# Patient Record
Sex: MALE | Race: WHITE | NOT HISPANIC OR LATINO | URBAN - METROPOLITAN AREA
[De-identification: names, ages, dates, MRNs, and addresses within clinical notes are randomized per-mention and may not be internally consistent; named-entity substitution may affect disease eponyms.]

---

## 2018-01-08 ENCOUNTER — INPATIENT (INPATIENT)
Facility: HOSPITAL | Age: 83
LOS: 11 days | Discharge: HOME | DRG: 244 | End: 2018-01-20
Attending: INTERNAL MEDICINE | Admitting: FAMILY MEDICINE
Payer: MEDICARE

## 2018-01-08 VITALS
HEART RATE: 110 BPM | SYSTOLIC BLOOD PRESSURE: 135 MMHG | TEMPERATURE: 98 F | HEIGHT: 67 IN | DIASTOLIC BLOOD PRESSURE: 67 MMHG | OXYGEN SATURATION: 95 % | RESPIRATION RATE: 24 BRPM | WEIGHT: 149.91 LBS

## 2018-01-08 DIAGNOSIS — R55 SYNCOPE AND COLLAPSE: ICD-10-CM

## 2018-01-08 LAB
ALBUMIN SERPL ELPH-MCNC: 3.6 G/DL — SIGNIFICANT CHANGE UP (ref 3.3–5.2)
ALP SERPL-CCNC: 82 U/L — SIGNIFICANT CHANGE UP (ref 40–120)
ALT FLD-CCNC: 32 U/L — SIGNIFICANT CHANGE UP
ANION GAP SERPL CALC-SCNC: 18 MMOL/L — HIGH (ref 5–17)
ANISOCYTOSIS BLD QL: SLIGHT — SIGNIFICANT CHANGE UP
APPEARANCE UR: CLEAR — SIGNIFICANT CHANGE UP
APTT BLD: 27 SEC — LOW (ref 27.5–37.4)
AST SERPL-CCNC: 32 U/L — SIGNIFICANT CHANGE UP
BILIRUB SERPL-MCNC: 0.5 MG/DL — SIGNIFICANT CHANGE UP (ref 0.4–2)
BILIRUB UR-MCNC: NEGATIVE — SIGNIFICANT CHANGE UP
BUN SERPL-MCNC: 28 MG/DL — HIGH (ref 8–20)
CALCIUM SERPL-MCNC: 9 MG/DL — SIGNIFICANT CHANGE UP (ref 8.6–10.2)
CHLORIDE SERPL-SCNC: 100 MMOL/L — SIGNIFICANT CHANGE UP (ref 98–107)
CO2 SERPL-SCNC: 19 MMOL/L — LOW (ref 22–29)
COLOR SPEC: YELLOW — SIGNIFICANT CHANGE UP
CREAT SERPL-MCNC: 1.02 MG/DL — SIGNIFICANT CHANGE UP (ref 0.5–1.3)
DIFF PNL FLD: ABNORMAL
EPI CELLS # UR: SIGNIFICANT CHANGE UP
GLUCOSE SERPL-MCNC: 112 MG/DL — SIGNIFICANT CHANGE UP (ref 70–115)
GLUCOSE UR QL: NEGATIVE MG/DL — SIGNIFICANT CHANGE UP
HCT VFR BLD CALC: 36.9 % — LOW (ref 42–52)
HGB BLD-MCNC: 12.5 G/DL — LOW (ref 14–18)
INR BLD: 1.2 RATIO — HIGH (ref 0.88–1.16)
KETONES UR-MCNC: NEGATIVE — SIGNIFICANT CHANGE UP
LEUKOCYTE ESTERASE UR-ACNC: NEGATIVE — SIGNIFICANT CHANGE UP
LYMPHOCYTES # BLD AUTO: 5 % — LOW (ref 20–55)
MACROCYTES BLD QL: SLIGHT — SIGNIFICANT CHANGE UP
MCHC RBC-ENTMCNC: 31.4 PG — HIGH (ref 27–31)
MCHC RBC-ENTMCNC: 33.9 G/DL — SIGNIFICANT CHANGE UP (ref 32–36)
MCV RBC AUTO: 92.7 FL — SIGNIFICANT CHANGE UP (ref 80–94)
MICROCYTES BLD QL: SLIGHT — SIGNIFICANT CHANGE UP
MONOCYTES NFR BLD AUTO: 12 % — HIGH (ref 3–10)
NEUTROPHILS NFR BLD AUTO: 83 % — HIGH (ref 37–73)
NITRITE UR-MCNC: NEGATIVE — SIGNIFICANT CHANGE UP
NT-PROBNP SERPL-SCNC: 781 PG/ML — HIGH (ref 0–300)
OVALOCYTES BLD QL SMEAR: SLIGHT — SIGNIFICANT CHANGE UP
PH UR: 6 — SIGNIFICANT CHANGE UP (ref 5–8)
PLAT MORPH BLD: NORMAL — SIGNIFICANT CHANGE UP
PLATELET # BLD AUTO: 239 K/UL — SIGNIFICANT CHANGE UP (ref 150–400)
POIKILOCYTOSIS BLD QL AUTO: SLIGHT — SIGNIFICANT CHANGE UP
POTASSIUM SERPL-MCNC: 4 MMOL/L — SIGNIFICANT CHANGE UP (ref 3.5–5.3)
POTASSIUM SERPL-SCNC: 4 MMOL/L — SIGNIFICANT CHANGE UP (ref 3.5–5.3)
PROT SERPL-MCNC: 7.1 G/DL — SIGNIFICANT CHANGE UP (ref 6.6–8.7)
PROT UR-MCNC: NEGATIVE MG/DL — SIGNIFICANT CHANGE UP
PROTHROM AB SERPL-ACNC: 13.3 SEC — HIGH (ref 9.8–12.7)
RBC # BLD: 3.98 M/UL — LOW (ref 4.6–6.2)
RBC # FLD: 13 % — SIGNIFICANT CHANGE UP (ref 11–15.6)
RBC BLD AUTO: ABNORMAL
RBC CASTS # UR COMP ASSIST: SIGNIFICANT CHANGE UP /HPF (ref 0–4)
SODIUM SERPL-SCNC: 137 MMOL/L — SIGNIFICANT CHANGE UP (ref 135–145)
SP GR SPEC: 1.01 — SIGNIFICANT CHANGE UP (ref 1.01–1.02)
UROBILINOGEN FLD QL: NEGATIVE MG/DL — SIGNIFICANT CHANGE UP
WBC # BLD: 10.8 K/UL — SIGNIFICANT CHANGE UP (ref 4.8–10.8)
WBC # FLD AUTO: 10.8 K/UL — SIGNIFICANT CHANGE UP (ref 4.8–10.8)
WBC UR QL: NEGATIVE — SIGNIFICANT CHANGE UP

## 2018-01-08 PROCEDURE — 93010 ELECTROCARDIOGRAM REPORT: CPT

## 2018-01-08 PROCEDURE — 71045 X-RAY EXAM CHEST 1 VIEW: CPT | Mod: 26

## 2018-01-08 PROCEDURE — 99223 1ST HOSP IP/OBS HIGH 75: CPT

## 2018-01-08 PROCEDURE — 99285 EMERGENCY DEPT VISIT HI MDM: CPT

## 2018-01-08 RX ORDER — SODIUM CHLORIDE 9 MG/ML
1000 INJECTION INTRAMUSCULAR; INTRAVENOUS; SUBCUTANEOUS ONCE
Qty: 0 | Refills: 0 | Status: COMPLETED | OUTPATIENT
Start: 2018-01-08 | End: 2018-01-08

## 2018-01-08 RX ORDER — IPRATROPIUM/ALBUTEROL SULFATE 18-103MCG
3 AEROSOL WITH ADAPTER (GRAM) INHALATION ONCE
Qty: 0 | Refills: 0 | Status: COMPLETED | OUTPATIENT
Start: 2018-01-08 | End: 2018-01-08

## 2018-01-08 RX ORDER — METOPROLOL TARTRATE 50 MG
5 TABLET ORAL ONCE
Qty: 0 | Refills: 0 | Status: COMPLETED | OUTPATIENT
Start: 2018-01-08 | End: 2018-01-08

## 2018-01-08 RX ORDER — SODIUM CHLORIDE 9 MG/ML
1000 INJECTION INTRAMUSCULAR; INTRAVENOUS; SUBCUTANEOUS
Qty: 0 | Refills: 0 | Status: DISCONTINUED | OUTPATIENT
Start: 2018-01-08 | End: 2018-01-11

## 2018-01-08 RX ORDER — ENOXAPARIN SODIUM 100 MG/ML
68 INJECTION SUBCUTANEOUS ONCE
Qty: 0 | Refills: 0 | Status: COMPLETED | OUTPATIENT
Start: 2018-01-08 | End: 2018-01-09

## 2018-01-08 RX ORDER — METOPROLOL TARTRATE 50 MG
25 TABLET ORAL EVERY 8 HOURS
Qty: 0 | Refills: 0 | Status: DISCONTINUED | OUTPATIENT
Start: 2018-01-08 | End: 2018-01-10

## 2018-01-08 RX ADMIN — Medication 3 MILLILITER(S): at 20:33

## 2018-01-08 RX ADMIN — SODIUM CHLORIDE 1000 MILLILITER(S): 9 INJECTION INTRAMUSCULAR; INTRAVENOUS; SUBCUTANEOUS at 20:33

## 2018-01-08 RX ADMIN — SODIUM CHLORIDE 1000 MILLILITER(S): 9 INJECTION INTRAMUSCULAR; INTRAVENOUS; SUBCUTANEOUS at 22:30

## 2018-01-08 RX ADMIN — Medication 5 MILLIGRAM(S): at 20:33

## 2018-01-08 NOTE — ED ADULT NURSE NOTE - PMH
CAD (coronary artery disease)    HTN (hypertension)    Hyperlipidemia    MI (myocardial infarction)  1988  Stented coronary artery

## 2018-01-08 NOTE — ED PROVIDER NOTE - MEDICAL DECISION MAKING DETAILS
Multiple episodes of syncope, 1 provoked 1 at rest. Suspect possibly from Afib w/RVR as has not taken meds today. Will place on tele, get labs, CXR. Pt requesting xfer to [facility], will reassess after assessment ot see if patient still wants transfer or complete workup here. Given age, multiple syncopal episodes in a day, think will at least need to be monitored overnight, but will defer to [facility]'s asessment. Atraumatic exam. Not anticoagulated, though unclear why. Multiple episodes of syncope, 1 provoked 1 at rest. Suspect possibly from Afib w/RVR as has not taken meds today. Will place on tele, get labs, CXR. Pt requesting xfer to [facility], will reassess after assessment ot see if patient still wants transfer or complete workup here. Given age, multiple syncopal episodes in a day, think will at least need to be monitored overnight, but will defer to [facility] cardiology's asessment. Atraumatic exam. Not anticoagulated, though unclear why.

## 2018-01-08 NOTE — ED PROVIDER NOTE - PHYSICAL EXAMINATION
GEN: well appearing, nontoxic  HEAD: NCAT  EYES: clear  ENT: mucus membranes are dry, oropharynx WNL, neck supple  CV: tachycardic, irregular, 2+ distal pulses  RESP: scant expiratory wheezing bialterally, no focal aventitious breath sounds, no tachypnea, no increased WOB  ABD: soft, nontender  MSK: pelvist stable, extremities nontender, symmetric, fully ranged painlessly. no neck or back tenderness  NEURO: CNII-XII intact, strength intact, sensation intact, coordination intact  SKIN: warm, dry, no bruising

## 2018-01-08 NOTE — ED PROVIDER NOTE - OBJECTIVE STATEMENT
This is an 83M w/Hx of CAD, paroxysmal Afib (not on AC and no "episodes" for years) who presents for 2 episodes of syncope. He has been feeling mildly ill as he is recovering from "a flu" he had for the last week and his wife had the week before, which he describes as runny nose, sore throat, dry cough, mild body aches, no fevers, no sputum, no SOB. Today he syncopized twice. Once when he stood up quickly to go to the bathroom and fell backwards onto a padded chair. The second time he was seated rest and not changing position. Both episodes lasted somewhere between 1-3 minutes per patient's wife. Both episodes were preceded by palpitations and feeling flushed. he denies any associated SOB, pain. He denies any pain or injuries This is an 83M w/Hx of CAD, paroxysmal Afib (not on AC and no "episodes" for years) who presents for 2 episodes of syncope. He has been feeling mildly ill as he is recovering from "a flu" he had for the last week and his wife had the week before, which he describes as runny nose, sore throat, dry cough, mild body aches, no fevers, no sputum, no SOB. Today he syncopized twice. Once when he stood up quickly to go to the bathroom and fell backwards onto the a chair. The second time he was seated rest and not changing position. Both episodes lasted somewhere between 1-3 minutes per patient's wife. Both episodes were preceded by palpitations and feeling flushed. he denies any associated SOB, pain. He denies any pain or injuries

## 2018-01-08 NOTE — CONSULT NOTE ADULT - SUBJECTIVE AND OBJECTIVE BOX
[facility], [facility]                                          [address], [address], [place] [address]                                               PHONE: [phone]    FAX: [phone] [address], [address], [place] [address]                                       PHONE: [phone]    FAX: [phone]  *******************************************************************************    Reason for Consult: Syncope    HPI:  TATIANA RIBERA is a 83y Male with HTN, HLD, CAD/MI/stents presents with syncope x 2 today.  Pt was at rest, sat up with palpitations and dyspnea, then "passed out."  Second time patient was walking to the bathroom, felt palpitations, dyspnea, and then "passed out."  No significant trauma.  In ER found to be rapid AF, given IV BB with improvement in HR.  Denies CP, orthopnea, PND, or edema.  History of AF ~3 years ago, not maintained on AC.  Recent prolonged viral illness.    PAST MEDICAL & SURGICAL HISTORY:  MI (myocardial infarction): 1988  Stented coronary artery  CAD (coronary artery disease)  Hyperlipidemia  HTN (hypertension)  History of inguinal hernia repair  History of hemorrhoidectomy      Lipitor (Muscle Pain)  Plavix (Other)      MEDICATIONS  (STANDING):  sodium chloride 0.9% Bolus 1000 milliLiter(s) IV Bolus once    MEDICATIONS  (PRN):      Social History: no active tobacco / EtOH / IVDA    Family History: noncontributory    ROS: As noted above, otherwise unremarkable.    Vital Signs Last 24 Hrs  T(C): 36.3 (08 Jan 2018 20:10), Max: 36.6 (08 Jan 2018 18:14)  T(F): 97.4 (08 Jan 2018 20:10), Max: 97.9 (08 Jan 2018 18:14)  HR: 115 (08 Jan 2018 20:10) (98 - 115)  BP: 152/74 (08 Jan 2018 20:10) (131/75 - 152/74)  BP(mean): --  RR: 21 (08 Jan 2018 20:10) (20 - 24)  SpO2: 97% (08 Jan 2018 20:10) (95% - 97%)    I&O's Detail    I&O's Summary          PHYSICAL EXAM:  General: Appears well developed, well nourished, no acute distress  HEENT: Head: normocephalic, atraumatic  Eyes: Pupils equal and reactive  Neck: Supple, no carotid bruit, no JVD, no HJR  CARDIOVASCULAR: Irreg irreg, Normal S1 and S2, no murmur, rub, or gallop  LUNGS: Clear to auscultation bilaterally, no rales, rhonchi or wheeze  ABDOMEN: Soft, nontender, non-distended, positive bowel sounds, no mass or bruit  EXTREMITIES: No edema, distal pulses WNL  SKIN: Warm and dry with normal turgor  NEURO: Alert & oriented x 3, grossly intact  PSYCH: normal mood and affect    LABS:                        12.5   10.8  )-----------( 239      ( 08 Jan 2018 18:55 )             36.9     01-08    137  |  100  |  28.0<H>  ----------------------------<  112  4.0   |  19.0<L>  |  1.02    Ca    9.0      08 Jan 2018 18:55    TPro  7.1  /  Alb  3.6  /  TBili  0.5  /  DBili  x   /  AST  32  /  ALT  32  /  AlkPhos  82  01-08        PT/INR - ( 08 Jan 2018 18:55 )   PT: 13.3 sec;   INR: 1.20 ratio         PTT - ( 08 Jan 2018 18:55 )  PTT:27.0 sec    RADIOLOGY & ADDITIONAL STUDIES:    ECG: AF 117bpm, LVH, septal infarct, age indeterminate, nonspecific T wave changes    CATH:     Assessment and Plan:  In summary, TATIANA RIBERA is a 83y Male with past medical history significant for     - Monitor on telemetry  - Orthostatics  - Check troponins x3  - Repeat EKG  - Echocardiogram  - Carotid Duplex Scan  - Further work-up & testing (possible MRI, EEG, etc.) per neurology   - No evidence of ischemia or CHF clinically, eventual ischemic evaluation (likely as outpatient)  - Rhythm/hemodynamics stable = continue current doses for now and titrate PRN    We will follow with you.  Thank you for allowing me to participate in the care of your patient.      Sincerely,    Kaushal Thomas MD [facility], [facility]                                          [address], [address], [place] [address]                                               PHONE: [phone]    FAX: [phone] [address], [address], [place] [address]                                       PHONE: [phone]    FAX: [phone]  *******************************************************************************    Reason for Consult: Syncope    HPI:  TATIANA RIBERA is a 83y Male with HTN, HLD, CAD/MI/stents presents with syncope x 2 today.  Pt was at rest, sat up with palpitations and dyspnea, then "passed out."  Second time patient was walking to the bathroom, felt palpitations, dyspnea, and then "passed out."  No significant trauma.  In ER found to be rapid AF, given IV BB with improvement in HR.  Denies CP, orthopnea, PND, or edema.  History of AF ~3 years ago, not maintained on AC.  Recent prolonged viral illness.    PAST MEDICAL & SURGICAL HISTORY:  MI (myocardial infarction): 1988  Stented coronary artery  CAD (coronary artery disease)  Hyperlipidemia  HTN (hypertension)  History of inguinal hernia repair  History of hemorrhoidectomy      Lipitor (Muscle Pain)  Plavix (Other)      MEDICATIONS  (STANDING):  sodium chloride 0.9% Bolus 1000 milliLiter(s) IV Bolus once    MEDICATIONS  (PRN):      Social History: no active tobacco / EtOH / IVDA    Family History: noncontributory    ROS: As noted above, otherwise unremarkable.    Vital Signs Last 24 Hrs  T(C): 36.3 (08 Jan 2018 20:10), Max: 36.6 (08 Jan 2018 18:14)  T(F): 97.4 (08 Jan 2018 20:10), Max: 97.9 (08 Jan 2018 18:14)  HR: 115 (08 Jan 2018 20:10) (98 - 115)  BP: 152/74 (08 Jan 2018 20:10) (131/75 - 152/74)  BP(mean): --  RR: 21 (08 Jan 2018 20:10) (20 - 24)  SpO2: 97% (08 Jan 2018 20:10) (95% - 97%)    I&O's Detail    I&O's Summary          PHYSICAL EXAM:  General: Appears well developed, well nourished, no acute distress  HEENT: Head: normocephalic, atraumatic  Eyes: Pupils equal and reactive  Neck: Supple, no carotid bruit, no JVD, no HJR  CARDIOVASCULAR: Irreg irreg, Normal S1 and S2, no murmur, rub, or gallop  LUNGS: Clear to auscultation bilaterally, no rales, rhonchi or wheeze  ABDOMEN: Soft, nontender, non-distended, positive bowel sounds, no mass or bruit  EXTREMITIES: No edema, distal pulses WNL  SKIN: Warm and dry with normal turgor  NEURO: Alert & oriented x 3, grossly intact  PSYCH: normal mood and affect    LABS:                        12.5   10.8  )-----------( 239      ( 08 Jan 2018 18:55 )             36.9     01-08    137  |  100  |  28.0<H>  ----------------------------<  112  4.0   |  19.0<L>  |  1.02    Ca    9.0      08 Jan 2018 18:55    TPro  7.1  /  Alb  3.6  /  TBili  0.5  /  DBili  x   /  AST  32  /  ALT  32  /  AlkPhos  82  01-08        PT/INR - ( 08 Jan 2018 18:55 )   PT: 13.3 sec;   INR: 1.20 ratio         PTT - ( 08 Jan 2018 18:55 )  PTT:27.0 sec    RADIOLOGY & ADDITIONAL STUDIES:    ECG: AF 117bpm, LVH, septal infarct, age indeterminate, nonspecific T wave changes    CATH: < from: Cardiac Cath Lab (04.23.13 @ 17:39) >  VENTRICLES: No LV gram was performed; however, a recent echocardiogram  demonstrated normal global and regional LV function.  CORONARY VESSELS: The coronary circulation is right dominant.  LM:   --  LM: This vessel was not injected.  LAD:   --  LAD: This vessel was not injected.  CX:   --  Circumflex: This vessel was not injected.  RCA:   --  Proximal RCA: There was a diffuse 90 % stenosis at the ostium of  the vessel segment. There was CASSANDRA grade 3 flow through the vessel (brisk  flow). This is a likely culprit for the patient's clinical presentation.  An intervention was performed.  COMPLICATIONS: There were no complications.  DIAGNOSTIC RECOMMENDATIONS:  1. Successful POBA and brachytherapy to the ostial RCA.  2. Aspirin 81mg and clopidogrel 75mg daily indefinitely.  INTERVENTIONAL RECOMMENDATIONS:  1. Successful POBA and brachytherapy to the ostial RCA.  2. Aspirin 81mg and clopidogrel 75mg daily indefinitely.    < end of copied text >      Assessment and Plan:  In summary, TATIANA RIBERA is a 83y Male with past medical history significant for HTN, HLD, CAD/MI/stents presents with syncope x 2 today.   History of AF ~ 3 years ago, not on AC.  Currently with AF/RVR.  Consider vasovagal vs. arrhythmia.    - Monitor on telemetry  - Orthostatics  - Check troponins x3, initial negative  - Repeat EKG  - Echocardiogram  - Carotid Duplex Scan  - Further work-up & testing (possible MRI, EEG, etc.) per neurology   - No evidence of ischemia or CHF clinically, eventual ischemic evaluation (likely as outpatient)  - Check TSH  - Lopressor 25mg q8h for now, titrate prn  - Continue home CV medications including ASA  - IVF hydration  - Will get office records in AM, but last seen years ago  - Increased CHADSVASc score.  Start lovenox 1mg/kg for now.    We will follow with you.  Thank you for allowing me to participate in the care of your patient.      Sincerely,    Kaushal Thomas MD [facility], [facility]                                          [address], [place] [address]                                               PHONE: [phone]    FAX: [phone] [address], [address], [place] [address]                                       PHONE: [phone]    FAX: [phone]  *******************************************************************************    Reason for Consult: Syncope    HPI:  TATIANA RIBERA is a 83y Male with HTN, HLD, CAD/MI/stents presents with syncope x 2 today.  Pt was at rest, sat up with palpitations and dyspnea, then "passed out."  Second time patient was walking to the bathroom, felt palpitations, dyspnea, and then "passed out."  No significant trauma.  In ER found to be rapid AF, given IV BB with improvement in HR.  Denies CP, orthopnea, PND, or edema.  History of AF ~3 years ago, not maintained on AC.  Recent prolonged viral illness.    PAST MEDICAL & SURGICAL HISTORY:  MI (myocardial infarction): 1988  Stented coronary artery  CAD (coronary artery disease)  Hyperlipidemia  HTN (hypertension)  History of inguinal hernia repair  History of hemorrhoidectomy      Lipitor (Muscle Pain)  Plavix (Other)      MEDICATIONS  (STANDING):  sodium chloride 0.9% Bolus 1000 milliLiter(s) IV Bolus once    MEDICATIONS  (PRN):      Social History: no active tobacco / EtOH / IVDA    Family History: noncontributory    ROS: As noted above, otherwise unremarkable.    Vital Signs Last 24 Hrs  T(C): 36.3 (08 Jan 2018 20:10), Max: 36.6 (08 Jan 2018 18:14)  T(F): 97.4 (08 Jan 2018 20:10), Max: 97.9 (08 Jan 2018 18:14)  HR: 115 (08 Jan 2018 20:10) (98 - 115)  BP: 152/74 (08 Jan 2018 20:10) (131/75 - 152/74)  BP(mean): --  RR: 21 (08 Jan 2018 20:10) (20 - 24)  SpO2: 97% (08 Jan 2018 20:10) (95% - 97%)    I&O's Detail    I&O's Summary          PHYSICAL EXAM:  General: Appears well developed, well nourished, no acute distress  HEENT: Head: normocephalic, atraumatic  Eyes: Pupils equal and reactive  Neck: Supple, no carotid bruit, no JVD, no HJR  CARDIOVASCULAR: Irreg irreg, Normal S1 and S2, no murmur, rub, or gallop  LUNGS: Clear to auscultation bilaterally, no rales, rhonchi or wheeze  ABDOMEN: Soft, nontender, non-distended, positive bowel sounds, no mass or bruit  EXTREMITIES: No edema, distal pulses WNL  SKIN: Warm and dry with normal turgor  NEURO: Alert & oriented x 3, grossly intact  PSYCH: normal mood and affect    LABS:                        12.5   10.8  )-----------( 239      ( 08 Jan 2018 18:55 )             36.9     01-08    137  |  100  |  28.0<H>  ----------------------------<  112  4.0   |  19.0<L>  |  1.02    Ca    9.0      08 Jan 2018 18:55    TPro  7.1  /  Alb  3.6  /  TBili  0.5  /  DBili  x   /  AST  32  /  ALT  32  /  AlkPhos  82  01-08        PT/INR - ( 08 Jan 2018 18:55 )   PT: 13.3 sec;   INR: 1.20 ratio         PTT - ( 08 Jan 2018 18:55 )  PTT:27.0 sec    RADIOLOGY & ADDITIONAL STUDIES:    ECG: AF 117bpm, LVH, septal infarct, age indeterminate, nonspecific T wave changes    CATH: < from: Cardiac Cath Lab (04.23.13 @ 17:39) >  VENTRICLES: No LV gram was performed; however, a recent echocardiogram  demonstrated normal global and regional LV function.  CORONARY VESSELS: The coronary circulation is right dominant.  LM:   --  LM: This vessel was not injected.  LAD:   --  LAD: This vessel was not injected.  CX:   --  Circumflex: This vessel was not injected.  RCA:   --  Proximal RCA: There was a diffuse 90 % stenosis at the ostium of  the vessel segment. There was CASSANDRA grade 3 flow through the vessel (brisk  flow). This is a likely culprit for the patient's clinical presentation.  An intervention was performed.  COMPLICATIONS: There were no complications.  DIAGNOSTIC RECOMMENDATIONS:  1. Successful POBA and brachytherapy to the ostial RCA.  2. Aspirin 81mg and clopidogrel 75mg daily indefinitely.  INTERVENTIONAL RECOMMENDATIONS:  1. Successful POBA and brachytherapy to the ostial RCA.  2. Aspirin 81mg and clopidogrel 75mg daily indefinitely.    < end of copied text >      Assessment and Plan:  In summary, TATIANA RIBERA is a 83y Male with past medical history significant for HTN, HLD, CAD/MI/stents presents with syncope x 2 today.   History of AF ~ 3 years ago, not on AC.  Currently with AF/RVR.  Consider orthostatic vs. vasovagal vs. arrhythmia.    - Monitor on telemetry  - + Orthostatics in ER  - Check troponins x3, initial negative  - Repeat EKG  - Echocardiogram  - Carotid Duplex Scan  - Further work-up & testing (possible MRI, EEG, etc.) per neurology   - No evidence of ischemia or CHF clinically, eventual ischemic evaluation (likely as outpatient)  - Check TSH  - Lopressor 25mg q8h for now, titrate prn  - Continue home CV medications including ASA  - IVF hydration  - Will get office records in AM, but last seen years ago  - Increased CHADSVASc score.  Start lovenox 1mg/kg for now.    We will follow with you.  Thank you for allowing me to participate in the care of your patient.      Sincerely,    Kaushal Thomas MD

## 2018-01-08 NOTE — ED ADULT NURSE NOTE - CHIEF COMPLAINT QUOTE
Patient brought in by ambulance A/Ox3, patient reports he was sitting on couch and went to get up and had a syncopal episode landing on the couch, witnessed by his wife.  Patient has been incontinent twice today.  Patient [facility].  As per wife-pt. started new medication "Promethazime", and reports patient has been weak/fainting since he started new med.

## 2018-01-08 NOTE — ED PROVIDER NOTE - PROGRESS NOTE DETAILS
Pt amenable to stay in this hospital. To be seen by Dr. Ortiz of [facility]. Pt amenable to stay in this hospital. Appears dehydrated by labs. To be seen by [facility].

## 2018-01-08 NOTE — ED ADULT TRIAGE NOTE - CHIEF COMPLAINT QUOTE
Patient brought in by ambulance A/Ox3, patient reports he was sitting on couch and went to get up and had a syncopal episode landing on the couch, witnessed by his wife.  Patient has been incontinent twice today.  Patient [facility]. Patient brought in by ambulance A/Ox3, patient reports he was sitting on couch and went to get up and had a syncopal episode landing on the couch, witnessed by his wife.  Patient has been incontinent twice today.  Patient [facility].  As per wife-pt. started new medication "Promethazime", and reports patient has been weak/fainting since he started new med.

## 2018-01-08 NOTE — H&P ADULT - PROBLEM SELECTOR PLAN 1
admit to telemetry  cardiology consult appreciated  troponin neg x 1, f/u rpt and trend  EKG reviewed-rpt in AM  Carotid doppler  TTE  cont lopressor-dosage changed to 25 q 8 from 50 ER  IVF for gentle hydration  lovenox full dose x 1   f/u cbc, cmp, coags, trop

## 2018-01-08 NOTE — H&P ADULT - NSHPPHYSICALEXAM_GEN_ALL_CORE
General: Appears well developed, well nourished, no acute distress  HEENT: Head: normocephalic, atraumatic  Eyes: Pupils equal and reactive  Neck: Supple, no carotid bruit  CARDIOVASCULAR: Irreg irreg, Normal S1 and S2, no murmur, rub, or gallop  LUNGS: Clear to auscultation bilaterally, no rales, rhonchi or wheeze  ABDOMEN: Soft, nontender, non-distended, positive bowel sounds, no mass or bruit  EXTREMITIES: No edema, distal pulses WNL  SKIN: Warm and dry with normal turgor  NEURO: Alert & oriented x 3, grossly intact  PSYCH: normal mood and affect

## 2018-01-08 NOTE — H&P ADULT - HISTORY OF PRESENT ILLNESS
Pt is an 82yo Male with HTN, HLD, CAD/MI/stents who presents with syncope x 2 today, which was preceded by shortness of breath and associated with urinary incontinence. Pt states in past he has had these episodes of SOB and that it has been going on for many years but today was the first time he lost consciousness. Pt denies tongue biting, numbness, speech, visual, auditory disturbances, cp, palpitations, HA, n/v/d. Pt does endorse URI sx for past two weeks.

## 2018-01-08 NOTE — ED ADULT NURSE NOTE - OBJECTIVE STATEMENT
received pt sitting in stretcher with 18g RAC. pt c/o cough, difficulty breathing, states he has been sick with flu, today feeling dizzy and passed out earlier. received pt sitting in stretcher with 18g RAC, A&Ox3. pt c/o dry cough, difficulty breathing after bending over to tie shoes or laying down, states he has been sick with flu past couple days, today feeling dizzy and passed out 2x today with bladder incontinence. denies injury both times.

## 2018-01-08 NOTE — ED PROVIDER NOTE - CARE PLAN
Principal Discharge DX:	Syncope Principal Discharge DX:	Syncope  Secondary Diagnosis:	Viral syndrome

## 2018-01-08 NOTE — ED PROVIDER NOTE - NS ED ROS FT
CONST: see HPI  EYES: no pain, no visual disturbances  ENT: see HPI  CV: see HPI  RESP: see HPI  ABD: no abdominal pain, no nausea, no vomiting, no diarrhea, no black or bloody stool  : no dysuria, no hematuria, no frequency, no urgency  MSK: no back pain, no neck pain, no extremity pain  NEURO: no headache, no sensory disturbances, no focal weakness, no dizziness  HEME: no easy bleeding or bruising, see HPI  SKIN: no diaphoresis, no rash  ENDO: no DM, no thyroid issues

## 2018-01-09 DIAGNOSIS — E78.5 HYPERLIPIDEMIA, UNSPECIFIED: ICD-10-CM

## 2018-01-09 DIAGNOSIS — D64.9 ANEMIA, UNSPECIFIED: ICD-10-CM

## 2018-01-09 DIAGNOSIS — R94.6 ABNORMAL RESULTS OF THYROID FUNCTION STUDIES: ICD-10-CM

## 2018-01-09 DIAGNOSIS — Z90.89 ACQUIRED ABSENCE OF OTHER ORGANS: Chronic | ICD-10-CM

## 2018-01-09 DIAGNOSIS — N17.9 ACUTE KIDNEY FAILURE, UNSPECIFIED: ICD-10-CM

## 2018-01-09 DIAGNOSIS — B34.9 VIRAL INFECTION, UNSPECIFIED: ICD-10-CM

## 2018-01-09 DIAGNOSIS — I25.10 ATHEROSCLEROTIC HEART DISEASE OF NATIVE CORONARY ARTERY WITHOUT ANGINA PECTORIS: ICD-10-CM

## 2018-01-09 DIAGNOSIS — R55 SYNCOPE AND COLLAPSE: ICD-10-CM

## 2018-01-09 LAB
APTT BLD: 27.3 SEC — LOW (ref 27.5–37.4)
CHOLEST SERPL-MCNC: 132 MG/DL — SIGNIFICANT CHANGE UP (ref 110–199)
FERRITIN SERPL-MCNC: 114.7 NG/ML — SIGNIFICANT CHANGE UP (ref 30–400)
HBA1C BLD-MCNC: 5 % — SIGNIFICANT CHANGE UP (ref 4–5.6)
HCT VFR BLD CALC: 34 % — LOW (ref 42–52)
HDLC SERPL-MCNC: 32 MG/DL — LOW
HGB BLD-MCNC: 11.1 G/DL — LOW (ref 14–18)
INR BLD: 1.25 RATIO — HIGH (ref 0.88–1.16)
IRON SATN MFR SERPL: 14 % — LOW (ref 16–55)
IRON SATN MFR SERPL: 42 UG/DL — LOW (ref 59–158)
LIPID PNL WITH DIRECT LDL SERPL: 80 MG/DL — SIGNIFICANT CHANGE UP
MAGNESIUM SERPL-MCNC: 1.9 MG/DL — SIGNIFICANT CHANGE UP (ref 1.6–2.6)
MCHC RBC-ENTMCNC: 30.6 PG — SIGNIFICANT CHANGE UP (ref 27–31)
MCHC RBC-ENTMCNC: 32.6 G/DL — SIGNIFICANT CHANGE UP (ref 32–36)
MCV RBC AUTO: 93.7 FL — SIGNIFICANT CHANGE UP (ref 80–94)
PHOSPHATE SERPL-MCNC: 2.6 MG/DL — SIGNIFICANT CHANGE UP (ref 2.4–4.7)
PLATELET # BLD AUTO: 236 K/UL — SIGNIFICANT CHANGE UP (ref 150–400)
PROTHROM AB SERPL-ACNC: 13.8 SEC — HIGH (ref 9.8–12.7)
RAPID RVP RESULT: SIGNIFICANT CHANGE UP
RBC # BLD: 3.63 M/UL — LOW (ref 4.6–6.2)
RBC # FLD: 13 % — SIGNIFICANT CHANGE UP (ref 11–15.6)
T3 SERPL-MCNC: 83 NG/DL — SIGNIFICANT CHANGE UP (ref 80–200)
T4 AB SER-ACNC: 5.5 UG/DL — SIGNIFICANT CHANGE UP (ref 4.5–12)
TIBC SERPL-MCNC: 296 UG/DL — SIGNIFICANT CHANGE UP (ref 220–430)
TOTAL CHOLESTEROL/HDL RATIO MEASUREMENT: 4 RATIO — SIGNIFICANT CHANGE UP (ref 3.4–9.6)
TRANSFERRIN SERPL-MCNC: 207 MG/DL — SIGNIFICANT CHANGE UP (ref 180–329)
TRIGL SERPL-MCNC: 102 MG/DL — SIGNIFICANT CHANGE UP (ref 10–200)
TROPONIN T SERPL-MCNC: 0.05 NG/ML — SIGNIFICANT CHANGE UP (ref 0–0.06)
TSH SERPL-MCNC: 0.23 UIU/ML — LOW (ref 0.27–4.2)
TSH SERPL-MCNC: 0.28 UIU/ML — SIGNIFICANT CHANGE UP (ref 0.27–4.2)
WBC # BLD: 9.3 K/UL — SIGNIFICANT CHANGE UP (ref 4.8–10.8)
WBC # FLD AUTO: 9.3 K/UL — SIGNIFICANT CHANGE UP (ref 4.8–10.8)

## 2018-01-09 PROCEDURE — 93306 TTE W/DOPPLER COMPLETE: CPT | Mod: 26

## 2018-01-09 PROCEDURE — 99233 SBSQ HOSP IP/OBS HIGH 50: CPT

## 2018-01-09 RX ORDER — PRASUGREL 5 MG/1
10 TABLET, FILM COATED ORAL DAILY
Qty: 0 | Refills: 0 | Status: DISCONTINUED | OUTPATIENT
Start: 2018-01-09 | End: 2018-01-20

## 2018-01-09 RX ORDER — ENOXAPARIN SODIUM 100 MG/ML
68 INJECTION SUBCUTANEOUS EVERY 12 HOURS
Qty: 0 | Refills: 0 | Status: DISCONTINUED | OUTPATIENT
Start: 2018-01-09 | End: 2018-01-16

## 2018-01-09 RX ORDER — ASPIRIN/CALCIUM CARB/MAGNESIUM 324 MG
325 TABLET ORAL DAILY
Qty: 0 | Refills: 0 | Status: DISCONTINUED | OUTPATIENT
Start: 2018-01-09 | End: 2018-01-20

## 2018-01-09 RX ADMIN — ENOXAPARIN SODIUM 68 MILLIGRAM(S): 100 INJECTION SUBCUTANEOUS at 17:35

## 2018-01-09 RX ADMIN — Medication 25 MILLIGRAM(S): at 05:55

## 2018-01-09 RX ADMIN — PRASUGREL 10 MILLIGRAM(S): 5 TABLET, FILM COATED ORAL at 14:16

## 2018-01-09 RX ADMIN — ENOXAPARIN SODIUM 68 MILLIGRAM(S): 100 INJECTION SUBCUTANEOUS at 05:55

## 2018-01-09 RX ADMIN — Medication 25 MILLIGRAM(S): at 14:16

## 2018-01-09 RX ADMIN — Medication 25 MILLIGRAM(S): at 21:05

## 2018-01-09 RX ADMIN — Medication 5 MILLIGRAM(S): at 05:56

## 2018-01-09 RX ADMIN — Medication 325 MILLIGRAM(S): at 14:16

## 2018-01-09 RX ADMIN — SODIUM CHLORIDE 80 MILLILITER(S): 9 INJECTION INTRAMUSCULAR; INTRAVENOUS; SUBCUTANEOUS at 05:54

## 2018-01-09 RX ADMIN — Medication 10 MILLIGRAM(S): at 05:56

## 2018-01-09 NOTE — ED ADULT NURSE REASSESSMENT NOTE - NS ED NURSE REASSESS COMMENT FT1
Pt had a 3.29 pause and carlene down. Dr. Guerra made aware and strips put in chart. Pt is asymptotic and denies any chest pain. Will continue to monitor.
Spoke with patient's wife, wants  to be transferred to [facility], Dr. Kaplan made aware.
Assumed pt care @ 4016 from [facility]. Pt is A&Ox3 in NAD. Pt denies complaint.  IV clean dry and intact, running without difficulty. Pt OOB independently. Safety maintained, Bed locked in lowest position. Call bell in reach. Pt awaiting bed placement. Will continue to monitor.
Pt care assumed at this time, no distress noted, NSR on CM , VSS , pt awaiting eccho and US , will continue to monitor
Pt is sleeping comfortably in NAD resp even and unlabored. Afib on cardiac monitor. Pt OOB independently. Safety maintained, Bed locked in lowest position.  Pt awaiting bed placement. Will continue to monitor.

## 2018-01-09 NOTE — PROGRESS NOTE ADULT - SUBJECTIVE AND OBJECTIVE BOX
[facility], [facility]                                                    [address] , [address], [place] [address]                                                         PHONE: [phone]    FAX: [phone] [address], [address], [place] [address]                                                 PHONE: [phone]    FAX: [phone]  *******************************************************************************    Overnight events/Subjective Assessment:    INTERPRETATION OF TELEMETRY (personally reviewed):    Lipitor (Muscle Pain)  Plavix (Other)    MEDICATIONS  (STANDING):  aspirin enteric coated 325 milliGRAM(s) Oral daily  enalapril 10 milliGRAM(s) Oral daily  metoprolol     tartrate 25 milliGRAM(s) Oral every 8 hours  prasugrel 10 milliGRAM(s) Oral daily  predniSONE   Tablet 5 milliGRAM(s) Oral daily  sodium chloride 0.9%. 1000 milliLiter(s) (80 mL/Hr) IV Continuous <Continuous>    MEDICATIONS  (PRN):      Vital Signs Last 24 Hrs  T(C): 37.1 (09 Jan 2018 04:09), Max: 37.1 (09 Jan 2018 04:09)  T(F): 98.7 (09 Jan 2018 04:09), Max: 98.7 (09 Jan 2018 04:09)  HR: 78 (09 Jan 2018 05:43) (78 - 115)  BP: 142/65 (09 Jan 2018 05:43) (131/75 - 152/74)  BP(mean): --  RR: 20 (09 Jan 2018 05:43) (20 - 24)  SpO2: 92% (09 Jan 2018 05:43) (92% - 97%)    I&O's Detail    I&O's Summary          PHYSICAL EXAM:  General: Appears well developed, well nourished, no acute distress  HEENT: Head: normocephalic, atraumatic  Eyes: Pupils equal and reactive  Neck: Supple, no carotid bruit, no JVD, no HJR  CARDIOVASCULAR: irreg irreg S1 and S2, no murmur, rub, or gallop  LUNGS: Clear to auscultation bilaterally, no rales, rhonchi or wheeze  ABDOMEN: Soft, nontender, non-distended, positive bowel sounds, no mass or bruit  EXTREMITIES: No edema, distal pulses WNL  SKIN: Warm and dry with normal turgor  NEURO: Alert & oriented x 3, grossly intact  PSYCH: normal mood and affect        LABS:                        11.1   9.3   )-----------( 236      ( 09 Jan 2018 05:49 )             34.0     01-08    137  |  100  |  28.0<H>  ----------------------------<  112  4.0   |  19.0<L>  |  1.02    Ca    9.0      08 Jan 2018 18:55  Phos  2.6     01-09  Mg     1.9     01-09    TPro  7.1  /  Alb  3.6  /  TBili  0.5  /  DBili  x   /  AST  32  /  ALT  32  /  AlkPhos  82  01-08    CARDIAC MARKERS ( 09 Jan 2018 05:49 )  x     / 0.05 ng/mL / x     / x     / x      CARDIAC MARKERS ( 08 Jan 2018 22:55 )  x     / 0.05 ng/mL / x     / x     / x          PT/INR - ( 09 Jan 2018 05:49 )   PT: 13.8 sec;   INR: 1.25 ratio         PTT - ( 09 Jan 2018 05:49 )  PTT:27.3 sec  Serum Pro-Brain Natriuretic Peptide: 781 pg/mL (01-08 @ 22:28)  serum  Lipids:   Hemoglobin A1C, Whole Blood: 5.0 % (01-09 @ 05:50)    Thyroid Stimulating Hormone, Serum: 0.28 uIU/mL (01-09 @ 05:50)  Thyroid Stimulating Hormone, Serum: 0.23 uIU/mL (01-08 @ 22:55)      RADIOLOGY & ADDITIONAL STUDIES:    ECG: AF RBBB      ASSESSMENT AND PLAN:  In summary, TATIANA RIBERA is a 83y Male with past medical history significant for HTN, HLD, CAD/MI/stents presents with syncope x 2 in light of coughing in light of URI symptoms.   History of PAF,, not on AC (pt refused eliquis as it made him feel poorly and will not take AC.  On presentation AF/RVR. Last evening 3.29sec pause in CAF.  Consider orthostatic vs. vasovagal vs. arrhythmia.  Multiple coronary stents, ast June 2015 of mid LAD and large diagonal. Pt has smooth 50% in stent stenosis of RCA. 70%small circumflex marginal branch.  Hx of self terminating WCT (declined EPS). EF 36%  Myoview stress 3/30/16 large fixed distal AW defect. EF=43%  Carotid 9/22/16 bilat 16-49% stenosis  Echo 8/8/16 EF=50-55%. Apical akinesis. mild MR/AR. dilation of the aortic root 3.8cm  MUGA 7/14/16 EF=36%  Holter 2/8/17 SR. AVG HR=64, 1847 isolated VE, 50 paired VE, 14 trigeminy,  33 isolated SVE, one 3bt SVT  - Continue to Monitor on telemetry  - + Orthostatics in ER  - Troponins are negative  - Echocardiogram  - Carotid Duplex Scan  - Further work-up & testing (possible MRI, EEG, etc.) per neurology   - No evidence of ischemia or CHF clinically, eventual ischemic evaluation (likely as outpatient)  - asa, Effient (pt allergic to plavix).   Pt with hx of coronary stents  - metoprolol, enalapril  - Lopressor 25mg q8h for now. ? pauses vagally induced with coughing?. Will consider outpt ILR  - Continue home CV medications including ASA  - IVF hydration  - Increased CHADSVASc score.  Start lovenox 1mg/kg for now while in hospital. Pt got one dose yesterday    We will follow with you.  Thank you for allowing me to participate in the care of your patient.          Key Fischer MD

## 2018-01-09 NOTE — PROGRESS NOTE ADULT - PROBLEM SELECTOR PLAN 1
Appreciate cardiology comments/plan Continue telemetry. troponin negative s, Carotid doppler, TTE, cont lopressor-dosage changed to 25 q 8 from 50 ER, enalapril, aspirin, IVF for gentle hydration. Also AC with lovenox full dose inpatient, continue Effient as outpt (pt with stents).    May  need neurology consult, and work up orthostatics, MRI, tilt table

## 2018-01-09 NOTE — PROGRESS NOTE ADULT - SUBJECTIVE AND OBJECTIVE BOX
HPI:  Pt is an 84yo Male with HTN, HLD, CAD/MI/stents who presents with syncope x 2 today, which was preceded by shortness of breath and associated with urinary incontinence. Pt states in past he has had these episodes of SOB and that it has been going on for many years but today was the first time he lost consciousness. Pt denies tongue biting, numbness, speech, visual, auditory disturbances, cp, palpitations, HA, n/v/d. Pt does endorse URI sx for past two weeks. (08 Jan 2018 23:19)    Hospital Course:  Patient today was in good spirits, eating breakfast without difficulty. Audible cough interrupting speech. Patient was thought he was well enough to go home.. did'nt seem to think there was not  much more happening. We discussed the need for full work up. That he was scheduled for some testing and cardiology would be coming by.   Patients wife has the Flu and was prescribe 30day of small pills  Chart Review: Cardiology  Pt is a 83y Male with past medical history significant for HTN, HLD, CAD/MI/stents presents with syncope x 2 in light of coughing in light of URI symptoms.   History of PAF,,but non complient  refused eliquis and will not take AC.  On presentation AF/RVR. noting 3.29sec pause in CAF.  Consider orthostatic vs. vasovagal vs. arrhythmia. Patient with multiple coronary stents, ast June 2015 of mid LAD and large diagonal. Pt has smooth 50% in stent stenosis of RCA. 70%small circumflex marginal branch. Hx of self terminating WCT (declined EPS). EF 36%    Myoview stress 3/30/16 large fixed distal AW defect. EF=43%  Carotid 9/22/16 bilat 16-49% stenosis  Echo 8/8/16 EF=50-55%. Apical akinesis. mild MR/AR. dilation of the aortic root 3.8cm  MUGA 7/14/16 EF=36%  Holter 2/8/17 SR. AVG HR=64, 1847 isolated VE, 50 paired VE, 14 trigeminy,  33 isolated SVE, one 3bt SVT                          11.1   9.3   )-----------( 236      ( 09 Jan 2018 05:49 )             34.0     01-08    137  |  100  |  28.0<H>  ----------------------------<  112  4.0   |  19.0<L>  |  1.02    Ca    9.0      08 Jan 2018 18:55  Phos  2.6     01-09  Mg     1.9     01-09    TPro  7.1  /  Alb  3.6  /  TBili  0.5  /  DBili  x   /  AST  32  /  ALT  32  /  AlkPhos  82  01-08    CARDIAC MARKERS ( 09 Jan 2018 05:49 )  x     / 0.05 ng/mL / x     / x     / x      CARDIAC MARKERS ( 08 Jan 2018 22:55 )  x     / 0.05 ng/mL / x     / x     / x        < from: Xray Chest 1 View AP/PA. (01.08.18 @ 20:35) >  NTERPRETATION:  Portable chest radiograph        CLINICAL INFORMATION: Cough, shortness of breath    TECHNIQUE:  Portable  AP view of the chest was obtained.    COMPARISON: No previous examinations are available for review.    FINDINGS:    The lungs  are clear.  No pleural abnormality is seen.         The  heart is enlarged in transverse diameter. No hilar mass. Trachea   midline.        Visualized osseous structures are intact.        IMPRESSION:   No evidence of active chest disease.          < end of copied text >  < from: Xray Chest 1 View AP/PA. (01.08.18 @ 20:35) >  NTERPRETATION:  Portable chest radiograph        CLINICAL INFORMATION: Cough, shortness of breath    TECHNIQUE:  Portable  AP view of the chest was obtained.    COMPARISON: No previous examinations are available for review.    FINDINGS:    The lungs  are clear.  No pleural abnormality is seen.         The  heart is enlarged in transverse diameter. No hilar mass. Trachea   midline.        Visualized osseous structures are intact.      IMPRESSION:   No evidence of active chest disease.            ICU Vital Signs Last 24 Hrs  T(C): 37.8 (09 Jan 2018 09:29), Max: 37.8 (09 Jan 2018 09:29)  T(F): 100 (09 Jan 2018 09:29), Max: 100 (09 Jan 2018 09:29)  HR: 81 (09 Jan 2018 09:29) (78 - 115)  BP: 125/60 (09 Jan 2018 09:29) (125/60 - 152/74)  BP(mean): --  ABP: --  ABP(mean): --  RR: 19 (09 Jan 2018 09:29) (19 - 24)  SpO2: 95% (09 Jan 2018 09:29) (92% - 97%)

## 2018-01-10 DIAGNOSIS — H10.9 UNSPECIFIED CONJUNCTIVITIS: ICD-10-CM

## 2018-01-10 DIAGNOSIS — I48.0 PAROXYSMAL ATRIAL FIBRILLATION: ICD-10-CM

## 2018-01-10 LAB
ANION GAP SERPL CALC-SCNC: 14 MMOL/L — SIGNIFICANT CHANGE UP (ref 5–17)
BUN SERPL-MCNC: 29 MG/DL — HIGH (ref 8–20)
CALCIUM SERPL-MCNC: 8.6 MG/DL — SIGNIFICANT CHANGE UP (ref 8.6–10.2)
CHLORIDE SERPL-SCNC: 108 MMOL/L — HIGH (ref 98–107)
CO2 SERPL-SCNC: 20 MMOL/L — LOW (ref 22–29)
CREAT SERPL-MCNC: 1.09 MG/DL — SIGNIFICANT CHANGE UP (ref 0.5–1.3)
GLUCOSE SERPL-MCNC: 97 MG/DL — SIGNIFICANT CHANGE UP (ref 70–115)
HCT VFR BLD CALC: 31.3 % — LOW (ref 42–52)
HGB BLD-MCNC: 10.2 G/DL — LOW (ref 14–18)
MCHC RBC-ENTMCNC: 30.8 PG — SIGNIFICANT CHANGE UP (ref 27–31)
MCHC RBC-ENTMCNC: 32.6 G/DL — SIGNIFICANT CHANGE UP (ref 32–36)
MCV RBC AUTO: 94.6 FL — HIGH (ref 80–94)
PLATELET # BLD AUTO: 204 K/UL — SIGNIFICANT CHANGE UP (ref 150–400)
POTASSIUM SERPL-MCNC: 4.2 MMOL/L — SIGNIFICANT CHANGE UP (ref 3.5–5.3)
POTASSIUM SERPL-SCNC: 4.2 MMOL/L — SIGNIFICANT CHANGE UP (ref 3.5–5.3)
RBC # BLD: 3.31 M/UL — LOW (ref 4.6–6.2)
RBC # FLD: 12.9 % — SIGNIFICANT CHANGE UP (ref 11–15.6)
SODIUM SERPL-SCNC: 142 MMOL/L — SIGNIFICANT CHANGE UP (ref 135–145)
WBC # BLD: 8.2 K/UL — SIGNIFICANT CHANGE UP (ref 4.8–10.8)
WBC # FLD AUTO: 8.2 K/UL — SIGNIFICANT CHANGE UP (ref 4.8–10.8)

## 2018-01-10 PROCEDURE — 93880 EXTRACRANIAL BILAT STUDY: CPT | Mod: 26

## 2018-01-10 PROCEDURE — 99233 SBSQ HOSP IP/OBS HIGH 50: CPT

## 2018-01-10 PROCEDURE — 99222 1ST HOSP IP/OBS MODERATE 55: CPT

## 2018-01-10 RX ADMIN — SODIUM CHLORIDE 80 MILLILITER(S): 9 INJECTION INTRAMUSCULAR; INTRAVENOUS; SUBCUTANEOUS at 23:06

## 2018-01-10 RX ADMIN — Medication 10 MILLIGRAM(S): at 05:15

## 2018-01-10 RX ADMIN — Medication 100 MILLIGRAM(S): at 13:10

## 2018-01-10 RX ADMIN — Medication 100 MILLIGRAM(S): at 00:07

## 2018-01-10 RX ADMIN — Medication 5 MILLIGRAM(S): at 05:16

## 2018-01-10 RX ADMIN — Medication 25 MILLIGRAM(S): at 05:16

## 2018-01-10 RX ADMIN — PRASUGREL 10 MILLIGRAM(S): 5 TABLET, FILM COATED ORAL at 13:11

## 2018-01-10 RX ADMIN — Medication 325 MILLIGRAM(S): at 13:10

## 2018-01-10 RX ADMIN — ENOXAPARIN SODIUM 68 MILLIGRAM(S): 100 INJECTION SUBCUTANEOUS at 21:35

## 2018-01-10 NOTE — PROGRESS NOTE ADULT - PROBLEM SELECTOR PROBLEM 7
Coronary artery disease involving native coronary artery of native heart without angina pectoris Hyperlipidemia, unspecified hyperlipidemia type Elevated TSH

## 2018-01-10 NOTE — PROGRESS NOTE ADULT - ASSESSMENT
INTERVAL HISTORY: denies CP, SOB, palpitation, syncope  	  MEDICATIONS:  enalapril 10 milliGRAM(s) Oral daily  metoprolol     tartrate 25 milliGRAM(s) Oral every 8 hours  guaiFENesin    Syrup 100 milliGRAM(s) Oral every 8 hours PRN  predniSONE   Tablet 5 milliGRAM(s) Oral daily  aspirin enteric coated 325 milliGRAM(s) Oral daily  enoxaparin Injectable 68 milliGRAM(s) SubCutaneous every 12 hours  prasugrel 10 milliGRAM(s) Oral daily  sodium chloride 0.9%. 1000 milliLiter(s) IV Continuous <Continuous>        PHYSICAL EXAM:  T(C): 36.9 (01-10-18 @ 03:18), Max: 37.8 (01-09-18 @ 09:29)  HR: 75 (01-10-18 @ 05:12) (74 - 81)  BP: 156/71 (01-10-18 @ 05:12) (125/60 - 182/73)  RR: 19 (01-10-18 @ 03:18) (18 - 19)  SpO2: 96% (01-10-18 @ 03:18) (95% - 96%)  Wt(kg): --  I&O's Summary        Appearance: Normal		  Cardiovascular: Normal S1 S2, No JVD, No murmurs, No edema  Respiratory: Lungs clear to auscultation	  Psychiatry: A & O x 3, Mood & affect appropriate  Gastrointestinal:  Soft, Non-tender, + BS	  Skin: No rashes, No ecchymoses, No cyanosis  Neurologic: Non-focal  Extremities: Normal range of motion, No clubbing, cyanosis or edema  Vascular: Peripheral pulses palpable 2+ bilaterally    TELEMETRY: Pauses up to 2.8 sec	    	    LABS:	 	                          11.1   9.3   )-----------( 236      ( 09 Jan 2018 05:49 )             34.0     01-08    137  |  100  |  28.0<H>  ----------------------------<  112  4.0   |  19.0<L>  |  1.02    Ca    9.0      08 Jan 2018 18:55  Phos  2.6     01-09  Mg     1.9     01-09    TPro  7.1  /  Alb  3.6  /  TBili  0.5  /  DBili  x   /  AST  32  /  ALT  32  /  AlkPhos  82  01-08    ASSESSMENT/PLAN: TATIANA RIBERA is a 83y Male with past medical history significant for HTN, HLD, CAD/MI/stents presents with syncope x 2 in light of coughing in light of URI symptoms.     History of PAF,, not on AC (pt refused Eliquis as it made him feel poorly and will not take AC.  On presentation AF/RVR. Last evening 3.29sec pause in CAF.   Multiple coronary stents, ast June 2015 of mid LAD and large diagonal. Pt has smooth 50% in stent stenosis of RCA. 70%small circumflex marginal branch.  Hx of self terminating WCT (declined EPS). Also pauses up to 3.3 seconds.  Myoview stress 3/30/16 large fixed distal AW defect. EF=43%  Carotid 9/22/16 bilat 16-49% stenosis  Echo 8/8/16 EF=50-55%. Apical akinesis. mild MR/AR. dilation of the aortic root 3.8cm  MUGA 7/14/16 EF=36%  Holter 2/8/17 SR. AVG HR=64, 1847 isolated VE, 50 paired VE, 14 trigeminy,  33 isolated SVE, one 3bt SVT  - Continue to Monitor on telemetry  - + Orthostatics in ER  - Troponins are negative  - Echocardiogram  - Carotid Duplex Scan  - Further work-up & testing (possible MRI, EEG, etc.) per neurology   - No evidence of ischemia or CHF clinically, eventual ischemic evaluation (likely as outpatient)  - asa, Effient (pt allergic to plavix).   Pt with hx of coronary stents  - On metoprolol. Will D/C for now  - Discussed with Dr Carrillo who recommends out pt f/w this week and likely ILR

## 2018-01-10 NOTE — PROGRESS NOTE ADULT - SUBJECTIVE AND OBJECTIVE BOX
Pt is an 84yo Male with HTN, HLD, CAD/MI/stents, PAF (non compliant with eliquis and will not take AC), admitted to [facility] s/p 2 syncopal  syncope x 2 today which was preceded by SOB and dizziness. Denies prior syncopal episodes in the past. When further questioned about syncope/SOB pt is quick to relate all symptoms to his recent influenza/URI symptoms x 2 weeks.     Pt seen and examined at bedside. Pt ANO x 3, laying in bed. As per pt, feeling better, denies all medical denies n/v, fever, chills, chest pain, palpitations, headache, dizziness,  numbness, speech, visual, auditory disturbances.       INTERVAL HPI/OVERNIGHT EVENTS: As per RN, pt noted with syncopal episode last night while sitting on the toilet. Pt also noted with episodes of bradycardia this morning.     MEDICATIONS  (STANDING):  aspirin enteric coated 325 milliGRAM(s) Oral daily  enalapril 10 milliGRAM(s) Oral daily  enoxaparin Injectable 68 milliGRAM(s) SubCutaneous every 12 hours  prasugrel 10 milliGRAM(s) Oral daily  predniSONE   Tablet 5 milliGRAM(s) Oral daily  sodium chloride 0.9%. 1000 milliLiter(s) (80 mL/Hr) IV Continuous <Continuous>    MEDICATIONS  (PRN):  guaiFENesin    Syrup 100 milliGRAM(s) Oral every 8 hours PRN Cough    Allergies: Plavix (Other)    Intolerances: Lipitor (Muscle Pain)    PMH: see HPI    REVIEW OF SYSTEMS:  CONSTITUTIONAL: No fever, weight loss, or fatigue  RESPIRATORY: see HPI  CARDIOVASCULAR: No chest pain, palpitations, dizziness, or leg swelling  GASTROINTESTINAL: No abdominal or epigastric pain. No nausea, vomiting, or hematemesis; No diarrhea or constipation. No melena or hematochezia.  GENITOURINARY: No dysuria, frequency, hematuria, or incontinence  NEUROLOGICAL: See HPI    Vital Signs Last 24 Hrs  T(C): 36.6 (10 Esequiel 2018 08:30), Max: 36.9 (10 Esequiel 2018 03:18)  T(F): 97.8 (10 Esequiel 2018 08:30), Max: 98.4 (10 Esequiel 2018 03:18)  HR: 72 (10 Esequiel 2018 08:30) (72 - 78)  BP: 166/72 (10 Esequiel 2018 08:30) (141/64 - 182/73)  BP(mean): --  RR: 22 (10 Esequiel 2018 08:30) (18 - 22)  SpO2: 97% (10 Esequiel 2018 08:30) (95% - 97%)    PHYSICAL EXAM:  GENERAL: NAD, well-groomed, well-developed  HEAD:  Atraumatic, Normocephalic  EENT: Left conjunctiva inflamed with increased tear production.   NERVOUS SYSTEM:  Alert & Oriented X3, Good concentration  CHEST/LUNG: Clear to auscultation bilaterally; No rales, rhonchi, wheezing, or rubs  HEART: Regular rate and rhythm; No murmurs, rubs, or gallops  ABDOMEN: Soft, Nontender, Nondistended; Bowel sounds present  EXTREMITIES:  No edema    LABS:                        10.2   8.2   )-----------( 204      ( 10 Esequiel 2018 08:17 )             31.3     10 Esequiel 2018 08:17    142    |  108    |  29.0   ----------------------------<  97     4.2     |  20.0   |  1.09     Ca    8.6        10 Esequiel 2018 08:17      PT/INR - ( 2018 05:49 )   PT: 13.8 sec;   INR: 1.25 ratio         PTT - ( 2018 05:49 )  PTT:27.3 sec  Urinalysis Basic - ( 2018 22:50 )    Color: Yellow / Appearance: Clear / S.010 / pH: x  Gluc: x / Ketone: Negative  / Bili: Negative / Urobili: Negative mg/dL   Blood: x / Protein: Negative mg/dL / Nitrite: Negative   Leuk Esterase: Negative / RBC: 0-2 /HPF / WBC Negative   Sq Epi: x / Non Sq Epi: Occasional / Bacteria: x      RADIOLOGY & ADDITIONAL TESTS:    EXAM:  ECHO TRANSTHORACIC COMP W DOPP    PROCEDURE DATE:  2018   TTE Echo Complete w/Doppler (18 @ 14:44) >  Summary:   1. Left ventricular ejection fraction, by visual estimation, is 50 to   55%.   2. Mildly decreased segmental left ventricular systolic function.   3. Mid anteroseptal segment and apex are abnormal as described above.   4. There is no evidence of pericardial effusion.   5. Mild-moderate tricuspid regurgitation.   6. Mild aortic regurgitation.   7. Estimated pulmonary artery systolic pressure is 35.8 mmHg assuming a   right atrial pressure of 3 mmHg, which is consistent with borderline   pulmonary hypertension.   8. No evidence of aortic stenosis.   9. Severely enlarged left atrium.  10. Normal right ventricular size and function.

## 2018-01-10 NOTE — PROGRESS NOTE ADULT - PROBLEM SELECTOR PLAN 5
Supportive care  RVP negative H/H stable  No active bleeding   Iron studies reviewed, likely anemia of chronic dz   Will follow up occult blood

## 2018-01-10 NOTE — PROGRESS NOTE ADULT - PROBLEM SELECTOR PLAN 3
H/H stable  No active bleeding   Iron studies reviewed, likely anemia of chronic dz   Will follow up occult blood Likely secondary to recent URI   Cool compresses  Artificial tears if needed  Good hygiene

## 2018-01-10 NOTE — CONSULT NOTE ADULT - ASSESSMENT
The patient is a 83y Male with syncope in setting of hyperventilation  No evidence for seizure    There is no further inpatient neurologic workup suggested at this time.  We will be available for reconsultation as needed.    Thank you.   Og Srivastava MD, PhD  [phone]

## 2018-01-10 NOTE — PROGRESS NOTE ADULT - PROBLEM SELECTOR PLAN 2
Gentle hydration, continue to monitor Pt with hx of PAF, not on AC as pt has refused elliquis in the past.   On presentation to ED afib/RVR  Full dose lovenox as in patient given pts chadvasc score  Rate controlled  Cardio following

## 2018-01-10 NOTE — CONSULT NOTE ADULT - SUBJECTIVE AND OBJECTIVE BOX
[facility]                                     [facility] [place]                                 Atif Clark, & Jairo                                  [address]                                        [place], [place], [address]                                             [phone]    HISTORY:    The patient is a 83y Male who was admitted 1/8/18 with syncope.  This was in the setting of illness and hyperventilation. There was no convulsion or incontinence.  Currently he is at his baseline    PAST MEDICAL & SURGICAL HISTORY:  MI (myocardial infarction): 1988  Stented coronary artery  CAD (coronary artery disease)  Hyperlipidemia  HTN (hypertension)  Absent tonsil  History of inguinal hernia repair  History of hemorrhoidectomy      MEDICATIONS  (STANDING):  aspirin enteric coated 325 milliGRAM(s) Oral daily  enalapril 10 milliGRAM(s) Oral daily  enoxaparin Injectable 68 milliGRAM(s) SubCutaneous every 12 hours  prasugrel 10 milliGRAM(s) Oral daily  predniSONE   Tablet 5 milliGRAM(s) Oral daily  sodium chloride 0.9%. 1000 milliLiter(s) (80 mL/Hr) IV Continuous <Continuous>    MEDICATIONS  (PRN):  guaiFENesin    Syrup 100 milliGRAM(s) Oral every 8 hours PRN Cough      Allergies    Plavix (Other)    Intolerances    Lipitor (Muscle Pain)      SOCIAL HISTORY:  soc EtOH, no drugs, no tob    FAMILY HISTORY:  No pertinent family history in first degree relatives      ROS:  The patient denies fevers or weight changes.  Denies headache or dizziness.  Denies chest pain.  Denies shortness of breath.  Denies abdominal pain, nausea, or vomiting.  Denies change in urinary pattern.  Denies rash.  Denies recent mood changes.    Exam:  Vital Signs Last 24 Hrs  T(C): 36.4 (10 Esequiel 2018 11:04), Max: 36.9 (10 Esequiel 2018 03:18)  T(F): 97.5 (10 Esequiel 2018 11:04), Max: 98.4 (10 Esequiel 2018 03:18)  HR: 80 (10 Esequiel 2018 11:04) (72 - 80)  BP: 152/86 (10 Esequiel 2018 11:04) (141/64 - 182/73)  BP(mean): --  RR: 20 (10 Esequiel 2018 11:04) (18 - 22)  SpO2: 99% (10 Esequiel 2018 11:04) (95% - 99%)  General: NAD    Mental status: The patient is awake, alert, and fully oriented. There is no aphasia.    Cranial nerves: . Pupils react Symmetrically to light. There is no visual field deficit to confrontation. Extraocular motion is full with no nystagmus. There is no ptosis. Facial sensation is intact. Facial musculature is symmetric. Palate elevates symmetrically. Tongue is midline.    Motor: There is normal bulk and tone.  Strength is 5/5 in the right arm and leg.   Strength is 5/5 in the left arm and leg.    Sensation: Intact to light touch in 4 ext.    Reflexes: 1+ throughout and plantar responses are flexor.    Cerebellar: There is no dysmetria on finger to nose testing.    LABS:                         10.2   8.2   )-----------( 204      ( 10 Esequiel 2018 08:17 )             31.3       01-10    142  |  108<H>  |  29.0<H>  ----------------------------<  97  4.2   |  20.0<L>  |  1.09    Ca    8.6      10 Esequiel 2018 08:17  Phos  2.6     01-09  Mg     1.9     01-09    TPro  7.1  /  Alb  3.6  /  TBili  0.5  /  DBili  x   /  AST  32  /  ALT  32  /  AlkPhos  82  01-08      PT/INR - ( 09 Jan 2018 05:49 )   PT: 13.8 sec;   INR: 1.25 ratio         PTT - ( 09 Jan 2018 05:49 )  PTT:27.3 sec    RADIOLOGY & ADDITIONAL STUDIES:  no head CT    Echo: Summary:   1. Left ventricular ejection fraction, by visual estimation, is 50 to   55%.   2. Mildly decreased segmental left ventricular systolic function.   3. Mid anteroseptal segment and apex are abnormal as described above.   4. There is no evidence of pericardial effusion.   5. Mild-moderate tricuspid regurgitation.   6. Mild aortic regurgitation.   7. Estimated pulmonary artery systolic pressure is 35.8 mmHg assuming a   right atrial pressure of 3 mmHg, which is consistent with borderline   pulmonary hypertension.   8. No evidence of aortic stenosis.   9. Severely enlarged left atrium.  10. Normal right ventricular size and function.

## 2018-01-10 NOTE — PROGRESS NOTE ADULT - PROBLEM SELECTOR PLAN 8
Cont ASA, effient, vasotec  Lopressor DCed as per cardio Diet restriction- states he is allergic to cholesterol pills

## 2018-01-10 NOTE — PROGRESS NOTE ADULT - ASSESSMENT
Pt is an 84yo Male with HTN, HLD, CAD/MI/stents, PAF (non compliant with eliquis and will not take AC), admitted to [facility] s/p 2 syncopal syncope x 2 today which was preceded by SOB and dizziness. In ED, no evidence of ischemia or CHF, EKG with not acute changes, trops negative, TTE with normal EF/mild systolic dysfunction. Pt pending carotid US. Pt evaluated by cardiology.. vasovagal vs arrythmia vs orthostatics. Will need to be monitored for another 24 hours with plans for EP appointment as o/p friday with Dr. Carrillo for possible loop recorder. Lopressor will be discontinued given episodes of bradycardia in ED. Neuro eval requested.

## 2018-01-10 NOTE — PROGRESS NOTE ADULT - PROBLEM SELECTOR PLAN 7
Cont ASA, effient, vasotec  Lopressor DCed as per cardio Diet restriction- states he is allergic to cholesterol pills Repeat TSH WNL  T3/T4 WNL

## 2018-01-10 NOTE — PROGRESS NOTE ADULT - PROBLEM SELECTOR PLAN 1
Vasovagal vs orthostatics vs arrythmia  Pt with another syncopal episode last night while having bowel movement & two episodes of bradycardia this morning. Cardio aware  Appreciate cardiology comments/plan:   Will continue monitoring pt on telemetry  Troponins negative  EKG w/o acute changes  TTE with normal EF and mild systolic dysfuction  Awaiting Carotid doppler  Lopressor to be d/c as per cardio due to episodes of carlene in ED.   Will continue with enalapril, aspirin, IVF for gentle hydration.   Also AC with lovenox full dose inpatient (pt non compliant with o/p AC and does not want AC). Will continue Effient as outpt (pt with stents).  Will monitor for another 24 hours  Will get orthostatics today, RN aware  Neurology consult requested   Pt will follow up with EP Dr. Carrillo Friday for MIMA nathan

## 2018-01-10 NOTE — PROGRESS NOTE ADULT - PROBLEM SELECTOR PLAN 6
Diet restriction- states he is allergic to cholesterol pills Repeat TSH WNL  T3/T4 WNL Supportive care  RVP negative

## 2018-01-10 NOTE — PROGRESS NOTE ADULT - PROBLEM SELECTOR PLAN 4
Repeat TSH WNL  T3/T4 WNL Likely secondary to recent URI   Cool compresses  Artificial tears if needed  Good hygiene Gentle hydration, continue to monitor

## 2018-01-11 DIAGNOSIS — I49.5 SICK SINUS SYNDROME: ICD-10-CM

## 2018-01-11 PROCEDURE — 99231 SBSQ HOSP IP/OBS SF/LOW 25: CPT

## 2018-01-11 PROCEDURE — 99233 SBSQ HOSP IP/OBS HIGH 50: CPT

## 2018-01-11 RX ORDER — SODIUM CHLORIDE 9 MG/ML
3 INJECTION INTRAMUSCULAR; INTRAVENOUS; SUBCUTANEOUS EVERY 8 HOURS
Qty: 0 | Refills: 0 | Status: DISCONTINUED | OUTPATIENT
Start: 2018-01-11 | End: 2018-01-20

## 2018-01-11 RX ORDER — ONDANSETRON 8 MG/1
4 TABLET, FILM COATED ORAL EVERY 8 HOURS
Qty: 0 | Refills: 0 | Status: DISCONTINUED | OUTPATIENT
Start: 2018-01-11 | End: 2018-01-20

## 2018-01-11 RX ADMIN — SODIUM CHLORIDE 3 MILLILITER(S): 9 INJECTION INTRAMUSCULAR; INTRAVENOUS; SUBCUTANEOUS at 21:44

## 2018-01-11 RX ADMIN — Medication 325 MILLIGRAM(S): at 12:01

## 2018-01-11 RX ADMIN — Medication 100 MILLIGRAM(S): at 21:44

## 2018-01-11 RX ADMIN — Medication 100 MILLIGRAM(S): at 12:00

## 2018-01-11 RX ADMIN — SODIUM CHLORIDE 3 MILLILITER(S): 9 INJECTION INTRAMUSCULAR; INTRAVENOUS; SUBCUTANEOUS at 05:06

## 2018-01-11 RX ADMIN — Medication 5 MILLIGRAM(S): at 05:13

## 2018-01-11 RX ADMIN — PRASUGREL 10 MILLIGRAM(S): 5 TABLET, FILM COATED ORAL at 12:01

## 2018-01-11 RX ADMIN — ENOXAPARIN SODIUM 68 MILLIGRAM(S): 100 INJECTION SUBCUTANEOUS at 11:58

## 2018-01-11 RX ADMIN — Medication 10 MILLIGRAM(S): at 05:13

## 2018-01-11 RX ADMIN — ENOXAPARIN SODIUM 68 MILLIGRAM(S): 100 INJECTION SUBCUTANEOUS at 21:44

## 2018-01-11 RX ADMIN — SODIUM CHLORIDE 3 MILLILITER(S): 9 INJECTION INTRAMUSCULAR; INTRAVENOUS; SUBCUTANEOUS at 11:56

## 2018-01-11 NOTE — PROGRESS NOTE ADULT - SUBJECTIVE AND OBJECTIVE BOX
Patient: TATIANA RIBERA [id number] 83y Male                 Internal Medicine Hospitalist Progress Note - Dr. Chago Gaspar    Chief Complaint: Patient is a 83y old  Male who presents with a chief complaint of sob, fainted (2018 23:19)    HPI:  Pt is an 84yo Male with HTN, HLD, CAD/MI/stents, PAF (non compliant with eliquis and will not take AC), admitted to [facility] s/p 2 syncopal  syncope x 2 today which was preceded by SOB and dizziness. Denies prior syncopal episodes in the past. When further questioned about syncope/SOB pt is quick to relate all symptoms to his recent influenza/URI symptoms x 2 weeks.     In ED, noted to have episodes of bradycardia.  Seen by EP and found to have indication for PPM.  Pt c/o intermittent nonproductive cough.  No fever /c hills.  No additional complaints.     ____________________PHYSICAL EXAM:  Vitals reviewed as indicated below  GENERAL:  NAD Alert and Oriented x 3   HEENT: NCAT  CARDIOVASCULAR:  S1, S2  LUNGS: CTAB  ABDOMEN:  soft, (-) tenderness, (-) distension, (+) bowel sounds, (-) guarding, (-) rebound (-) rigidity  EXTREMITIES:  no cyanosis / clubbing / edema.   ____________________    BACKGROUND:  HEALTH ISSUES - PROBLEM Dx:  PAF (paroxysmal atrial fibrillation): PAF (paroxysmal atrial fibrillation)  Conjunctivitis: Conjunctivitis  Coronary artery disease involving native coronary artery of native heart without angina pectoris: Coronary artery disease involving native coronary artery of native heart without angina pectoris  Hyperlipidemia, unspecified hyperlipidemia type: Hyperlipidemia, unspecified hyperlipidemia type  Viral syndrome: Viral syndrome  Elevated TSH: Elevated TSH  Anemia, unspecified type: Anemia, unspecified type  JOE (acute kidney injury): JOE (acute kidney injury)  Syncope, unspecified syncope type: Syncope, unspecified syncope type        Allergies    Plavix (Other)    Intolerances    Lipitor (Muscle Pain)    PAST MEDICAL & SURGICAL HISTORY:  MI (myocardial infarction):   Stented coronary artery  CAD (coronary artery disease)  Hyperlipidemia  HTN (hypertension)  Absent tonsil  History of inguinal hernia repair  History of hemorrhoidectomy      VITALS:  Vital Signs Last 24 Hrs  T(C): 36.9 (2018 10:25), Max: 36.9 (2018 10:25)  T(F): 98.5 (2018 10:25), Max: 98.5 (2018 10:25)  HR: 75 (2018 13:08) (31 - 95)  BP: 158/70 (2018 13:08) (146/55 - 177/65)  BP(mean): --  RR: 18 (2018 13:08) (18 - 21)  SpO2: 95% (2018 13:08) (95% - 99%) Daily     Daily Weight in k (2018 05:09)  CAPILLARY BLOOD GLUCOSE        I&O's Summary    10 Esequiel 2018 07:01  -  2018 07:00  --------------------------------------------------------  IN: 1720 mL / OUT: 575 mL / NET: 1145 mL    2018 07:01  -  2018 14:50  --------------------------------------------------------  IN: 120 mL / OUT: 300 mL / NET: -180 mL        LABS:                        10.2   8.2   )-----------( 204      ( 10 Esequiel 2018 08:17 )             31.3     01-10    142  |  108<H>  |  29.0<H>  ----------------------------<  97  4.2   |  20.0<L>  |  1.09    Ca    8.6      10 Esequiel 2018 08:17                  MEDICATIONS:  MEDICATIONS  (STANDING):  aspirin enteric coated 325 milliGRAM(s) Oral daily  enalapril 10 milliGRAM(s) Oral daily  enoxaparin Injectable 68 milliGRAM(s) SubCutaneous every 12 hours  prasugrel 10 milliGRAM(s) Oral daily  predniSONE   Tablet 5 milliGRAM(s) Oral daily  sodium chloride 0.9% lock flush 3 milliLiter(s) IV Push every 8 hours    MEDICATIONS  (PRN):  guaiFENesin    Syrup 100 milliGRAM(s) Oral every 8 hours PRN Cough  ondansetron Injectable 4 milliGRAM(s) IV Push every 8 hours PRN Nausea

## 2018-01-11 NOTE — PROGRESS NOTE ADULT - SUBJECTIVE AND OBJECTIVE BOX
[facility], [facility]                                                    [address], [address], [place] [address]                                                         PHONE: [phone]    FAX: [phone] [address], [address], [place] [address]                                                 PHONE: [phone]    FAX: [phone]  *******************************************************************************    Overnight events/Subjective Assessment: c/o congestion, SOB, coughing      INTERPRETATION OF TELEMETRY (personally reviewed): Pafib with pauses     Lipitor (Muscle Pain)  Plavix (Other)    MEDICATIONS  (STANDING):  aspirin enteric coated 325 milliGRAM(s) Oral daily  enalapril 10 milliGRAM(s) Oral daily  enoxaparin Injectable 68 milliGRAM(s) SubCutaneous every 12 hours  prasugrel 10 milliGRAM(s) Oral daily  predniSONE   Tablet 5 milliGRAM(s) Oral daily  sodium chloride 0.9% lock flush 3 milliLiter(s) IV Push every 8 hours    MEDICATIONS  (PRN):  guaiFENesin    Syrup 100 milliGRAM(s) Oral every 8 hours PRN Cough  ondansetron Injectable 4 milliGRAM(s) IV Push every 8 hours PRN Nausea      Vital Signs Last 24 Hrs  T(C): 36.7 (11 Jan 2018 05:09), Max: 36.7 (10 Esequiel 2018 19:34)  T(F): 98 (11 Jan 2018 05:09), Max: 98 (10 Esequiel 2018 19:34)  HR: 78 (11 Jan 2018 05:09) (31 - 84)  BP: 146/55 (11 Jan 2018 05:09) (146/55 - 177/65)  BP(mean): --  RR: 18 (11 Jan 2018 05:09) (18 - 21)  SpO2: 96% (11 Jan 2018 05:09) (95% - 99%)    I&O's Detail    10 Esequiel 2018 07:01  -  11 Jan 2018 07:00  --------------------------------------------------------  IN:    Oral Fluid: 840 mL    sodium chloride 0.9%: 880 mL  Total IN: 1720 mL    OUT:    Voided: 575 mL  Total OUT: 575 mL    Total NET: 1145 mL        I&O's Summary    10 Esequiel 2018 07:01  -  11 Jan 2018 07:00  --------------------------------------------------------  IN: 1720 mL / OUT: 575 mL / NET: 1145 mL      PHYSICAL EXAM:    General: Appears well developed, well nourished, no acute distress  HEENT: Head: normocephalic, atraumatic  Eyes: Pupils equal and reactive  Neck: Supple, no carotid bruit, no JVD, no HJR  CARDIOVASCULAR: Normal S1 and S2, no murmur, rub, or gallop  LUNGS: bilateral rales, no rhonchi or wheeze  ABDOMEN: Soft, nontender, non-distended, positive bowel sounds, no mass or bruit  EXTREMITIES: No edema, distal pulses WNL  SKIN: Warm and dry with normal turgor  NEURO: Alert & oriented x 3, grossly intact  PSYCH: normal mood and affect        LABS:                        10.2   8.2   )-----------( 204      ( 10 Esequiel 2018 08:17 )             31.3     01-10    142  |  108<H>  |  29.0<H>  ----------------------------<  97  4.2   |  20.0<L>  |  1.09    Ca    8.6      10 Esequiel 2018 08:17        Serum Pro-Brain Natriuretic Peptide: 781 pg/mL (01-08 @ 22:28)  serum  Lipids:   Hemoglobin A1C, Whole Blood: 5.0 % (01-09 @ 05:50)    Thyroid Stimulating Hormone, Serum: 0.28 uIU/mL (01-09 @ 05:50)  Thyroid Stimulating Hormone, Serum: 0.23 uIU/mL (01-08 @ 22:55)      RADIOLOGY & ADDITIONAL STUDIES:    ECG: < from: 12 Lead ECG (01.08.18 @ 18:17) >  Atrial fibrillation with rapid ventricular response  Minimalvoltage criteria for LVH, may be normal variant  Septal infarct , age undetermined    ECHO: < from: TTE Echo Complete w/Doppler (01.09.18 @ 14:44) >   Summary:   1. Left ventricular ejection fraction, by visual estimation, is 50 to   55%.   2. Mildly decreased segmental left ventricular systolic function.   3. Mid anteroseptal segment and apex are abnormal as described above.   4. There is no evidence of pericardial effusion.   5. Mild-moderate tricuspid regurgitation.   6. Mild aortic regurgitation.   7. Estimated pulmonary artery systolic pressure is 35.8 mmHg assuming a   right atrial pressure of 3 mmHg, which is consistent with borderline   pulmonary hypertension.   8. No evidence of aortic stenosis.   9. Severely enlarged left atrium.  10. Normal right ventricular size and function.    CARDIAC CATHETERIZATION: < from: Cardiac Cath Lab (04.23.13 @ 17:39) >  DIAGNOSTIC RECOMMENDATIONS:  1. Successful POBA and brachytherapy to the ostial RCA.  2. Aspirin 81mg and clopidogrel 75mg daily indefinitely.  INTERVENTIONAL RECOMMENDATIONS:  1. Successful POBA and brachytherapy to the ostial RCA.  2. Aspirin 81mg and clopidogrel 75mg daily indefinitely.      ASSESSMENT AND PLAN:  In summary, TATIANA RIBERA is a 83y Male with past medical history significant for HTN, HLD, CAD/MI/stents presents with syncope x 2 in light of coughing in light of URI symptoms.     History of PAF,, not on AC (pt refused Eliquis as it made him feel poorly and will not take AC.  On presentation AF/RVR. Last evening 3.29sec pause in CAF.   Multiple coronary stents, ast June 2015 of mid LAD and large diagonal. Pt has smooth 50% in stent stenosis of RCA. 70%small circumflex marginal branch.  Hx of self terminating WCT (declined EPS). Also pauses up to 3.3 seconds.  Myoview stress 3/30/16 large fixed distal AW defect. EF=43%  Carotid 9/22/16 bilat 16-49% stenosis  Echo 8/8/16 EF=50-55%. Apical akinesis. mild MR/AR. dilation of the aortic root 3.8cm  MUGA 7/14/16 EF=36%  Holter 2/8/17 SR. AVG HR=64, 1847 isolated VE, 50 paired VE, 14 trigeminy,  33 isolated SVE, one 3bt SVT  - SSS  - Continue to Monitor on telemetry  - + Orthostatics in ER  - Troponins are negative  - Echocardiogram with EF of 50-55%  - Carotid Duplex Scan < from: US Duplex Carotid Arteries Complete, Bilateral (01.10.18 @ 18:47) >  with internal carotid artery velocity equaling 232 cm/s equaling to a stenosis 70% or greater. Will need CT angiogram, possible vascular eval.    Leftcarotid system:  No hemodynamically significant stenosis was found.    - Neuro work-up per neurology   - No evidence of ischemia or CHF clinically, eventual ischemic evaluation (likely as an outpatient)  - Asa, Effient (pt allergic to plavix).   Pt with hx of coronary stents, OK to DC Effient and consider full AC with Eliquis and ASA at 81 mg daily   - Off metoprolol for now  - Will plan for PPM later this week if clinically better, respiratory status   - No full long acting meds for AC for now till PPM is placed, Lovenox Ok for now

## 2018-01-11 NOTE — PROGRESS NOTE ADULT - ASSESSMENT
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  for pacemaker    no need for further neurological work up at this time.    Thank you for allowing me to participate in the care of your patient    Og Srivastava MD, PhD   [phone]

## 2018-01-11 NOTE — PROGRESS NOTE ADULT - SUBJECTIVE AND OBJECTIVE BOX
[facility]                                     Ne[facility] [place]                                 [facility]                                  [address]                                        [place], [place], [address]                                             [phone]      Vital signs:  T(C): 36.8 (01-11-18 @ 16:44), Max: 36.9 (01-11-18 @ 10:25)  HR: 75 (01-11-18 @ 16:44) (31 - 95)  BP: 160/70 (01-11-18 @ 16:44) (146/55 - 177/65)  RR: 18 (01-11-18 @ 16:44) (18 - 20)  SpO2: 97% (01-11-18 @ 16:44) (95% - 98%)  Wt(kg): --    Exam:    No new complaints.  Awake and alert.  speech language intact  Pupils react.  Face symmetric smile and sensation  5/5 power in 4 ext  no drift  intact FT x 4 ext

## 2018-01-11 NOTE — PROGRESS NOTE ADULT - PROBLEM SELECTOR PLAN 5
H/H stable  No active bleeding   Iron studies reviewed, likely anemia of chronic dz   Will follow up occult blood

## 2018-01-11 NOTE — PROGRESS NOTE ADULT - ASSESSMENT
Pt is an 82yo Male with HTN, HLD, CAD/MI/stents, PAF (non compliant with eliquis and will not take AC), admitted to [facility] s/p 2 syncopal syncope x 2, found to be bradycardic requiring PPM.

## 2018-01-12 PROCEDURE — 99222 1ST HOSP IP/OBS MODERATE 55: CPT

## 2018-01-12 PROCEDURE — 99233 SBSQ HOSP IP/OBS HIGH 50: CPT

## 2018-01-12 PROCEDURE — 70498 CT ANGIOGRAPHY NECK: CPT | Mod: 26

## 2018-01-12 RX ADMIN — SODIUM CHLORIDE 3 MILLILITER(S): 9 INJECTION INTRAMUSCULAR; INTRAVENOUS; SUBCUTANEOUS at 14:19

## 2018-01-12 RX ADMIN — SODIUM CHLORIDE 3 MILLILITER(S): 9 INJECTION INTRAMUSCULAR; INTRAVENOUS; SUBCUTANEOUS at 21:57

## 2018-01-12 RX ADMIN — SODIUM CHLORIDE 3 MILLILITER(S): 9 INJECTION INTRAMUSCULAR; INTRAVENOUS; SUBCUTANEOUS at 05:30

## 2018-01-12 RX ADMIN — Medication 5 MILLIGRAM(S): at 05:30

## 2018-01-12 RX ADMIN — Medication 10 MILLIGRAM(S): at 05:31

## 2018-01-12 RX ADMIN — ENOXAPARIN SODIUM 68 MILLIGRAM(S): 100 INJECTION SUBCUTANEOUS at 21:56

## 2018-01-12 RX ADMIN — Medication 325 MILLIGRAM(S): at 11:21

## 2018-01-12 RX ADMIN — ENOXAPARIN SODIUM 68 MILLIGRAM(S): 100 INJECTION SUBCUTANEOUS at 11:19

## 2018-01-12 RX ADMIN — PRASUGREL 10 MILLIGRAM(S): 5 TABLET, FILM COATED ORAL at 11:21

## 2018-01-12 NOTE — PROGRESS NOTE ADULT - ASSESSMENT
Pt is an 84yo Male with HTN, HLD, CAD/MI/stents, PAF (non compliant with eliquis and will not take AC), admitted to [facility] s/p 2 syncopal syncope x 2, found to be bradycardic requiring PPM.

## 2018-01-12 NOTE — PHYSICAL THERAPY INITIAL EVALUATION ADULT - GENERAL OBSERVATIONS, REHAB EVAL
Pt received supine in bed, + O2nc 2L + telemetry, c/o bilateral "ankle pain" (unrated). Pt agreeable to PT

## 2018-01-12 NOTE — PHYSICAL THERAPY INITIAL EVALUATION ADULT - PERTINENT HX OF CURRENT PROBLEM, REHAB EVAL
83y Male with h/o HTN, HLD, CAD/MI/stents presents with syncope x 2 in light of coughing in light of URI symptoms. Pt found to be in Afib with RVR, also with pauses on Telemetry. Pt diagnosed with Sick Sinus Syndrome, is pending PPM placement.

## 2018-01-12 NOTE — PHYSICAL THERAPY INITIAL EVALUATION ADULT - ADDITIONAL COMMENTS
Pt lives in a private home with his wife. 5 steps to enter with handrails, no steps inside. Pt was independent PTA without assist device. Pt does not own DME.

## 2018-01-12 NOTE — PROGRESS NOTE ADULT - PROBLEM SELECTOR PLAN 6
No indication of active infection.  ID input appreciated.  F/u cultures prior to PPM placement.  D/w Dr. Carrillo and Dr. William.

## 2018-01-12 NOTE — CONSULT NOTE ADULT - SUBJECTIVE AND OBJECTIVE BOX
[facility] [place] ELAINE BARBA MD FACP   MI FARAH MD  Diplomates American Board of Internal Medicine and Infecctious Diseases  [phone]  [id number] SUBHA [id number]      HPI:  Pt is an 84yo Male with HTN, HLD, CAD/MI/stents who presents with syncope x 2 today, which was preceded by shortness of breath and associated with urinary incontinence. Pt states in past he has had these episodes of SOB and that it has been going on for many years but today was the first time he lost consciousness. Pt denies tongue biting, numbness, speech, visual, auditory disturbances, cp, palpitations, HA, n/v/d.   PT WTH URI SX COUGH CONGESTIONS  NO FEVERS  PT FOR PACEMAKER  ASKED TO EVALUATE FROM ID STANDPOINT       PAST MEDICAL & SURGICAL HISTORY:  MI (myocardial infarction): 1988  Stented coronary artery  CAD (coronary artery disease)  Hyperlipidemia  HTN (hypertension)  Absent tonsil  History of inguinal hernia repair  History of hemorrhoidectomy      ANTIBIOTICS      Allergies    Plavix (Other)    Intolerances    Lipitor (Muscle Pain)      SOCIAL HISTORY:    FAMILY HISTORY:  No pertinent family history in first degree relatives      Vital Signs Last 24 Hrs  T(C): 36.9 (12 Jan 2018 10:50), Max: 37.1 (12 Jan 2018 01:00)  T(F): 98.5 (12 Jan 2018 10:50), Max: 98.7 (12 Jan 2018 01:00)  HR: 96 (12 Jan 2018 10:50) (66 - 148)  BP: 145/58 (12 Jan 2018 10:50) (116/62 - 160/70)  BP(mean): --  RR: 16 (12 Jan 2018 10:50) (16 - 22)  SpO2: 96% (12 Jan 2018 10:50) (96% - 98%)  Drug Dosing Weight  Height (cm): 170.18 (08 Jan 2018 18:14)  Weight (kg): 68 (08 Jan 2018 18:14)  BMI (kg/m2): 23.5 (08 Jan 2018 18:14)  BSA (m2): 1.79 (08 Jan 2018 18:14)      REVIEW OF SYSTEMS:    CONSTITUTIONAL:  As per HPI.    HEENT:  Eyes:  No diplopia or blurred vision. ENT:  No earache, sore throat or runny nose.    CARDIOVASCULAR:  No pressure, squeezing, strangling, tightness, heaviness or aching about the chest, neck, axilla or epigastrium.    RESPIRATORY:    cough     GASTROINTESTINAL:  No nausea, vomiting or diarrhea.    GENITOURINARY:  No dysuria, frequency or urgency.    MUSCULOSKELETAL:  As per HPI.    SKIN:  No change in skin, hair or nails.    NEUROLOGIC:  No paresthesias, fasciculations, seizures or weakness.                  PHYSICAL EXAMINATION:    GENERAL: The patient is a well-developed, well-nourished _____in no apparent distress. ___ is alert and oriented x3.    VITAL SIGNS: T(C): 36.9 (01-12-18 @ 10:50), Max: 37.1 (01-12-18 @ 01:00)  HR: 96 (01-12-18 @ 10:50) (66 - 148)  BP: 145/58 (01-12-18 @ 10:50) (116/62 - 160/70)  RR: 16 (01-12-18 @ 10:50) (16 - 22)  SpO2: 96% (01-12-18 @ 10:50) (96% - 98%)  Wt(kg): --    HEENT: Head is normocephalic and atraumatic.  ANICTERIC  NECK: Supple. No carotid bruits.  No lymphadenopathy or thyromegaly.    LUNGS: COARSE BREATH SOUNDS    HEART: Regular rate and rhythm without murmur.    ABDOMEN: Soft, nontender, and nondistended.  Positive bowel sounds.  No hepatosplenomegaly was noted. NO REBOUND NO GUARDING    EXTREMITIES: NO EDEMA NO ERYTHEMA    NEUROLOGIC: NON FOCAL      SKIN: No ulceration or induration present. NO RASH        BLOOD CULTURES       URINE CX          LABS:                RADIOLOGY & ADDITIONAL STUDIES:      ASSESSMENT/PLAN    Pt is an 84yo Male with HTN, HLD, CAD/MI/stents who presents with syncope x 2 today, which was preceded by shortness of breath and associated with urinary incontinence. Pt states in past he has had these episodes of SOB and that it has been going on for many years but today was the first time he lost consciousness. Pt denies tongue biting, numbness, speech, visual, auditory disturbances, cp, palpitations, HA, n/v/d.   PT WITH URI SX COUGH NO FEVERS  PT FOR PACEMAKER  PT NON TOXIC WILL RECOMMEND BLOOD CX X2 SETS   PRIOR TO PACEMAKER PLACEMENT   ALTHOUGH LOW YIELD AS PT AFEBRILE    PT MORE LIKELY WITH VIRAL SYNDROME WOULD DEFER ABX  WILL FOLLOW UP              MI LONG MD

## 2018-01-12 NOTE — PROGRESS NOTE ADULT - SUBJECTIVE AND OBJECTIVE BOX
Patient: TATIANA RIBERA [id number] 83y Male                 Internal Medicine Hospitalist Progress Note - Dr. Chago Gaspar    Chief Complaint: Patient is a 83y old  Male who presents with a chief complaint of sob, fainted (2018 23:19)    HPI:  Pt is an 82yo Male with HTN, HLD, CAD/MI/stents, PAF (non compliant with eliquis and will not take AC), admitted to [facility] s/p 2 syncopal  syncope x 2 today which was preceded by SOB and dizziness. Denies prior syncopal episodes in the past. When further questioned about syncope/SOB pt is quick to relate all symptoms to his recent influenza/URI symptoms x 2 weeks.     In ED, noted to have episodes of bradycardia.  Seen by EP and found to have indication for PPM.  Pt c/o intermittent nonproductive cough.  No fever /c hills.  No additional complaints.   Seen by ID    ____________________PHYSICAL EXAM:  Vitals reviewed as indicated below  GENERAL:  NAD Alert and Oriented x 3   HEENT: NCAT  CARDIOVASCULAR:  S1, S2  LUNGS: CTAB  ABDOMEN:  soft, (-) tenderness, (-) distension, (+) bowel sounds, (-) guarding, (-) rebound (-) rigidity  EXTREMITIES:  no cyanosis / clubbing / edema.   ____________________    VITALS:  Vital Signs Last 24 Hrs  T(C): 36.8 (2018 15:37), Max: 37.1 (2018 01:00)  T(F): 98.2 (2018 15:37), Max: 98.7 (2018 01:00)  HR: 101 (2018 15:37) (66 - 148)  BP: 156/70 (2018 15:37) (116/62 - 160/70)  BP(mean): --  RR: 16 (2018 10:50) (16 - 22)  SpO2: 96% (2018 10:50) (96% - 98%) Daily     Daily Weight in k.4 (2018 05:31)  CAPILLARY BLOOD GLUCOSE        I&O's Summary    2018 07:01  -  2018 07:00  --------------------------------------------------------  IN: 480 mL / OUT: 1450 mL / NET: -970 mL    2018 07:01  -  2018 16:02  --------------------------------------------------------  IN: 240 mL / OUT: 875 mL / NET: -635 mL        LABS:                      MEDICATIONS:  aspirin enteric coated 325 milliGRAM(s) Oral daily  enalapril 10 milliGRAM(s) Oral daily  enoxaparin Injectable 68 milliGRAM(s) SubCutaneous every 12 hours  guaiFENesin    Syrup 100 milliGRAM(s) Oral every 8 hours PRN  ondansetron Injectable 4 milliGRAM(s) IV Push every 8 hours PRN  prasugrel 10 milliGRAM(s) Oral daily  predniSONE   Tablet 5 milliGRAM(s) Oral daily  sodium chloride 0.9% lock flush 3 milliLiter(s) IV Push every 8 hours

## 2018-01-12 NOTE — PROGRESS NOTE ADULT - SUBJECTIVE AND OBJECTIVE BOX
[facility], [facility]                                                    [address] , [address], [place] [address]                                                         PHONE: [phone]    FAX: [phone] [address], [address], [place] [address]                                                 PHONE: [phone]    FAX: [phone]  *******************************************************************************    Overnight events/Subjective Assessment:    INTERPRETATION OF TELEMETRY (personally reviewed):    Lipitor (Muscle Pain)  Plavix (Other)    MEDICATIONS  (STANDING):  aspirin enteric coated 325 milliGRAM(s) Oral daily  enalapril 10 milliGRAM(s) Oral daily  enoxaparin Injectable 68 milliGRAM(s) SubCutaneous every 12 hours  prasugrel 10 milliGRAM(s) Oral daily  predniSONE   Tablet 5 milliGRAM(s) Oral daily  sodium chloride 0.9% lock flush 3 milliLiter(s) IV Push every 8 hours    MEDICATIONS  (PRN):  guaiFENesin    Syrup 100 milliGRAM(s) Oral every 8 hours PRN Cough  ondansetron Injectable 4 milliGRAM(s) IV Push every 8 hours PRN Nausea      Vital Signs Last 24 Hrs  T(C): 36.9 (12 Jan 2018 05:26), Max: 37.1 (12 Jan 2018 01:00)  T(F): 98.4 (12 Jan 2018 05:26), Max: 98.7 (12 Jan 2018 01:00)  HR: 129 (12 Jan 2018 05:26) (66 - 148)  BP: 116/62 (12 Jan 2018 05:26) (116/62 - 175/73)  BP(mean): --  RR: 21 (12 Jan 2018 05:26) (18 - 22)  SpO2: 97% (12 Jan 2018 05:26) (95% - 98%)    I&O's Detail    11 Jan 2018 07:01  -  12 Jan 2018 07:00  --------------------------------------------------------  IN:    Oral Fluid: 480 mL  Total IN: 480 mL    OUT:    Voided: 1450 mL  Total OUT: 1450 mL    Total NET: -970 mL        I&O's Summary    11 Jan 2018 07:01  -  12 Jan 2018 07:00  --------------------------------------------------------  IN: 480 mL / OUT: 1450 mL / NET: -970 mL            PHYSICAL EXAM:  General: Appears well developed, well nourished, no acute distress  HEENT: Head: normocephalic, atraumatic  Eyes: Pupils equal and reactive  Neck: Supple, no carotid bruit, no JVD, no HJR  CARDIOVASCULAR: Normal S1 and S2, no murmur, rub, or gallop  LUNGS: Clear to auscultation bilaterally, no rales, rhonchi or wheeze  ABDOMEN: Soft, nontender, non-distended, positive bowel sounds, no mass or bruit  EXTREMITIES: No edema, distal pulses WNL  SKIN: Warm and dry with normal turgor  NEURO: Alert & oriented x 3, grossly intact  PSYCH: normal mood and affect        LABS:                        10.2   8.2   )-----------( 204      ( 10 Esequiel 2018 08:17 )             31.3     01-10    142  |  108<H>  |  29.0<H>  ----------------------------<  97  4.2   |  20.0<L>  |  1.09    Ca    8.6      10 Esequiel 2018 08:17            Serum Pro-Brain Natriuretic Peptide: 781 pg/mL (01-08 @ 22:28)  serum  Lipids:   Hemoglobin A1C, Whole Blood: 5.0 % (01-09 @ 05:50)    Thyroid Stimulating Hormone, Serum: 0.28 uIU/mL (01-09 @ 05:50)  Thyroid Stimulating Hormone, Serum: 0.23 uIU/mL (01-08 @ 22:55)      RADIOLOGY & ADDITIONAL STUDIES:      ECHO: < from: TTE Echo Complete w/Doppler (01.09.18 @ 14:44) >     Summary:   1. Left ventricular ejection fraction, by visual estimation, is 50 to   55%.   2. Mildly decreased segmental left ventricular systolic function.   3. Mid anteroseptal segment and apex are abnormal as described above.   4. There is no evidence of pericardial effusion.   5. Mild-moderate tricuspid regurgitation.   6. Mild aortic regurgitation.   7. Estimated pulmonary artery systolic pressure is 35.8 mmHg assuming a   right atrial pressure of 3 mmHg, which is consistent with borderline   pulmonary hypertension.   8. No evidence of aortic stenosis.   9. Severely enlarged left atrium.  10. Normal right ventricular size and function.    < end of copied text >        ASSESSMENT AND PLAN:  In summary, TATIANA RIBERA is a 83y Male with past medical history significant for HTN, HLD, CAD/MI/stents presents with syncope x 2 in light of coughing in light of URI symptoms.     History of PAF,, not on AC (pt refused Eliquis as it made him feel poorly and will not take AC.  On presentation AF/RVR. Hospital course noted to have 3.29sec pause in CAF.   Multiple coronary stents, last June 2015 of mid LAD and large diagonal. Pt has smooth 50% in stent stenosis of RCA. 70%small circumflex marginal branch.  Hx of self terminating WCT (declined EPS). Also pauses up to 3.3 seconds.  Myoview stress 3/30/16 large fixed distal AW defect. EF=43%  Carotid 9/22/16 bilat 16-49% stenosis  Echo 8/8/16 EF=50-55%. Apical akinesis. mild MR/AR. dilation of the aortic root 3.8cm  MUGA 7/14/16 EF=36%  Holter 2/8/17 SR. AVG HR=64, 1847 isolated VE, 50 paired VE, 14 trigeminy,  33 isolated SVE, one 3bt SVT  - SSS  - Continue to Monitor on telemetry  - + Orthostatics in ER  - Troponins are negative for ACS  - Echocardiogram with EF of 50-55%  - Carotid Duplex Scan < from: US Duplex Carotid Arteries Complete, Bilateral (01.10.18 @ 18:47) >  with internal carotid artery velocity equaling 232 cm/s equaling to a stenosis 70% or greater. Will need CT angiogram, possible vascular eval.    - Left carotid system:  No hemodynamically significant stenosis was found.    - Neuro work-up and input.  Carotid findings due need to be addressed per Dr Luo. CTA neck pending  - No evidence of ischemia or CHF clinically, eventual ischemic evaluation (likely as an outpatient)  - Asa, Effient (pt allergic to plavix).   Pt with hx of coronary stents, OK to DC Effient and consider full AC with Eliquis and ASA at 81 mg daily   - Off metoprolol for now  - Will plan for PPM when clinically better as to respiratory status and medically cleared with resolved cough   - No full long acting meds for AC for now till PPM is placed, Lovenox Ok for now       Key Fischer MD

## 2018-01-12 NOTE — PHYSICAL THERAPY INITIAL EVALUATION ADULT - CRITERIA FOR SKILLED THERAPEUTIC INTERVENTIONS
anticipated equipment needs at discharge/impairments found/Home with least restrictive assist device, Home PT pending progress/anticipated discharge recommendation

## 2018-01-13 DIAGNOSIS — F41.9 ANXIETY DISORDER, UNSPECIFIED: ICD-10-CM

## 2018-01-13 PROCEDURE — 71046 X-RAY EXAM CHEST 2 VIEWS: CPT | Mod: 26

## 2018-01-13 PROCEDURE — 99233 SBSQ HOSP IP/OBS HIGH 50: CPT

## 2018-01-13 RX ORDER — DIAZEPAM 5 MG
2 TABLET ORAL
Qty: 0 | Refills: 0 | Status: COMPLETED | OUTPATIENT
Start: 2018-01-13 | End: 2018-01-20

## 2018-01-13 RX ORDER — DIAZEPAM 5 MG
2 TABLET ORAL DAILY
Qty: 0 | Refills: 0 | Status: DISCONTINUED | OUTPATIENT
Start: 2018-01-13 | End: 2018-01-13

## 2018-01-13 RX ORDER — FUROSEMIDE 40 MG
20 TABLET ORAL ONCE
Qty: 0 | Refills: 0 | Status: COMPLETED | OUTPATIENT
Start: 2018-01-13 | End: 2018-01-13

## 2018-01-13 RX ADMIN — Medication 20 MILLIGRAM(S): at 11:09

## 2018-01-13 RX ADMIN — Medication 325 MILLIGRAM(S): at 11:09

## 2018-01-13 RX ADMIN — Medication 2 MILLIGRAM(S): at 10:46

## 2018-01-13 RX ADMIN — Medication 2 MILLIGRAM(S): at 21:55

## 2018-01-13 RX ADMIN — SODIUM CHLORIDE 3 MILLILITER(S): 9 INJECTION INTRAMUSCULAR; INTRAVENOUS; SUBCUTANEOUS at 05:50

## 2018-01-13 RX ADMIN — SODIUM CHLORIDE 3 MILLILITER(S): 9 INJECTION INTRAMUSCULAR; INTRAVENOUS; SUBCUTANEOUS at 21:56

## 2018-01-13 RX ADMIN — PRASUGREL 10 MILLIGRAM(S): 5 TABLET, FILM COATED ORAL at 11:09

## 2018-01-13 RX ADMIN — ENOXAPARIN SODIUM 68 MILLIGRAM(S): 100 INJECTION SUBCUTANEOUS at 21:55

## 2018-01-13 RX ADMIN — ENOXAPARIN SODIUM 68 MILLIGRAM(S): 100 INJECTION SUBCUTANEOUS at 10:56

## 2018-01-13 RX ADMIN — Medication 5 MILLIGRAM(S): at 05:49

## 2018-01-13 RX ADMIN — Medication 10 MILLIGRAM(S): at 05:49

## 2018-01-13 RX ADMIN — SODIUM CHLORIDE 3 MILLILITER(S): 9 INJECTION INTRAMUSCULAR; INTRAVENOUS; SUBCUTANEOUS at 13:48

## 2018-01-13 NOTE — PROGRESS NOTE ADULT - SUBJECTIVE AND OBJECTIVE BOX
Patient: TATIANA RIBERA [id number] 83y Male                 Internal Medicine Hospitalist Progress Note - Dr. Chago Gaspar    Chief Complaint: Patient is a 83y old  Male who presents with a chief complaint of sob, fainted (2018 23:19)    HPI:  Pt is an 82yo Male with HTN, HLD, CAD/MI/stents, PAF (non compliant with eliquis and will not take AC), admitted to [facility] s/p 2 syncopal  syncope x 2 today which was preceded by SOB and dizziness. Denies prior syncopal episodes in the past. When further questioned about syncope/SOB pt is quick to relate all symptoms to his recent influenza/URI symptoms x 2 weeks.  In ED, noted to have episodes of bradycardia.  Seen by EP and found to have indication for PPM.  Seen by ID for evaluation prior to PPM placement.  Blood cultures obtained.    denies cough /f ever / chills.  No complaints.      ____________________PHYSICAL EXAM:  Vitals reviewed as indicated below  GENERAL:  NAD Alert and Oriented x 3   HEENT: NCAT  CARDIOVASCULAR:  S1, S2  LUNGS: CTAB  ABDOMEN:  soft, (-) tenderness, (-) distension, (+) bowel sounds, (-) guarding, (-) rebound (-) rigidity  EXTREMITIES:  no cyanosis / clubbing / edema.   ____________________    VITALS:  Vital Signs Last 24 Hrs  T(C): 36.8 (2018 10:55), Max: 36.9 (2018 05:31)  T(F): 98.2 (2018 10:55), Max: 98.5 (2018 05:31)  HR: 157 (2018 10:55) (91 - 157)  BP: 140/60 (2018 10:55) (140/60 - 169/71)  BP(mean): --  RR: 20 (2018 10:55) (16 - 20)  SpO2: 96% (2018 10:55) (96% - 98%) Daily     Daily Weight in k.4 (2018 04:38)  CAPILLARY BLOOD GLUCOSE        I&O's Summary    2018 07:01  -  2018 07:00  --------------------------------------------------------  IN: 240 mL / OUT: 1225 mL / NET: -985 mL    2018 07:01  -  2018 14:37  --------------------------------------------------------  IN: 0 mL / OUT: 700 mL / NET: -700 mL        LABS:                      MEDICATIONS:  aspirin enteric coated 325 milliGRAM(s) Oral daily  diazepam    Tablet 2 milliGRAM(s) Oral two times a day  enalapril 10 milliGRAM(s) Oral daily  enoxaparin Injectable 68 milliGRAM(s) SubCutaneous every 12 hours  guaiFENesin    Syrup 100 milliGRAM(s) Oral every 8 hours PRN  ondansetron Injectable 4 milliGRAM(s) IV Push every 8 hours PRN  prasugrel 10 milliGRAM(s) Oral daily  predniSONE   Tablet 5 milliGRAM(s) Oral daily  sodium chloride 0.9% lock flush 3 milliLiter(s) IV Push every 8 hours

## 2018-01-13 NOTE — PROGRESS NOTE ADULT - PROBLEM SELECTOR PLAN 5
No indication of active infection.  ID input appreciated.  F/u cultures prior to PPM placement.  D/w Dr. Carirllo and Dr. William.

## 2018-01-13 NOTE — PROGRESS NOTE ADULT - SUBJECTIVE AND OBJECTIVE BOX
[facility], [facility]                                                    [address] [facility] [address] [facility], [address], [place] [address]                                                         PHONE: [phone]    FAX: [phone] [address], [address], [place] [address]                                                 PHONE: [phone]    FAX: [phone]  *******************************************************************************    Overnight events/Subjective Assessment: Anxious in the bed for not getting his regular dose of Vallum      INTERPRETATION OF TELEMETRY (personally reviewed):    Lipitor (Muscle Pain)  Plavix (Other)    MEDICATIONS  (STANDING):  aspirin enteric coated 325 milliGRAM(s) Oral daily  diazepam    Tablet 2 milliGRAM(s) Oral two times a day  enalapril 10 milliGRAM(s) Oral daily  enoxaparin Injectable 68 milliGRAM(s) SubCutaneous every 12 hours  prasugrel 10 milliGRAM(s) Oral daily  predniSONE   Tablet 5 milliGRAM(s) Oral daily  sodium chloride 0.9% lock flush 3 milliLiter(s) IV Push every 8 hours    MEDICATIONS  (PRN):  guaiFENesin    Syrup 100 milliGRAM(s) Oral every 8 hours PRN Cough  ondansetron Injectable 4 milliGRAM(s) IV Push every 8 hours PRN Nausea      Vital Signs Last 24 Hrs  T(C): 36.6 (14 Jan 2018 10:13), Max: 36.7 (14 Jan 2018 05:41)  T(F): 97.9 (14 Jan 2018 10:13), Max: 98 (14 Jan 2018 05:41)  HR: 94 (14 Jan 2018 10:13) (59 - 94)  BP: 120/48 (14 Jan 2018 10:13) (120/48 - 156/61)  BP(mean): --  RR: 18 (14 Jan 2018 10:13) (18 - 20)  SpO2: 98% (14 Jan 2018 05:41) (97% - 98%)    I&O's Detail    13 Jan 2018 07:01  -  14 Jan 2018 07:00  --------------------------------------------------------  IN:  Total IN: 0 mL    OUT:    Voided: 1700 mL  Total OUT: 1700 mL    Total NET: -1700 mL        I&O's Summary    13 Jan 2018 07:01  -  14 Jan 2018 07:00  --------------------------------------------------------  IN: 0 mL / OUT: 1700 mL / NET: -1700 mL            PHYSICAL EXAM:  General: Appears well developed, well nourished, no acute distress  HEENT: Head: normocephalic, atraumatic  Eyes: Pupils equal and reactive  Neck: Supple, no carotid bruit, no JVD, no HJR  CARDIOVASCULAR: S1/S2 irregularly irregular tachy, no murmur, rub, or gallop  LUNGS: LLL insoiratory rales, no rhonchi or wheeze  ABDOMEN: Soft, nontender, non-distended, positive bowel sounds, no mass or bruit  EXTREMITIES: No edema, distal pulses WNL  SKIN: Warm and dry with normal turgor  NEURO: Alert & oriented x 3, grossly intact  PSYCH: normal mood and affect        LABS:    01-14    141  |  102  |  22.0<H>  ----------------------------<  95  3.6   |  24.0  |  1.08    Ca    8.7      14 Jan 2018 05:48            Serum Pro-Brain Natriuretic Peptide: 781 pg/mL (01-08 @ 22:28)  serum  Lipids:   Hemoglobin A1C, Whole Blood: 5.0 % (01-09 @ 05:50)    Thyroid Stimulating Hormone, Serum: 0.28 uIU/mL (01-09 @ 05:50)  Thyroid Stimulating Hormone, Serum: 0.23 uIU/mL (01-08 @ 22:55)      RADIOLOGY & ADDITIONAL STUDIES:    ECG: < from: 12 Lead ECG (01.08.18 @ 18:17) >  Atrial fibrillation with rapid ventricular response  Minimalvoltage criteria for LVH, may be normal variant  Septal infarct , age undetermined      ECHO: < from: TTE Echo Complete w/Doppler (01.09.18 @ 14:44) >  Summary:   1. Left ventricular ejection fraction, by visual estimation, is 50 to   55%.   2. Mildly decreased segmental left ventricular systolic function.   3. Mid anteroseptal segment and apex are abnormal as described above.   4. There is no evidence of pericardial effusion.   5. Mild-moderate tricuspid regurgitation.   6. Mild aortic regurgitation.   7. Estimated pulmonary artery systolic pressure is 35.8 mmHg assuming a   right atrial pressure of 3 mmHg, which is consistent with borderline   pulmonary hypertension.   8. No evidence of aortic stenosis.   9. Severely enlarged left atrium.  10. Normal right ventricular size and function.      ASSESSMENT AND PLAN:  In summary, TATIANA RIBERA is a 83y Male with past medical history significant for HTN, HLD, CAD/MI/stents presents with syncope x 2 in light of coughing in light of URI symptoms.     History of PAF,, not on AC (pt refused Eliquis as it made him feel poorly and will not take AC.  On presentation AF/RVR. Hospital course noted to have 3.29sec pause in CAF.   Multiple coronary stents, last June 2015 of mid LAD and large diagonal. Pt has smooth 50% in stent stenosis of RCA. 70%small circumflex marginal branch.  Hx of self terminating WCT (declined EPS). Also pauses up to 3.3 seconds.  Myoview stress 3/30/16 large fixed distal AW defect. EF=43%  Carotid 9/22/16 bilat 16-49% stenosis  Echo 8/8/16 EF=50-55%. Apical akinesis. mild MR/AR. dilation of the aortic root 3.8cm  MUGA 7/14/16 EF=36%  Holter 2/8/17 SR. AVG HR=64, 1847 isolated VE, 50 paired VE, 14 trigeminy,  33 isolated SVE, one 3bt SVT  - SSS  - Continue to Monitor on telemetry  - Troponins are negative for ACS  - Echocardiogram with EF of 50-55%  - Carotid Duplex Scan < from: US Duplex Carotid Arteries Complete, Bilateral (01.10.18 @ 18:47) >  with internal carotid artery velocity equaling 232 cm/s equaling to a stenosis 70% or greater. Will need CT angiogram, possible vascular eval.    - Left carotid system:  No hemodynamically significant stenosis was found.    - Neuro work-up and input.  Carotid findings due need to be addressed per Dr Luo. CTA neck pending  - No evidence of ischemia or CHF clinically, eventual ischemic evaluation (likely as an outpatient)  - Asa, Effient (pt allergic to plavix).   Pt with hx of coronary stents, OK to DC Effient and consider full AC with Eliquis and ASA at 81 mg daily   - Off metoprolol for now  - Will plan for PPM when clinically better as to respiratory status and medically cleared with resolved cough   - No full long acting meds for AC for now till PPM is placed, Lovenox Ok for now     Monday cath/EP lab is closed for elective cases.

## 2018-01-13 NOTE — PROGRESS NOTE ADULT - ASSESSMENT
Pt is an 82yo Male with HTN, HLD, CAD/MI/stents, PAF (non compliant with eliquis and will not take AC), admitted to [facility] s/p 2 syncopal syncope x 2, found to be bradycardic requiring PPM.

## 2018-01-14 DIAGNOSIS — M25.50 PAIN IN UNSPECIFIED JOINT: ICD-10-CM

## 2018-01-14 DIAGNOSIS — I65.21 OCCLUSION AND STENOSIS OF RIGHT CAROTID ARTERY: ICD-10-CM

## 2018-01-14 LAB
ANION GAP SERPL CALC-SCNC: 15 MMOL/L — SIGNIFICANT CHANGE UP (ref 5–17)
BUN SERPL-MCNC: 22 MG/DL — HIGH (ref 8–20)
CALCIUM SERPL-MCNC: 8.7 MG/DL — SIGNIFICANT CHANGE UP (ref 8.6–10.2)
CHLORIDE SERPL-SCNC: 102 MMOL/L — SIGNIFICANT CHANGE UP (ref 98–107)
CO2 SERPL-SCNC: 24 MMOL/L — SIGNIFICANT CHANGE UP (ref 22–29)
CREAT SERPL-MCNC: 1.08 MG/DL — SIGNIFICANT CHANGE UP (ref 0.5–1.3)
GLUCOSE SERPL-MCNC: 95 MG/DL — SIGNIFICANT CHANGE UP (ref 70–115)
POTASSIUM SERPL-MCNC: 3.6 MMOL/L — SIGNIFICANT CHANGE UP (ref 3.5–5.3)
POTASSIUM SERPL-SCNC: 3.6 MMOL/L — SIGNIFICANT CHANGE UP (ref 3.5–5.3)
SODIUM SERPL-SCNC: 141 MMOL/L — SIGNIFICANT CHANGE UP (ref 135–145)

## 2018-01-14 PROCEDURE — 99233 SBSQ HOSP IP/OBS HIGH 50: CPT

## 2018-01-14 PROCEDURE — 99232 SBSQ HOSP IP/OBS MODERATE 35: CPT

## 2018-01-14 RX ADMIN — ENOXAPARIN SODIUM 68 MILLIGRAM(S): 100 INJECTION SUBCUTANEOUS at 10:04

## 2018-01-14 RX ADMIN — Medication 2 MILLIGRAM(S): at 10:04

## 2018-01-14 RX ADMIN — Medication 2 MILLIGRAM(S): at 21:43

## 2018-01-14 RX ADMIN — SODIUM CHLORIDE 3 MILLILITER(S): 9 INJECTION INTRAMUSCULAR; INTRAVENOUS; SUBCUTANEOUS at 05:44

## 2018-01-14 RX ADMIN — SODIUM CHLORIDE 3 MILLILITER(S): 9 INJECTION INTRAMUSCULAR; INTRAVENOUS; SUBCUTANEOUS at 14:19

## 2018-01-14 RX ADMIN — PRASUGREL 10 MILLIGRAM(S): 5 TABLET, FILM COATED ORAL at 12:43

## 2018-01-14 RX ADMIN — SODIUM CHLORIDE 3 MILLILITER(S): 9 INJECTION INTRAMUSCULAR; INTRAVENOUS; SUBCUTANEOUS at 21:17

## 2018-01-14 RX ADMIN — ENOXAPARIN SODIUM 68 MILLIGRAM(S): 100 INJECTION SUBCUTANEOUS at 21:43

## 2018-01-14 RX ADMIN — Medication 100 MILLIGRAM(S): at 05:43

## 2018-01-14 RX ADMIN — Medication 325 MILLIGRAM(S): at 12:43

## 2018-01-14 RX ADMIN — Medication 5 MILLIGRAM(S): at 05:43

## 2018-01-14 RX ADMIN — Medication 10 MILLIGRAM(S): at 05:43

## 2018-01-14 NOTE — PROGRESS NOTE ADULT - SUBJECTIVE AND OBJECTIVE BOX
TATIANA RIBERA is a 83y Male with HPI:  P is an 84yo Male with HTN, HLD, CAD/MI/stents who presents with syncope x 2 today, which was preceded by shortness of breath and associated with urinary incontinence. Pt states in past he has had these episodes of SOB and that it has been going on for many years but today was the first time he lost consciousness. Pt denies tongue biting, numbness, speech, visual, auditory disturbances, cp, palpitations, HA, n/v/d.   PT WTH URI SX COUGH CONGESTIONS  NO FEVERS  PT FOR PACEMAKER  FEELS BETTER    Allergies:  Lipitor (Muscle Pain)  Plavix (Other)      Medications:  aspirin enteric coated 325 milliGRAM(s) Oral daily  diazepam    Tablet 2 milliGRAM(s) Oral two times a day  enalapril 10 milliGRAM(s) Oral daily  enoxaparin Injectable 68 milliGRAM(s) SubCutaneous every 12 hours  guaiFENesin    Syrup 100 milliGRAM(s) Oral every 8 hours PRN  ondansetron Injectable 4 milliGRAM(s) IV Push every 8 hours PRN  prasugrel 10 milliGRAM(s) Oral daily  predniSONE   Tablet 5 milliGRAM(s) Oral daily  sodium chloride 0.9% lock flush 3 milliLiter(s) IV Push every 8 hours      ANTIBIOTICS:         Review of Systems: - Negative except as mentioned above     Physical Exam:  ICU Vital Signs Last 24 Hrs  T(C): 36.6 (14 Jan 2018 10:13), Max: 36.7 (14 Jan 2018 05:41)  T(F): 97.9 (14 Jan 2018 10:13), Max: 98 (14 Jan 2018 05:41)  HR: 94 (14 Jan 2018 10:13) (59 - 94)  BP: 120/48 (14 Jan 2018 10:13) (120/48 - 156/61)  BP(mean): --  ABP: --  ABP(mean): --  RR: 18 (14 Jan 2018 10:13) (18 - 20)  SpO2: 98% (14 Jan 2018 05:41) (97% - 98%)    GEN: NAD, pleasant  HEENT: normocephalic and atraumatic. EOMI. ERENDIRA...  NECK: Supple. No carotid bruits.  No lymphadenopathy or thyromegaly.  LUNGS: Clear to auscultation.  HEART: Regular rate and rhythm without murmur.  ABDOMEN: Soft, nontender, and nondistended.  Positive bowel sounds.  No hepatosplenomegaly was noted.  NO REBOUND NO GUARDING  EXTREMITIES: Without any cyanosis, clubbing, rash, lesions or edema.  NEUROLOGIC: Cranial nerves II through XII are grossly intact.    SKIN: No ulceration or induration present.      Labs:

## 2018-01-14 NOTE — PROGRESS NOTE ADULT - SUBJECTIVE AND OBJECTIVE BOX
[facility], [facility]                                                    [address] , [address], [place] [address]                                                         PHONE: [phone]    FAX: [phone] [address] [facility] [address], [address], [place] [address]                                                 PHONE: [phone]    FAX: [phone]  *******************************************************************************    Overnight events/Subjective Assessment: Feels better after Valium    INTERPRETATION OF TELEMETRY (personally reviewed): SR in 50s    Lipitor (Muscle Pain)  Plavix (Other)    MEDICATIONS  (STANDING):  aspirin enteric coated 325 milliGRAM(s) Oral daily  diazepam    Tablet 2 milliGRAM(s) Oral two times a day  enalapril 10 milliGRAM(s) Oral daily  enoxaparin Injectable 68 milliGRAM(s) SubCutaneous every 12 hours  prasugrel 10 milliGRAM(s) Oral daily  predniSONE   Tablet 5 milliGRAM(s) Oral daily  sodium chloride 0.9% lock flush 3 milliLiter(s) IV Push every 8 hours    MEDICATIONS  (PRN):  guaiFENesin    Syrup 100 milliGRAM(s) Oral every 8 hours PRN Cough  ondansetron Injectable 4 milliGRAM(s) IV Push every 8 hours PRN Nausea      Vital Signs Last 24 Hrs  T(C): 36.6 (14 Jan 2018 10:13), Max: 36.7 (14 Jan 2018 05:41)  T(F): 97.9 (14 Jan 2018 10:13), Max: 98 (14 Jan 2018 05:41)  HR: 94 (14 Jan 2018 10:13) (59 - 94)  BP: 120/48 (14 Jan 2018 10:13) (120/48 - 156/61)  BP(mean): --  RR: 18 (14 Jan 2018 10:13) (18 - 20)  SpO2: 98% (14 Jan 2018 05:41) (97% - 98%)    I&O's Detail    13 Jan 2018 07:01 - 14 Jan 2018 07:00  --------------------------------------------------------  IN:  Total IN: 0 mL    OUT:    Voided: 1700 mL  Total OUT: 1700 mL    Total NET: -1700 mL        I&O's Summary    13 Jan 2018 07:01  -  14 Jan 2018 07:00  --------------------------------------------------------  IN: 0 mL / OUT: 1700 mL / NET: -1700 mL        PHYSICAL EXAM:  General: Appears well developed, well nourished, no acute distress  HEENT: Head: normocephalic, atraumatic  Eyes: Pupils equal and reactive  Neck: Supple, no carotid bruit, no JVD, no HJR  CARDIOVASCULAR: Normal S1 and S2, no murmur, rub, or gallop  LUNGS: Clear to auscultation bilaterally, no rales, rhonchi or wheeze  ABDOMEN: Soft, nontender, non-distended, positive bowel sounds, no mass or bruit  EXTREMITIES: No edema, distal pulses WNL  SKIN: Warm and dry with normal turgor  NEURO: Alert & oriented x 3, grossly intact          LABS:    01-14    141  |  102  |  22.0<H>  ----------------------------<  95  3.6   |  24.0  |  1.08    Ca    8.7      14 Jan 2018 05:48      Serum Pro-Brain Natriuretic Peptide: 781 pg/mL (01-08 @ 22:28)  serum  Lipids:   Hemoglobin A1C, Whole Blood: 5.0 % (01-09 @ 05:50)    Thyroid Stimulating Hormone, Serum: 0.28 uIU/mL (01-09 @ 05:50)  Thyroid Stimulating Hormone, Serum: 0.23 uIU/mL (01-08 @ 22:55)      RADIOLOGY & ADDITIONAL STUDIES:    ECG: < from: 12 Lead ECG (01.08.18 @ 18:17) >  Atrial fibrillation with rapid ventricular response  Minimalvoltage criteria for LVH, may be normal variant  Septal infarct , age undetermined      ECHO: < from: TTE Echo Complete w/Doppler (01.09.18 @ 14:44) >  Summary:   1. Left ventricular ejection fraction, by visual estimation, is 50 to   55%.   2. Mildly decreased segmental left ventricular systolic function.   3. Mid anteroseptal segment and apex are abnormal as described above.   4. There is no evidence of pericardial effusion.   5. Mild-moderate tricuspid regurgitation.   6. Mild aortic regurgitation.   7. Estimated pulmonary artery systolic pressure is 35.8 mmHg assuming a   right atrial pressure of 3 mmHg, which is consistent with borderline   pulmonary hypertension.   8. No evidence of aortic stenosis.   9. Severely enlarged left atrium.  10. Normal right ventricular size and function.      ASSESSMENT AND PLAN:  In summary, TATIANA RIBERA is a 83y Male with past medical history significant for HTN, HLD, CAD/MI/stents presents with syncope x 2 in light of coughing in light of URI symptoms.     History of PAF,, not on AC (pt refused Eliquis as it made him feel poorly and will not take AC.  On presentation AF/RVR. Hospital course noted to have 3.29sec pause in CAF.   Multiple coronary stents, last June 2015 of mid LAD and large diagonal. Pt has smooth 50% in stent stenosis of RCA. 70%small circumflex marginal branch.  Hx of self terminating WCT (declined EPS). Also pauses up to 3.3 seconds.  Myoview stress 3/30/16 large fixed distal AW defect. EF=43%  Carotid 9/22/16 bilat 16-49% stenosis  Echo 8/8/16 EF=50-55%. Apical akinesis. mild MR/AR. dilation of the aortic root 3.8cm  MUGA 7/14/16 EF=36%  Holter 2/8/17 SR. AVG HR=64, 1847 isolated VE, 50 paired VE, 14 trigeminy,  33 isolated SVE, one 3bt SVT  - SSS  - Continue to Monitor on telemetry  - Troponins are negative for ACS  - Echocardiogram with EF of 50-55%  - Carotid Duplex Scan < from: US Duplex Carotid Arteries Complete, Bilateral (01.10.18 @ 18:47) >  with internal carotid artery velocity equaling 232 cm/s equaling to a stenosis 70% or greater. Will need CT angiogram, possible vascular eval.    - Left carotid system:  No hemodynamically significant stenosis was found.    - Neuro work-up and input.  Carotid findings due need to be addressed per Dr Luo. CTA neck pending  - No evidence of ischemia or CHF clinically, eventual ischemic evaluation (likely as an outpatient)  - Asa, Effient (pt allergic to plavix).   Pt with hx of coronary stents, OK to DC Effient and consider full AC with Eliquis and ASA at 81 mg daily   - Off metoprolol for now  - Better mentally on Valium at 2 mg every 12 hrs  - Will plan for PPM when clinically better as to respiratory status and medically cleared with resolved cough   - No full long acting meds for AC for now till PPM is placed, Lovenox Ok for now     Monday cath/EP lab is closed for elective cases.

## 2018-01-14 NOTE — PROGRESS NOTE ADULT - SUBJECTIVE AND OBJECTIVE BOX
Patient: TATIANA RIBERA [id number] 83y Male                 Internal Medicine Hospitalist Progress Note - Dr. Chago Gaspar    Chief Complaint: Patient is a 83y old  Male who presents with a chief complaint of sob, fainted (08 Jan 2018 23:19)    HPI:  Pt is an 84yo Male with HTN, HLD, CAD/MI/stents, PAF (non compliant with eliquis and will not take AC), admitted to [facility] s/p 2 syncopal  syncope x 2 today which was preceded by SOB and dizziness. Denies prior syncopal episodes in the past. When further questioned about syncope/SOB pt is quick to relate all symptoms to his recent influenza/URI symptoms x 2 weeks.  In ED, noted to have episodes of bradycardia.  Seen by EP and found to have indication for PPM.  Seen by ID for evaluation prior to PPM placement.  Blood cultures obtained.    denies cough /f ever / chills.  No complaints.      ROS:  CONSTITUTIONAL:  No distress.no fever/chills/fatigue/weight loss  HEENT:  Eyes:  No diplopia or blurred vision.   CARDIOVASCULAR:  No pressure, squeezing, tightness, heaviness or aching about the chest; no palpitations.no leg swelling, no orthopnea or PND  RESPIRATORY:  no SOB. no wheezing.no cough or sputum.  No hemoptysis  GI: no nausea, no vomiting, no diarrhea, no constipation. No hematochezia or melena  SKIN: no rash  CNS: No headaches. No weakness.no numbness. No depression or anxiety. No SI      ____________________PHYSICAL EXAM:  Vitals reviewed as indicated below  GENERAL:  NAD Alert and Oriented x 3   HEENT: NCAT  CARDIOVASCULAR:  S1, S2  LUNGS: CTAB  ABDOMEN:  soft, (-) tenderness, (-) distension, (+) bowel sounds, (-) guarding, (-) rebound (-) rigidity  EXTREMITIES:  no cyanosis / clubbing / edema.   ____________________    VITALS:  Vital Signs Last 24 Hrs  T(C): 36.7 (14 Jan 2018 05:41), Max: 36.8 (13 Jan 2018 10:55)  T(F): 98 (14 Jan 2018 05:41), Max: 98.2 (13 Jan 2018 10:55)  HR: 59 (14 Jan 2018 05:41) (59 - 157)  BP: 156/61 (14 Jan 2018 05:41) (140/50 - 156/61)  BP(mean): --  RR: 20 (14 Jan 2018 05:41) (20 - 20)  SpO2: 98% (14 Jan 2018 05:41) (96% - 98%)        LABS:      not done yet today    MEDICATIONS  (STANDING):  aspirin enteric coated 325 milliGRAM(s) Oral daily  diazepam    Tablet 2 milliGRAM(s) Oral two times a day  enalapril 10 milliGRAM(s) Oral daily  enoxaparin Injectable 68 milliGRAM(s) SubCutaneous every 12 hours  prasugrel 10 milliGRAM(s) Oral daily  predniSONE   Tablet 5 milliGRAM(s) Oral daily  sodium chloride 0.9% lock flush 3 milliLiter(s) IV Push every 8 hours    MEDICATIONS  (PRN):  guaiFENesin    Syrup 100 milliGRAM(s) Oral every 8 hours PRN Cough  ondansetron Injectable 4 milliGRAM(s) IV Push every 8 hours PRN Nausea Patient: TATIANA RIBERA [id number] 83y Male                 Internal Medicine Hospitalist Progress Note - Dr. Chago Gaspar    Chief Complaint: Patient is a 83y old  Male who presents with a chief complaint of sob, fainted (08 Jan 2018 23:19)    HPI:  Pt is an 82yo Male with HTN, HLD, CAD/MI/stents, PAF (non compliant with eliquis and will not take AC), admitted to [facility] s/p 2 syncopal  syncope x 2 today which was preceded by SOB and dizziness. Denies prior syncopal episodes in the past. When further questioned about syncope/SOB pt is quick to relate all symptoms to his recent influenza/URI symptoms x 2 weeks.  In ED, noted to have episodes of bradycardia.  Seen by EP and found to have indication for PPM.  Seen by ID for evaluation prior to PPM placement.  Blood cultures obtained.    denies cough /f ever / chills/CP/SOB/palpitations.  complaints b/l ankle and knees pain intermittent. h/o pain b/l ankle/knees/elbows/hand. possible OA. no fall or injury..      Tele: HR fluctuation lower 50s    ROS:  CONSTITUTIONAL:  No distress.   HEENT:  Eyes:  No diplopia or blurred vision.   CARDIOVASCULAR:  No pressure, squeezing, tightness, heaviness or aching about the chest; no palpitations.no leg swelling, no orthopnea or PND  RESPIRATORY:  no SOB. no wheezing.no cough or sputum.  No hemoptysis  GI: no nausea, no vomiting, no diarrhea, no constipation. No hematochezia or melena  SKIN: no rash  CNS: No headaches. No weakness.no numbness. No depression or anxiety. No SI      ____________________PHYSICAL EXAM:  Vitals reviewed as indicated below  GENERAL:  NAD Alert and Oriented x 3   HEENT: NCAT  CARDIOVASCULAR:  S1, S2  LUNGS: CTAB  ABDOMEN:  soft, (-) tenderness, (-) distension, (+) bowel sounds, (-) guarding, (-) rebound (-) rigidity  EXTREMITIES:  no cyanosis / clubbing / edema. no joint swelling/redness/warmth. mild b/l ankle tenderness. no sing of septic arthritis. OA deformities b/l PIPs and DIPS  ____________________    VITALS:  Vital Signs Last 24 Hrs  T(C): 36.7 (14 Jan 2018 05:41), Max: 36.8 (13 Jan 2018 10:55)  T(F): 98 (14 Jan 2018 05:41), Max: 98.2 (13 Jan 2018 10:55)  HR: 59 (14 Jan 2018 05:41) (59 - 157)  BP: 156/61 (14 Jan 2018 05:41) (140/50 - 156/61)  BP(mean): --  RR: 20 (14 Jan 2018 05:41) (20 - 20)  SpO2: 98% (14 Jan 2018 05:41) (96% - 98%)        LABS:      not done yet today    MEDICATIONS  (STANDING):  aspirin enteric coated 325 milliGRAM(s) Oral daily  diazepam    Tablet 2 milliGRAM(s) Oral two times a day  enalapril 10 milliGRAM(s) Oral daily  enoxaparin Injectable 68 milliGRAM(s) SubCutaneous every 12 hours  prasugrel 10 milliGRAM(s) Oral daily  predniSONE   Tablet 5 milliGRAM(s) Oral daily  sodium chloride 0.9% lock flush 3 milliLiter(s) IV Push every 8 hours    MEDICATIONS  (PRN):  guaiFENesin    Syrup 100 milliGRAM(s) Oral every 8 hours PRN Cough  ondansetron Injectable 4 milliGRAM(s) IV Push every 8 hours PRN Nausea

## 2018-01-14 NOTE — DIETITIAN INITIAL EVALUATION ADULT. - OTHER INFO
Pt reports fair to good appetite, tolerating diet well. Completed 100% of breakfast this am and 50% of lunch today.

## 2018-01-14 NOTE — PROGRESS NOTE ADULT - PROBLEM SELECTOR PLAN 1
Discussed with EP.  Pt requires PPM.  Possible placement pending ID followup Pt requires PPM.  Possible placement pending ID followup and cleared medially. EP follwoing

## 2018-01-14 NOTE — PROGRESS NOTE ADULT - ASSESSMENT
Pt is an 82yo Male with HTN, HLD, CAD/MI/stents, PAF (non compliant with eliquis and will not take AC), admitted to [facility] s/p 2 syncopal syncope x 2, found to be bradycardic requiring PPM.

## 2018-01-14 NOTE — PROGRESS NOTE ADULT - ASSESSMENT
Pt is an 82yo Male with HTN, HLD, CAD/MI/stents who presents with syncope x 2 today, which was preceded by shortness of breath and associated with urinary incontinence. Pt states in past he has had these episodes of SOB and that it has been going on for many years but today was the first time he lost consciousness. Pt denies tongue biting, numbness, speech, visual, auditory disturbances, cp, palpitations, HA, n/v/d.   PT WTH URI SX COUGH CONGESTIONS  NO FEVERS  PT FOR PACEMAKER  IF PT REMAINS AFEBRILE AND CX REMAIN NEGATIVE OK FOR   PPM PLACEMENT FORM ID STANDPOINT Pt is an 84yo Male with HTN, HLD, CAD/MI/stents who presents with syncope x 2 today, which was preceded by shortness of breath and associated with urinary incontinence. Pt states in past he has had these episodes of SOB and that it has been going on for many years but today was the first time he lost consciousness. Pt denies tongue biting, numbness, speech, visual, auditory disturbances, cp, palpitations, HA, n/v/d.   PT WTH URI SX COUGH CONGESTIONS  NO FEVERS  PT FOR PACEMAKER  IF PT REMAINS AFEBRILE AND CX REMAIN NEGATIVE OK FOR   PPM PLACEMENT FORM ID STANDPOINT  WILL FOLLOW UP AS NEEDED PLEASE CALL IF QUESTIONS

## 2018-01-14 NOTE — PROGRESS NOTE ADULT - PROBLEM SELECTOR PLAN 5
No indication of active infection.  ID input appreciated.  F/u cultures prior to PPM placement.  Dr. Carrillo and Dr. William was informed.

## 2018-01-15 LAB
ANION GAP SERPL CALC-SCNC: 14 MMOL/L — SIGNIFICANT CHANGE UP (ref 5–17)
BUN SERPL-MCNC: 21 MG/DL — HIGH (ref 8–20)
CALCIUM SERPL-MCNC: 8.6 MG/DL — SIGNIFICANT CHANGE UP (ref 8.6–10.2)
CHLORIDE SERPL-SCNC: 102 MMOL/L — SIGNIFICANT CHANGE UP (ref 98–107)
CO2 SERPL-SCNC: 20 MMOL/L — LOW (ref 22–29)
CREAT SERPL-MCNC: 1.1 MG/DL — SIGNIFICANT CHANGE UP (ref 0.5–1.3)
GLUCOSE SERPL-MCNC: 204 MG/DL — HIGH (ref 70–115)
HCT VFR BLD CALC: 35.3 % — LOW (ref 42–52)
HGB BLD-MCNC: 11.6 G/DL — LOW (ref 14–18)
MCHC RBC-ENTMCNC: 30.4 PG — SIGNIFICANT CHANGE UP (ref 27–31)
MCHC RBC-ENTMCNC: 32.9 G/DL — SIGNIFICANT CHANGE UP (ref 32–36)
MCV RBC AUTO: 92.7 FL — SIGNIFICANT CHANGE UP (ref 80–94)
PLATELET # BLD AUTO: 258 K/UL — SIGNIFICANT CHANGE UP (ref 150–400)
POTASSIUM SERPL-MCNC: 4.1 MMOL/L — SIGNIFICANT CHANGE UP (ref 3.5–5.3)
POTASSIUM SERPL-SCNC: 4.1 MMOL/L — SIGNIFICANT CHANGE UP (ref 3.5–5.3)
RBC # BLD: 3.81 M/UL — LOW (ref 4.6–6.2)
RBC # FLD: 12.6 % — SIGNIFICANT CHANGE UP (ref 11–15.6)
SODIUM SERPL-SCNC: 136 MMOL/L — SIGNIFICANT CHANGE UP (ref 135–145)
WBC # BLD: 9.5 K/UL — SIGNIFICANT CHANGE UP (ref 4.8–10.8)
WBC # FLD AUTO: 9.5 K/UL — SIGNIFICANT CHANGE UP (ref 4.8–10.8)

## 2018-01-15 PROCEDURE — 99233 SBSQ HOSP IP/OBS HIGH 50: CPT

## 2018-01-15 PROCEDURE — 99232 SBSQ HOSP IP/OBS MODERATE 35: CPT

## 2018-01-15 RX ADMIN — SODIUM CHLORIDE 3 MILLILITER(S): 9 INJECTION INTRAMUSCULAR; INTRAVENOUS; SUBCUTANEOUS at 21:26

## 2018-01-15 RX ADMIN — ENOXAPARIN SODIUM 68 MILLIGRAM(S): 100 INJECTION SUBCUTANEOUS at 11:32

## 2018-01-15 RX ADMIN — Medication 5 MILLIGRAM(S): at 05:01

## 2018-01-15 RX ADMIN — SODIUM CHLORIDE 3 MILLILITER(S): 9 INJECTION INTRAMUSCULAR; INTRAVENOUS; SUBCUTANEOUS at 05:00

## 2018-01-15 RX ADMIN — Medication 325 MILLIGRAM(S): at 11:32

## 2018-01-15 RX ADMIN — Medication 100 MILLIGRAM(S): at 03:09

## 2018-01-15 RX ADMIN — Medication 2 MILLIGRAM(S): at 11:32

## 2018-01-15 RX ADMIN — Medication 10 MILLIGRAM(S): at 05:01

## 2018-01-15 RX ADMIN — ENOXAPARIN SODIUM 68 MILLIGRAM(S): 100 INJECTION SUBCUTANEOUS at 21:30

## 2018-01-15 RX ADMIN — Medication 100 MILLIGRAM(S): at 21:30

## 2018-01-15 RX ADMIN — SODIUM CHLORIDE 3 MILLILITER(S): 9 INJECTION INTRAMUSCULAR; INTRAVENOUS; SUBCUTANEOUS at 14:39

## 2018-01-15 RX ADMIN — PRASUGREL 10 MILLIGRAM(S): 5 TABLET, FILM COATED ORAL at 11:32

## 2018-01-15 NOTE — PROGRESS NOTE ADULT - SUBJECTIVE AND OBJECTIVE BOX
[facility], [facility]                                                    [address], [address], [place] [address]                                                         PHONE: [phone]    FAX: [phone] [address], [address], [place] [address]                                                 PHONE: [phone]    FAX: [phone]  *******************************************************************************    Overnight events/Subjective Assessment: comfortable in the bed with no new c/o.     INTERPRETATION OF TELEMETRY (personally reviewed): sinus in 50s     Lipitor (Muscle Pain)  Plavix (Other)    MEDICATIONS  (STANDING):  aspirin enteric coated 325 milliGRAM(s) Oral daily  diazepam    Tablet 2 milliGRAM(s) Oral two times a day  enalapril 10 milliGRAM(s) Oral daily  enoxaparin Injectable 68 milliGRAM(s) SubCutaneous every 12 hours  prasugrel 10 milliGRAM(s) Oral daily  predniSONE   Tablet 5 milliGRAM(s) Oral daily  sodium chloride 0.9% lock flush 3 milliLiter(s) IV Push every 8 hours    MEDICATIONS  (PRN):  guaiFENesin    Syrup 100 milliGRAM(s) Oral every 8 hours PRN Cough  ondansetron Injectable 4 milliGRAM(s) IV Push every 8 hours PRN Nausea      Vital Signs Last 24 Hrs  T(C): 37.2 (15 Esequiel 2018 05:00), Max: 37.2 (15 Esequiel 2018 05:00)  T(F): 99 (15 Esequiel 2018 05:00), Max: 99 (15 Esequiel 2018 05:00)  HR: 70 (15 Esequiel 2018 05:00) (58 - 94)  BP: 148/58 (15 Esequiel 2018 05:00) (120/48 - 150/56)  BP(mean): --  RR: 20 (15 Esequiel 2018 05:00) (18 - 20)  SpO2: 100% (15 Esequiel 2018 05:00) (98% - 100%)    I&O's Detail    14 Jan 2018 07:01  -  15 Esequiel 2018 07:00  --------------------------------------------------------  IN:    Oral Fluid: 240 mL  Total IN: 240 mL    OUT:    Voided: 420 mL  Total OUT: 420 mL    Total NET: -180 mL        I&O's Summary    14 Jan 2018 07:01  -  15 Esequiel 2018 07:00  --------------------------------------------------------  IN: 240 mL / OUT: 420 mL / NET: -180 mL            PHYSICAL EXAM:  General: Appears well developed, well nourished, no acute distress  HEENT: Head: normocephalic, atraumatic  Eyes: Pupils equal and reactive  Neck: Supple, no carotid bruit, no JVD, no HJR  CARDIOVASCULAR: Normal S1 and S2, no murmur, rub, or gallop  LUNGS: Clear to auscultation bilaterally, no rales, rhonchi or wheeze  ABDOMEN: Soft, nontender, non-distended, positive bowel sounds, no mass or bruit  EXTREMITIES: No edema, distal pulses WNL  SKIN: Warm and dry with normal turgor  NEURO: Alert & oriented x 3, grossly intact  PSYCH: normal mood and affect        LABS:    01-14    141  |  102  |  22.0<H>  ----------------------------<  95  3.6   |  24.0  |  1.08    Ca    8.7      14 Jan 2018 05:48      Serum Pro-Brain Natriuretic Peptide: 781 pg/mL (01-08 @ 22:28)  serum  Lipids:   Hemoglobin A1C, Whole Blood: 5.0 % (01-09 @ 05:50)    Thyroid Stimulating Hormone, Serum: 0.28 uIU/mL (01-09 @ 05:50)  Thyroid Stimulating Hormone, Serum: 0.23 uIU/mL (01-08 @ 22:55)      RADIOLOGY & ADDITIONAL STUDIES:    ECG: < from: 12 Lead ECG (01.08.18 @ 18:17) >  Atrial fibrillation with rapid ventricular response  Minimalvoltage criteria for LVH, may be normal variant  Septal infarct , age undetermined      ECHO: < from: TTE Echo Complete w/Doppler (01.09.18 @ 14:44) >  Summary:   1. Left ventricular ejection fraction, by visual estimation, is 50 to   55%.   2. Mildly decreased segmental left ventricular systolic function.   3. Mid anteroseptal segment and apex are abnormal as described above.   4. There is no evidence of pericardial effusion.   5. Mild-moderate tricuspid regurgitation.   6. Mild aortic regurgitation.   7. Estimated pulmonary artery systolic pressure is 35.8 mmHg assuming a   right atrial pressure of 3 mmHg, which is consistent with borderline   pulmonary hypertension.   8. No evidence of aortic stenosis.   9. Severely enlarged left atrium.  10. Normal right ventricular size and function.      ASSESSMENT AND PLAN:  In summary, TATIANA RIBERA is a 83y Male with past medical history significant for HTN, HLD, CAD/MI/stents presents with syncope x 2 in light of coughing in light of URI symptoms.     History of PAF,, not on AC (pt refused Eliquis as it made him feel poorly and will not take AC.  On presentation AF/RVR. Hospital course noted to have 3.29sec pause in CAF.   Multiple coronary stents, last June 2015 of mid LAD and large diagonal. Pt has smooth 50% in stent stenosis of RCA. 70%small circumflex marginal branch.  Hx of self terminating WCT (declined EPS). Also pauses up to 3.3 seconds.  Myoview stress 3/30/16 large fixed distal AW defect. EF=43%  Carotid 9/22/16 bilat 16-49% stenosis  Echo 8/8/16 EF=50-55%. Apical akinesis. mild MR/AR. dilation of the aortic root 3.8cm  MUGA 7/14/16 EF=36%  Holter 2/8/17 SR. AVG HR=64, 1847 isolated VE, 50 paired VE, 14 trigeminy,  33 isolated SVE, one 3bt SVT  - SSS  - Continue to Monitor on telemetry  - Troponins are negative for ACS  - Echocardiogram with EF of 50-55%  - Carotid Duplex Scan < from: US Duplex Carotid Arteries Complete, Bilateral (01.10.18 @ 18:47) >  with internal carotid artery velocity equaling 232 cm/s equaling to a stenosis 70% or greater. Will need CT angiogram, possible vascular eval.    - Left carotid system:  No hemodynamically significant stenosis was found.    - Neuro work-up and input.  Carotid findings due need to be addressed per Dr Luo. CTA neck pending  - No evidence of ischemia or CHF clinically, eventual ischemic evaluation (likely as an outpatient)  - Asa, Effient (pt allergic to plavix).   Pt with hx of coronary stents, OK to DC Effient and consider full AC with Eliquis and ASA at 81 mg daily   - Off metoprolol for now  - Stable mentally on Valium at 2 mg every 12 hrs  - Plan for PPM tomorrow if  medically cleared and stable   - Hold Lovenox today after PM dose.      Monday cath/EP lab is closed for elective cases.

## 2018-01-15 NOTE — PROGRESS NOTE ADULT - SUBJECTIVE AND OBJECTIVE BOX
[facility]  INFECTIOUS DISEASES AND INTERNAL MEDICINE at [place]  =======================================================  Dev Peters MD  Diplomates American Board of Internal Medicine and Infectious Diseases  =======================================================      TATIANA RIBERA [id number]      Follow up: ID Pre-op evaluation    No complaints  No fevers    Allergies:  Lipitor (Muscle Pain)  Plavix (Other)      Antibiotics:  None      REVIEW OF SYSTEMS:  CONSTITUTIONAL:  No Fever or chills  HEENT:   No diplopia or blurred vision.  No earache, sore throat or runny nose.  CARDIOVASCULAR:  No pressure, squeezing, strangling, tightness, heaviness or aching about the chest, neck, axilla or epigastrium.  RESPIRATORY:  No cough, shortness of breath  GASTROINTESTINAL:  No nausea, vomiting or diarrhea.  GENITOURINARY:  No dysuria, frequency or urgency. No Blood in urine  MUSCULOSKELETAL:  no joint aches, no muscle pain  SKIN:  No change in skin, hair or nails.  NEUROLOGIC:  No paresthesias, fasciculations  PSYCHIATRIC:  No disorder of thought or mood.  ENDOCRINE:  No heat or cold intolerance  HEMATOLOGICAL:  No easy bruising or bleeding.       Physical Exam:  Vital Signs Last 24 Hrs  T(C): 36.7 (15 Esequiel 2018 15:39), Max: 37.2 (15 Esequiel 2018 05:00)  T(F): 98 (15 Esequiel 2018 15:39), Max: 99 (15 Esequiel 2018 05:00)  HR: 76 (15 Esequiel 2018 15:39) (58 - 91)  BP: 137/72 (15 Esequiel 2018 15:39) (137/72 - 165/65)  RR: 18 (15 Esequiel 2018 15:39) (18 - 20)  SpO2: 100% (15 Esequiel 2018 15:39) (98% - 100%)      GEN: NAD, pleasant  HEENT: normocephalic and atraumatic. EOMI. PERRL.    NECK: Supple  LUNGS: Clear to auscultation.  HEART: Regular rate and rhythm   ABDOMEN: Soft, nontender, and nondistended.  Positive bowel sounds.    : No CVA tenderness  EXTREMITIES: Without any edema.  MSK: no joint swelling  NEUROLOGIC: Cranial nerves II through XII are grossly intact.  PSYCHIATRIC: Appropriate affect .  SKIN: No ulceration or induration present.      Labs:  01-15    136  |  102  |  21.0<H>  ----------------------------<  204<H>  4.1   |  20.0<L>  |  1.10    Ca    8.6      15 Esequiel 2018 11:13                 11.6   9.5   )-----------( 258      ( 15 Esequiel 2018 11:13 )             35.3         RECENT CULTURES:  01-08 @ 22:56    RVP  NotDetec        EXAM:  XR CHEST PA LAT 2V                        PROCEDURE DATE:  01/13/2018    INTERPRETATION:  PA and lateral chest radiographs   COMPARISON: 1/8/2018.  CLINICAL INFORMATION: Cough.  FINDINGS:  The airway is midline.  There are no airspace consolidations.  There is no pleural effusion or pneumothorax.   The  heart is enlarged in transverse diameter. No hilar mass. Trachea midline.       The visualized osseus structures are intact.   IMPRESSION:  If cough persists despite conservative therapy and further evaluation of   lung parenchyma required, a noncontrast CT exam suggested to exclude   occult pathology not evident on plain film radiography..  No acute radiographic cardiopulmonary pathology

## 2018-01-15 NOTE — PROGRESS NOTE ADULT - ASSESSMENT
84yo Male with HTN, HLD, CAD/MI/stents who presents with syncope. Needs PPM per cardiology. Called for ID pre-op eval prior to placement

## 2018-01-15 NOTE — PROGRESS NOTE ADULT - SUBJECTIVE AND OBJECTIVE BOX
Patient: TATIANA RIBERA [id number] 83y Male                 Internal Medicine Hospitalist Progress Note - Dr. Chago Gaspar    Chief Complaint: Patient is a 83y old  Male who presents with a chief complaint of sob, fainted (08 Jan 2018 23:19)    HPI:  Pt is an 84yo Male with HTN, HLD, CAD/MI/stents, PAF (non compliant with eliquis and will not take AC), admitted to [facility] s/p 2 syncopal  syncope x 2 today which was preceded by SOB and dizziness. Denies prior syncopal episodes in the past. When further questioned about syncope/SOB pt is quick to relate all symptoms to his recent influenza/URI symptoms x 2 weeks.  In ED, noted to have episodes of bradycardia.  Seen by EP and found to have indication for PPM.  Seen by ID for evaluation prior to PPM placement.  Blood cultures obtained.    denies cough /fever / chills/CP/SOB/palpitations.  OA pain better today. BC not available. spoke to ID. not comfortable for PPM until Bcx back. spoke to cardiology. PPM was deferred. As per cardiology patient has been living with this pause for long time and tolerating.     Tele: HR fluctuation lower 50s    ROS:  CONSTITUTIONAL:  No distress.   HEENT:  Eyes:  No diplopia or blurred vision.   CARDIOVASCULAR:  No pressure, squeezing, tightness, heaviness or aching about the chest; no palpitations. no leg swelling, no orthopnea or PND  RESPIRATORY:  no SOB. no wheezing. no cough or sputum.  No hemoptysis  GI: no nausea, no vomiting, no diarrhea, no constipation. No hematochezia or melena  SKIN: no rash  CNS: No headaches. No weakness. no numbness. No depression or anxiety. No SI      ____________________PHYSICAL EXAM:  Vital Signs Last 24 Hrs  T(C): 36.7 (15 Esequiel 2018 10:26), Max: 37.2 (15 Esequiel 2018 05:00)  T(F): 98 (15 Esequiel 2018 10:26), Max: 99 (15 Esequiel 2018 05:00)  HR: 73 (15 Esequiel 2018 11:32) (58 - 91)  BP: 144/60 (15 Esequiel 2018 11:32) (142/60 - 165/65)  BP(mean): --  RR: 18 (15 Esequiel 2018 11:32) (18 - 20)  SpO2: 100% (15 Esequiel 2018 11:32) (98% - 100%)  ____________________      LABS:                          11.6   9.5   )-----------( 258      ( 15 Seequiel 2018 11:13 )             35.3     01-15    136  |  102  |  21.0<H>  ----------------------------<  204<H>  4.1   |  20.0<L>  |  1.10    Ca    8.6      15 Esequiel 2018 11:13      MEDICATIONS  (STANDING):  aspirin enteric coated 325 milliGRAM(s) Oral daily  diazepam    Tablet 2 milliGRAM(s) Oral two times a day  enalapril 10 milliGRAM(s) Oral daily  enoxaparin Injectable 68 milliGRAM(s) SubCutaneous every 12 hours  prasugrel 10 milliGRAM(s) Oral daily  predniSONE   Tablet 5 milliGRAM(s) Oral daily  sodium chloride 0.9% lock flush 3 milliLiter(s) IV Push every 8 hours    MEDICATIONS  (PRN):  guaiFENesin    Syrup 100 milliGRAM(s) Oral every 8 hours PRN Cough  ondansetron Injectable 4 milliGRAM(s) IV Push every 8 hours PRN Nausea

## 2018-01-15 NOTE — PROGRESS NOTE ADULT - PROBLEM SELECTOR PLAN 1
blood cultures pending  Afebrile  No leukocytosis  Place PPM on or after 1/18/18 if cultures remain negative

## 2018-01-16 PROCEDURE — 99233 SBSQ HOSP IP/OBS HIGH 50: CPT

## 2018-01-16 RX ORDER — LIDOCAINE 4 G/100G
1 CREAM TOPICAL
Qty: 0 | Refills: 0 | Status: DISCONTINUED | OUTPATIENT
Start: 2018-01-16 | End: 2018-01-16

## 2018-01-16 RX ORDER — LIDOCAINE 4 G/100G
1 CREAM TOPICAL
Qty: 0 | Refills: 0 | Status: DISCONTINUED | OUTPATIENT
Start: 2018-01-16 | End: 2018-01-20

## 2018-01-16 RX ORDER — OXYCODONE AND ACETAMINOPHEN 5; 325 MG/1; MG/1
1 TABLET ORAL ONCE
Qty: 0 | Refills: 0 | Status: DISCONTINUED | OUTPATIENT
Start: 2018-01-16 | End: 2018-01-16

## 2018-01-16 RX ORDER — ACETAMINOPHEN 500 MG
325 TABLET ORAL EVERY 4 HOURS
Qty: 0 | Refills: 0 | Status: DISCONTINUED | OUTPATIENT
Start: 2018-01-16 | End: 2018-01-20

## 2018-01-16 RX ORDER — ENOXAPARIN SODIUM 100 MG/ML
68 INJECTION SUBCUTANEOUS EVERY 12 HOURS
Qty: 0 | Refills: 0 | Status: DISCONTINUED | OUTPATIENT
Start: 2018-01-16 | End: 2018-01-18

## 2018-01-16 RX ORDER — ENOXAPARIN SODIUM 100 MG/ML
68 INJECTION SUBCUTANEOUS EVERY 12 HOURS
Qty: 0 | Refills: 0 | Status: DISCONTINUED | OUTPATIENT
Start: 2018-01-16 | End: 2018-01-16

## 2018-01-16 RX ADMIN — Medication 10 MILLIGRAM(S): at 06:19

## 2018-01-16 RX ADMIN — Medication 325 MILLIGRAM(S): at 11:14

## 2018-01-16 RX ADMIN — SODIUM CHLORIDE 3 MILLILITER(S): 9 INJECTION INTRAMUSCULAR; INTRAVENOUS; SUBCUTANEOUS at 14:44

## 2018-01-16 RX ADMIN — Medication 2 MILLIGRAM(S): at 11:14

## 2018-01-16 RX ADMIN — LIDOCAINE 1 APPLICATION(S): 4 CREAM TOPICAL at 14:50

## 2018-01-16 RX ADMIN — SODIUM CHLORIDE 3 MILLILITER(S): 9 INJECTION INTRAMUSCULAR; INTRAVENOUS; SUBCUTANEOUS at 22:18

## 2018-01-16 RX ADMIN — PRASUGREL 10 MILLIGRAM(S): 5 TABLET, FILM COATED ORAL at 11:14

## 2018-01-16 RX ADMIN — ENOXAPARIN SODIUM 68 MILLIGRAM(S): 100 INJECTION SUBCUTANEOUS at 11:15

## 2018-01-16 RX ADMIN — Medication 100 MILLIGRAM(S): at 06:19

## 2018-01-16 RX ADMIN — Medication 325 MILLIGRAM(S): at 14:43

## 2018-01-16 RX ADMIN — Medication 5 MILLIGRAM(S): at 06:19

## 2018-01-16 RX ADMIN — SODIUM CHLORIDE 3 MILLILITER(S): 9 INJECTION INTRAMUSCULAR; INTRAVENOUS; SUBCUTANEOUS at 06:05

## 2018-01-16 NOTE — PROGRESS NOTE ADULT - SUBJECTIVE AND OBJECTIVE BOX
Patient: TATIANA RIBERA [id number] 83y Male                 Internal Medicine Hospitalist Progress Note - Dr. Chago Gaspar    Chief Complaint: Patient is a 83y old  Male who presents with a chief complaint of sob, fainted (08 Jan 2018 23:19)    HPI:  Pt is an 82yo Male with HTN, HLD, CAD/MI/stents, PAF (non compliant with eliquis and will not take AC), admitted to [facility] s/p 2 syncopal  syncope x 2 today which was preceded by SOB and dizziness. Denies prior syncopal episodes in the past. When further questioned about syncope/SOB pt is quick to relate all symptoms to his recent influenza/URI symptoms x 2 weeks.  In ED, noted to have episodes of bradycardia.  Seen by EP and found to have indication for PPM.  Seen by ID for evaluation prior to PPM placement.  Blood cultures obtained.    denies cough /fever / chills/CP/SOB/palpitations. complainig of b/l foot and ankle pain. long-standing history. wants bengay. helps. BC not available. spoke to ID. not comfortable for PPM until Bcx back. spoke to cardiology. PPM was deferred. As per cardiology patient has been living with this pause for long time and tolerating. ID suggested possible PPM on 1/18    Tele: longest pause 2.5 sec. PVC    ROS:  CONSTITUTIONAL:  No distress.   HEENT:  Eyes:  No diplopia or blurred vision.   CARDIOVASCULAR:  No pressure, squeezing, tightness, heaviness or aching about the chest; no palpitations. no leg swelling, no orthopnea or PND  RESPIRATORY:  no SOB. no wheezing. no cough or sputum.  No hemoptysis  GI: no nausea, no vomiting, no diarrhea, no constipation. No hematochezia or melena  ext: as per HPI  SKIN: no rash  CNS: No headaches. No weakness. no numbness. No depression or anxiety. No SI      ____________________PHYSICAL EXAM:  Vital Signs Last 24 Hrs  T(C): 36.9 (16 Jan 2018 09:53), Max: 36.9 (16 Jan 2018 09:53)  T(F): 98.5 (16 Jan 2018 09:53), Max: 98.5 (16 Jan 2018 09:53)  HR: 88 (16 Jan 2018 09:53) (76 - 88)  BP: 161/64 (16 Jan 2018 09:53) (137/72 - 161/64)  BP(mean): --  RR: 20 (16 Jan 2018 09:53) (18 - 20)  SpO2: 95% (16 Jan 2018 06:13) (95% - 100%)      LABS:                                     11.6   9.5   )-----------( 258      ( 15 Esequiel 2018 11:13 )             35.3       01-15    136  |  102  |  21.0<H>  ----------------------------<  204<H>  4.1   |  20.0<L>  |  1.10    Ca    8.6      15 Esequiel 2018 11:13      MEDICATIONS  (STANDING):  aspirin enteric coated 325 milliGRAM(s) Oral daily  diazepam    Tablet 2 milliGRAM(s) Oral two times a day  enalapril 10 milliGRAM(s) Oral daily  enoxaparin Injectable 68 milliGRAM(s) SubCutaneous every 12 hours  prasugrel 10 milliGRAM(s) Oral daily  predniSONE   Tablet 5 milliGRAM(s) Oral daily  sodium chloride 0.9% lock flush 3 milliLiter(s) IV Push every 8 hours    MEDICATIONS  (PRN):  guaiFENesin    Syrup 100 milliGRAM(s) Oral every 8 hours PRN Cough  ondansetron Injectable 4 milliGRAM(s) IV Push every 8 hours PRN Nausea

## 2018-01-16 NOTE — PROGRESS NOTE ADULT - SUBJECTIVE AND OBJECTIVE BOX
[facility], [facility]                                                    [address], [address], [place] [address]                                                         PHONE: [phone]    FAX: [phone] [address], [address], [place] [address]                                                 PHONE: [phone]    FAX: [phone]  *******************************************************************************    Overnight events/Subjective Assessment:    INTERPRETATION OF TELEMETRY (personally reviewed):    Lipitor (Muscle Pain)  Plavix (Other)    MEDICATIONS  (STANDING):  aspirin enteric coated 325 milliGRAM(s) Oral daily  diazepam    Tablet 2 milliGRAM(s) Oral two times a day  enalapril 10 milliGRAM(s) Oral daily  enoxaparin Injectable 68 milliGRAM(s) SubCutaneous every 12 hours  prasugrel 10 milliGRAM(s) Oral daily  predniSONE   Tablet 5 milliGRAM(s) Oral daily  sodium chloride 0.9% lock flush 3 milliLiter(s) IV Push every 8 hours    MEDICATIONS  (PRN):  guaiFENesin    Syrup 100 milliGRAM(s) Oral every 8 hours PRN Cough  ondansetron Injectable 4 milliGRAM(s) IV Push every 8 hours PRN Nausea      Vital Signs Last 24 Hrs  T(C): 36.7 (16 Jan 2018 06:13), Max: 36.8 (15 Esequiel 2018 21:28)  T(F): 98.1 (16 Jan 2018 06:13), Max: 98.2 (15 Esequiel 2018 21:28)  HR: 78 (16 Jan 2018 06:13) (73 - 91)  BP: 142/60 (16 Jan 2018 06:13) (137/72 - 165/65)  BP(mean): --  RR: 19 (16 Jan 2018 06:13) (18 - 20)  SpO2: 95% (16 Jan 2018 06:13) (95% - 100%)    I&O's Detail    15 Esequiel 2018 07:01  -  16 Jan 2018 07:00  --------------------------------------------------------  IN:    Oral Fluid: 480 mL  Total IN: 480 mL    OUT:    Voided: 1300 mL  Total OUT: 1300 mL    Total NET: -820 mL        I&O's Summary    15 Esequiel 2018 07:01  -  16 Jan 2018 07:00  --------------------------------------------------------  IN: 480 mL / OUT: 1300 mL / NET: -820 mL            PHYSICAL EXAM:  General: Appears well developed, well nourished, no acute distress  HEENT: Head: normocephalic, atraumatic  Eyes: Pupils equal and reactive  Neck: Supple, no carotid bruit, no JVD, no HJR  CARDIOVASCULAR: Normal S1 and S2, no murmur, rub, or gallop  LUNGS: Clear to auscultation bilaterally, no rales, rhonchi or wheeze  ABDOMEN: Soft, nontender, non-distended, positive bowel sounds, no mass or bruit  EXTREMITIES: No edema, distal pulses WNL  SKIN: Warm and dry with normal turgor  NEURO: Alert & oriented x 3, grossly intact  PSYCH: normal mood and affect        LABS:                        11.6   9.5   )-----------( 258      ( 15 Esequiel 2018 11:13 )             35.3     01-15    136  |  102  |  21.0<H>  ----------------------------<  204<H>  4.1   |  20.0<L>  |  1.10    Ca    8.6      15 Esequiel 2018 11:13            serum  Lipids:   Hemoglobin A1C, Whole Blood: 5.0 % (01-09 @ 05:50)    Thyroid Stimulating Hormone, Serum: 0.28 uIU/mL (01-09 @ 05:50)  Thyroid Stimulating Hormone, Serum: 0.23 uIU/mL (01-08 @ 22:55)      RADIOLOGY & ADDITIONAL STUDIES:    < from: TTE Echo Complete w/Doppler (01.09.18 @ 14:44) >   Summary:   1. Left ventricular ejection fraction, by visual estimation, is 50 to   55%.   2. Mildly decreased segmental left ventricular systolic function.   3. Mid anteroseptal segment and apex are abnormal as described above.   4. There is no evidence of pericardial effusion.   5. Mild-moderate tricuspid regurgitation.   6. Mild aortic regurgitation.   7. Estimated pulmonary artery systolic pressure is 35.8 mmHg assuming a   right atrial pressure of 3 mmHg, which is consistent with borderline   pulmonary hypertension.   8. No evidence of aortic stenosis.   9. Severely enlarged left atrium.  10. Normal right ventricular size and function.     MD Heidi Electronically signed on 1/9/2018 at 6:26:35 PM    < end of copied text >      < from: CT Angio Neck w/ IV Cont (01.12.18 @ 13:23) >      1.   Right carotid system: Moderate focal stenosis of the proximal   right internal carotid artery approximately 50-60%.    2.   Left carotid system:  No hemodynamically significant stenosis.         < end of copied text >    ASSESSMENT AND PLAN:  In summary, TATIANA RIBERA is a 83y Male with past medical history significant for HTN, HLD, CAD/MI/stents presents with syncope x 2 in light of coughing in light of URI symptoms.     History of PAF,, not on AC (pt refused Eliquis as it made him feel poorly and will not take AC.  On presentation AF/RVR. Hospital course noted to have 3.29sec pause in CAF.   Multiple coronary stents, last June 2015 of mid LAD and large diagonal. Pt has smooth 50% in stent stenosis of RCA. 70%small circumflex marginal branch.  Hx of self terminating WCT (declined EPS). Also pauses up to 3.3 seconds.  Myoview stress 3/30/16 large fixed distal AW defect. EF=43%  Carotid 9/22/16 bilat 16-49% stenosis  Echo 8/8/16 EF=50-55%. Apical akinesis. mild MR/AR. dilation of the aortic root 3.8cm (EF 50-55%on current echo)  MUGA 7/14/16 EF=36%  Holter 2/8/17 SR. AVG HR=64, 1847 isolated VE, 50 paired VE, 14 trigeminy,  33 isolated SVE, one 3bt SVT  - SSS  - Continue to Monitor on telemetry  - Troponins are negative for ACS  - Echocardiogram with EF of 50-55%  - Carotid Duplex Scan < from: US Duplex Carotid Arteries Complete, Bilateral (01.10.18 @ 18:47) >  with internal carotid artery velocity equaling 232 cm/s equaling to a stenosis 70% or greater. CT angiogram SHENA 50-60%, left system no hemodynamically significant stenosis.    - No evidence of ischemia or CHF clinically, eventual ischemic evaluation (likely as an outpatient)  - Asa, Effient (pt allergic to plavix).   Pt with hx of coronary stents, OK to DC Effient and consider full AC with Eliquis and ASA at 81 mg daily   - Off metoprolol for now  - Plan for PPM when medically cleared and stable. ID requests on or after 1/18   - Continue lovenox for now         Key Fischer MD

## 2018-01-16 NOTE — PROGRESS NOTE ADULT - ASSESSMENT
Pt is an 84yo Male with HTN, HLD, CAD/MI/stents, PAF (non compliant with eliquis and will not take AC), admitted to [facility] s/p 2 syncopal syncope x 2, found to be bradycardic requiring PPM. awaiting ID clearance for PPM placement

## 2018-01-17 LAB
BLD GP AB SCN SERPL QL: SIGNIFICANT CHANGE UP
TYPE + AB SCN PNL BLD: SIGNIFICANT CHANGE UP

## 2018-01-17 PROCEDURE — 99232 SBSQ HOSP IP/OBS MODERATE 35: CPT

## 2018-01-17 PROCEDURE — 99233 SBSQ HOSP IP/OBS HIGH 50: CPT

## 2018-01-17 PROCEDURE — 73620 X-RAY EXAM OF FOOT: CPT | Mod: 26,50

## 2018-01-17 PROCEDURE — 93971 EXTREMITY STUDY: CPT | Mod: 26,LT

## 2018-01-17 RX ADMIN — Medication 2 MILLIGRAM(S): at 14:23

## 2018-01-17 RX ADMIN — ENOXAPARIN SODIUM 68 MILLIGRAM(S): 100 INJECTION SUBCUTANEOUS at 00:41

## 2018-01-17 RX ADMIN — ENOXAPARIN SODIUM 68 MILLIGRAM(S): 100 INJECTION SUBCUTANEOUS at 23:15

## 2018-01-17 RX ADMIN — Medication 10 MILLIGRAM(S): at 05:30

## 2018-01-17 RX ADMIN — Medication 5 MILLIGRAM(S): at 05:29

## 2018-01-17 RX ADMIN — ENOXAPARIN SODIUM 68 MILLIGRAM(S): 100 INJECTION SUBCUTANEOUS at 14:23

## 2018-01-17 RX ADMIN — SODIUM CHLORIDE 3 MILLILITER(S): 9 INJECTION INTRAMUSCULAR; INTRAVENOUS; SUBCUTANEOUS at 05:29

## 2018-01-17 RX ADMIN — Medication 325 MILLIGRAM(S): at 18:34

## 2018-01-17 RX ADMIN — Medication 325 MILLIGRAM(S): at 14:22

## 2018-01-17 RX ADMIN — PRASUGREL 10 MILLIGRAM(S): 5 TABLET, FILM COATED ORAL at 14:23

## 2018-01-17 RX ADMIN — SODIUM CHLORIDE 3 MILLILITER(S): 9 INJECTION INTRAMUSCULAR; INTRAVENOUS; SUBCUTANEOUS at 21:31

## 2018-01-17 RX ADMIN — SODIUM CHLORIDE 3 MILLILITER(S): 9 INJECTION INTRAMUSCULAR; INTRAVENOUS; SUBCUTANEOUS at 14:24

## 2018-01-17 NOTE — PROGRESS NOTE ADULT - PROBLEM SELECTOR PLAN 1
blood cultures no growth  Afebrile  No leukocytosis  Place PPM on or after 1/18/18 if cultures remain negative and patient remains without fever

## 2018-01-17 NOTE — PROGRESS NOTE ADULT - ASSESSMENT
Pt is an 84yo Male with HTN, HLD, CAD/MI/stents, PAF (non compliant with eliquis and will not take AC), admitted to [facility] s/p 2 syncopal syncope x 2, found to be bradycardic requiring PPM. awaiting ID clearance for PPM placement

## 2018-01-17 NOTE — PROGRESS NOTE ADULT - PROBLEM SELECTOR PLAN 1
Pt requires PPM.  Possible placement pending ID followup and cleared medially. EP following. BC neg*2. spoke to ID. can go PPM .

## 2018-01-17 NOTE — PROGRESS NOTE ADULT - SUBJECTIVE AND OBJECTIVE BOX
[facility]  INFECTIOUS DISEASES AND INTERNAL MEDICINE at [place]  =======================================================  Dev Peters MD  Diplomates American Board of Internal Medicine and Infectious Diseases  =======================================================      TATIANA RIBERA [id number]      Follow up: ID Pre-op evaluation    No complaints  No fevers    Allergies:  Lipitor (Muscle Pain)  Plavix (Other)      Antibiotics:  None      REVIEW OF SYSTEMS:  CONSTITUTIONAL:  No Fever or chills  HEENT:   No diplopia or blurred vision.  No earache, sore throat or runny nose.  CARDIOVASCULAR:  No pressure, squeezing, strangling, tightness, heaviness or aching about the chest, neck, axilla or epigastrium.  RESPIRATORY:  No cough, shortness of breath  GASTROINTESTINAL:  No nausea, vomiting or diarrhea.  GENITOURINARY:  No dysuria, frequency or urgency. No Blood in urine  MUSCULOSKELETAL:  no joint aches, no muscle pain  SKIN:  No change in skin, hair or nails.  NEUROLOGIC:  No paresthesias, fasciculations  PSYCHIATRIC:  No disorder of thought or mood.  ENDOCRINE:  No heat or cold intolerance  HEMATOLOGICAL:  No easy bruising or bleeding.       Physical Exam:  Vital Signs Last 24 Hrs  T(C): 36.6 (17 Jan 2018 09:37), Max: 36.9 (16 Jan 2018 16:07)  T(F): 97.9 (17 Jan 2018 09:37), Max: 98.5 (16 Jan 2018 16:07)  HR: 83 (17 Jan 2018 14:16) (72 - 83)  BP: 134/60 (17 Jan 2018 14:16) (115/63 - 148/72)  RR: 20 (17 Jan 2018 09:37) (18 - 20)  SpO2: 96% (17 Jan 2018 05:30) (96% - 99%)      GEN: NAD, pleasant  HEENT: normocephalic and atraumatic. EOMI. PERRL.    NECK: Supple  LUNGS: Clear to auscultation.  HEART: Regular rate and rhythm   ABDOMEN: Soft, nontender, and nondistended.  Positive bowel sounds.    : No CVA tenderness  EXTREMITIES: Without any edema.  MSK: no joint swelling  NEUROLOGIC: Cranial nerves II through XII are grossly intact.  PSYCHIATRIC: Appropriate affect .  SKIN: No ulceration or induration present.      Labs:  RECENT CULTURES:  01-15 @ 11:13 .Blood Blood-Peripheral     No growth at 48 hours      01-08 @ 22:56    RVP  NotDetec        EXAM:  XR CHEST PA LAT 2V                        PROCEDURE DATE:  01/13/2018    INTERPRETATION:  PA and lateral chest radiographs   COMPARISON: 1/8/2018.  CLINICAL INFORMATION: Cough.  FINDINGS:  The airway is midline.  There are no airspace consolidations.  There is no pleural effusion or pneumothorax.   The  heart is enlarged in transverse diameter. No hilar mass. Trachea midline.       The visualized osseus structures are intact.   IMPRESSION:  If cough persists despite conservative therapy and further evaluation of   lung parenchyma required, a noncontrast CT exam suggested to exclude   occult pathology not evident on plain film radiography..  No acute radiographic cardiopulmonary pathology

## 2018-01-17 NOTE — PROGRESS NOTE ADULT - SUBJECTIVE AND OBJECTIVE BOX
[facility], [facility]                                                    [address], [address], [place] [address]                                                         PHONE: [phone]    FAX: [phone] [address], [address], [place] [address]                                                 PHONE: [phone]    FAX: [phone]  *******************************************************************************    Overnight events/Subjective Assessment:    INTERPRETATION OF TELEMETRY (personally reviewed):    Lipitor (Muscle Pain)  Plavix (Other)    MEDICATIONS  (STANDING):  aspirin enteric coated 325 milliGRAM(s) Oral daily  diazepam    Tablet 2 milliGRAM(s) Oral two times a day  enalapril 10 milliGRAM(s) Oral daily  enoxaparin Injectable 68 milliGRAM(s) SubCutaneous every 12 hours  prasugrel 10 milliGRAM(s) Oral daily  predniSONE   Tablet 5 milliGRAM(s) Oral daily  sodium chloride 0.9% lock flush 3 milliLiter(s) IV Push every 8 hours    MEDICATIONS  (PRN):  acetaminophen   Tablet. 325 milliGRAM(s) Oral every 4 hours PRN pain  guaiFENesin    Syrup 100 milliGRAM(s) Oral every 8 hours PRN Cough  lidocaine 2% Gel 1 Application(s) Topical two times a day PRN pain  ondansetron Injectable 4 milliGRAM(s) IV Push every 8 hours PRN Nausea      Vital Signs Last 24 Hrs  T(C): 36.5 (17 Jan 2018 05:30), Max: 36.9 (16 Jan 2018 09:53)  T(F): 97.7 (17 Jan 2018 05:30), Max: 98.5 (16 Jan 2018 09:53)  HR: 80 (17 Jan 2018 05:30) (72 - 88)  BP: 148/72 (17 Jan 2018 05:30) (119/56 - 161/64)  BP(mean): --  RR: 20 (17 Jan 2018 05:30) (18 - 20)  SpO2: 96% (17 Jan 2018 05:30) (96% - 99%)    I&O's Detail    16 Jan 2018 07:01  -  17 Jan 2018 07:00  --------------------------------------------------------  IN:    Oral Fluid: 240 mL  Total IN: 240 mL    OUT:    Voided: 1300 mL  Total OUT: 1300 mL    Total NET: -1060 mL        I&O's Summary    16 Jan 2018 07:01  -  17 Jan 2018 07:00  --------------------------------------------------------  IN: 240 mL / OUT: 1300 mL / NET: -1060 mL            PHYSICAL EXAM:  General: Appears well developed, well nourished, no acute distress  HEENT: Head: normocephalic, atraumatic  Eyes: Pupils equal and reactive  Neck: Supple, no carotid bruit, no JVD, no HJR  CARDIOVASCULAR: Normal S1 and S2, no murmur, rub, or gallop  LUNGS: Clear to auscultation bilaterally, no rales, rhonchi or wheeze  ABDOMEN: Soft, nontender, non-distended, positive bowel sounds, no mass or bruit  EXTREMITIES: No edema, distal pulses WNL  SKIN: Warm and dry with normal turgor  NEURO: Alert & oriented x 3, grossly intact  PSYCH: normal mood and affect        LABS:                        11.6   9.5   )-----------( 258      ( 15 Esequiel 2018 11:13 )             35.3     01-15    136  |  102  |  21.0<H>  ----------------------------<  204<H>  4.1   |  20.0<L>  |  1.10    Ca    8.6      15 Esequiel 2018 11:13            serum  Lipids:   Hemoglobin A1C, Whole Blood: 5.0 % (01-09 @ 05:50)    Thyroid Stimulating Hormone, Serum: 0.28 uIU/mL (01-09 @ 05:50)  Thyroid Stimulating Hormone, Serum: 0.23 uIU/mL (01-08 @ 22:55)      RADIOLOGY & ADDITIONAL STUDIES:        ASSESSMENT AND PLAN:  In summary, TATIANA RIBERA is a 83y Male with past medical history significant for HTN, HLD, CAD/MI/stents presents with syncope x 2 in light of coughing in light of URI symptoms.     History of PAF,, not on AC (pt refused Eliquis as it made him feel poorly and will not take AC.  On presentation AF/RVR. Hospital course noted to have 3.29sec pause in CAF.   Multiple coronary stents, last June 2015 of mid LAD and large diagonal. Pt has smooth 50% in stent stenosis of RCA. 70%small circumflex marginal branch.  Hx of self terminating WCT (declined EPS). Also pauses up to 3.3 seconds.  Myoview stress 3/30/16 large fixed distal AW defect. EF=43%  Carotid 9/22/16 bilat 16-49% stenosis  Echo 8/8/16 EF=50-55%. Apical akinesis. mild MR/AR. dilation of the aortic root 3.8cm (EF 50-55%on current echo)  MUGA 7/14/16 EF=36%  Holter 2/8/17 SR. AVG HR=64, 1847 isolated VE, 50 paired VE, 14 trigeminy,  33 isolated SVE, one 3bt SVT  - SSS  - Continue to Monitor on telemetry  - Troponins are negative for ACS  - Echocardiogram with EF of 50-55%  - Carotid Duplex Scan < from: US Duplex Carotid Arteries Complete, Bilateral (01.10.18 @ 18:47) >  with internal carotid artery velocity equaling 232 cm/s equaling to a stenosis 70% or greater. CT angiogram SHENA 50-60%, left system no hemodynamically significant stenosis.    - No evidence of ischemia or CHF clinically, eventual ischemic evaluation (likely as an outpatient)  - Pt was on Asa, Effient (pt allergic to plavix).   Pt with hx of coronary stents, OK to DC Effient and consider full AC with Eliquis and ASA at 81 mg daily   - Off metoprolol for now  - Plan for PPM when medically cleared and stable. ID requests on or after 1/18   - Continue lovenox for now  - PM placement as per Dr Carrillo when cleared by LAURIE Fischer MD

## 2018-01-18 LAB
ABO RH CONFIRMATION: SIGNIFICANT CHANGE UP
URATE SERPL-MCNC: 10 MG/DL — HIGH (ref 3.4–7)

## 2018-01-18 PROCEDURE — 99233 SBSQ HOSP IP/OBS HIGH 50: CPT

## 2018-01-18 RX ORDER — ENOXAPARIN SODIUM 100 MG/ML
60 INJECTION SUBCUTANEOUS ONCE
Qty: 0 | Refills: 0 | Status: COMPLETED | OUTPATIENT
Start: 2018-01-18 | End: 2018-01-18

## 2018-01-18 RX ORDER — FAMOTIDINE 10 MG/ML
20 INJECTION INTRAVENOUS DAILY
Qty: 0 | Refills: 0 | Status: DISCONTINUED | OUTPATIENT
Start: 2018-01-18 | End: 2018-01-20

## 2018-01-18 RX ADMIN — SODIUM CHLORIDE 3 MILLILITER(S): 9 INJECTION INTRAMUSCULAR; INTRAVENOUS; SUBCUTANEOUS at 22:21

## 2018-01-18 RX ADMIN — FAMOTIDINE 20 MILLIGRAM(S): 10 INJECTION INTRAVENOUS at 12:44

## 2018-01-18 RX ADMIN — Medication 40 MILLIGRAM(S): at 12:44

## 2018-01-18 RX ADMIN — Medication 2 MILLIGRAM(S): at 12:44

## 2018-01-18 RX ADMIN — ENOXAPARIN SODIUM 60 MILLIGRAM(S): 100 INJECTION SUBCUTANEOUS at 22:19

## 2018-01-18 RX ADMIN — Medication 325 MILLIGRAM(S): at 12:44

## 2018-01-18 RX ADMIN — ENOXAPARIN SODIUM 60 MILLIGRAM(S): 100 INJECTION SUBCUTANEOUS at 12:44

## 2018-01-18 RX ADMIN — PRASUGREL 10 MILLIGRAM(S): 5 TABLET, FILM COATED ORAL at 12:44

## 2018-01-18 RX ADMIN — Medication 10 MILLIGRAM(S): at 05:23

## 2018-01-18 RX ADMIN — Medication 5 MILLIGRAM(S): at 05:23

## 2018-01-18 RX ADMIN — SODIUM CHLORIDE 3 MILLILITER(S): 9 INJECTION INTRAMUSCULAR; INTRAVENOUS; SUBCUTANEOUS at 05:23

## 2018-01-18 RX ADMIN — SODIUM CHLORIDE 3 MILLILITER(S): 9 INJECTION INTRAMUSCULAR; INTRAVENOUS; SUBCUTANEOUS at 16:00

## 2018-01-18 NOTE — PROGRESS NOTE ADULT - ASSESSMENT
Pt is an 84yo Male with HTN, HLD, CAD/MI/stents, PAF (non compliant with eliquis and will not take AC), admitted to [facility] s/p 2 syncopal syncope x 2, found to be bradycardic requiring PPM. awaiting ID clearance for PPM placement

## 2018-01-18 NOTE — PROGRESS NOTE ADULT - PROBLEM SELECTOR PLAN 10
tylenol PRN. PT eval
tylenol PRN. PT eval. lidociane gel PRN. consider dennis
tylenol PRN. PT eval
left foot pain and swelling. USG neg for for DVT. possible gout, trial of prednisone. will void colchicine in this pt. check Uric acid level
tylenol PRN. PT eval. lidociane gel PRN. consider dennis. pt refused. left foot midly swelling+. xray left foot. USG to r/o DVT

## 2018-01-18 NOTE — PHARMACY COMMUNICATION NOTE - COMMENTS
spoke to benja arriaga,obinna nguyễn getting one dose lovenox  10am 01/18 and 1 dose at 10 pm tonight,having procedure at 10/19/18 12 pm

## 2018-01-18 NOTE — PROGRESS NOTE ADULT - SUBJECTIVE AND OBJECTIVE BOX
Patient: TATIANA RIBERA [id number] 83y Male                 Internal Medicine Hospitalist Progress Note - Dr. Chago Gaspar    Chief Complaint: Patient is a 83y old  Male who presents with a chief complaint of sob, fainted (08 Jan 2018 23:19)    HPI:  Pt is an 82yo Male with HTN, HLD, CAD/MI/stents, PAF (non compliant with eliquis and will not take AC), admitted to [facility] s/p 2 syncopal  syncope x 2 today which was preceded by SOB and dizziness. Denies prior syncopal episodes in the past. When further questioned about syncope/SOB pt is quick to relate all symptoms to his recent influenza/URI symptoms x 2 weeks.  In ED, noted to have episodes of bradycardia.  Seen by EP and found to have indication for PPM.  Seen by ID for evaluation prior to PPM placement.  Blood cultures obtained.    denies cough /fever / chills/CP/SOB/palpitations. continue to have b/l foot and ankle pain. long-standing history. xray reported  possible gout. no h/o gout.  USG neg for DVT  BC*2 neg. ID cleared. PPM tomorrow at 4 pm     Tele: pause. PVC    ROS:  CONSTITUTIONAL:  No distress.   HEENT:  Eyes:  No diplopia or blurred vision.   CARDIOVASCULAR:  No pressure, squeezing, tightness, heaviness or aching about the chest; no palpitations. no leg swelling, no orthopnea or PND  RESPIRATORY:  no SOB. no wheezing. no cough or sputum.  No hemoptysis  GI: no nausea, no vomiting, no diarrhea, no constipation. No hematochezia or melena  ext: as per HPI  SKIN: no rash  CNS: No headaches. No weakness. no numbness. No depression or anxiety. No SI      ____________________PHYSICAL EXAM:  vitals reviewed  Chest:clear.  CVS: S1/S2, no m/r/g  ext: left foot swelling    LABS:    no labs today    MEDICATIONS  (STANDING):  aspirin enteric coated 325 milliGRAM(s) Oral daily  diazepam    Tablet 2 milliGRAM(s) Oral two times a day  enalapril 10 milliGRAM(s) Oral daily  enoxaparin Injectable 68 milliGRAM(s) SubCutaneous every 12 hours  prasugrel 10 milliGRAM(s) Oral daily  predniSONE   Tablet 5 milliGRAM(s) Oral daily  sodium chloride 0.9% lock flush 3 milliLiter(s) IV Push every 8 hours    MEDICATIONS  (PRN):  acetaminophen   Tablet. 325 milliGRAM(s) Oral every 4 hours PRN pain  guaiFENesin    Syrup 100 milliGRAM(s) Oral every 8 hours PRN Cough  lidocaine 2% Gel 1 Application(s) Topical two times a day PRN pain  ondansetron Injectable 4 milliGRAM(s) IV Push every 8 hours PRN Nausea

## 2018-01-18 NOTE — PROGRESS NOTE ADULT - SUBJECTIVE AND OBJECTIVE BOX
[facility], [facility]                                                    [address] , [address], [place] [address]                                                         PHONE: [phone]    FAX: [phone] [address], [address], [place] [address]                                                 PHONE: [phone]    FAX: [phone]  *******************************************************************************    Overnight events/Subjective Assessment: no new c/o.     INTERPRETATION OF TELEMETRY (personally reviewed): SR in 50s    Lipitor (Muscle Pain)  Plavix (Other)    MEDICATIONS  (STANDING):  aspirin enteric coated 325 milliGRAM(s) Oral daily  diazepam    Tablet 2 milliGRAM(s) Oral two times a day  enalapril 10 milliGRAM(s) Oral daily  enoxaparin Injectable 60 milliGRAM(s) SubCutaneous once  enoxaparin Injectable 60 milliGRAM(s) SubCutaneous once  famotidine    Tablet 20 milliGRAM(s) Oral daily  prasugrel 10 milliGRAM(s) Oral daily  predniSONE   Tablet 40 milliGRAM(s) Oral daily  sodium chloride 0.9% lock flush 3 milliLiter(s) IV Push every 8 hours    MEDICATIONS  (PRN):  acetaminophen   Tablet. 325 milliGRAM(s) Oral every 4 hours PRN pain  guaiFENesin    Syrup 100 milliGRAM(s) Oral every 8 hours PRN Cough  lidocaine 2% Gel 1 Application(s) Topical two times a day PRN pain  ondansetron Injectable 4 milliGRAM(s) IV Push every 8 hours PRN Nausea      Vital Signs Last 24 Hrs  T(C): 37.1 (18 Jan 2018 05:19), Max: 37.1 (18 Jan 2018 05:19)  T(F): 98.7 (18 Jan 2018 05:19), Max: 98.7 (18 Jan 2018 05:19)  HR: 64 (18 Jan 2018 05:19) (64 - 83)  BP: 118/50 (18 Jan 2018 05:19) (118/50 - 152/58)  BP(mean): --  RR: 18 (18 Jan 2018 05:19) (18 - 20)  SpO2: 95% (18 Jan 2018 05:19) (95% - 98%)    I&O's Detail    17 Jan 2018 07:01  -  18 Jan 2018 07:00  --------------------------------------------------------  IN:    Oral Fluid: 960 mL  Total IN: 960 mL    OUT:    Voided: 1700 mL  Total OUT: 1700 mL    Total NET: -740 mL      18 Jan 2018 07:01  -  18 Jan 2018 09:38  --------------------------------------------------------  IN:  Total IN: 0 mL    OUT:    Voided: 500 mL  Total OUT: 500 mL    Total NET: -500 mL        I&O's Summary    17 Jan 2018 07:01  -  18 Jan 2018 07:00  --------------------------------------------------------  IN: 960 mL / OUT: 1700 mL / NET: -740 mL    18 Jan 2018 07:01  -  18 Jan 2018 09:38  --------------------------------------------------------  IN: 0 mL / OUT: 500 mL / NET: -500 mL            PHYSICAL EXAM:  General: Appears well developed, well nourished, no acute distress  HEENT: Head: normocephalic, atraumatic  Eyes: Pupils equal and reactive  Neck: Supple, no carotid bruit, no JVD, no HJR  CARDIOVASCULAR: Normal S1 and S2, no murmur, rub, or gallop  LUNGS: Clear to auscultation bilaterally, no rales, rhonchi or wheeze  ABDOMEN: Soft, nontender, non-distended, positive bowel sounds, no mass or bruit  EXTREMITIES: No edema, distal pulses WNL  SKIN: Warm and dry with normal turgor  NEURO: Alert & oriented x 3, grossly intact  PSYCH: normal mood and affect        LABS:      serum  Lipids:   Hemoglobin A1C, Whole Blood: 5.0 % (01-09 @ 05:50)    Thyroid Stimulating Hormone, Serum: 0.28 uIU/mL (01-09 @ 05:50)  Thyroid Stimulating Hormone, Serum: 0.23 uIU/mL (01-08 @ 22:55)      RADIOLOGY & ADDITIONAL STUDIES:    ECG:    ECHO:    STRESS TEST:    CARDIAC CATHETERIZATION:    ASSESSMENT AND PLAN:  In summary, TATIANA RIBERA is a 83y Male with past medical history significant for HTN, HLD, CAD/MI/stents presents with syncope x 2 in light of coughing in light of URI symptoms.     History of PAF,, not on AC (pt refused Eliquis as it made him feel poorly and will not take AC.  On presentation AF/RVR. Hospital course noted to have 3.29sec pause in CAF.   Multiple coronary stents, last June 2015 of mid LAD and large diagonal. Pt has smooth 50% in stent stenosis of RCA. 70%small circumflex marginal branch.  Hx of self terminating WCT (declined EPS). Also pauses up to 3.3 seconds.  Myoview stress 3/30/16 large fixed distal AW defect. EF=43%  Carotid 9/22/16 bilat 16-49% stenosis  Echo 8/8/16 EF=50-55%. Apical akinesis. mild MR/AR. dilation of the aortic root 3.8cm (EF 50-55%on current echo)  MUGA 7/14/16 EF=36%  Holter 2/8/17 SR. AVG HR=64, 1847 isolated VE, 50 paired VE, 14 trigeminy,  33 isolated SVE, one 3bt SVT  - SSS  - Continue to Monitor on telemetry  - Troponins are negative for ACS  - Echocardiogram with EF of 50-55%  - Carotid Duplex Scan < from: US Duplex Carotid Arteries Complete, Bilateral (01.10.18 @ 18:47) >  with internal carotid artery velocity equaling 232 cm/s equaling to a stenosis 70% or greater. CT angiogram SHENA 50-60%, left system no hemodynamically significant stenosis.    - No evidence of ischemia or CHF clinically, eventual ischemic evaluation (likely as an outpatient)  - Pt was on Asa, Effient (pt allergic to plavix).   Pt with hx of coronary stents, OK to DC Effient and consider full AC with Eliquis and ASA at 81 mg daily   - Off metoprolol for now  - Plan for PPM when medically cleared and stable. ID requests on or after 1/18   - Hold lovenox today after PM dose  - PM placement tomorrow at 4 pm, blood cultures no growth, afebrile, no leukocytosis, patient remains without fever.         Key Fischer MD

## 2018-01-19 DIAGNOSIS — M10.9 GOUT, UNSPECIFIED: ICD-10-CM

## 2018-01-19 LAB
ANION GAP SERPL CALC-SCNC: 16 MMOL/L — SIGNIFICANT CHANGE UP (ref 5–17)
BUN SERPL-MCNC: 34 MG/DL — HIGH (ref 8–20)
CALCIUM SERPL-MCNC: 8.7 MG/DL — SIGNIFICANT CHANGE UP (ref 8.6–10.2)
CHLORIDE SERPL-SCNC: 101 MMOL/L — SIGNIFICANT CHANGE UP (ref 98–107)
CO2 SERPL-SCNC: 19 MMOL/L — LOW (ref 22–29)
CREAT SERPL-MCNC: 1.13 MG/DL — SIGNIFICANT CHANGE UP (ref 0.5–1.3)
GLUCOSE SERPL-MCNC: 113 MG/DL — SIGNIFICANT CHANGE UP (ref 70–115)
HCT VFR BLD CALC: 35.4 % — LOW (ref 42–52)
HGB BLD-MCNC: 11.6 G/DL — LOW (ref 14–18)
MAGNESIUM SERPL-MCNC: 2.5 MG/DL — SIGNIFICANT CHANGE UP (ref 1.6–2.6)
MCHC RBC-ENTMCNC: 30.5 PG — SIGNIFICANT CHANGE UP (ref 27–31)
MCHC RBC-ENTMCNC: 32.8 G/DL — SIGNIFICANT CHANGE UP (ref 32–36)
MCV RBC AUTO: 93.2 FL — SIGNIFICANT CHANGE UP (ref 80–94)
PLATELET # BLD AUTO: 310 K/UL — SIGNIFICANT CHANGE UP (ref 150–400)
POTASSIUM SERPL-MCNC: 4.8 MMOL/L — SIGNIFICANT CHANGE UP (ref 3.5–5.3)
POTASSIUM SERPL-SCNC: 4.8 MMOL/L — SIGNIFICANT CHANGE UP (ref 3.5–5.3)
RBC # BLD: 3.8 M/UL — LOW (ref 4.6–6.2)
RBC # FLD: 12.7 % — SIGNIFICANT CHANGE UP (ref 11–15.6)
SODIUM SERPL-SCNC: 136 MMOL/L — SIGNIFICANT CHANGE UP (ref 135–145)
WBC # BLD: 11.3 K/UL — HIGH (ref 4.8–10.8)
WBC # FLD AUTO: 11.3 K/UL — HIGH (ref 4.8–10.8)

## 2018-01-19 PROCEDURE — 71045 X-RAY EXAM CHEST 1 VIEW: CPT | Mod: 26

## 2018-01-19 PROCEDURE — 99233 SBSQ HOSP IP/OBS HIGH 50: CPT

## 2018-01-19 PROCEDURE — 93010 ELECTROCARDIOGRAM REPORT: CPT

## 2018-01-19 RX ORDER — METOPROLOL TARTRATE 50 MG
50 TABLET ORAL ONCE
Qty: 0 | Refills: 0 | Status: COMPLETED | OUTPATIENT
Start: 2018-01-19 | End: 2018-01-19

## 2018-01-19 RX ORDER — METOPROLOL TARTRATE 50 MG
50 TABLET ORAL DAILY
Qty: 0 | Refills: 0 | Status: DISCONTINUED | OUTPATIENT
Start: 2018-01-20 | End: 2018-01-20

## 2018-01-19 RX ORDER — CEFAZOLIN SODIUM 1 G
2000 VIAL (EA) INJECTION
Qty: 0 | Refills: 0 | Status: COMPLETED | OUTPATIENT
Start: 2018-01-19 | End: 2018-01-20

## 2018-01-19 RX ADMIN — Medication 50 MILLIGRAM(S): at 19:14

## 2018-01-19 RX ADMIN — Medication 40 MILLIGRAM(S): at 05:32

## 2018-01-19 RX ADMIN — Medication 2 MILLIGRAM(S): at 11:43

## 2018-01-19 RX ADMIN — SODIUM CHLORIDE 3 MILLILITER(S): 9 INJECTION INTRAMUSCULAR; INTRAVENOUS; SUBCUTANEOUS at 05:33

## 2018-01-19 RX ADMIN — PRASUGREL 10 MILLIGRAM(S): 5 TABLET, FILM COATED ORAL at 11:43

## 2018-01-19 RX ADMIN — SODIUM CHLORIDE 3 MILLILITER(S): 9 INJECTION INTRAMUSCULAR; INTRAVENOUS; SUBCUTANEOUS at 21:29

## 2018-01-19 RX ADMIN — Medication 325 MILLIGRAM(S): at 11:43

## 2018-01-19 RX ADMIN — SODIUM CHLORIDE 3 MILLILITER(S): 9 INJECTION INTRAMUSCULAR; INTRAVENOUS; SUBCUTANEOUS at 19:14

## 2018-01-19 RX ADMIN — FAMOTIDINE 20 MILLIGRAM(S): 10 INJECTION INTRAVENOUS at 11:43

## 2018-01-19 RX ADMIN — Medication 10 MILLIGRAM(S): at 05:28

## 2018-01-19 NOTE — PROGRESS NOTE ADULT - PROBLEM SELECTOR PLAN 3
CTA reported 50-60% Rt ICA stenosis. c/w ASA, statin. no neuro intervention needed at this time. neuro signed off

## 2018-01-19 NOTE — PROGRESS NOTE ADULT - SUBJECTIVE AND OBJECTIVE BOX
[facility], [facility]                                                    [address], [address], [place] [address]                                                         PHONE: [phone]    FAX: [phone] [address], [address], [place] [address]                                                 PHONE: [phone]    FAX: [phone]  *******************************************************************************    Overnight events/Subjective Assessment:    INTERPRETATION OF TELEMETRY (personally reviewed):    Lipitor (Muscle Pain)  Plavix (Other)    MEDICATIONS  (STANDING):  aspirin enteric coated 325 milliGRAM(s) Oral daily  diazepam    Tablet 2 milliGRAM(s) Oral two times a day  enalapril 10 milliGRAM(s) Oral daily  famotidine    Tablet 20 milliGRAM(s) Oral daily  prasugrel 10 milliGRAM(s) Oral daily  predniSONE   Tablet 40 milliGRAM(s) Oral daily  sodium chloride 0.9% lock flush 3 milliLiter(s) IV Push every 8 hours    MEDICATIONS  (PRN):  acetaminophen   Tablet. 325 milliGRAM(s) Oral every 4 hours PRN pain  guaiFENesin    Syrup 100 milliGRAM(s) Oral every 8 hours PRN Cough  lidocaine 2% Gel 1 Application(s) Topical two times a day PRN pain  ondansetron Injectable 4 milliGRAM(s) IV Push every 8 hours PRN Nausea      Vital Signs Last 24 Hrs  T(C): 36.6 (19 Jan 2018 05:24), Max: 36.8 (18 Jan 2018 10:38)  T(F): 97.9 (19 Jan 2018 05:24), Max: 98.2 (18 Jan 2018 10:38)  HR: 64 (19 Jan 2018 05:24) (64 - 89)  BP: 144/60 (19 Jan 2018 05:24) (126/52 - 151/62)  BP(mean): --  RR: 18 (19 Jan 2018 05:24) (18 - 18)  SpO2: 98% (19 Jan 2018 05:24) (95% - 98%)    I&O's Detail    18 Jan 2018 07:01  -  19 Jan 2018 07:00  --------------------------------------------------------  IN:    Oral Fluid: 260 mL  Total IN: 260 mL    OUT:    Voided: 1400 mL  Total OUT: 1400 mL    Total NET: -1140 mL        I&O's Summary    18 Jan 2018 07:01  -  19 Jan 2018 07:00  --------------------------------------------------------  IN: 260 mL / OUT: 1400 mL / NET: -1140 mL            PHYSICAL EXAM:  General: Appears well developed, well nourished, no acute distress  HEENT: Head: normocephalic, atraumatic  Eyes: Pupils equal and reactive  Neck: Supple, no carotid bruit, no JVD, no HJR  CARDIOVASCULAR: Normal S1 and S2, no murmur, rub, or gallop  LUNGS: Clear to auscultation bilaterally, no rales, rhonchi or wheeze  ABDOMEN: Soft, nontender, non-distended, positive bowel sounds, no mass or bruit  EXTREMITIES: No edema, distal pulses WNL  SKIN: Warm and dry with normal turgor  NEURO: Alert & oriented x 3, grossly intact  PSYCH: normal mood and affect        LABS:                serum  Lipids:   Hemoglobin A1C, Whole Blood: 5.0 % (01-09 @ 05:50)    Thyroid Stimulating Hormone, Serum: 0.28 uIU/mL (01-09 @ 05:50)  Thyroid Stimulating Hormone, Serum: 0.23 uIU/mL (01-08 @ 22:55)      RADIOLOGY & ADDITIONAL STUDIES:      ASSESSMENT AND PLAN:  In summary, TATIANA RIBERA is a 83y Male with past medical history significant for  HTN, HLD, CAD/MI/stents presents with syncope x 2 in light of coughing in light of URI symptoms.     History of PAF,, not on AC (pt refused Eliquis as it made him feel poorly and will not take AC.  On presentation AF/RVR. Hospital course noted to have 3.29sec pause in CAF.   Multiple coronary stents, last June 2015 of mid LAD and large diagonal. Pt has smooth 50% in stent stenosis of RCA. 70%small circumflex marginal branch.  Hx of self terminating WCT (declined EPS). Also pauses up to 3.3 seconds.  Myoview stress 3/30/16 large fixed distal AW defect. EF=43%  Carotid 9/22/16 bilat 16-49% stenosis  Echo 8/8/16 EF=50-55%. Apical akinesis. mild MR/AR. dilation of the aortic root 3.8cm (EF 50-55%on current echo)  MUGA 7/14/16 EF=36%  Holter 2/8/17 SR. AVG HR=64, 1847 isolated VE, 50 paired VE, 14 trigeminy,  33 isolated SVE, one 3bt SVT  - SSS  - Continue to Monitor on telemetry  - Troponins are negative for ACS  - Echocardiogram with EF of 50-55%  - Carotid Duplex Scan < from: US Duplex Carotid Arteries Complete, Bilateral (01.10.18 @ 18:47) >  with internal carotid artery velocity equaling 232 cm/s equaling to a stenosis 70% or greater. CT angiogram SHENA 50-60%, left system no hemodynamically significant stenosis.    - No evidence of ischemia or CHF clinically, eventual ischemic evaluation (likely as an outpatient)  - Pt was on Asa, Effient (pt allergic to plavix).   Pt with hx of coronary stents, OK to DC Effient and consider full AC with Eliquis and ASA at 81 mg daily   - Off metoprolol for now  - Plan for PPM today  - Hold lovenox   - PM placement at 4 pm, blood cultures no growth, afebrile, no leukocytosis, patient remains without fever.         Key Fischer MD

## 2018-01-19 NOTE — PROGRESS NOTE ADULT - PROBLEM SELECTOR PLAN 6
continue prednisone with taper. avoid NSAIDs and Colchicine. uric acid level. patient reported today h/o gouty arthritis. discussed risk and benefit of prednisone with alternatives, and no side effects reported.

## 2018-01-19 NOTE — PROGRESS NOTE ADULT - ASSESSMENT
Pt is an 82yo Male with HTN, HLD, CAD/MI/stents, PAF (non compliant with eliquis and will not take AC), admitted to [facility] s/p 2 syncopal syncope x 2, found to be bradycardic requiring PPM. awaiting ID clearance for PPM placement

## 2018-01-19 NOTE — PROGRESS NOTE ADULT - PROBLEM SELECTOR PLAN 9
CTA reported 50-60% Rt ICA stenosis. c/w ASA, statin. no neuro intervention needed at this time. neuro signed off
Restarted valium.  Appears much better controlled.
Cont ASA, effient, vasotec  Lopressor DCed as per cardio

## 2018-01-19 NOTE — PROGRESS NOTE ADULT - SUBJECTIVE AND OBJECTIVE BOX
Patient seen today in bed following implantation of pacemaker. No acute complaints. While in holding area patient noted to have upper rate behavior at 120bpm. Device interrogation revealed brief Afib with rapid ventricular pacing  Settings: DDD     EKG: pending    MEDICATIONS  (STANDING):  aspirin enteric coated 325 milliGRAM(s) Oral daily  diazepam    Tablet 2 milliGRAM(s) Oral two times a day  enalapril 10 milliGRAM(s) Oral daily  famotidine    Tablet 20 milliGRAM(s) Oral daily  prasugrel 10 milliGRAM(s) Oral daily  predniSONE   Tablet 40 milliGRAM(s) Oral daily  sodium chloride 0.9% lock flush 3 milliLiter(s) IV Push every 8 hours    MEDICATIONS  (PRN):  acetaminophen   Tablet. 325 milliGRAM(s) Oral every 4 hours PRN pain  guaiFENesin    Syrup 100 milliGRAM(s) Oral every 8 hours PRN Cough  lidocaine 2% Gel 1 Application(s) Topical two times a day PRN pain  ondansetron Injectable 4 milliGRAM(s) IV Push every 8 hours PRN Nausea    Allergies  Plavix (Other)    Intolerances  Lipitor (Muscle Pain)    PAST MEDICAL & SURGICAL HISTORY:  MI (myocardial infarction): 1988  Stented coronary artery  CAD (coronary artery disease)  Hyperlipidemia  HTN (hypertension)  Absent tonsil  History of inguinal hernia repair  History of hemorrhoidectomy    Vital Signs Last 24 Hrs  T(C): 37 (19 Jan 2018 18:17), Max: 37 (19 Jan 2018 18:17)  T(F): 98.6 (19 Jan 2018 18:17), Max: 98.6 (19 Jan 2018 18:17)  HR: 98 (19 Jan 2018 18:17) (64 - 98)  BP: 125/59 (19 Jan 2018 18:17) (125/59 - 195/69)  RR: 16 (19 Jan 2018 18:17) (16 - 18)  SpO2: 95% (19 Jan 2018 18:17) (95% - 98%)    Physical Exam:  Constitutional: NAD, AAOx3  Cardiovascular: +S1S2 RRR  Pulmonary: CTA b/l, unlabored  GI: soft NTND +BS  Extremities: no pedal edema, +distal pulses b/l  Neuro: non focal, HUERTA x4    LABS:                        11.6   11.3  )-----------( 310      ( 19 Jan 2018 06:45 )             35.4     01-19    136  |  101  |  34.0<H>  ----------------------------<  113  4.8   |  19.0<L>  |  1.13    Ca    8.7      19 Jan 2018 06:45  Mg     2.5     01-19    A/P  s/p dual chamber BSI pacemaker implant.     - Post Op Per protocol  - Left arm precautions  - Ancef IVPB q8h x 2 more doses  - Bedrest x 4 hours  - Toprol XL 50mg now  - Wound check in one weeks time.   - CXR today in holding still pending, Patient seen today in bed following implantation of pacemaker. No acute complaints. While in holding area patient noted to have upper rate behavior at 120bpm. Device interrogation revealed brief Afib with rapid ventricular pacing  Settings: DDD     EKG: pending    MEDICATIONS  (STANDING):  aspirin enteric coated 325 milliGRAM(s) Oral daily  diazepam    Tablet 2 milliGRAM(s) Oral two times a day  enalapril 10 milliGRAM(s) Oral daily  famotidine    Tablet 20 milliGRAM(s) Oral daily  prasugrel 10 milliGRAM(s) Oral daily  predniSONE   Tablet 40 milliGRAM(s) Oral daily  sodium chloride 0.9% lock flush 3 milliLiter(s) IV Push every 8 hours    MEDICATIONS  (PRN):  acetaminophen   Tablet. 325 milliGRAM(s) Oral every 4 hours PRN pain  guaiFENesin    Syrup 100 milliGRAM(s) Oral every 8 hours PRN Cough  lidocaine 2% Gel 1 Application(s) Topical two times a day PRN pain  ondansetron Injectable 4 milliGRAM(s) IV Push every 8 hours PRN Nausea    Allergies  Plavix (Other)    Intolerances  Lipitor (Muscle Pain)    PAST MEDICAL & SURGICAL HISTORY:  MI (myocardial infarction): 1988  Stented coronary artery  CAD (coronary artery disease)  Hyperlipidemia  HTN (hypertension)  Absent tonsil  History of inguinal hernia repair  History of hemorrhoidectomy    Vital Signs Last 24 Hrs  T(C): 37 (19 Jan 2018 18:17), Max: 37 (19 Jan 2018 18:17)  T(F): 98.6 (19 Jan 2018 18:17), Max: 98.6 (19 Jan 2018 18:17)  HR: 98 (19 Jan 2018 18:17) (64 - 98)  BP: 125/59 (19 Jan 2018 18:17) (125/59 - 195/69)  RR: 16 (19 Jan 2018 18:17) (16 - 18)  SpO2: 95% (19 Jan 2018 18:17) (95% - 98%)    Physical Exam:  Constitutional: NAD, AAOx3  Cardiovascular: +S1S2 RRR  Pulmonary: CTA b/l, unlabored  GI: soft NTND +BS  Extremities: no pedal edema, +distal pulses b/l  Neuro: non focal, HUERTA x4    LABS:                        11.6   11.3  )-----------( 310      ( 19 Jan 2018 06:45 )             35.4     01-19    136  |  101  |  34.0<H>  ----------------------------<  113  4.8   |  19.0<L>  |  1.13    Ca    8.7      19 Jan 2018 06:45  Mg     2.5     01-19    A/P  s/p dual chamber BSI pacemaker implant.     - Post Op Per protocol  - Left arm precautions  - Ancef IVPB q8h x 2 more doses  - Bedrest x 4 hours  - Toprol XL 50mg now  - Wound check in one weeks time.   - CXR today in holding still pending,   - NO IV or SC Heparin, NO Lovenox  - Will restart anticoagulation in outpatient setting. Patient seen today in bed following implantation of pacemaker. No acute complaints. While in holding area patient noted to have upper rate behavior at 120bpm. Device interrogation revealed brief Afib with rapid ventricular pacing  Settings: DDD     EKG: pending    MEDICATIONS  (STANDING):  aspirin enteric coated 325 milliGRAM(s) Oral daily  diazepam    Tablet 2 milliGRAM(s) Oral two times a day  enalapril 10 milliGRAM(s) Oral daily  famotidine    Tablet 20 milliGRAM(s) Oral daily  prasugrel 10 milliGRAM(s) Oral daily  predniSONE   Tablet 40 milliGRAM(s) Oral daily  sodium chloride 0.9% lock flush 3 milliLiter(s) IV Push every 8 hours    MEDICATIONS  (PRN):  acetaminophen   Tablet. 325 milliGRAM(s) Oral every 4 hours PRN pain  guaiFENesin    Syrup 100 milliGRAM(s) Oral every 8 hours PRN Cough  lidocaine 2% Gel 1 Application(s) Topical two times a day PRN pain  ondansetron Injectable 4 milliGRAM(s) IV Push every 8 hours PRN Nausea    Allergies  Plavix (Other)    Intolerances  Lipitor (Muscle Pain)    PAST MEDICAL & SURGICAL HISTORY:  MI (myocardial infarction): 1988  Stented coronary artery  CAD (coronary artery disease)  Hyperlipidemia  HTN (hypertension)  Absent tonsil  History of inguinal hernia repair  History of hemorrhoidectomy    Vital Signs Last 24 Hrs  T(C): 37 (19 Jan 2018 18:17), Max: 37 (19 Jan 2018 18:17)  T(F): 98.6 (19 Jan 2018 18:17), Max: 98.6 (19 Jan 2018 18:17)  HR: 98 (19 Jan 2018 18:17) (64 - 98)  BP: 125/59 (19 Jan 2018 18:17) (125/59 - 195/69)  RR: 16 (19 Jan 2018 18:17) (16 - 18)  SpO2: 95% (19 Jan 2018 18:17) (95% - 98%)    Physical Exam:  Constitutional: NAD, AAOx3  Cardiovascular: +S1S2 RRR  Pulmonary: CTA b/l, unlabored  GI: soft NTND +BS  Extremities: no pedal edema, +distal pulses b/l  Neuro: non focal, HUERTA x4    LABS:                        11.6   11.3  )-----------( 310      ( 19 Jan 2018 06:45 )             35.4     01-19    136  |  101  |  34.0<H>  ----------------------------<  113  4.8   |  19.0<L>  |  1.13    Ca    8.7      19 Jan 2018 06:45  Mg     2.5     01-19    A/P  s/p dual chamber BSI pacemaker implant.     - Post Op Per protocol  - Left arm precautions  - Ancef IVPB q8h x 2 more doses  - Bedrest x 4 hours  - Toprol XL 50mg now  - Wound check in one weeks time.   - CXR today in holding still pending,   - NO IV or SC Heparin, NO Lovenox  - Will restart anticoagulation in outpatient setting.   - AM labs, EKG, and BSI device check ordered.

## 2018-01-19 NOTE — PROGRESS NOTE ADULT - PROBLEM SELECTOR PROBLEM 1
Syncope, unspecified syncope type
Sick sinus syndrome
Sick sinus syndrome
Viral syndrome
Sick sinus syndrome
Viral syndrome
Sick sinus syndrome
Syncope, unspecified syncope type
Sick sinus syndrome
Sick sinus syndrome

## 2018-01-19 NOTE — PROGRESS NOTE ADULT - SUBJECTIVE AND OBJECTIVE BOX
Patient: TATIANA RIBERA [id number] 83y Male                 Internal Medicine Hospitalist Progress Note - Dr. Chago Gaspar    Chief Complaint: Patient is a 83y old  Male who presents with a chief complaint of sob, fainted (08 Jan 2018 23:19)    HPI:  Pt is an 82yo Male with HTN, HLD, CAD/MI/stents, PAF (non compliant with eliquis and will not take AC), admitted to [facility] s/p 2 syncopal  syncope x 2 today which was preceded by SOB and dizziness. Denies prior syncopal episodes in the past. When further questioned about syncope/SOB pt is quick to relate all symptoms to his recent influenza/URI symptoms x 2 weeks.  In ED, noted to have episodes of bradycardia.  Seen by EP and found to have indication for PPM.  Seen by ID for evaluation prior to PPM placement.  Blood cultures obtained.    denies cough /fever / chills/CP/SOB/palpitations.   b/l foot pain better with prednisone.   BC*2 neg. ID cleared. PPM today at 4 pm     Tele: pause. PVC    ROS:  CONSTITUTIONAL:  No distress.   HEENT:  Eyes:  No diplopia or blurred vision.   CARDIOVASCULAR:  No pressure, squeezing, tightness, heaviness or aching about the chest; no palpitations. no leg swelling, no orthopnea or PND  RESPIRATORY:  no SOB. no wheezing. no cough or sputum.  No hemoptysis  GI: no nausea, no vomiting, no diarrhea, no constipation. No hematochezia or melena  ext: as per HPI  SKIN: no rash  CNS: No headaches. No weakness. no numbness. No depression or anxiety. No SI      ____________________PHYSICAL EXAM:    ICU Vital Signs Last 24 Hrs  T(C): 36.6 (19 Jan 2018 05:24), Max: 36.8 (18 Jan 2018 10:38)  T(F): 97.9 (19 Jan 2018 05:24), Max: 98.2 (18 Jan 2018 10:38)  HR: 64 (19 Jan 2018 05:24) (64 - 89)  BP: 144/60 (19 Jan 2018 05:24) (126/52 - 151/62)  BP(mean): --  ABP: --  ABP(mean): --  RR: 18 (19 Jan 2018 05:24) (18 - 18)  SpO2: 98% (19 Jan 2018 05:24) (95% - 98%)    GENERAL: Not in distress. Alert    HEENT:  Normocephalic and atraumatic. PEARLA,EOMI  NECK: Supple.  No JVD.    CARDIOVASCULAR: RRR S1, S2. No murmur/rubs/gallop  LUNGS: BLAE+, no rales, no wheezing, no rhonchi.    ABDOMEN: ND. Soft,  NT, no guarding / rebound / rigidity. BS normoactive. No CVA tenderness.    BACK: No spine tenderness.  EXTREMITIES: no cyanosis, no clubbing, no edema.  feet swelling better. o foot tenderenss  SKIN: no rash. No skin break or ulcer. No cellulitis.  NEUROLOGIC: AAO*3.strength is symmetric, sensation intact, speech fluent.    PSYCHIATRIC: Calm.  No agitation.      LABS:                          11.6   11.3  )-----------( 310      ( 19 Jan 2018 06:45 )             35.4       01-19    136  |  101  |  34.0<H>  ----------------------------<  113  4.8   |  19.0<L>  |  1.13    Ca    8.7      19 Jan 2018 06:45  Mg     2.5     01-19      MEDICATIONS  (STANDING):  aspirin enteric coated 325 milliGRAM(s) Oral daily  diazepam    Tablet 2 milliGRAM(s) Oral two times a day  enalapril 10 milliGRAM(s) Oral daily  famotidine    Tablet 20 milliGRAM(s) Oral daily  prasugrel 10 milliGRAM(s) Oral daily  predniSONE   Tablet 40 milliGRAM(s) Oral daily  sodium chloride 0.9% lock flush 3 milliLiter(s) IV Push every 8 hours    MEDICATIONS  (PRN):  acetaminophen   Tablet. 325 milliGRAM(s) Oral every 4 hours PRN pain  guaiFENesin    Syrup 100 milliGRAM(s) Oral every 8 hours PRN Cough  lidocaine 2% Gel 1 Application(s) Topical two times a day PRN pain  ondansetron Injectable 4 milliGRAM(s) IV Push every 8 hours PRN Nausea

## 2018-01-19 NOTE — PROGRESS NOTE ADULT - PROBLEM SELECTOR PROBLEM 9
Carotid stenosis, asymptomatic, right
Anxiety
Carotid stenosis, asymptomatic, right
Carotid stenosis, asymptomatic, right
Coronary artery disease involving native coronary artery of native heart without angina pectoris

## 2018-01-19 NOTE — PROGRESS NOTE ADULT - SUBJECTIVE AND OBJECTIVE BOX
Cardiology NP    83 year old male with sinus node disfunction for PPM. Cardiology NP    83 year old male with sinus node dysfunction for PPM.

## 2018-01-19 NOTE — PROGRESS NOTE ADULT - PROBLEM SELECTOR PLAN 5
left foot pain and swelling. USG neg for for DVT. possible gout, trial of prednisone. will void colchicine in this pt. check Uric acid level

## 2018-01-19 NOTE — PROGRESS NOTE ADULT - NSHPATTENDINGPLANDISCUSS_GEN_ALL_CORE
patient, , Dr. St
Dr Martínez
Dr Martínez
patient, , Dr. St
patient, RN
patient,
patient, RN
patient, ID

## 2018-01-20 ENCOUNTER — TRANSCRIPTION ENCOUNTER (OUTPATIENT)
Age: 83
End: 2018-01-20

## 2018-01-20 VITALS
DIASTOLIC BLOOD PRESSURE: 70 MMHG | TEMPERATURE: 98 F | OXYGEN SATURATION: 96 % | SYSTOLIC BLOOD PRESSURE: 138 MMHG | RESPIRATION RATE: 18 BRPM | HEART RATE: 80 BPM

## 2018-01-20 LAB
ANION GAP SERPL CALC-SCNC: 16 MMOL/L — SIGNIFICANT CHANGE UP (ref 5–17)
BUN SERPL-MCNC: 32 MG/DL — HIGH (ref 8–20)
CALCIUM SERPL-MCNC: 8.8 MG/DL — SIGNIFICANT CHANGE UP (ref 8.6–10.2)
CHLORIDE SERPL-SCNC: 98 MMOL/L — SIGNIFICANT CHANGE UP (ref 98–107)
CO2 SERPL-SCNC: 20 MMOL/L — LOW (ref 22–29)
CREAT SERPL-MCNC: 1.24 MG/DL — SIGNIFICANT CHANGE UP (ref 0.5–1.3)
CULTURE RESULTS: SIGNIFICANT CHANGE UP
CULTURE RESULTS: SIGNIFICANT CHANGE UP
GLUCOSE SERPL-MCNC: 177 MG/DL — HIGH (ref 70–115)
HCT VFR BLD CALC: 35.9 % — LOW (ref 42–52)
HGB BLD-MCNC: 11.9 G/DL — LOW (ref 14–18)
MCHC RBC-ENTMCNC: 31.2 PG — HIGH (ref 27–31)
MCHC RBC-ENTMCNC: 33.1 G/DL — SIGNIFICANT CHANGE UP (ref 32–36)
MCV RBC AUTO: 94 FL — SIGNIFICANT CHANGE UP (ref 80–94)
PLATELET # BLD AUTO: 341 K/UL — SIGNIFICANT CHANGE UP (ref 150–400)
POTASSIUM SERPL-MCNC: 4.6 MMOL/L — SIGNIFICANT CHANGE UP (ref 3.5–5.3)
POTASSIUM SERPL-SCNC: 4.6 MMOL/L — SIGNIFICANT CHANGE UP (ref 3.5–5.3)
RBC # BLD: 3.82 M/UL — LOW (ref 4.6–6.2)
RBC # FLD: 13 % — SIGNIFICANT CHANGE UP (ref 11–15.6)
SODIUM SERPL-SCNC: 134 MMOL/L — LOW (ref 135–145)
SPECIMEN SOURCE: SIGNIFICANT CHANGE UP
SPECIMEN SOURCE: SIGNIFICANT CHANGE UP
WBC # BLD: 10.6 K/UL — SIGNIFICANT CHANGE UP (ref 4.8–10.8)
WBC # FLD AUTO: 10.6 K/UL — SIGNIFICANT CHANGE UP (ref 4.8–10.8)

## 2018-01-20 PROCEDURE — 93971 EXTREMITY STUDY: CPT

## 2018-01-20 PROCEDURE — 84480 ASSAY TRIIODOTHYRONINE (T3): CPT

## 2018-01-20 PROCEDURE — 73620 X-RAY EXAM OF FOOT: CPT

## 2018-01-20 PROCEDURE — 87798 DETECT AGENT NOS DNA AMP: CPT

## 2018-01-20 PROCEDURE — 86850 RBC ANTIBODY SCREEN: CPT

## 2018-01-20 PROCEDURE — 84100 ASSAY OF PHOSPHORUS: CPT

## 2018-01-20 PROCEDURE — 84466 ASSAY OF TRANSFERRIN: CPT

## 2018-01-20 PROCEDURE — 97116 GAIT TRAINING THERAPY: CPT

## 2018-01-20 PROCEDURE — 84484 ASSAY OF TROPONIN QUANT: CPT

## 2018-01-20 PROCEDURE — 71045 X-RAY EXAM CHEST 1 VIEW: CPT

## 2018-01-20 PROCEDURE — C1898: CPT

## 2018-01-20 PROCEDURE — 85027 COMPLETE CBC AUTOMATED: CPT

## 2018-01-20 PROCEDURE — 99239 HOSP IP/OBS DSCHRG MGMT >30: CPT

## 2018-01-20 PROCEDURE — 36415 COLL VENOUS BLD VENIPUNCTURE: CPT

## 2018-01-20 PROCEDURE — 84443 ASSAY THYROID STIM HORMONE: CPT

## 2018-01-20 PROCEDURE — 85730 THROMBOPLASTIN TIME PARTIAL: CPT

## 2018-01-20 PROCEDURE — 93880 EXTRACRANIAL BILAT STUDY: CPT

## 2018-01-20 PROCEDURE — 80053 COMPREHEN METABOLIC PANEL: CPT

## 2018-01-20 PROCEDURE — 82728 ASSAY OF FERRITIN: CPT

## 2018-01-20 PROCEDURE — 97163 PT EVAL HIGH COMPLEX 45 MIN: CPT

## 2018-01-20 PROCEDURE — 84436 ASSAY OF TOTAL THYROXINE: CPT

## 2018-01-20 PROCEDURE — 81001 URINALYSIS AUTO W/SCOPE: CPT

## 2018-01-20 PROCEDURE — 97530 THERAPEUTIC ACTIVITIES: CPT

## 2018-01-20 PROCEDURE — 83036 HEMOGLOBIN GLYCOSYLATED A1C: CPT

## 2018-01-20 PROCEDURE — 71046 X-RAY EXAM CHEST 2 VIEWS: CPT | Mod: 26

## 2018-01-20 PROCEDURE — 33208 INSRT HEART PM ATRIAL & VENT: CPT | Mod: KX

## 2018-01-20 PROCEDURE — 93306 TTE W/DOPPLER COMPLETE: CPT

## 2018-01-20 PROCEDURE — 80048 BASIC METABOLIC PNL TOTAL CA: CPT

## 2018-01-20 PROCEDURE — 70498 CT ANGIOGRAPHY NECK: CPT

## 2018-01-20 PROCEDURE — 86900 BLOOD TYPING SEROLOGIC ABO: CPT

## 2018-01-20 PROCEDURE — C1785: CPT

## 2018-01-20 PROCEDURE — 80061 LIPID PANEL: CPT

## 2018-01-20 PROCEDURE — 97110 THERAPEUTIC EXERCISES: CPT

## 2018-01-20 PROCEDURE — 87040 BLOOD CULTURE FOR BACTERIA: CPT

## 2018-01-20 PROCEDURE — 83880 ASSAY OF NATRIURETIC PEPTIDE: CPT

## 2018-01-20 PROCEDURE — 83550 IRON BINDING TEST: CPT

## 2018-01-20 PROCEDURE — 87633 RESP VIRUS 12-25 TARGETS: CPT

## 2018-01-20 PROCEDURE — 87581 M.PNEUMON DNA AMP PROBE: CPT

## 2018-01-20 PROCEDURE — 85610 PROTHROMBIN TIME: CPT

## 2018-01-20 PROCEDURE — C1894: CPT

## 2018-01-20 PROCEDURE — 71046 X-RAY EXAM CHEST 2 VIEWS: CPT

## 2018-01-20 PROCEDURE — 87486 CHLMYD PNEUM DNA AMP PROBE: CPT

## 2018-01-20 PROCEDURE — 86901 BLOOD TYPING SEROLOGIC RH(D): CPT

## 2018-01-20 PROCEDURE — 83735 ASSAY OF MAGNESIUM: CPT

## 2018-01-20 PROCEDURE — C1892: CPT

## 2018-01-20 PROCEDURE — 94640 AIRWAY INHALATION TREATMENT: CPT

## 2018-01-20 PROCEDURE — 96374 THER/PROPH/DIAG INJ IV PUSH: CPT

## 2018-01-20 PROCEDURE — 99285 EMERGENCY DEPT VISIT HI MDM: CPT | Mod: 25

## 2018-01-20 PROCEDURE — 93010 ELECTROCARDIOGRAM REPORT: CPT

## 2018-01-20 PROCEDURE — 93005 ELECTROCARDIOGRAM TRACING: CPT

## 2018-01-20 PROCEDURE — 84550 ASSAY OF BLOOD/URIC ACID: CPT

## 2018-01-20 RX ORDER — METOPROLOL TARTRATE 50 MG
1 TABLET ORAL
Qty: 0 | Refills: 0 | DISCHARGE
Start: 2018-01-20

## 2018-01-20 RX ORDER — METOPROLOL TARTRATE 50 MG
1 TABLET ORAL
Qty: 0 | Refills: 0 | COMMUNITY

## 2018-01-20 RX ADMIN — Medication 50 MILLIGRAM(S): at 09:38

## 2018-01-20 RX ADMIN — PRASUGREL 10 MILLIGRAM(S): 5 TABLET, FILM COATED ORAL at 13:33

## 2018-01-20 RX ADMIN — FAMOTIDINE 20 MILLIGRAM(S): 10 INJECTION INTRAVENOUS at 13:34

## 2018-01-20 RX ADMIN — SODIUM CHLORIDE 3 MILLILITER(S): 9 INJECTION INTRAMUSCULAR; INTRAVENOUS; SUBCUTANEOUS at 13:32

## 2018-01-20 RX ADMIN — Medication 2 MILLIGRAM(S): at 09:38

## 2018-01-20 RX ADMIN — Medication 100 MILLIGRAM(S): at 10:04

## 2018-01-20 RX ADMIN — Medication 100 MILLIGRAM(S): at 02:26

## 2018-01-20 RX ADMIN — SODIUM CHLORIDE 3 MILLILITER(S): 9 INJECTION INTRAMUSCULAR; INTRAVENOUS; SUBCUTANEOUS at 05:04

## 2018-01-20 RX ADMIN — Medication 40 MILLIGRAM(S): at 05:04

## 2018-01-20 RX ADMIN — Medication 10 MILLIGRAM(S): at 05:04

## 2018-01-20 RX ADMIN — Medication 325 MILLIGRAM(S): at 13:33

## 2018-01-20 NOTE — PROGRESS NOTE ADULT - SUBJECTIVE AND OBJECTIVE BOX
CC: SYncope    INTERVAL HPI/OVERNIGHT EVENTS: Patient seen and examined, no acute complaints overnight.       Vital Signs Last 24 Hrs  T(C): 36.4 (20 Jan 2018 09:30), Max: 37 (19 Jan 2018 18:17)  T(F): 97.6 (20 Jan 2018 09:30), Max: 98.6 (19 Jan 2018 18:17)  HR: 75 (20 Jan 2018 13:35) (70 - 98)  BP: 134/68 (20 Jan 2018 13:35) (125/59 - 167/71)  BP(mean): --  RR: 18 (20 Jan 2018 13:35) (16 - 25)  SpO2: 94% (20 Jan 2018 13:35) (94% - 97%)    PHYSICAL EXAM:    GENERAL: NAD, well-groomed, well-developed  HEAD:  Atraumatic, Normocephalic  CHEST/LUNG: Clear to auscultation bilaterally; No rales, rhonchi, wheezing, or rubs  HEART: Regular rate and rhythm; No murmurs, rubs, or gallops  ABDOMEN: Soft, Nontender, Nondistended; Bowel sounds present  EXTREMITIES:  2+ Peripheral Pulses, No clubbing, cyanosis, or edema        MEDICATIONS  (STANDING):  aspirin enteric coated 325 milliGRAM(s) Oral daily  diazepam    Tablet 2 milliGRAM(s) Oral two times a day  enalapril 10 milliGRAM(s) Oral daily  famotidine    Tablet 20 milliGRAM(s) Oral daily  metoprolol succinate ER 50 milliGRAM(s) Oral daily  prasugrel 10 milliGRAM(s) Oral daily  predniSONE   Tablet 40 milliGRAM(s) Oral daily  sodium chloride 0.9% lock flush 3 milliLiter(s) IV Push every 8 hours    MEDICATIONS  (PRN):  acetaminophen   Tablet. 325 milliGRAM(s) Oral every 4 hours PRN pain  guaiFENesin    Syrup 100 milliGRAM(s) Oral every 8 hours PRN Cough  lidocaine 2% Gel 1 Application(s) Topical two times a day PRN pain  ondansetron Injectable 4 milliGRAM(s) IV Push every 8 hours PRN Nausea      Allergies    Plavix (Other)    Intolerances    Lipitor (Muscle Pain)        LABS:                          11.9   10.6  )-----------( 341      ( 20 Jan 2018 11:47 )             35.9     01-20    134<L>  |  98  |  32.0<H>  ----------------------------<  177<H>  4.6   |  20.0<L>  |  1.24    Ca    8.8      20 Jan 2018 11:47  Mg     2.5     01-19            RADIOLOGY & ADDITIONAL TESTS:

## 2018-01-20 NOTE — DISCHARGE NOTE ADULT - PLAN OF CARE
status post PPM metoprolol decreased to 50mg OD  FOllow up with cardiology in 1 week secondary to above resolved

## 2018-01-20 NOTE — CHART NOTE - NSCHARTNOTEFT_GEN_A_CORE
Source: Patient [x ]  Family [ ]   other [ ]     Current Diet: Diet, DASH/TLC:   Sodium & Cholesterol Restricted (01-08-18 @ 23:18)  Diet, Clear Liquid (01-18-18 @ 08:41)    PO intake:  Pt reports good po intake at meals, no complaints at this time    Current Weight: 154# (1/8) 150#    % Weight Change - no wt loss noted    Pertinent Medications: MEDICATIONS  (STANDING):  aspirin enteric coated 325 milliGRAM(s) Oral daily  diazepam    Tablet 2 milliGRAM(s) Oral two times a day  enalapril 10 milliGRAM(s) Oral daily  famotidine    Tablet 20 milliGRAM(s) Oral daily  metoprolol succinate ER 50 milliGRAM(s) Oral daily  prasugrel 10 milliGRAM(s) Oral daily  predniSONE   Tablet 40 milliGRAM(s) Oral daily  sodium chloride 0.9% lock flush 3 milliLiter(s) IV Push every 8 hours    MEDICATIONS  (PRN):  acetaminophen   Tablet. 325 milliGRAM(s) Oral every 4 hours PRN pain  guaiFENesin    Syrup 100 milliGRAM(s) Oral every 8 hours PRN Cough  lidocaine 2% Gel 1 Application(s) Topical two times a day PRN pain  ondansetron Injectable 4 milliGRAM(s) IV Push every 8 hours PRN Nausea    Pertinent Labs: CBC Full  -  ( 20 Jan 2018 11:47 )  WBC Count : 10.6 K/uL  Hemoglobin : 11.9 g/dL  Hematocrit : 35.9 %  Platelet Count - Automated : 341 K/uL  Mean Cell Volume : 94.0 fl  Mean Cell Hemoglobin : 31.2 pg  Mean Cell Hemoglobin Concentration : 33.1 g/dL  01-20 Na134 mmol/L<L> Glu 177 mg/dL<H> K+ 4.6 mmol/L Cr  1.24 mg/dL BUN 32.0 mg/dL<H> Phos n/a   Alb n/a   PAB n/a       Skin: sx incision s/p PPM    Nutrition focused physical exam conducted - found signs of malnutrition [ x]absent [ ]present    Subcutaneous fat loss: [ ] Orbital fat pads region, [ ]Buccal fat region, [ ]Triceps region,  [ ]Ribs region    Muscle wasting: [ ]Temples region, [ ]Clavicle region, [ ]Shoulder region, [ ]Scapula region, [ ]Interosseous region,  [ ]thigh region, [ ]Calf region    Estimated Needs:   [x ] no change since previous assessment  [ ] recalculated:     Current Nutrition Diagnosis: Pt at nutrition risk secondary to altered nutrition labs related to s/p PPM, JOE and steroid taper as evidenced by elevated BUN, glu, and low na.    Recommendations:   Continue with diet rx    Monitoring and Evaluation:   [x ] PO intake [x ] Tolerance to diet prescription [X] Weights  [X] Follow up per protocol [X] Labs:

## 2018-01-20 NOTE — DISCHARGE NOTE ADULT - PATIENT PORTAL LINK FT
“You can access the [employer] Patient Portal, offered by [facility], by registering with the following website: [email or web address]”

## 2018-01-20 NOTE — DISCHARGE NOTE ADULT - MEDICATION SUMMARY - MEDICATIONS TO STOP TAKING
I will STOP taking the medications listed below when I get home from the hospital:    Vasotec 10 mg oral tablet  -- 1 tab(s) by mouth once a day

## 2018-01-20 NOTE — PROGRESS NOTE ADULT - PROVIDER SPECIALTY LIST ADULT
Cardiology
Hospitalist
Infectious Disease
Internal Medicine
Internal Medicine
Neurology
Cardiology
Cardiology
Electrophysiology
Hospitalist
Infectious Disease
Infectious Disease
Hospitalist
Internal Medicine
Internal Medicine
Hospitalist

## 2018-01-20 NOTE — DISCHARGE NOTE ADULT - CARE PLAN
Principal Discharge DX:	Sick sinus syndrome  Goal:	status post PPM  Assessment and plan of treatment:	metoprolol decreased to 50mg OD  FOllow up with cardiology in 1 week  Secondary Diagnosis:	Syncope, unspecified syncope type  Assessment and plan of treatment:	secondary to above  Secondary Diagnosis:	Gouty arthritis of ankle or foot  Secondary Diagnosis:	JOE (acute kidney injury)  Assessment and plan of treatment:	resolved

## 2018-01-20 NOTE — DISCHARGE NOTE ADULT - MEDICATION SUMMARY - MEDICATIONS TO TAKE
I will START or STAY ON the medications listed below when I get home from the hospital:    predniSONE 5 mg oral tablet  -- 1 tab(s) by mouth once a day  -- Indication: For Sle    Ecotrin 325 mg oral delayed release tablet  -- 1 tab(s) by mouth once a day  -- Indication: For Cad    Vasotec 10 mg oral tablet  -- 1 tab(s) by mouth once a day  -- Indication: For Hypertension    Valium 2 mg oral tablet  -- 1 tab(s) orally  -- Indication: For Anxiety    Effient 10 mg oral tablet  -- 1 tab(s) by mouth once a day  -- Indication: For Cad    metoprolol succinate 50 mg oral tablet, extended release  -- 1 tab(s) by mouth once a day  -- Indication: For Cad

## 2018-01-20 NOTE — PROGRESS NOTE ADULT - SUBJECTIVE AND OBJECTIVE BOX
[facility], [facility]                                                    [address], [address], [place] [address]                                                         PHONE: [phone]    FAX: [phone] [address], [address], [place] [address]                                                 PHONE: [phone]    FAX: [phone]  *******************************************************************************    Overnight events/Subjective Assessment:  No chest pain, SOB, palpitations    Lipitor (Muscle Pain)  Plavix (Other)    MEDICATIONS  (STANDING):  aspirin enteric coated 325 milliGRAM(s) Oral daily  diazepam    Tablet 2 milliGRAM(s) Oral two times a day  enalapril 10 milliGRAM(s) Oral daily  famotidine    Tablet 20 milliGRAM(s) Oral daily  metoprolol succinate ER 50 milliGRAM(s) Oral daily  prasugrel 10 milliGRAM(s) Oral daily  predniSONE   Tablet 40 milliGRAM(s) Oral daily  sodium chloride 0.9% lock flush 3 milliLiter(s) IV Push every 8 hours    MEDICATIONS  (PRN):  acetaminophen   Tablet. 325 milliGRAM(s) Oral every 4 hours PRN pain  guaiFENesin    Syrup 100 milliGRAM(s) Oral every 8 hours PRN Cough  lidocaine 2% Gel 1 Application(s) Topical two times a day PRN pain  ondansetron Injectable 4 milliGRAM(s) IV Push every 8 hours PRN Nausea      Vital Signs Last 24 Hrs  T(C): 36.4 (20 Jan 2018 09:30), Max: 37 (19 Jan 2018 18:17)  T(F): 97.6 (20 Jan 2018 09:30), Max: 98.6 (19 Jan 2018 18:17)  HR: 85 (20 Jan 2018 09:30) (70 - 98)  BP: 132/60 (20 Jan 2018 09:30) (125/59 - 167/71)  BP(mean): --  RR: 18 (20 Jan 2018 09:30) (16 - 25)  SpO2: 96% (20 Jan 2018 09:30) (94% - 97%)    I&O's Detail    19 Jan 2018 07:01  -  20 Jan 2018 07:00  --------------------------------------------------------  IN:    Oral Fluid: 240 mL  Total IN: 240 mL    OUT:    Voided: 2120 mL  Total OUT: 2120 mL    Total NET: -1880 mL      20 Jan 2018 07:01  -  20 Jan 2018 12:52  --------------------------------------------------------  IN:    Oral Fluid: 240 mL  Total IN: 240 mL    OUT:    Voided: 825 mL  Total OUT: 825 mL    Total NET: -585 mL        I&O's Summary    19 Jan 2018 07:01  -  20 Jan 2018 07:00  --------------------------------------------------------  IN: 240 mL / OUT: 2120 mL / NET: -1880 mL    20 Jan 2018 07:01  -  20 Jan 2018 12:52  --------------------------------------------------------  IN: 240 mL / OUT: 825 mL / NET: -585 mL            PHYSICAL EXAM:  General: Appears well developed, well nourished, no acute distress  HEENT: Head: normocephalic, atraumatic  Eyes: Pupils equal and reactive  Neck: Supple, no carotid bruit, no JVD, no HJR  CARDIOVASCULAR: Normal S1 and S2, no murmur, rub, or gallop  LUNGS: Clear to auscultation bilaterally, no rales, rhonchi or wheeze  ABDOMEN: Soft, nontender, non-distended, positive bowel sounds, no mass or bruit  EXTREMITIES: No edema, distal pulses WNL  SKIN: Warm and dry with normal turgor  NEURO: Alert & oriented x 3, grossly intact  PSYCH: normal mood and affect        LABS:                        11.9   10.6  )-----------( 341      ( 20 Jan 2018 11:47 )             35.9     01-20    134<L>  |  98  |  32.0<H>  ----------------------------<  177<H>  4.6   |  20.0<L>  |  1.24    Ca    8.8      20 Jan 2018 11:47  Mg     2.5     01-19            serum  Lipids:   Hemoglobin A1C, Whole Blood: 5.0 % (01-09 @ 05:50)    Thyroid Stimulating Hormone, Serum: 0.28 uIU/mL (01-09 @ 05:50)  Thyroid Stimulating Hormone, Serum: 0.23 uIU/mL (01-08 @ 22:55)      RADIOLOGY & ADDITIONAL STUDIES:    TELEMETRY:    ECHO (1/9/18):  PHYSICIAN INTERPRETATION:  Left Ventricle:  Left ventricular ejection fraction, by visual estimation, is 50 to 55%.   Mildly decreased segmental left ventricular systolic function.  LV Wall Scoring:  The mid anteroseptal segment and apex are akinetic.  Right Ventricle: Normal right ventricular size and function. The right   ventricular size is normal. RV systolic function is normal.  Left Atrium: Severely enlarged left atrium.  Right Atrium: The right atrium is normal in size.  Pericardium: There is no evidence of pericardial effusion.  Mitral Valve: The mitral valve is normal in structure. Thickening of the   anterior mitral valve leaflet. Mild mitral valve regurgitation is seen.  Tricuspid Valve: The tricuspid valve is normal in structure.   Mild-moderate tricuspid regurgitation is visualized. Estimated pulmonary   artery systolic pressure is 35.8 mmHg assuming a right atrial pressure of   3 mmHg, which is consistent with borderline pulmonary hypertension.  Aortic Valve: The aortic valve is trileaflet. No evidence of aortic   stenosis. Mild aortic valve regurgitation is seen.  Pulmonic Valve: Mild pulmonic valve regurgitation.  Aorta: The aortic root is normal in size and structure.  Venous: The inferior vena cava is normal.  Summary:   1. Left ventricular ejection fraction, by visual estimation, is 50 to 55%.   2. Mildly decreased segmental left ventricular systolic function.   3. Mid anteroseptal segment and apex are abnormal as described above.   4. There is no evidence of pericardial effusion.   5. Mild-moderate tricuspid regurgitation.   6. Mild aortic regurgitation.   7. Estimated pulmonary artery systolic pressure is 35.8 mmHg assuming a   right atrial pressure of 3 mmHg, which is consistent with borderline   pulmonary hypertension.   8. No evidence of aortic stenosis.   9. Severely enlarged left atrium.  10. Normal right ventricular size and function.        ASSESSMENT AND PLAN:  In summary, TATIANA RIBERA is a 83M a/w syncope, + tachy-carlene syndrome w/ AF/RVR & bradycardia/pauses (max 3.3 sec), s/p self-terminating WCT, s/p dual-chamber PPM 1/19/18, h/o CAD/MI/multiple stents (last mLAD & large D1 6/15 w/ residual 50% RCA & 70% small OM lesions), mild ICM (EF 50-55% w/ SWMA), PAF (was refusing Eliquis), PVD/moderate R ICA stenosis, HTN, HL  - Stable cardiovascular status, no evidence of ischemia or CHF clinically, troponins negative, no chest pain or acute EKG changes.  Continue medical management of known severe CAD.  Prior Nuclear stress test 3/30/16 demonstrated a large fixed anterior wall defect, EF 43%.  - Echocardiogram 1/9/18 demonstrated mildly decreased segment LV systolic fxn (EF 50-55%), mid anteroseptal segment & apex akinetic, severe LAE, mild AR, mild MR, mild DE, mild-moderate TR, RVSP 35.8 mmHg  - Carotid duplex scan 1/10/18 demonstrated R ICA 70% or greater and no hemodynamically significant stenosis L ICA.  CT angiogram neck 1/12/18 demonstrated R ICA 50-60% & L system w/ no hemodynamically significant stenosis  - Rhythm stable paced, BP stable = continue current dose of Toprol XL 50 daily & Enalapril 10 daily for now and titrate PRN  -  & Effient 10 daily in place (+ Plavix allergy)  - Patient has a h/o PAF with an increased [id number] risk score.  He had been prescribed Eliquis but had been non-compliant.  Will restart anticoagulation in the outpatient setting as per Dr. Carrillo (EP).  Risks, benefits & alternatives discussed.  - Plan for d/c home today with outpatient follow-up at [facility] next week.  Will sign off for now and follow PRN.  Please call with any cardiovascular questions or issues.    Senthil Giron MD

## 2018-01-20 NOTE — DISCHARGE NOTE ADULT - CARE PROVIDER_API CALL
Key Fischer), Cardiovascular Disease; Internal Medicine  [address] [place] [address]  Phone: [phone]  Fax: [phone]

## 2018-01-20 NOTE — DISCHARGE NOTE ADULT - HOSPITAL COURSE
Pt is an 82yo Male with HTN, HLD, CAD/MI/stents, PAF (non compliant with eliquis and will not take AC), admitted to [facility] for a syncopal episode secondary to tachy carlene syndrome. Seen by cardiology, status post PPM placement. Cleared by cardiology for discharge home to follow up in 1 week. Pt is an 84yo Male with HTN, HLD, CAD/MI/stents, PAF (non compliant with eliquis and will not take AC), admitted to [facility] for a syncopal episode secondary to tachy carlene syndrome/pauses (max 3.3 sec), s/p self-terminating WCT, s/p dual-chamber PPM 1/19/18, h/o CAD/MI/multiple stents (last mLAD & large D1 6/15 w/ residual 50% RCA & 70% small OM lesions), mild ICM (EF 50-55% w/ SWMA), PAF (was refusing Eliquis), PVD/moderate R ICA stenosis, HTN, HL   Stable cardiovascular status, no evidence of ischemia or CHF clinically, troponins negative, no chest pain or acute EKG changes.  Continue medical management of known severe CAD.  Prior Nuclear stress test 3/30/16 demonstrated a large fixed anterior wall defect, EF 43%.  Echocardiogram 1/9/18 demonstrated mildly decreased segment LV systolic fxn (EF 50-55%), mid anteroseptal segment & apex akinetic, severe LAE, mild AR, mild MR, mild ME, mild-moderate TR, RVSP 35.8 mmHg  Carotid duplex scan 1/10/18 demonstrated R ICA 70% or greater and no hemodynamically significant stenosis L ICA.  CT angiogram neck 1/12/18 demonstrated R ICA 50-60% & L system w/ no hemodynamically significant stenosis  - Rhythm stable paced, BP stable = continue current dose of Toprol XL 50 daily & Enalapril 10 daily for now and titrate PRN  -  & Effient 10 daily in place (+ Plavix allergy)  - Patient has a h/o PAF with an increased [id number] risk score.  He had been prescribed Eliquis but had been non-compliant.  Will restart anticoagulation in the outpatient setting as per Dr. Carrillo (EP).    Plan for d/c home outpatient follow-up at [facility] next week.      35 mins spent on coordinating care and discharge.

## 2018-01-20 NOTE — PROGRESS NOTE ADULT - SUBJECTIVE AND OBJECTIVE BOX
CC: Post op day 1 DC BSP    HPI: Patient post op day 1 BS DCP left shoulder, NAD, Minor site tenderness, Dressing clear dry and intact left arm sling in use, no swelling, no hematoma, no bleeding. AM Labs pending  EKG: NSR, rate 75 inverted T waves V4-thru V6, non specific T wave changes.      PAST MEDICAL & SURGICAL HISTORY:  MI (myocardial infarction): 1988  Stented coronary artery  CAD (coronary artery disease)  Hyperlipidemia  HTN (hypertension)  Absent tonsil  History of inguinal hernia repair  History of hemorrhoidectomy      REVIEW OF SYSTEMS:    CONSTITUTIONAL: No fever, weight loss, or fatigue  EYES: No eye pain, visual disturbances, or discharge  ENMT:  No difficulty hearing, tinnitus, vertigo; No sinus or throat pain  NECK: No pain or stiffness  RESPIRATORY: No cough, wheezing, chills or hemoptysis; No shortness of breath  CARDIOVASCULAR: No chest pain, palpitations, dizziness, or leg swelling  GASTROINTESTINAL: No abdominal or epigastric pain. No nausea, vomiting, or hematemesis; No diarrhea or constipation. No melena or hematochezia.  GENITOURINARY: No dysuria, frequency, hematuria, or incontinence  NEUROLOGICAL: No headaches, memory loss, loss of strength, numbness, or tremors  SKIN: No itching, burning, rashes, or lesions     Allergies    Plavix (Other)    Intolerances    Lipitor (Muscle Pain)      MEDICATIONS  (STANDING):  aspirin enteric coated 325 milliGRAM(s) Oral daily  diazepam    Tablet 2 milliGRAM(s) Oral two times a day  enalapril 10 milliGRAM(s) Oral daily  famotidine    Tablet 20 milliGRAM(s) Oral daily  metoprolol succinate ER 50 milliGRAM(s) Oral daily  prasugrel 10 milliGRAM(s) Oral daily  predniSONE   Tablet 40 milliGRAM(s) Oral daily  sodium chloride 0.9% lock flush 3 milliLiter(s) IV Push every 8 hours    MEDICATIONS  (PRN):  acetaminophen   Tablet. 325 milliGRAM(s) Oral every 4 hours PRN pain  guaiFENesin    Syrup 100 milliGRAM(s) Oral every 8 hours PRN Cough  lidocaine 2% Gel 1 Application(s) Topical two times a day PRN pain  ondansetron Injectable 4 milliGRAM(s) IV Push every 8 hours PRN Nausea      Vital Signs Last 24 Hrs  T(C): 36.4 (20 Jan 2018 09:30), Max: 37 (19 Jan 2018 18:17)  T(F): 97.6 (20 Jan 2018 09:30), Max: 98.6 (19 Jan 2018 18:17)  HR: 85 (20 Jan 2018 09:30) (70 - 98)  BP: 132/60 (20 Jan 2018 09:30) (125/59 - 195/69)  BP(mean): --  RR: 18 (20 Jan 2018 09:30) (16 - 25)  SpO2: 96% (20 Jan 2018 09:30) (94% - 98%)    PHYSICAL EXAM:    GENERAL: NAD, well-groomed, well-developed  HEAD:  Atraumatic, Normocephalic  EYES: EOMI, PERRLA, conjunctiva and sclera clear  ENMT: No tonsillar erythema, exudates, or enlargement; Moist mucous membranes  NECK: Supple, No JVD  NERVOUS SYSTEM:  Alert & Oriented X3, Good concentration  CHEST/LUNG: Clear to percussion bilaterally; No rales, rhonchi, wheezing, or rubs  Left Shoulder: Op site good hemostasis, no bleeding or swelling  HEART: Regular rate and rhythm; No murmurs, rubs, or gallops  ABDOMEN: Soft, Nontender, Nondistended; Bowel sounds present  EXTREMITIES:  2+ Peripheral Pulses, No clubbing, cyanosis, or edema  LYMPH: No lymphadenopathy noted  SKIN: No rashes or lesions      Labs: Pending

## 2018-01-20 NOTE — PROGRESS NOTE ADULT - ASSESSMENT
Assessment/Plan:  s/p dual chamber BSI pacemaker implant.     c/W   - Left arm precautions  - Ancef IVPB completed  - Toprol XL 50mg daily  - Wound check in one weeks time.   - CXR no PTX   - NO IV or SC Heparin, NO Lovenox  - Will restart anticoagulation in outpatient setting.

## 2018-01-20 NOTE — PROGRESS NOTE ADULT - ASSESSMENT
Pt is an 84yo Male with HTN, HLD, CAD/MI/stents, PAF (non compliant with eliquis and will not take AC), admitted to [facility] for a syncopal episode secondary to tachy carlene syndrome/pauses (max 3.3 sec) status post PPM placement.      Assessment/Plan:    1. Syncope secondary to tachy/carlene syndrome now status post PPM. Dose of metoprolol decreased to 50xl od    2. Afib to follow up with EP as outpatient to discuss AC    3. Hsitory of cad to continue apsirin and effient    Stable for discharge home per cardio to follow up in 1 week.

## 2018-12-13 NOTE — H&P ADULT - PROBLEM/PLAN-3
Noted.   [facility]     DISPLAY PLAN FREE TEXT

## 2019-05-30 NOTE — PHYSICAL THERAPY INITIAL EVALUATION ADULT - IMPAIRED TRANSFERS: SIT/STAND, REHAB EVAL
no hematuria/no incontinence/no dysuria/normal urinary frequency/no urinary hesitancy
decreased strength/impaired balance

## 2020-02-28 NOTE — ED ADULT TRIAGE NOTE - BP NONINVASIVE DIASTOLIC (MM HG)
Negative for discharge and itching. Respiratory: Negative for cough, chest tightness and shortness of breath. Cardiovascular: Negative for chest pain, palpitations and leg swelling. Gastrointestinal: Negative for abdominal pain, anorexia, change in bowel habit, diarrhea, nausea and vomiting. Endocrine: Negative. Genitourinary: Negative for dysuria, frequency and urgency. Musculoskeletal: Negative for arthralgias, joint swelling, myalgias, neck pain and neck stiffness. Skin: Negative for rash. Allergic/Immunologic: Negative. Neurological: Negative for dizziness, vertigo, weakness, light-headedness, numbness and headaches. Hematological: Negative for adenopathy. Psychiatric/Behavioral: Negative for confusion. Objective:      Physical Exam  /75   Pulse 64   Temp 98.7 °F (37.1 °C) (Temporal)   Resp 16   Ht 5' 2.99\" (1.6 m)   Wt 136 lb 9.6 oz (62 kg)   BMI 24.20 kg/m²     Assessment:       Diagnosis Orders   1. Sore throat  POCT rapid strep A           Plan:     1.) POCT Strep -  2.)   Problem List     None           Patient given educationalmaterials - see patient instructions. Discussed use, benefit, and side effectsof prescribed medications. All patient questions answered. Pt verbalized understanding. Instructed to continue current medications, diet and exercise. Patient agreedwith treatment plan. Follow up as directed.      Electronically signed by KAROL Mccauley CNP on 2/28/2020 at 5:35 PM
67

## 2021-09-09 NOTE — PHYSICAL THERAPY INITIAL EVALUATION ADULT - DISCHARGE DISPOSITION, PT EVAL
Colchicine Counseling:  Patient counseled regarding adverse effects including but not limited to stomach upset (nausea, vomiting, stomach pain, or diarrhea).  Patient instructed to limit alcohol consumption while taking this medication.  Colchicine may reduce blood counts especially with prolonged use.  The patient understands that monitoring of kidney function and blood counts may be required, especially at baseline. The patient verbalized understanding of the proper use and possible adverse effects of colchicine.  All of the patient's questions and concerns were addressed. Imiquimod Pregnancy And Lactation Text: This medication is Pregnancy Category C. It is unknown if this medication is excreted in breast milk. Cyclophosphamide Pregnancy And Lactation Text: This medication is Pregnancy Category D and it isn't considered safe during pregnancy. This medication is excreted in breast milk. Otezla Counseling: The side effects of Otezla were discussed with the patient, including but not limited to worsening or new depression, weight loss, diarrhea, nausea, upper respiratory tract infection, and headache. Patient instructed to call the office should any adverse effect occur.  The patient verbalized understanding of the proper use and possible adverse effects of Otezla.  All the patient's questions and concerns were addressed. Hydroxyzine Counseling: Patient advised that the medication is sedating and not to drive a car after taking this medication.  Patient informed of potential adverse effects including but not limited to dry mouth, urinary retention, and blurry vision.  The patient verbalized understanding of the proper use and possible adverse effects of hydroxyzine.  All of the patient's questions and concerns were addressed. Topical Clindamycin Counseling: Patient counseled that this medication may cause skin irritation or allergic reactions.  In the event of skin irritation, the patient was advised to reduce the amount of the drug applied or use it less frequently.   The patient verbalized understanding of the proper use and possible adverse effects of clindamycin.  All of the patient's questions and concerns were addressed. Skyrizi Counseling: I discussed with the patient the risks of risankizumab-rzaa including but not limited to immunosuppression, and serious infections.  The patient understands that monitoring is required including a PPD at baseline and must alert us or the primary physician if symptoms of infection or other concerning signs are noted. Cephalexin Pregnancy And Lactation Text: This medication is Pregnancy Category B and considered safe during pregnancy.  It is also excreted in breast milk but can be used safely for shorter doses. home w/ assist/home w/ home PT Clofazimine Pregnancy And Lactation Text: This medication is Pregnancy Category C and isn't considered safe during pregnancy. It is excreted in breast milk. Tetracycline Counseling: Patient counseled regarding possible photosensitivity and increased risk for sunburn.  Patient instructed to avoid sunlight, if possible.  When exposed to sunlight, patients should wear protective clothing, sunglasses, and sunscreen.  The patient was instructed to call the office immediately if the following severe adverse effects occur:  hearing changes, easy bruising/bleeding, severe headache, or vision changes.  The patient verbalized understanding of the proper use and possible adverse effects of tetracycline.  All of the patient's questions and concerns were addressed. Patient understands to avoid pregnancy while on therapy due to potential birth defects. Doxepin Pregnancy And Lactation Text: This medication is Pregnancy Category C and it isn't known if it is safe during pregnancy. It is also excreted in breast milk and breast feeding isn't recommended. Cosentyx Counseling:  I discussed with the patient the risks of Cosentyx including but not limited to worsening of Crohn's disease, immunosuppression, allergic reactions and infections.  The patient understands that monitoring is required including a PPD at baseline and must alert us or the primary physician if symptoms of infection or other concerning signs are noted. Valtrex Pregnancy And Lactation Text: this medication is Pregnancy Category B and is considered safe during pregnancy. This medication is not directly found in breast milk but it's metabolite acyclovir is present. Hydroxyzine Pregnancy And Lactation Text: This medication is not safe during pregnancy and should not be taken. It is also excreted in breast milk and breast feeding isn't recommended. Dupixent Counseling: I discussed with the patient the risks of dupilumab including but not limited to eye infection and irritation, cold sores, injection site reactions, worsening of asthma, allergic reactions and increased risk of parasitic infection.  Live vaccines should be avoided while taking dupilumab. Dupilumab will also interact with certain medications such as warfarin and cyclosporine. The patient understands that monitoring is required and they must alert us or the primary physician if symptoms of infection or other concerning signs are noted. Cyclosporine Counseling:  I discussed with the patient the risks of cyclosporine including but not limited to hypertension, gingival hyperplasia,myelosuppression, immunosuppression, liver damage, kidney damage, neurotoxicity, lymphoma, and serious infections. The patient understands that monitoring is required including baseline blood pressure, CBC, CMP, lipid panel and uric acid, and then 1-2 times monthly CMP and blood pressure. Minoxidil Counseling: Minoxidil is a topical medication which can increase blood flow where it is applied. It is uncertain how this medication increases hair growth. Side effects are uncommon and include stinging and allergic reactions. Clindamycin Counseling: I counseled the patient regarding use of clindamycin as an antibiotic for prophylactic and/or therapeutic purposes. Clindamycin is active against numerous classes of bacteria, including skin bacteria. Side effects may include nausea, diarrhea, gastrointestinal upset, rash, hives, yeast infections, and in rare cases, colitis. Topical Clindamycin Pregnancy And Lactation Text: This medication is Pregnancy Category B and is considered safe during pregnancy. It is unknown if it is excreted in breast milk. Tetracycline Pregnancy And Lactation Text: This medication is Pregnancy Category D and not consider safe during pregnancy. It is also excreted in breast milk. Use Enhanced Medication Counseling?: No Cosentyx Pregnancy And Lactation Text: This medication is Pregnancy Category B and is considered safe during pregnancy. It is unknown if this medication is excreted in breast milk. Otezla Pregnancy And Lactation Text: This medication is Pregnancy Category C and it isn't known if it is safe during pregnancy. It is unknown if it is excreted in breast milk. Skyrizi Pregnancy And Lactation Text: The risk during pregnancy and breastfeeding is uncertain with this medication. Benzoyl Peroxide Counseling: Patient counseled that medicine may cause skin irritation and bleach clothing.  In the event of skin irritation, the patient was advised to reduce the amount of the drug applied or use it less frequently.   The patient verbalized understanding of the proper use and possible adverse effects of benzoyl peroxide.  All of the patient's questions and concerns were addressed. Oxybutynin Counseling:  I discussed with the patient the risks of oxybutynin including but not limited to skin rash, drowsiness, dry mouth, difficulty urinating, and blurred vision. Cyclosporine Pregnancy And Lactation Text: This medication is Pregnancy Category C and it isn't know if it is safe during pregnancy. This medication is excreted in breast milk. Topical Sulfur Applications Pregnancy And Lactation Text: This medication is Pregnancy Category C and has an unknown safety profile during pregnancy. It is unknown if this topical medication is excreted in breast milk. Carac Counseling:  I discussed with the patient the risks of Carac including but not limited to erythema, scaling, itching, weeping, crusting, and pain. Fluconazole Counseling:  Patient counseled regarding adverse effects of fluconazole including but not limited to headache, diarrhea, nausea, upset stomach, liver function test abnormalities, taste disturbance, and stomach pain.  There is a rare possibility of liver failure that can occur when taking fluconazole.  The patient understands that monitoring of LFTs and kidney function test may be required, especially at baseline. The patient verbalized understanding of the proper use and possible adverse effects of fluconazole.  All of the patient's questions and concerns were addressed. Clindamycin Pregnancy And Lactation Text: This medication can be used in pregnancy if certain situations. Clindamycin is also present in breast milk. Minoxidil Pregnancy And Lactation Text: This medication has not been assigned a Pregnancy Risk Category but animal studies failed to show danger with the topical medication. It is unknown if the medication is excreted in breast milk. Dapsone Counseling: I discussed with the patient the risks of dapsone including but not limited to hemolytic anemia, agranulocytosis, rashes, methemoglobinemia, kidney failure, peripheral neuropathy, headaches, GI upset, and liver toxicity.  Patients who start dapsone require monitoring including baseline LFTs and weekly CBCs for the first month, then every month thereafter.  The patient verbalized understanding of the proper use and possible adverse effects of dapsone.  All of the patient's questions and concerns were addressed. Dupixent Pregnancy And Lactation Text: This medication likely crosses the placenta but the risk for the fetus is uncertain. This medication is excreted in breast milk. Stelara Counseling:  I discussed with the patient the risks of ustekinumab including but not limited to immunosuppression, malignancy, posterior leukoencephalopathy syndrome, and serious infections.  The patient understands that monitoring is required including a PPD at baseline and must alert us or the primary physician if symptoms of infection or other concerning signs are noted. Benzoyl Peroxide Pregnancy And Lactation Text: This medication is Pregnancy Category C. It is unknown if benzoyl peroxide is excreted in breast milk. Topical Sulfur Applications Counseling: Topical Sulfur Counseling: Patient counseled that this medication may cause skin irritation or allergic reactions.  In the event of skin irritation, the patient was advised to reduce the amount of the drug applied or use it less frequently.   The patient verbalized understanding of the proper use and possible adverse effects of topical sulfur application.  All of the patient's questions and concerns were addressed. Albendazole Counseling:  I discussed with the patient the risks of albendazole including but not limited to cytopenia, kidney damage, nausea/vomiting and severe allergy.  The patient understands that this medication is being used in an off-label manner. Dapsone Pregnancy And Lactation Text: This medication is Pregnancy Category C and is not considered safe during pregnancy or breast feeding. Enbrel Counseling:  I discussed with the patient the risks of etanercept including but not limited to myelosuppression, immunosuppression, autoimmune hepatitis, demyelinating diseases, lymphoma, and infections.  The patient understands that monitoring is required including a PPD at baseline and must alert us or the primary physician if symptoms of infection or other concerning signs are noted. Taltz Counseling: I discussed with the patient the risks of ixekizumab including but not limited to immunosuppression, serious infections, worsening of inflammatory bowel disease and drug reactions.  The patient understands that monitoring is required including a PPD at baseline and must alert us or the primary physician if symptoms of infection or other concerning signs are noted. Carac Pregnancy And Lactation Text: This medication is Pregnancy Category X and contraindicated in pregnancy and in women who may become pregnant. It is unknown if this medication is excreted in breast milk. Methotrexate Counseling:  Patient counseled regarding adverse effects of methotrexate including but not limited to nausea, vomiting, abnormalities in liver function tests. Patients may develop mouth sores, rash, diarrhea, and abnormalities in blood counts. The patient understands that monitoring is required including LFT's and blood counts.  There is a rare possibility of scarring of the liver and lung problems that can occur when taking methotrexate. Persistent nausea, loss of appetite, pale stools, dark urine, cough, and shortness of breath should be reported immediately. Patient advised to discontinue methotrexate treatment at least three months before attempting to become pregnant.  I discussed the need for folate supplements while taking methotrexate.  These supplements can decrease side effects during methotrexate treatment. The patient verbalized understanding of the proper use and possible adverse effects of methotrexate.  All of the patient's questions and concerns were addressed. Mirvaso Counseling: Mirvaso is a topical medication which can decrease superficial blood flow where applied. Side effects are uncommon and include stinging, redness and allergic reactions. Doxycycline Counseling:  Patient counseled regarding possible photosensitivity and increased risk for sunburn.  Patient instructed to avoid sunlight, if possible.  When exposed to sunlight, patients should wear protective clothing, sunglasses, and sunscreen.  The patient was instructed to call the office immediately if the following severe adverse effects occur:  hearing changes, easy bruising/bleeding, severe headache, or vision changes.  The patient verbalized understanding of the proper use and possible adverse effects of doxycycline.  All of the patient's questions and concerns were addressed. Erythromycin Counseling:  I discussed with the patient the risks of erythromycin including but not limited to GI upset, allergic reaction, drug rash, diarrhea, increase in liver enzymes, and yeast infections. Mirvaso Pregnancy And Lactation Text: This medication has not been assigned a Pregnancy Risk Category. It is unknown if the medication is excreted in breast milk. Calcipotriene Counseling:  I discussed with the patient the risks of calcipotriene including but not limited to erythema, scaling, itching, and irritation. Erivedge Counseling- I discussed with the patient the risks of Erivedge including but not limited to nausea, vomiting, diarrhea, constipation, weight loss, changes in the sense of taste, decreased appetite, muscle spasms, and hair loss.  The patient verbalized understanding of the proper use and possible adverse effects of Erivedge.  All of the patient's questions and concerns were addressed. Methotrexate Pregnancy And Lactation Text: This medication is Pregnancy Category X and is known to cause fetal harm. This medication is excreted in breast milk. Doxycycline Pregnancy And Lactation Text: This medication is Pregnancy Category D and not consider safe during pregnancy. It is also excreted in breast milk but is considered safe for shorter treatment courses. Propranolol Counseling:  I discussed with the patient the risks of propranolol including but not limited to low heart rate, low blood pressure, low blood sugar, restlessness and increased cold sensitivity. They should call the office if they experience any of these side effects. Fluconazole Pregnancy And Lactation Text: This medication is Pregnancy Category C and it isn't know if it is safe during pregnancy. It is also excreted in breast milk. Calcipotriene Pregnancy And Lactation Text: This medication has not been proven safe during pregnancy. It is unknown if this medication is excreted in breast milk. Griseofulvin Pregnancy And Lactation Text: This medication is Pregnancy Category X and is known to cause serious birth defects. It is unknown if this medication is excreted in breast milk but breast feeding should be avoided. Humira Counseling:  I discussed with the patient the risks of adalimumab including but not limited to myelosuppression, immunosuppression, autoimmune hepatitis, demyelinating diseases, lymphoma, and serious infections.  The patient understands that monitoring is required including a PPD at baseline and must alert us or the primary physician if symptoms of infection or other concerning signs are noted. Prednisone Counseling:  I discussed with the patient the risks of prolonged use of prednisone including but not limited to weight gain, insomnia, osteoporosis, mood changes, diabetes, susceptibility to infection, glaucoma and high blood pressure.  In cases where prednisone use is prolonged, patients should be monitored with blood pressure checks, serum glucose levels and an eye exam.  Additionally, the patient may need to be placed on GI prophylaxis, PCP prophylaxis, and calcium and vitamin D supplementation and/or a bisphosphonate.  The patient verbalized understanding of the proper use and the possible adverse effects of prednisone.  All of the patient's questions and concerns were addressed. Propranolol Pregnancy And Lactation Text: This medication is Pregnancy Category C and it isn't known if it is safe during pregnancy. It is excreted in breast milk. Erivedge Pregnancy And Lactation Text: This medication is Pregnancy Category X and is absolutely contraindicated during pregnancy. It is unknown if it is excreted in breast milk. Picato Counseling:  I discussed with the patient the risks of Picato including but not limited to erythema, scaling, itching, weeping, crusting, and pain. Griseofulvin Counseling:  I discussed with the patient the risks of griseofulvin including but not limited to photosensitivity, cytopenia, liver damage, nausea/vomiting and severe allergy.  The patient understands that this medication is best absorbed when taken with a fatty meal (e.g., ice cream or french fries). 5-Fu Counseling: 5-Fluorouracil Counseling:  I discussed with the patient the risks of 5-fluorouracil including but not limited to erythema, scaling, itching, weeping, crusting, and pain. Detail Level: Simple Itraconazole Counseling:  I discussed with the patient the risks of itraconazole including but not limited to liver damage, nausea/vomiting, neuropathy, and severe allergy.  The patient understands that this medication is best absorbed when taken with acidic beverages such as non-diet cola or ginger ale.  The patient understands that monitoring is required including baseline LFTs and repeat LFTs at intervals.  The patient understands that they are to contact us or the primary physician if concerning signs are noted. Tremfya Counseling: I discussed with the patient the risks of guselkumab including but not limited to immunosuppression, serious infections, and drug reactions.  The patient understands that monitoring is required including a PPD at baseline and must alert us or the primary physician if symptoms of infection or other concerning signs are noted. Finasteride Counseling:  I discussed with the patient the risks of use of finasteride including but not limited to decreased libido, decreased ejaculate volume, gynecomastia, and depression. Women should not handle medication.  All of the patient's questions and concerns were addressed. Birth Control Pills Counseling: Birth Control Pill Counseling: I discussed with the patient the potential side effects of OCPs including but not limited to increased risk of stroke, heart attack, thrombophlebitis, deep venous thrombosis, hepatic adenomas, breast changes, GI upset, headaches, and depression.  The patient verbalized understanding of the proper use and possible adverse effects of OCPs. All of the patient's questions and concerns were addressed. Erythromycin Pregnancy And Lactation Text: This medication is Pregnancy Category B and is considered safe during pregnancy. It is also excreted in breast milk. Wartpeel Counseling:  I discussed with the patient the risks of Wartpeel including but not limited to erythema, scaling, itching, weeping, crusting, and pain. Gabapentin Counseling: I discussed with the patient the risks of gabapentin including but not limited to dizziness, somnolence, fatigue and ataxia. Zyclara Counseling:  I discussed with the patient the risks of imiquimod including but not limited to erythema, scaling, itching, weeping, crusting, and pain.  Patient understands that the inflammatory response to imiquimod is variable from person to person and was educated regarded proper titration schedule.  If flu-like symptoms develop, patient knows to discontinue the medication and contact us. Metronidazole Pregnancy And Lactation Text: This medication is Pregnancy Category B and considered safe during pregnancy.  It is also excreted in breast milk. Xeljanz Counseling: I discussed with the patient the risks of Xeljanz therapy including increased risk of infection, liver issues, headache, diarrhea, or cold symptoms. Live vaccines should be avoided. They were instructed to call if they have any problems. Birth Control Pills Pregnancy And Lactation Text: This medication should be avoided if pregnant and for the first 30 days post-partum. Protopic Counseling: Patient may experience a mild burning sensation during topical application. Protopic is not approved in children less than 2 years of age. There have been case reports of hematologic and skin malignancies in patients using topical calcineurin inhibitors although causality is questionable. Metronidazole Counseling:  I discussed with the patient the risks of metronidazole including but not limited to seizures, nausea/vomiting, a metallic taste in the mouth, nausea/vomiting and severe allergy. Finasteride Pregnancy And Lactation Text: This medication is absolutely contraindicated during pregnancy. It is unknown if it is excreted in breast milk. Ilumya Counseling: I discussed with the patient the risks of tildrakizumab including but not limited to immunosuppression, malignancy, posterior leukoencephalopathy syndrome, and serious infections.  The patient understands that monitoring is required including a PPD at baseline and must alert us or the primary physician if symptoms of infection or other concerning signs are noted. Albendazole Pregnancy And Lactation Text: This medication is Pregnancy Category C and it isn't known if it is safe during pregnancy. It is also excreted in breast milk. Acitretin Pregnancy And Lactation Text: This medication is Pregnancy Category X and should not be given to women who are pregnant or may become pregnant in the future. This medication is excreted in breast milk. Spironolactone Pregnancy And Lactation Text: This medication can cause feminization of the male fetus and should be avoided during pregnancy. The active metabolite is also found in breast milk. Xeljalilz Pregnancy And Lactation Text: This medication is Pregnancy Category D and is not considered safe during pregnancy.  The risk during breast feeding is also uncertain. Minocycline Counseling: Patient advised regarding possible photosensitivity and discoloration of the teeth, skin, lips, tongue and gums.  Patient instructed to avoid sunlight, if possible.  When exposed to sunlight, patients should wear protective clothing, sunglasses, and sunscreen.  The patient was instructed to call the office immediately if the following severe adverse effects occur:  hearing changes, easy bruising/bleeding, severe headache, or vision changes.  The patient verbalized understanding of the proper use and possible adverse effects of minocycline.  All of the patient's questions and concerns were addressed. Ketoconazole Counseling:   Patient counseled regarding improving absorption with orange juice.  Adverse effects include but are not limited to breast enlargement, headache, diarrhea, nausea, upset stomach, liver function test abnormalities, taste disturbance, and stomach pain.  There is a rare possibility of liver failure that can occur when taking ketoconazole. The patient understands that monitoring of LFTs may be required, especially at baseline. The patient verbalized understanding of the proper use and possible adverse effects of ketoconazole.  All of the patient's questions and concerns were addressed. Protopic Pregnancy And Lactation Text: This medication is Pregnancy Category C. It is unknown if this medication is excreted in breast milk when applied topically. Spironolactone Counseling: Patient advised regarding risks of diarrhea, abdominal pain, hyperkalemia, birth defects (for female patients), liver toxicity and renal toxicity. The patient may need blood work to monitor liver and kidney function and potassium levels while on therapy. The patient verbalized understanding of the proper use and possible adverse effects of spironolactone.  All of the patient's questions and concerns were addressed. Drysol Counseling:  I discussed with the patient the risks of drysol/aluminum chloride including but not limited to skin rash, itching, irritation, burning. Acitretin Counseling:  I discussed with the patient the risks of acitretin including but not limited to hair loss, dry lips/skin/eyes, liver damage, hyperlipidemia, depression/suicidal ideation, photosensitivity.  Serious rare side effects can include but are not limited to pancreatitis, pseudotumor cerebri, bony changes, clot formation/stroke/heart attack.  Patient understands that alcohol is contraindicated since it can result in liver toxicity and significantly prolong the elimination of the drug by many years. Glycopyrrolate Counseling:  I discussed with the patient the risks of glycopyrrolate including but not limited to skin rash, drowsiness, dry mouth, difficulty urinating, and blurred vision. Ketoconazole Pregnancy And Lactation Text: This medication is Pregnancy Category C and it isn't know if it is safe during pregnancy. It is also excreted in breast milk and breast feeding isn't recommended. Ivermectin Counseling:  Patient instructed to take medication on an empty stomach with a full glass of water.  Patient informed of potential adverse effects including but not limited to nausea, diarrhea, dizziness, itching, and swelling of the extremities or lymph nodes.  The patient verbalized understanding of the proper use and possible adverse effects of ivermectin.  All of the patient's questions and concerns were addressed. Xolair Counseling:  Patient informed of potential adverse effects including but not limited to fever, muscle aches, rash and allergic reactions.  The patient verbalized understanding of the proper use and possible adverse effects of Xolair.  All of the patient's questions and concerns were addressed. Infliximab Counseling:  I discussed with the patient the risks of infliximab including but not limited to myelosuppression, immunosuppression, autoimmune hepatitis, demyelinating diseases, lymphoma, and serious infections.  The patient understands that monitoring is required including a PPD at baseline and must alert us or the primary physician if symptoms of infection or other concerning signs are noted. Drysol Pregnancy And Lactation Text: This medication is considered safe during pregnancy and breast feeding. Rhofade Counseling: Rhofade is a topical medication which can decrease superficial blood flow where applied. Side effects are uncommon and include stinging, redness and allergic reactions. Rituxan Counseling:  I discussed with the patient the risks of Rituxan infusions. Side effects can include infusion reactions, severe drug rashes including mucocutaneous reactions, reactivation of latent hepatitis and other infections and rarely progressive multifocal leukoencephalopathy.  All of the patient's questions and concerns were addressed. Glycopyrrolate Pregnancy And Lactation Text: This medication is Pregnancy Category B and is considered safe during pregnancy. It is unknown if it is excreted breast milk. Xolair Pregnancy And Lactation Text: This medication is Pregnancy Category B and is considered safe during pregnancy. This medication is excreted in breast milk. Sski Pregnancy And Lactation Text: This medication is Pregnancy Category D and isn't considered safe during pregnancy. It is excreted in breast milk. Opioid Counseling: I discussed with the patient the potential side effects of opioids including but not limited to addiction, altered mental status, and depression. I stressed avoiding alcohol, benzodiazepines, muscle relaxants and sleep aids unless specifically okayed by a physician. The patient verbalized understanding of the proper use and possible adverse effects of opioids. All of the patient's questions and concerns were addressed. They were instructed to flush the remaining pills down the toilet if they did not need them for pain. Quinolones Counseling:  I discussed with the patient the risks of fluoroquinolones including but not limited to GI upset, allergic reaction, drug rash, diarrhea, dizziness, photosensitivity, yeast infections, liver function test abnormalities, tendonitis/tendon rupture. Elidel Counseling: Patient may experience a mild burning sensation during topical application. Elidel is not approved in children less than 2 years of age. There have been case reports of hematologic and skin malignancies in patients using topical calcineurin inhibitors although causality is questionable. Terbinafine Counseling: Patient counseling regarding adverse effects of terbinafine including but not limited to headache, diarrhea, rash, upset stomach, liver function test abnormalities, itching, taste/smell disturbance, nausea, abdominal pain, and flatulence.  There is a rare possibility of liver failure that can occur when taking terbinafine.  The patient understands that a baseline LFT and kidney function test may be required. The patient verbalized understanding of the proper use and possible adverse effects of terbinafine.  All of the patient's questions and concerns were addressed. SSKI Counseling:  I discussed with the patient the risks of SSKI including but not limited to thyroid abnormalities, metallic taste, GI upset, fever, headache, acne, arthralgias, paraesthesias, lymphadenopathy, easy bleeding, arrhythmias, and allergic reaction. Bexarotene Counseling:  I discussed with the patient the risks of bexarotene including but not limited to hair loss, dry lips/skin/eyes, liver abnormalities, hyperlipidemia, pancreatitis, depression/suicidal ideation, photosensitivity, drug rash/allergic reactions, hypothyroidism, anemia, leukopenia, infection, cataracts, and teratogenicity.  Patient understands that they will need regular blood tests to check lipid profile, liver function tests, white blood cell count, thyroid function tests and pregnancy test if applicable. Isotretinoin Counseling: Patient should get monthly blood tests, not donate blood, not drive at night if vision affected, not share medication, and not undergo elective surgery for 6 months after tx completed. Side effects reviewed, pt to contact office should one occur. Rituxan Pregnancy And Lactation Text: This medication is Pregnancy Category C and it isn't know if it is safe during pregnancy. It is unknown if this medication is excreted in breast milk but similar antibodies are known to be excreted. Solaraze Pregnancy And Lactation Text: This medication is Pregnancy Category B and is considered safe. There is some data to suggest avoiding during the third trimester. It is unknown if this medication is excreted in breast milk. Eucrisa Counseling: Patient may experience a mild burning sensation during topical application. Eucrisa is not approved in children less than 2 years of age. Azithromycin Counseling:  I discussed with the patient the risks of azithromycin including but not limited to GI upset, allergic reaction, drug rash, diarrhea, and yeast infections. Opioid Pregnancy And Lactation Text: These medications can lead to premature delivery and should be avoided during pregnancy. These medications are also present in breast milk in small amounts. Terbinafine Pregnancy And Lactation Text: This medication is Pregnancy Category B and is considered safe during pregnancy. It is also excreted in breast milk and breast feeding isn't recommended. Thalidomide Counseling: I discussed with the patient the risks of thalidomide including but not limited to birth defects, anxiety, weakness, chest pain, dizziness, cough and severe allergy. Hydroxychloroquine Counseling:  I discussed with the patient that a baseline ophthalmologic exam is needed at the start of therapy and every year thereafter while on therapy. A CBC may also be warranted for monitoring.  The side effects of this medication were discussed with the patient, including but not limited to agranulocytosis, aplastic anemia, seizures, rashes, retinopathy, and liver toxicity. Patient instructed to call the office should any adverse effect occur.  The patient verbalized understanding of the proper use and possible adverse effects of Plaquenil.  All the patient's questions and concerns were addressed. Bexarotene Pregnancy And Lactation Text: This medication is Pregnancy Category X and should not be given to women who are pregnant or may become pregnant. This medication should not be used if you are breast feeding. Solaraze Counseling:  I discussed with the patient the risks of Solaraze including but not limited to erythema, scaling, itching, weeping, crusting, and pain. Niacinamide Counseling: I recommended taking niacin or niacinamide, also know as vitamin B3, twice daily. Recent evidence suggests that taking vitamin B3 (500 mg twice daily) can reduce the risk of actinic keratoses and non-melanoma skin cancers. Side effects of vitamin B3 include flushing and headache. Azithromycin Pregnancy And Lactation Text: This medication is considered safe during pregnancy and is also secreted in breast milk. Topical Retinoid counseling:  Patient advised to apply a pea-sized amount only at bedtime and wait 30 minutes after washing their face before applying.  If too drying, patient may add a non-comedogenic moisturizer. The patient verbalized understanding of the proper use and possible adverse effects of retinoids.  All of the patient's questions and concerns were addressed. Nsaids Counseling: NSAID Counseling: I discussed with the patient that NSAIDs should be taken with food. Prolonged use of NSAIDs can result in the development of stomach ulcers.  Patient advised to stop taking NSAIDs if abdominal pain occurs.  The patient verbalized understanding of the proper use and possible adverse effects of NSAIDs.  All of the patient's questions and concerns were addressed. Siliq Counseling:  I discussed with the patient the risks of Siliq including but not limited to new or worsening depression, suicidal thoughts and behavior, immunosuppression, malignancy, posterior leukoencephalopathy syndrome, and serious infections.  The patient understands that monitoring is required including a PPD at baseline and must alert us or the primary physician if symptoms of infection or other concerning signs are noted. There is also a special program designed to monitor depression which is required with Siliq. Isotretinoin Pregnancy And Lactation Text: This medication is Pregnancy Category X and is considered extremely dangerous during pregnancy. It is unknown if it is excreted in breast milk. Hydroxychloroquine Pregnancy And Lactation Text: This medication has been shown to cause fetal harm but it isn't assigned a Pregnancy Risk Category. There are small amounts excreted in breast milk. Niacinamide Pregnancy And Lactation Text: These medications are considered safe during pregnancy. Rifampin Counseling: I discussed with the patient the risks of rifampin including but not limited to liver damage, kidney damage, red-orange body fluids, nausea/vomiting and severe allergy. Azathioprine Counseling:  I discussed with the patient the risks of azathioprine including but not limited to myelosuppression, immunosuppression, hepatotoxicity, lymphoma, and infections.  The patient understands that monitoring is required including baseline LFTs, Creatinine, possible TPMP genotyping and weekly CBCs for the first month and then every 2 weeks thereafter.  The patient verbalized understanding of the proper use and possible adverse effects of azathioprine.  All of the patient's questions and concerns were addressed. Hydroquinone Counseling:  Patient advised that medication may result in skin irritation, lightening (hypopigmentation), dryness, and burning.  In the event of skin irritation, the patient was advised to reduce the amount of the drug applied or use it less frequently.  Rarely, spots that are treated with hydroquinone can become darker (pseudoochronosis).  Should this occur, patient instructed to stop medication and call the office. The patient verbalized understanding of the proper use and possible adverse effects of hydroquinone.  All of the patient's questions and concerns were addressed. Bactrim Counseling:  I discussed with the patient the risks of sulfa antibiotics including but not limited to GI upset, allergic reaction, drug rash, diarrhea, dizziness, photosensitivity, and yeast infections.  Rarely, more serious reactions can occur including but not limited to aplastic anemia, agranulocytosis, methemoglobinemia, blood dyscrasias, liver or kidney failure, lung infiltrates or desquamative/blistering drug rashes. Libtayo Counseling- I discussed with the patient the risks of Libtayo including but not limited to nausea, vomiting, diarrhea, and bone or muscle pain.  The patient verbalized understanding of the proper use and possible adverse effects of Libtayo.  All of the patient's questions and concerns were addressed. High Dose Vitamin A Counseling: Side effects reviewed, pt to contact office should one occur. Rifampin Pregnancy And Lactation Text: This medication is Pregnancy Category C and it isn't know if it is safe during pregnancy. It is also excreted in breast milk and should not be used if you are breast feeding. Nsaids Pregnancy And Lactation Text: These medications are considered safe up to 30 weeks gestation. It is excreted in breast milk. Cimetidine Counseling:  I discussed with the patient the risks of Cimetidine including but not limited to gynecomastia, headache, diarrhea, nausea, drowsiness, arrhythmias, pancreatitis, skin rashes, psychosis, bone marrow suppression and kidney toxicity. Tranexamic Acid Counseling:  Patient advised of the small risk of bleeding problems with tranexamic acid. They were also instructed to call if they developed any nausea, vomiting or diarrhea. All of the patient's questions and concerns were addressed. Azathioprine Pregnancy And Lactation Text: This medication is Pregnancy Category D and isn't considered safe during pregnancy. It is unknown if this medication is excreted in breast milk. Arava Counseling:  Patient counseled regarding adverse effects of Arava including but not limited to nausea, vomiting, abnormalities in liver function tests. Patients may develop mouth sores, rash, diarrhea, and abnormalities in blood counts. The patient understands that monitoring is required including LFTs and blood counts.  There is a rare possibility of scarring of the liver and lung problems that can occur when taking methotrexate. Persistent nausea, loss of appetite, pale stools, dark urine, cough, and shortness of breath should be reported immediately. Patient advised to discontinue Arava treatment and consult with a physician prior to attempting conception. The patient will have to undergo a treatment to eliminate Arava from the body prior to conception. Bactrim Pregnancy And Lactation Text: This medication is Pregnancy Category D and is known to cause fetal risk.  It is also excreted in breast milk. Tranexamic Acid Pregnancy And Lactation Text: It is unknown if this medication is safe during pregnancy or breast feeding. Cimzia Pregnancy And Lactation Text: This medication crosses the placenta but can be considered safe in certain situations. Cimzia may be excreted in breast milk. Simponi Counseling:  I discussed with the patient the risks of golimumab including but not limited to myelosuppression, immunosuppression, autoimmune hepatitis, demyelinating diseases, lymphoma, and serious infections.  The patient understands that monitoring is required including a PPD at baseline and must alert us or the primary physician if symptoms of infection or other concerning signs are noted. High Dose Vitamin A Pregnancy And Lactation Text: High dose vitamin A therapy is contraindicated during pregnancy and breast feeding. Cellcept Counseling:  I discussed with the patient the risks of mycophenolate mofetil including but not limited to infection/immunosuppression, GI upset, hypokalemia, hypercholesterolemia, bone marrow suppression, lymphoproliferative disorders, malignancy, GI ulceration/bleed/perforation, colitis, interstitial lung disease, kidney failure, progressive multifocal leukoencephalopathy, and birth defects.  The patient understands that monitoring is required including a baseline creatinine and regular CBC testing. In addition, patient must alert us immediately if symptoms of infection or other concerning signs are noted. Libtayo Pregnancy And Lactation Text: This medication is contraindicated in pregnancy and when breast feeding. Tazorac Counseling:  Patient advised that medication is irritating and drying.  Patient may need to apply sparingly and wash off after an hour before eventually leaving it on overnight.  The patient verbalized understanding of the proper use and possible adverse effects of tazorac.  All of the patient's questions and concerns were addressed. Sarecycline Counseling: Patient advised regarding possible photosensitivity and discoloration of the teeth, skin, lips, tongue and gums.  Patient instructed to avoid sunlight, if possible.  When exposed to sunlight, patients should wear protective clothing, sunglasses, and sunscreen.  The patient was instructed to call the office immediately if the following severe adverse effects occur:  hearing changes, easy bruising/bleeding, severe headache, or vision changes.  The patient verbalized understanding of the proper use and possible adverse effects of sarecycline.  All of the patient's questions and concerns were addressed. Cimzia Counseling:  I discussed with the patient the risks of Cimzia including but not limited to immunosuppression, allergic reactions and infections.  The patient understands that monitoring is required including a PPD at baseline and must alert us or the primary physician if symptoms of infection or other concerning signs are noted. Cyclophosphamide Counseling:  I discussed with the patient the risks of cyclophosphamide including but not limited to hair loss, hormonal abnormalities, decreased fertility, abdominal pain, diarrhea, nausea and vomiting, bone marrow suppression and infection. The patient understands that monitoring is required while taking this medication. Cephalexin Counseling: I counseled the patient regarding use of cephalexin as an antibiotic for prophylactic and/or therapeutic purposes. Cephalexin (commonly prescribed under brand name Keflex) is a cephalosporin antibiotic which is active against numerous classes of bacteria, including most skin bacteria. Side effects may include nausea, diarrhea, gastrointestinal upset, rash, hives, yeast infections, and in rare cases, hepatitis, kidney disease, seizures, fever, confusion, neurologic symptoms, and others. Patients with severe allergies to penicillin medications are cautioned that there is about a 10% incidence of cross-reactivity with cephalosporins. When possible, patients with penicillin allergies should use alternatives to cephalosporins for antibiotic therapy. Tazorac Pregnancy And Lactation Text: This medication is not safe during pregnancy. It is unknown if this medication is excreted in breast milk. Imiquimod Counseling:  I discussed with the patient the risks of imiquimod including but not limited to erythema, scaling, itching, weeping, crusting, and pain.  Patient understands that the inflammatory response to imiquimod is variable from person to person and was educated regarded proper titration schedule.  If flu-like symptoms develop, patient knows to discontinue the medication and contact us. Doxepin Counseling:  Patient advised that the medication is sedating and not to drive a car after taking this medication. Patient informed of potential adverse effects including but not limited to dry mouth, urinary retention, and blurry vision.  The patient verbalized understanding of the proper use and possible adverse effects of doxepin.  All of the patient's questions and concerns were addressed. Odomzo Counseling- I discussed with the patient the risks of Odomzo including but not limited to nausea, vomiting, diarrhea, constipation, weight loss, changes in the sense of taste, decreased appetite, muscle spasms, and hair loss.  The patient verbalized understanding of the proper use and possible adverse effects of Odomzo.  All of the patient's questions and concerns were addressed. Valtrex Counseling: I discussed with the patient the risks of valacyclovir including but not limited to kidney damage, nausea, vomiting and severe allergy.  The patient understands that if the infection seems to be worsening or is not improving, they are to call. Clofazimine Counseling:  I discussed with the patient the risks of clofazimine including but not limited to skin and eye pigmentation, liver damage, nausea/vomiting, gastrointestinal bleeding and allergy. Xeljalilz Pregnancy And Lactation Text: This medication is Pregnancy Category D and is not considered safe during pregnancy.  The risk during breast feeding is also uncertain. Itraconazole Counseling:  I discussed with the patient the risks of itraconazole including but not limited to liver damage, nausea/vomiting, neuropathy, and severe allergy.  The patient understands that this medication is best absorbed when taken with acidic beverages such as non-diet cola or ginger ale.  The patient understands that monitoring is required including baseline LFTs and repeat LFTs at intervals.  The patient understands that they are to contact us or the primary physician if concerning signs are noted. Xeljanz Counseling: I discussed with the patient the risks of Xeljanz therapy including increased risk of infection, liver issues, headache, diarrhea, or cold symptoms. Live vaccines should be avoided. They were instructed to call if they have any problems. Xolair Counseling:  Patient informed of potential adverse effects including but not limited to fever, muscle aches, rash and allergic reactions.  The patient verbalized understanding of the proper use and possible adverse effects of Xolair.  All of the patient's questions and concerns were addressed. Arava Counseling:  Patient counseled regarding adverse effects of Arava including but not limited to nausea, vomiting, abnormalities in liver function tests. Patients may develop mouth sores, rash, diarrhea, and abnormalities in blood counts. The patient understands that monitoring is required including LFTs and blood counts.  There is a rare possibility of scarring of the liver and lung problems that can occur when taking methotrexate. Persistent nausea, loss of appetite, pale stools, dark urine, cough, and shortness of breath should be reported immediately. Patient advised to discontinue Arava treatment and consult with a physician prior to attempting conception. The patient will have to undergo a treatment to eliminate Arava from the body prior to conception. Infliximab Counseling:  I discussed with the patient the risks of infliximab including but not limited to myelosuppression, immunosuppression, autoimmune hepatitis, demyelinating diseases, lymphoma, and serious infections.  The patient understands that monitoring is required including a PPD at baseline and must alert us or the primary physician if symptoms of infection or other concerning signs are noted. Elidel Counseling: Patient may experience a mild burning sensation during topical application. Elidel is not approved in children less than 2 years of age. There have been case reports of hematologic and skin malignancies in patients using topical calcineurin inhibitors although causality is questionable. Bexarotene Counseling:  I discussed with the patient the risks of bexarotene including but not limited to hair loss, dry lips/skin/eyes, liver abnormalities, hyperlipidemia, pancreatitis, depression/suicidal ideation, photosensitivity, drug rash/allergic reactions, hypothyroidism, anemia, leukopenia, infection, cataracts, and teratogenicity.  Patient understands that they will need regular blood tests to check lipid profile, liver function tests, white blood cell count, thyroid function tests and pregnancy test if applicable. Nsaids Counseling: NSAID Counseling: I discussed with the patient that NSAIDs should be taken with food. Prolonged use of NSAIDs can result in the development of stomach ulcers.  Patient advised to stop taking NSAIDs if abdominal pain occurs.  The patient verbalized understanding of the proper use and possible adverse effects of NSAIDs.  All of the patient's questions and concerns were addressed. Albendazole Counseling:  I discussed with the patient the risks of albendazole including but not limited to cytopenia, kidney damage, nausea/vomiting and severe allergy.  The patient understands that this medication is being used in an off-label manner. Ivermectin Counseling:  Patient instructed to take medication on an empty stomach with a full glass of water.  Patient informed of potential adverse effects including but not limited to nausea, diarrhea, dizziness, itching, and swelling of the extremities or lymph nodes.  The patient verbalized understanding of the proper use and possible adverse effects of ivermectin.  All of the patient's questions and concerns were addressed. Valtrex Counseling: I discussed with the patient the risks of valacyclovir including but not limited to kidney damage, nausea, vomiting and severe allergy.  The patient understands that if the infection seems to be worsening or is not improving, they are to call. Metronidazole Pregnancy And Lactation Text: This medication is Pregnancy Category B and considered safe during pregnancy.  It is also excreted in breast milk. Ketoconazole Counseling:   Patient counseled regarding improving absorption with orange juice.  Adverse effects include but are not limited to breast enlargement, headache, diarrhea, nausea, upset stomach, liver function test abnormalities, taste disturbance, and stomach pain.  There is a rare possibility of liver failure that can occur when taking ketoconazole. The patient understands that monitoring of LFTs may be required, especially at baseline. The patient verbalized understanding of the proper use and possible adverse effects of ketoconazole.  All of the patient's questions and concerns were addressed. Terbinafine Counseling: Patient counseling regarding adverse effects of terbinafine including but not limited to headache, diarrhea, rash, upset stomach, liver function test abnormalities, itching, taste/smell disturbance, nausea, abdominal pain, and flatulence.  There is a rare possibility of liver failure that can occur when taking terbinafine.  The patient understands that a baseline LFT and kidney function test may be required. The patient verbalized understanding of the proper use and possible adverse effects of terbinafine.  All of the patient's questions and concerns were addressed. Rituxan Counseling:  I discussed with the patient the risks of Rituxan infusions. Side effects can include infusion reactions, severe drug rashes including mucocutaneous reactions, reactivation of latent hepatitis and other infections and rarely progressive multifocal leukoencephalopathy.  All of the patient's questions and concerns were addressed. Minocycline Counseling: Patient advised regarding possible photosensitivity and discoloration of the teeth, skin, lips, tongue and gums.  Patient instructed to avoid sunlight, if possible.  When exposed to sunlight, patients should wear protective clothing, sunglasses, and sunscreen.  The patient was instructed to call the office immediately if the following severe adverse effects occur:  hearing changes, easy bruising/bleeding, severe headache, or vision changes.  The patient verbalized understanding of the proper use and possible adverse effects of minocycline.  All of the patient's questions and concerns were addressed. Topical Retinoid counseling:  Patient advised to apply a pea-sized amount only at bedtime and wait 30 minutes after washing their face before applying.  If too drying, patient may add a non-comedogenic moisturizer. The patient verbalized understanding of the proper use and possible adverse effects of retinoids.  All of the patient's questions and concerns were addressed. Odomzo Counseling- I discussed with the patient the risks of Odomzo including but not limited to nausea, vomiting, diarrhea, constipation, weight loss, changes in the sense of taste, decreased appetite, muscle spasms, and hair loss.  The patient verbalized understanding of the proper use and possible adverse effects of Odomzo.  All of the patient's questions and concerns were addressed. Eucrisa Counseling: Patient may experience a mild burning sensation during topical application. Eucrisa is not approved in children less than 2 years of age. Tazorac Counseling:  Patient advised that medication is irritating and drying.  Patient may need to apply sparingly and wash off after an hour before eventually leaving it on overnight.  The patient verbalized understanding of the proper use and possible adverse effects of tazorac.  All of the patient's questions and concerns were addressed. Hydroquinone Counseling:  Patient advised that medication may result in skin irritation, lightening (hypopigmentation), dryness, and burning.  In the event of skin irritation, the patient was advised to reduce the amount of the drug applied or use it less frequently.  Rarely, spots that are treated with hydroquinone can become darker (pseudoochronosis).  Should this occur, patient instructed to stop medication and call the office. The patient verbalized understanding of the proper use and possible adverse effects of hydroquinone.  All of the patient's questions and concerns were addressed. Otezla Counseling: The side effects of Otezla were discussed with the patient, including but not limited to worsening or new depression, weight loss, diarrhea, nausea, upper respiratory tract infection, and headache. Patient instructed to call the office should any adverse effect occur.  The patient verbalized understanding of the proper use and possible adverse effects of Otezla.  All the patient's questions and concerns were addressed. Azathioprine Counseling:  I discussed with the patient the risks of azathioprine including but not limited to myelosuppression, immunosuppression, hepatotoxicity, lymphoma, and infections.  The patient understands that monitoring is required including baseline LFTs, Creatinine, possible TPMP genotyping and weekly CBCs for the first month and then every 2 weeks thereafter.  The patient verbalized understanding of the proper use and possible adverse effects of azathioprine.  All of the patient's questions and concerns were addressed. Colchicine Counseling:  Patient counseled regarding adverse effects including but not limited to stomach upset (nausea, vomiting, stomach pain, or diarrhea).  Patient instructed to limit alcohol consumption while taking this medication.  Colchicine may reduce blood counts especially with prolonged use.  The patient understands that monitoring of kidney function and blood counts may be required, especially at baseline. The patient verbalized understanding of the proper use and possible adverse effects of colchicine.  All of the patient's questions and concerns were addressed. Siliq Counseling:  I discussed with the patient the risks of Siliq including but not limited to new or worsening depression, suicidal thoughts and behavior, immunosuppression, malignancy, posterior leukoencephalopathy syndrome, and serious infections.  The patient understands that monitoring is required including a PPD at baseline and must alert us or the primary physician if symptoms of infection or other concerning signs are noted. There is also a special program designed to monitor depression which is required with Siliq. Dapsone Counseling: I discussed with the patient the risks of dapsone including but not limited to hemolytic anemia, agranulocytosis, rashes, methemoglobinemia, kidney failure, peripheral neuropathy, headaches, GI upset, and liver toxicity.  Patients who start dapsone require monitoring including baseline LFTs and weekly CBCs for the first month, then every month thereafter.  The patient verbalized understanding of the proper use and possible adverse effects of dapsone.  All of the patient's questions and concerns were addressed. Simponi Counseling:  I discussed with the patient the risks of golimumab including but not limited to myelosuppression, immunosuppression, autoimmune hepatitis, demyelinating diseases, lymphoma, and serious infections.  The patient understands that monitoring is required including a PPD at baseline and must alert us or the primary physician if symptoms of infection or other concerning signs are noted. Topical Clindamycin Counseling: Patient counseled that this medication may cause skin irritation or allergic reactions.  In the event of skin irritation, the patient was advised to reduce the amount of the drug applied or use it less frequently.   The patient verbalized understanding of the proper use and possible adverse effects of clindamycin.  All of the patient's questions and concerns were addressed. Imiquimod Counseling:  I discussed with the patient the risks of imiquimod including but not limited to erythema, scaling, itching, weeping, crusting, and pain.  Patient understands that the inflammatory response to imiquimod is variable from person to person and was educated regarded proper titration schedule.  If flu-like symptoms develop, patient knows to discontinue the medication and contact us. Cellcept Counseling:  I discussed with the patient the risks of mycophenolate mofetil including but not limited to infection/immunosuppression, GI upset, hypokalemia, hypercholesterolemia, bone marrow suppression, lymphoproliferative disorders, malignancy, GI ulceration/bleed/perforation, colitis, interstitial lung disease, kidney failure, progressive multifocal leukoencephalopathy, and birth defects.  The patient understands that monitoring is required including a baseline creatinine and regular CBC testing. In addition, patient must alert us immediately if symptoms of infection or other concerning signs are noted. Cimzia Counseling:  I discussed with the patient the risks of Cimzia including but not limited to immunosuppression, allergic reactions and infections.  The patient understands that monitoring is required including a PPD at baseline and must alert us or the primary physician if symptoms of infection or other concerning signs are noted. Cosentyx Counseling:  I discussed with the patient the risks of Cosentyx including but not limited to worsening of Crohn's disease, immunosuppression, allergic reactions and infections.  The patient understands that monitoring is required including a PPD at baseline and must alert us or the primary physician if symptoms of infection or other concerning signs are noted. Bactrim Counseling:  I discussed with the patient the risks of sulfa antibiotics including but not limited to GI upset, allergic reaction, drug rash, diarrhea, dizziness, photosensitivity, and yeast infections.  Rarely, more serious reactions can occur including but not limited to aplastic anemia, agranulocytosis, methemoglobinemia, blood dyscrasias, liver or kidney failure, lung infiltrates or desquamative/blistering drug rashes. Sarecycline Counseling: Patient advised regarding possible photosensitivity and discoloration of the teeth, skin, lips, tongue and gums.  Patient instructed to avoid sunlight, if possible.  When exposed to sunlight, patients should wear protective clothing, sunglasses, and sunscreen.  The patient was instructed to call the office immediately if the following severe adverse effects occur:  hearing changes, easy bruising/bleeding, severe headache, or vision changes.  The patient verbalized understanding of the proper use and possible adverse effects of sarecycline.  All of the patient's questions and concerns were addressed. Erivedge Counseling- I discussed with the patient the risks of Erivedge including but not limited to nausea, vomiting, diarrhea, constipation, weight loss, changes in the sense of taste, decreased appetite, muscle spasms, and hair loss.  The patient verbalized understanding of the proper use and possible adverse effects of Erivedge.  All of the patient's questions and concerns were addressed. Topical Sulfur Applications Counseling: Topical Sulfur Counseling: Patient counseled that this medication may cause skin irritation or allergic reactions.  In the event of skin irritation, the patient was advised to reduce the amount of the drug applied or use it less frequently.   The patient verbalized understanding of the proper use and possible adverse effects of topical sulfur application.  All of the patient's questions and concerns were addressed. Doxepin Counseling:  Patient advised that the medication is sedating and not to drive a car after taking this medication. Patient informed of potential adverse effects including but not limited to dry mouth, urinary retention, and blurry vision.  The patient verbalized understanding of the proper use and possible adverse effects of doxepin.  All of the patient's questions and concerns were addressed. Benzoyl Peroxide Counseling: Patient counseled that medicine may cause skin irritation and bleach clothing.  In the event of skin irritation, the patient was advised to reduce the amount of the drug applied or use it less frequently.   The patient verbalized understanding of the proper use and possible adverse effects of benzoyl peroxide.  All of the patient's questions and concerns were addressed. Bactrim Pregnancy And Lactation Text: This medication is Pregnancy Category D and is known to cause fetal risk.  It is also excreted in breast milk. Skyrizi Counseling: I discussed with the patient the risks of risankizumab-rzaa including but not limited to immunosuppression, and serious infections.  The patient understands that monitoring is required including a PPD at baseline and must alert us or the primary physician if symptoms of infection or other concerning signs are noted. Stelara Counseling:  I discussed with the patient the risks of ustekinumab including but not limited to immunosuppression, malignancy, posterior leukoencephalopathy syndrome, and serious infections.  The patient understands that monitoring is required including a PPD at baseline and must alert us or the primary physician if symptoms of infection or other concerning signs are noted. Finasteride Male Counseling: Finasteride Counseling:  I discussed with the patient the risks of use of finasteride including but not limited to decreased libido, decreased ejaculate volume, gynecomastia, and depression. Women should not handle medication.  All of the patient's questions and concerns were addressed. Dupixent Counseling: I discussed with the patient the risks of dupilumab including but not limited to eye infection and irritation, cold sores, injection site reactions, worsening of asthma, allergic reactions and increased risk of parasitic infection.  Live vaccines should be avoided while taking dupilumab. Dupilumab will also interact with certain medications such as warfarin and cyclosporine. The patient understands that monitoring is required and they must alert us or the primary physician if symptoms of infection or other concerning signs are noted. Tetracycline Counseling: Patient counseled regarding possible photosensitivity and increased risk for sunburn.  Patient instructed to avoid sunlight, if possible.  When exposed to sunlight, patients should wear protective clothing, sunglasses, and sunscreen.  The patient was instructed to call the office immediately if the following severe adverse effects occur:  hearing changes, easy bruising/bleeding, severe headache, or vision changes.  The patient verbalized understanding of the proper use and possible adverse effects of tetracycline.  All of the patient's questions and concerns were addressed. Patient understands to avoid pregnancy while on therapy due to potential birth defects. Birth Control Pills Counseling: Birth Control Pill Counseling: I discussed with the patient the potential side effects of OCPs including but not limited to increased risk of stroke, heart attack, thrombophlebitis, deep venous thrombosis, hepatic adenomas, breast changes, GI upset, headaches, and depression.  The patient verbalized understanding of the proper use and possible adverse effects of OCPs. All of the patient's questions and concerns were addressed. Mirvaso Counseling: Mirvaso is a topical medication which can decrease superficial blood flow where applied. Side effects are uncommon and include stinging, redness and allergic reactions. Hydroxyzine Counseling: Patient advised that the medication is sedating and not to drive a car after taking this medication.  Patient informed of potential adverse effects including but not limited to dry mouth, urinary retention, and blurry vision.  The patient verbalized understanding of the proper use and possible adverse effects of hydroxyzine.  All of the patient's questions and concerns were addressed. Cephalexin Pregnancy And Lactation Text: This medication is Pregnancy Category B and considered safe during pregnancy.  It is also excreted in breast milk but can be used safely for shorter doses. Spironolactone Counseling: Patient advised regarding risks of diarrhea, abdominal pain, hyperkalemia, birth defects (for female patients), liver toxicity and renal toxicity. The patient may need blood work to monitor liver and kidney function and potassium levels while on therapy. The patient verbalized understanding of the proper use and possible adverse effects of spironolactone.  All of the patient's questions and concerns were addressed. Methotrexate Counseling:  Patient counseled regarding adverse effects of methotrexate including but not limited to nausea, vomiting, abnormalities in liver function tests. Patients may develop mouth sores, rash, diarrhea, and abnormalities in blood counts. The patient understands that monitoring is required including LFT's and blood counts.  There is a rare possibility of scarring of the liver and lung problems that can occur when taking methotrexate. Persistent nausea, loss of appetite, pale stools, dark urine, cough, and shortness of breath should be reported immediately. Patient advised to discontinue methotrexate treatment at least three months before attempting to become pregnant.  I discussed the need for folate supplements while taking methotrexate.  These supplements can decrease side effects during methotrexate treatment. The patient verbalized understanding of the proper use and possible adverse effects of methotrexate.  All of the patient's questions and concerns were addressed. Fluconazole Counseling:  Patient counseled regarding adverse effects of fluconazole including but not limited to headache, diarrhea, nausea, upset stomach, liver function test abnormalities, taste disturbance, and stomach pain.  There is a rare possibility of liver failure that can occur when taking fluconazole.  The patient understands that monitoring of LFTs and kidney function test may be required, especially at baseline. The patient verbalized understanding of the proper use and possible adverse effects of fluconazole.  All of the patient's questions and concerns were addressed. Enbrel Counseling:  I discussed with the patient the risks of etanercept including but not limited to myelosuppression, immunosuppression, autoimmune hepatitis, demyelinating diseases, lymphoma, and infections.  The patient understands that monitoring is required including a PPD at baseline and must alert us or the primary physician if symptoms of infection or other concerning signs are noted. Taltz Counseling: I discussed with the patient the risks of ixekizumab including but not limited to immunosuppression, serious infections, worsening of inflammatory bowel disease and drug reactions.  The patient understands that monitoring is required including a PPD at baseline and must alert us or the primary physician if symptoms of infection or other concerning signs are noted. Zyclara Counseling:  I discussed with the patient the risks of imiquimod including but not limited to erythema, scaling, itching, weeping, crusting, and pain.  Patient understands that the inflammatory response to imiquimod is variable from person to person and was educated regarded proper titration schedule.  If flu-like symptoms develop, patient knows to discontinue the medication and contact us. Tremfya Counseling: I discussed with the patient the risks of guselkumab including but not limited to immunosuppression, serious infections, and drug reactions.  The patient understands that monitoring is required including a PPD at baseline and must alert us or the primary physician if symptoms of infection or other concerning signs are noted. Protopic Counseling: Patient may experience a mild burning sensation during topical application. Protopic is not approved in children less than 2 years of age. There have been case reports of hematologic and skin malignancies in patients using topical calcineurin inhibitors although causality is questionable. Libtayo Counseling- I discussed with the patient the risks of Libtayo including but not limited to nausea, vomiting, diarrhea, and bone or muscle pain.  The patient verbalized understanding of the proper use and possible adverse effects of Libtayo.  All of the patient's questions and concerns were addressed. Prednisone Counseling:  I discussed with the patient the risks of prolonged use of prednisone including but not limited to weight gain, insomnia, osteoporosis, mood changes, diabetes, susceptibility to infection, glaucoma and high blood pressure.  In cases where prednisone use is prolonged, patients should be monitored with blood pressure checks, serum glucose levels and an eye exam.  Additionally, the patient may need to be placed on GI prophylaxis, PCP prophylaxis, and calcium and vitamin D supplementation and/or a bisphosphonate.  The patient verbalized understanding of the proper use and the possible adverse effects of prednisone.  All of the patient's questions and concerns were addressed. Griseofulvin Counseling:  I discussed with the patient the risks of griseofulvin including but not limited to photosensitivity, cytopenia, liver damage, nausea/vomiting and severe allergy.  The patient understands that this medication is best absorbed when taken with a fatty meal (e.g., ice cream or french fries). Humira Counseling:  I discussed with the patient the risks of adalimumab including but not limited to myelosuppression, immunosuppression, autoimmune hepatitis, demyelinating diseases, lymphoma, and serious infections.  The patient understands that monitoring is required including a PPD at baseline and must alert us or the primary physician if symptoms of infection or other concerning signs are noted. Hydroxychloroquine Counseling:  I discussed with the patient that a baseline ophthalmologic exam is needed at the start of therapy and every year thereafter while on therapy. A CBC may also be warranted for monitoring.  The side effects of this medication were discussed with the patient, including but not limited to agranulocytosis, aplastic anemia, seizures, rashes, retinopathy, and liver toxicity. Patient instructed to call the office should any adverse effect occur.  The patient verbalized understanding of the proper use and possible adverse effects of Plaquenil.  All the patient's questions and concerns were addressed. Doxycycline Counseling:  Patient counseled regarding possible photosensitivity and increased risk for sunburn.  Patient instructed to avoid sunlight, if possible.  When exposed to sunlight, patients should wear protective clothing, sunglasses, and sunscreen.  The patient was instructed to call the office immediately if the following severe adverse effects occur:  hearing changes, easy bruising/bleeding, severe headache, or vision changes.  The patient verbalized understanding of the proper use and possible adverse effects of doxycycline.  All of the patient's questions and concerns were addressed. Ilumya Counseling: I discussed with the patient the risks of tildrakizumab including but not limited to immunosuppression, malignancy, posterior leukoencephalopathy syndrome, and serious infections.  The patient understands that monitoring is required including a PPD at baseline and must alert us or the primary physician if symptoms of infection or other concerning signs are noted. Acitretin Counseling:  I discussed with the patient the risks of acitretin including but not limited to hair loss, dry lips/skin/eyes, liver damage, hyperlipidemia, depression/suicidal ideation, photosensitivity.  Serious rare side effects can include but are not limited to pancreatitis, pseudotumor cerebri, bony changes, clot formation/stroke/heart attack.  Patient understands that alcohol is contraindicated since it can result in liver toxicity and significantly prolong the elimination of the drug by many years.

## 2021-10-25 NOTE — DIETITIAN INITIAL EVALUATION ADULT. - DIET TYPE

## 2022-01-02 ENCOUNTER — INPATIENT (INPATIENT)
Facility: HOSPITAL | Age: 87
LOS: 5 days | Discharge: HOME | DRG: 246 | End: 2022-01-08
Attending: STUDENT IN AN ORGANIZED HEALTH CARE EDUCATION/TRAINING PROGRAM | Admitting: SPECIALIST
Payer: MEDICARE

## 2022-01-02 VITALS
SYSTOLIC BLOOD PRESSURE: 157 MMHG | DIASTOLIC BLOOD PRESSURE: 76 MMHG | TEMPERATURE: 99 F | RESPIRATION RATE: 18 BRPM | HEIGHT: 67 IN | HEART RATE: 94 BPM | WEIGHT: 149.91 LBS | OXYGEN SATURATION: 100 %

## 2022-01-02 DIAGNOSIS — I48.20 CHRONIC ATRIAL FIBRILLATION, UNSPECIFIED: ICD-10-CM

## 2022-01-02 DIAGNOSIS — I21.3 ST ELEVATION (STEMI) MYOCARDIAL INFARCTION OF UNSPECIFIED SITE: ICD-10-CM

## 2022-01-02 DIAGNOSIS — Z90.89 ACQUIRED ABSENCE OF OTHER ORGANS: Chronic | ICD-10-CM

## 2022-01-02 DIAGNOSIS — Z95.0 PRESENCE OF CARDIAC PACEMAKER: Chronic | ICD-10-CM

## 2022-01-02 DIAGNOSIS — I25.10 ATHEROSCLEROTIC HEART DISEASE OF NATIVE CORONARY ARTERY WITHOUT ANGINA PECTORIS: ICD-10-CM

## 2022-01-02 LAB
A1C WITH ESTIMATED AVERAGE GLUCOSE RESULT: 5 % — SIGNIFICANT CHANGE UP (ref 4–5.6)
ALBUMIN SERPL ELPH-MCNC: 3.6 G/DL — SIGNIFICANT CHANGE UP (ref 3.3–5.2)
ALP SERPL-CCNC: 86 U/L — SIGNIFICANT CHANGE UP (ref 40–120)
ALT FLD-CCNC: 16 U/L — SIGNIFICANT CHANGE UP
ANION GAP SERPL CALC-SCNC: 18 MMOL/L — HIGH (ref 5–17)
APTT BLD: 29.9 SEC — SIGNIFICANT CHANGE UP (ref 27.5–35.5)
APTT BLD: 33.2 SEC — SIGNIFICANT CHANGE UP (ref 27.5–35.5)
AST SERPL-CCNC: 41 U/L — HIGH
BASOPHILS # BLD AUTO: 0.03 K/UL — SIGNIFICANT CHANGE UP (ref 0–0.2)
BASOPHILS # BLD AUTO: 0.04 K/UL — SIGNIFICANT CHANGE UP (ref 0–0.2)
BASOPHILS NFR BLD AUTO: 0.3 % — SIGNIFICANT CHANGE UP (ref 0–2)
BASOPHILS NFR BLD AUTO: 0.3 % — SIGNIFICANT CHANGE UP (ref 0–2)
BILIRUB SERPL-MCNC: 0.9 MG/DL — SIGNIFICANT CHANGE UP (ref 0.4–2)
BLD GP AB SCN SERPL QL: SIGNIFICANT CHANGE UP
BUN SERPL-MCNC: 33.5 MG/DL — HIGH (ref 8–20)
CALCIUM SERPL-MCNC: 9.1 MG/DL — SIGNIFICANT CHANGE UP (ref 8.6–10.2)
CHLORIDE SERPL-SCNC: 105 MMOL/L — SIGNIFICANT CHANGE UP (ref 98–107)
CK MB CFR SERPL CALC: 152.9 NG/ML — HIGH (ref 0–6.7)
CK SERPL-CCNC: 124 U/L — SIGNIFICANT CHANGE UP (ref 30–200)
CK SERPL-CCNC: 1458 U/L — HIGH (ref 30–200)
CO2 SERPL-SCNC: 17 MMOL/L — LOW (ref 22–29)
CREAT SERPL-MCNC: 1.18 MG/DL — SIGNIFICANT CHANGE UP (ref 0.5–1.3)
EOSINOPHIL # BLD AUTO: 0.03 K/UL — SIGNIFICANT CHANGE UP (ref 0–0.5)
EOSINOPHIL # BLD AUTO: 0.06 K/UL — SIGNIFICANT CHANGE UP (ref 0–0.5)
EOSINOPHIL NFR BLD AUTO: 0.3 % — SIGNIFICANT CHANGE UP (ref 0–6)
EOSINOPHIL NFR BLD AUTO: 0.5 % — SIGNIFICANT CHANGE UP (ref 0–6)
ESTIMATED AVERAGE GLUCOSE: 97 MG/DL — SIGNIFICANT CHANGE UP (ref 68–114)
FLUAV AG NPH QL: SIGNIFICANT CHANGE UP
FLUBV AG NPH QL: SIGNIFICANT CHANGE UP
GLUCOSE SERPL-MCNC: 206 MG/DL — HIGH (ref 70–99)
HCT VFR BLD CALC: 34.9 % — LOW (ref 39–50)
HCT VFR BLD CALC: 38 % — LOW (ref 39–50)
HGB BLD-MCNC: 11.4 G/DL — LOW (ref 13–17)
HGB BLD-MCNC: 12.6 G/DL — LOW (ref 13–17)
IMM GRANULOCYTES NFR BLD AUTO: 0.8 % — SIGNIFICANT CHANGE UP (ref 0–1.5)
IMM GRANULOCYTES NFR BLD AUTO: 0.9 % — SIGNIFICANT CHANGE UP (ref 0–1.5)
INR BLD: 1.37 RATIO — HIGH (ref 0.88–1.16)
INR BLD: 1.7 RATIO — HIGH (ref 0.88–1.16)
LYMPHOCYTES # BLD AUTO: 0.74 K/UL — LOW (ref 1–3.3)
LYMPHOCYTES # BLD AUTO: 0.75 K/UL — LOW (ref 1–3.3)
LYMPHOCYTES # BLD AUTO: 6 % — LOW (ref 13–44)
LYMPHOCYTES # BLD AUTO: 7.8 % — LOW (ref 13–44)
MCHC RBC-ENTMCNC: 30.1 PG — SIGNIFICANT CHANGE UP (ref 27–34)
MCHC RBC-ENTMCNC: 30.4 PG — SIGNIFICANT CHANGE UP (ref 27–34)
MCHC RBC-ENTMCNC: 32.7 GM/DL — SIGNIFICANT CHANGE UP (ref 32–36)
MCHC RBC-ENTMCNC: 33.2 GM/DL — SIGNIFICANT CHANGE UP (ref 32–36)
MCV RBC AUTO: 91.8 FL — SIGNIFICANT CHANGE UP (ref 80–100)
MCV RBC AUTO: 92.1 FL — SIGNIFICANT CHANGE UP (ref 80–100)
MONOCYTES # BLD AUTO: 1.05 K/UL — HIGH (ref 0–0.9)
MONOCYTES # BLD AUTO: 1.14 K/UL — HIGH (ref 0–0.9)
MONOCYTES NFR BLD AUTO: 11.8 % — SIGNIFICANT CHANGE UP (ref 2–14)
MONOCYTES NFR BLD AUTO: 8.5 % — SIGNIFICANT CHANGE UP (ref 2–14)
NEUTROPHILS # BLD AUTO: 10.42 K/UL — HIGH (ref 1.8–7.4)
NEUTROPHILS # BLD AUTO: 7.64 K/UL — HIGH (ref 1.8–7.4)
NEUTROPHILS NFR BLD AUTO: 79 % — HIGH (ref 43–77)
NEUTROPHILS NFR BLD AUTO: 83.8 % — HIGH (ref 43–77)
NT-PROBNP SERPL-SCNC: 8257 PG/ML — HIGH (ref 0–300)
PA ADP PRP-ACNC: 157 PRU — LOW (ref 180–376)
PLATELET # BLD AUTO: 218 K/UL — SIGNIFICANT CHANGE UP (ref 150–400)
PLATELET # BLD AUTO: 280 K/UL — SIGNIFICANT CHANGE UP (ref 150–400)
POTASSIUM SERPL-MCNC: 4.3 MMOL/L — SIGNIFICANT CHANGE UP (ref 3.5–5.3)
POTASSIUM SERPL-SCNC: 4.3 MMOL/L — SIGNIFICANT CHANGE UP (ref 3.5–5.3)
PREALB SERPL-MCNC: 16 MG/DL — LOW (ref 18–38)
PROT SERPL-MCNC: 6.7 G/DL — SIGNIFICANT CHANGE UP (ref 6.6–8.7)
PROTHROM AB SERPL-ACNC: 15.7 SEC — HIGH (ref 10.6–13.6)
PROTHROM AB SERPL-ACNC: 19.2 SEC — HIGH (ref 10.6–13.6)
RBC # BLD: 3.79 M/UL — LOW (ref 4.2–5.8)
RBC # BLD: 4.14 M/UL — LOW (ref 4.2–5.8)
RBC # FLD: 13.3 % — SIGNIFICANT CHANGE UP (ref 10.3–14.5)
RBC # FLD: 13.6 % — SIGNIFICANT CHANGE UP (ref 10.3–14.5)
RSV RNA NPH QL NAA+NON-PROBE: SIGNIFICANT CHANGE UP
SARS-COV-2 RNA SPEC QL NAA+PROBE: SIGNIFICANT CHANGE UP
SODIUM SERPL-SCNC: 140 MMOL/L — SIGNIFICANT CHANGE UP (ref 135–145)
TROPONIN T SERPL-MCNC: 0.43 NG/ML — HIGH (ref 0–0.06)
TROPONIN T SERPL-MCNC: 2.95 NG/ML — HIGH (ref 0–0.06)
TROPONIN T SERPL-MCNC: 6.95 NG/ML — HIGH (ref 0–0.06)
TSH SERPL-MCNC: 0.15 UIU/ML — LOW (ref 0.27–4.2)
WBC # BLD: 12.42 K/UL — HIGH (ref 3.8–10.5)
WBC # BLD: 9.67 K/UL — SIGNIFICANT CHANGE UP (ref 3.8–10.5)
WBC # FLD AUTO: 12.42 K/UL — HIGH (ref 3.8–10.5)
WBC # FLD AUTO: 9.67 K/UL — SIGNIFICANT CHANGE UP (ref 3.8–10.5)

## 2022-01-02 PROCEDURE — 99222 1ST HOSP IP/OBS MODERATE 55: CPT

## 2022-01-02 PROCEDURE — 71045 X-RAY EXAM CHEST 1 VIEW: CPT | Mod: 26

## 2022-01-02 PROCEDURE — 99285 EMERGENCY DEPT VISIT HI MDM: CPT

## 2022-01-02 PROCEDURE — 93880 EXTRACRANIAL BILAT STUDY: CPT | Mod: 26

## 2022-01-02 PROCEDURE — 93010 ELECTROCARDIOGRAM REPORT: CPT

## 2022-01-02 RX ORDER — NITROGLYCERIN 6.5 MG
0.4 CAPSULE, EXTENDED RELEASE ORAL ONCE
Refills: 0 | Status: COMPLETED | OUTPATIENT
Start: 2022-01-02 | End: 2022-01-02

## 2022-01-02 RX ORDER — CLOPIDOGREL BISULFATE 75 MG/1
75 TABLET, FILM COATED ORAL DAILY
Refills: 0 | Status: DISCONTINUED | OUTPATIENT
Start: 2022-01-03 | End: 2022-01-03

## 2022-01-02 RX ORDER — PRASUGREL 5 MG/1
1 TABLET, FILM COATED ORAL
Qty: 0 | Refills: 0 | DISCHARGE

## 2022-01-02 RX ORDER — APIXABAN 2.5 MG/1
5 TABLET, FILM COATED ORAL
Refills: 0 | Status: DISCONTINUED | OUTPATIENT
Start: 2022-01-02 | End: 2022-01-03

## 2022-01-02 RX ORDER — ASPIRIN/CALCIUM CARB/MAGNESIUM 324 MG
81 TABLET ORAL DAILY
Refills: 0 | Status: DISCONTINUED | OUTPATIENT
Start: 2022-01-03 | End: 2022-01-08

## 2022-01-02 RX ORDER — PANTOPRAZOLE SODIUM 20 MG/1
40 TABLET, DELAYED RELEASE ORAL
Refills: 0 | Status: DISCONTINUED | OUTPATIENT
Start: 2022-01-02 | End: 2022-01-08

## 2022-01-02 RX ORDER — SIMVASTATIN 20 MG/1
40 TABLET, FILM COATED ORAL AT BEDTIME
Refills: 0 | Status: DISCONTINUED | OUTPATIENT
Start: 2022-01-02 | End: 2022-01-08

## 2022-01-02 RX ORDER — METOPROLOL TARTRATE 50 MG
50 TABLET ORAL DAILY
Refills: 0 | Status: DISCONTINUED | OUTPATIENT
Start: 2022-01-02 | End: 2022-01-08

## 2022-01-02 RX ORDER — CLOPIDOGREL BISULFATE 75 MG/1
600 TABLET, FILM COATED ORAL ONCE
Refills: 0 | Status: COMPLETED | OUTPATIENT
Start: 2022-01-02 | End: 2022-01-02

## 2022-01-02 RX ORDER — ASPIRIN/CALCIUM CARB/MAGNESIUM 324 MG
1 TABLET ORAL
Qty: 0 | Refills: 0 | DISCHARGE

## 2022-01-02 RX ORDER — SODIUM CHLORIDE 9 MG/ML
3 INJECTION INTRAMUSCULAR; INTRAVENOUS; SUBCUTANEOUS EVERY 8 HOURS
Refills: 0 | Status: DISCONTINUED | OUTPATIENT
Start: 2022-01-02 | End: 2022-01-08

## 2022-01-02 RX ORDER — INFLUENZA VIRUS VACCINE 15; 15; 15; 15 UG/.5ML; UG/.5ML; UG/.5ML; UG/.5ML
0.7 SUSPENSION INTRAMUSCULAR ONCE
Refills: 0 | Status: DISCONTINUED | OUTPATIENT
Start: 2022-01-02 | End: 2022-01-08

## 2022-01-02 RX ADMIN — Medication 0.4 MILLIGRAM(S): at 11:14

## 2022-01-02 RX ADMIN — SIMVASTATIN 40 MILLIGRAM(S): 20 TABLET, FILM COATED ORAL at 21:57

## 2022-01-02 RX ADMIN — CLOPIDOGREL BISULFATE 600 MILLIGRAM(S): 75 TABLET, FILM COATED ORAL at 11:14

## 2022-01-02 RX ADMIN — PANTOPRAZOLE SODIUM 40 MILLIGRAM(S): 20 TABLET, DELAYED RELEASE ORAL at 19:01

## 2022-01-02 RX ADMIN — SODIUM CHLORIDE 3 MILLILITER(S): 9 INJECTION INTRAMUSCULAR; INTRAVENOUS; SUBCUTANEOUS at 21:54

## 2022-01-02 RX ADMIN — APIXABAN 5 MILLIGRAM(S): 2.5 TABLET, FILM COATED ORAL at 21:57

## 2022-01-02 NOTE — H&P ADULT - NSICDXPASTSURGICALHX_GEN_ALL_CORE_FT
PAST SURGICAL HISTORY:  Absent tonsil     History of hemorrhoidectomy     History of inguinal hernia repair     Pacemaker

## 2022-01-02 NOTE — CONSULT NOTE ADULT - SUBJECTIVE AND OBJECTIVE BOX
[facility], [facility]                                                    [address], [address], [place] [address]                                                         PHONE: [phone]    FAX: [phone] [address], [address], [place] [address]                                                 PHONE: [phone]    FAX: [phone]  *******************************************************************************    Reason for Consult: Chest pain    HPI:  TATIANA RIBERA is a 87y Male    PAST MEDICAL & SURGICAL HISTORY:  HTN (hypertension)    Hyperlipidemia    CAD (coronary artery disease)    Stented coronary artery    MI (myocardial infarction)  1988    Other persistent atrial fibrillation    History of hemorrhoidectomy    History of inguinal hernia repair    Absent tonsil    Pacemaker        Lipitor (Muscle Pain)  Plavix (Other)      MEDICATIONS  (STANDING):  metoprolol succinate ER 50 milliGRAM(s) Oral daily  simvastatin 40 milliGRAM(s) Oral at bedtime  sodium chloride 0.9% lock flush 3 milliLiter(s) IV Push every 8 hours    MEDICATIONS  (PRN):      Social History: no active tobacco / EtOH / IVDA    Family History: No pertinent family history in first degree relatives        ROS: As noted above, otherwise unremarkable.    Vital Signs Last 24 Hrs  T(C): 36.7 (02 Jan 2022 13:30), Max: 37.1 (02 Jan 2022 10:43)  T(F): 98 (02 Jan 2022 13:30), Max: 98.8 (02 Jan 2022 10:43)  HR: 78 (02 Jan 2022 15:00) (78 - 98)  BP: 107/51 (02 Jan 2022 15:00) (107/51 - 168/74)  BP(mean): 68 (02 Jan 2022 15:00) (68 - 90)  RR: 22 (02 Jan 2022 15:00) (11 - 22)  SpO2: 99% (02 Jan 2022 15:00) (99% - 100%)    I&O's Detail    02 Jan 2022 07:01  -  02 Jan 2022 15:25  --------------------------------------------------------  IN:    Oral Fluid: 120 mL  Total IN: 120 mL    OUT:    Voided (mL): 200 mL  Total OUT: 200 mL    Total NET: -80 mL        I&O's Summary    02 Jan 2022 07:01  -  02 Jan 2022 15:25  --------------------------------------------------------  IN: 120 mL / OUT: 200 mL / NET: -80 mL            PHYSICAL EXAM:  General: Appears well developed, well nourished, no acute distress  HEENT: Head: normocephalic, atraumatic  Eyes: Pupils equal and reactive  Neck: Supple, no carotid bruit, no JVD, no HJR  CARDIOVASCULAR: Normal S1 and S2, no murmur, rub, or gallop  LUNGS: Clear to auscultation bilaterally, no rales, rhonchi or wheeze  ABDOMEN: Soft, nontender, non-distended, positive bowel sounds, no mass or bruit  EXTREMITIES: No edema, distal pulses WNL  SKIN: Warm and dry with normal turgor  NEURO: Alert & oriented x 3, grossly intact  PSYCH: normal mood and affect    LABS:                        12.6   12.42 )-----------( 280      ( 02 Jan 2022 10:59 )             38.0     01-02    140  |  105  |  33.5<H>  ----------------------------<  206<H>  4.3   |  17.0<L>  |  1.18    Ca    9.1      02 Jan 2022 10:59    TPro  6.7  /  Alb  3.6  /  TBili  0.9  /  DBili  x   /  AST  41<H>  /  ALT  16  /  AlkPhos  86  01-02    CARDIAC MARKERS ( 02 Jan 2022 14:52 )  x     / 2.95 ng/mL / x     / x     / x      CARDIAC MARKERS ( 02 Jan 2022 10:59 )  x     / 0.43 ng/mL / 124 U/L / x     / x          PT/INR - ( 02 Jan 2022 10:59 )   PT: 15.7 sec;   INR: 1.37 ratio         PTT - ( 02 Jan 2022 10:59 )  PTT:29.9 sec    RADIOLOGY & ADDITIONAL STUDIES:    ECG: from er IWMI    ECHO:    STRESS TEST:    CARDIAC CATHETERIZATION:    Assessment and Plan:  In summary, TATIANA RIBERA is a 87y Male with past medical history significant for severe CAD (multiple stents), HL, HTN, CAF p/w CP - STEMI IWMI  s/p cath radial approach atherectomy BEVERLY to RCA known instent restenosis of LAD  - cont current meds  - Eliquis to be started tonight as recommended  - cont additional meds  - next step pending clinical sx  - Monitor on telemetry      We will follow with you.  Thank you for allowing me to participate in the care of your patient.      Sincerely,    Senthil Bond, DO

## 2022-01-02 NOTE — ED PROVIDER NOTE - CLINICAL SUMMARY MEDICAL DECISION MAKING FREE TEXT BOX
88yo M w/pmh CAD s/p stents x11, on Eliquis, HLD presents for STEMI. Code STEMI activated on arrival. Plavix 600 ordered per cardio. To cath lab.

## 2022-01-02 NOTE — ED PROVIDER NOTE - NSICDXPASTSURGICALHX_GEN_ALL_CORE_FT
PAST SURGICAL HISTORY:  Absent tonsil     History of hemorrhoidectomy     History of inguinal hernia repair

## 2022-01-02 NOTE — PROGRESS NOTE ADULT - SUBJECTIVE AND OBJECTIVE BOX
Patient is a 87y old  Male who presents with a chief complaint of Chest Discomfort (02 Jan 2022 15:25)      BRIEF HOSPITAL COURSE:   Patient is a 87 year old male with a pmhx of HTN, Hld, Ashd (stents x 14), PPM, atrial fib  and RA who presents with chest discomfort.  Patient has been having intermittent chest pain for several day. He was hospitalized at [facility] from 12/28-12/30 and observed and discharged home chest pain free.  Then on 12/31 patient had >2 hours of chest discomfort and then it went away.  This am he began to experience chest pain and presented to er after.  In ER  he received plavix and nitro and pain Improved.  Troponin elevated.  Code stemi had been activated and he underwent a cardiac cath.  Now s/p atherectomy and stent to RCA with known LAD in stent restenosis. Consulted for CABG evaluation.          PAST MEDICAL & SURGICAL HISTORY:  HTN (hypertension)    Hyperlipidemia    CAD (coronary artery disease)    Stented coronary artery    MI (myocardial infarction)  1988    Other persistent atrial fibrillation    History of hemorrhoidectomy    History of inguinal hernia repair    Absent tonsil    Pacemaker      Allergies    Plavix (Other)    Intolerances    Lipitor (Muscle Pain)    FAMILY HISTORY:  No pertinent family history in first degree relatives    Social:  Denies tobacco, alcohol use     Review of Systems:  CONSTITUTIONAL: No fever, chills, or fatigue  EYES: No eye pain, visual disturbances, or discharge  ENMT:  No difficulty hearing, tinnitus, vertigo; No sinus or throat pain  NECK: No pain or stiffness  RESPIRATORY: No cough, wheezing, chills or hemoptysis; No shortness of breath  CARDIOVASCULAR: No chest pain, palpitations, dizziness, or leg swelling  GASTROINTESTINAL: No abdominal or epigastric pain. No nausea, vomiting, or hematemesis; No diarrhea or constipation. No melena or hematochezia.  GENITOURINARY: No dysuria, frequency, hematuria, or incontinence  NEUROLOGICAL: No headaches, memory loss, loss of strength, numbness, or tremors  SKIN: No itching, burning, rashes, or lesions   MUSCULOSKELETAL: No joint pain or swelling; No muscle, back, or extremity pain  PSYCHIATRIC: No depression, anxiety, mood swings, or difficulty sleeping      Medications:    metoprolol succinate ER 50 milliGRAM(s) Oral daily          apixaban 5 milliGRAM(s) Oral two times a day    pantoprazole    Tablet 40 milliGRAM(s) Oral before breakfast      simvastatin 40 milliGRAM(s) Oral at bedtime    sodium chloride 0.9% lock flush 3 milliLiter(s) IV Push every 8 hours    influenza  Vaccine (HIGH DOSE) 0.7 milliLiter(s) IntraMuscular once              ICU Vital Signs Last 24 Hrs  T(C): 36.7 (02 Jan 2022 13:30), Max: 37.1 (02 Jan 2022 10:43)  T(F): 98 (02 Jan 2022 13:30), Max: 98.8 (02 Jan 2022 10:43)  HR: 78 (02 Jan 2022 18:15) (75 - 98)  BP: 100/61 (02 Jan 2022 18:15) (89/46 - 168/74)  BP(mean): 73 (02 Jan 2022 18:15) (59 - 90)  ABP: --  ABP(mean): --  RR: 27 (02 Jan 2022 18:15) (11 - 27)  SpO2: 100% (02 Jan 2022 18:15) (96% - 100%)    Vital Signs Last 24 Hrs  T(C): 36.7 (02 Jan 2022 13:30), Max: 37.1 (02 Jan 2022 10:43)  T(F): 98 (02 Jan 2022 13:30), Max: 98.8 (02 Jan 2022 10:43)  HR: 78 (02 Jan 2022 18:15) (75 - 98)  BP: 100/61 (02 Jan 2022 18:15) (89/46 - 168/74)  BP(mean): 73 (02 Jan 2022 18:15) (59 - 90)  RR: 27 (02 Jan 2022 18:15) (11 - 27)  SpO2: 100% (02 Jan 2022 18:15) (96% - 100%)        I&O's Detail    02 Jan 2022 07:01  -  02 Jan 2022 19:28  --------------------------------------------------------  IN:    Oral Fluid: 360 mL  Total IN: 360 mL    OUT:    Voided (mL): 400 mL  Total OUT: 400 mL    Total NET: -40 mL            LABS:                        12.6   12.42 )-----------( 280      ( 02 Jan 2022 10:59 )             38.0     01-02    140  |  105  |  33.5<H>  ----------------------------<  206<H>  4.3   |  17.0<L>  |  1.18    Ca    9.1      02 Jan 2022 10:59    TPro  6.7  /  Alb  3.6  /  TBili  0.9  /  DBili  x   /  AST  41<H>  /  ALT  16  /  AlkPhos  86  01-02      CARDIAC MARKERS ( 02 Jan 2022 14:52 )  x     / 2.95 ng/mL / 1458 U/L / x     / 152.9 ng/mL  CARDIAC MARKERS ( 02 Jan 2022 10:59 )  x     / 0.43 ng/mL / 124 U/L / x     / x          CAPILLARY BLOOD GLUCOSE        PT/INR - ( 02 Jan 2022 10:59 )   PT: 15.7 sec;   INR: 1.37 ratio         PTT - ( 02 Jan 2022 10:59 )  PTT:29.9 sec    CULTURES:      Physical Examination:    General: Elderly male in bed, NAD     HEENT: Pupils equal, reactive to light.  Symmetric.    PULM: b/l air entry     CVS: +s1/s2    ABD: Soft, nondistended, nontender, normoactive bowel sounds, no masses    EXT: No edema, nontender    SKIN: Warm and well perfused    Neuro: awake alert and oriented x 3, follows commands     RADIOLOGY:

## 2022-01-02 NOTE — ED ADULT TRIAGE NOTE - CHIEF COMPLAINT QUOTE
Pt states having intermittent chest pain and back pain with left arm radiation since last week and states its been worsening. Pt arrives as CODE STEMI. Pt received 324 ASA PTA via EMS.

## 2022-01-02 NOTE — PATIENT PROFILE ADULT - FALL HARM RISK - HARM RISK INTERVENTIONS

## 2022-01-02 NOTE — ED PROVIDER NOTE - TOBACCO USE
Never smoker Bcc Infiltrative Histology Text: There were numerous aggregates of basaloid cells demonstrating an infiltrative pattern.

## 2022-01-02 NOTE — ED PROVIDER NOTE - NS ED ROS FT
Constitutional: no fever, no sweats, and no chills.  CV: +chest pain, no edema.  Resp: no cough, no dyspnea  GI: no abdominal pain, no nausea and no vomiting.  MSK: no msk pain, no weakness  Skin: no lesions, and no rashes.  Neuro: no LOC, no headache, no sensory deficits, and no dizziness  ROS otherwise negative except as noted in HPI.

## 2022-01-02 NOTE — H&P ADULT - NSICDXPASTMEDICALHX_GEN_ALL_CORE_FT
PAST MEDICAL HISTORY:  CAD (coronary artery disease)     HTN (hypertension)     Hyperlipidemia     MI (myocardial infarction) 1988    Other persistent atrial fibrillation     Stented coronary artery

## 2022-01-02 NOTE — CONSULT NOTE ADULT - SUBJECTIVE AND OBJECTIVE BOX
Surgeon: Terri    Consult requesting by: Felipe    HISTORY OF PRESENT ILLNESS:  87y Male with PMH of HTN, Hld, Ashd (stents x 14), atrial fib  and RA who presents with chest discomfort.  Patient has been having intermittent chest pain for several day. He was hospitalized at [facility] from 12/28-12/30 and observed and discharged home chest pain free .  Then on 12/31 patient had >2 hours of chest discomfort and then it went away.  This am he began to experience chest pain and presented to er after.  In ER  he received plavix and nitro and pain Improved.  Code stemi had been activated and he underwent a cardiac cath.  Now s/p athrectomy and stent to rca > report not yet available.  Consulted for CABG evaluation.        PAST MEDICAL & SURGICAL HISTORY:  HTN (hypertension)    Hyperlipidemia    CAD (coronary artery disease)    Stented coronary artery    MI (myocardial infarction)  1988    History of hemorrhoidectomy    History of inguinal hernia repair    Absent tonsil    Pacemaker        MEDICATIONS  (STANDING):    MEDICATIONS  (PRN):    Antiplatelet therapy:                           Last dose/amt:    Allergies    Plavix (Other)    Intolerances    Lipitor (Muscle Pain)      SOCIAL HISTORY:  Smoker: [ ] Yes  [ ] No        PACK YEARS:                         WHEN QUIT?  ETOH use: [ ] Yes  [ ] No              FREQUENCY / QUANTITY:  Ilicit Drug use:  [ ] Yes  [ ] No  Occupation:  Live with:  Assisted device use:    FAMILY HISTORY:  No pertinent family history in first degree relatives        Review of Systems  CONSTITUTIONAL:  Fevers[ ] chills[ ] sweats[ ] fatigue[ ] weight loss[ ] weight gain [ ]                                     NEGATIVE [ ]   NEURO:  parathesias[ ] seizures [ ]  syncope [ ]  confusion [ ]                                                                                NEGATIVE[ ]   EYES: glasses[ ]  blurry vision[ ]  discharge[ ] pain[ ] glaucoma [ ]                                                                          NEGATIVE[ ]   ENMT:  difficulty hearing [ ]  vertigo[ ]  dysphagia[ ] epistaxis[ ] recent dental work [ ]                                    NEGATIVE[ ]   CV:  chest pain[ ] palpitations[ ] GOODMAN [ ] diaphoresis [ ]                                                                                           NEGATIVE[ ]   RESPIRATORY:  wheezing[ ] SOB[ ] cough [ ] sputum[ ] hemoptysis[ ]                                                                  NEGATIVE[ ]   GI:  nausea[ ]  vommiting [ ]  diarrhea[ ] constipation [ ] melena [ ]                                                                      NEGATIVE[ ]   : hematuria[ ]  dysuria[ ] urgency[ ] incontinence[ ]                                                                                            NEGATIVE[ ]   MUSKULOSKELETAL:  arthritis[ ]  joint swelling [ ] muscle weakness [ ]                                                                NEGATIVE[ ]   SKIN/BREAST:  rash[ ] itching [ ]  hair loss[ ] masses[ ]                                                                                              NEGATIVE[ ]   PSYCH:  dementia [ ] depresion [ ] anxiety[ ]                                                                                                               NEGATIVE[ ]   HEME/LYMPH:  bruises easily[ ] enlarged lymph nodes[ ] tender lymph nodes[ ]                                               NEGATIVE[ ]   ENDOCRINE:  cold intolerance[ ] heat intolerance[ ] polydipsia[ ]                                                                          NEGATIVE[ ]     PHYSICAL EXAM  Vital Signs Last 24 Hrs  T(C): 36.9 (02 Jan 2022 11:07), Max: 37.1 (02 Jan 2022 10:43)  T(F): 98.4 (02 Jan 2022 11:07), Max: 98.8 (02 Jan 2022 10:43)  HR: 87 (02 Jan 2022 11:10) (87 - 94)  BP: 168/74 (02 Jan 2022 11:10) (157/76 - 168/74)  BP(mean): --  RR: 18 (02 Jan 2022 11:10) (18 - 18)  SpO2: 100% (02 Jan 2022 11:10) (100% - 100%)    CONSTITUTIONAL:                                                                          WNL[ ]   Neuro: WNL[ ] Normal exam oriented to person/place & time with no focal motor or sensory  deficits. Other                     Eyes: WNL[ ]   Normal exam of conjunctiva & lids, pupils equally reactive. Other     ENT: WNL[ ]    Normal exam of nasal/oral mucosa with absence of cyanosis. Other  Neck: WNL[ ]  Normal exam of jugular veins, trachea & thyroid. Other  Chest: WNL[ ] Normal lung exam with good air movement absence of wheezes, rales, or rhonchi: Other                                                                                CV:  Auscultation: normal [ ] S3[ ] S4[ ] Irregular [ ] Rub[ ] Clicks[ ]    Murmurs none:[ ]systolic [ ]  diastolic [ ] holosystolic [ ]  Carotids: No Bruits[ ] Other                 Abdominal Aorta: normal [ ] nonpalpable[ ]Other                                                                                      GI:           WNL[ ] Normal exam of abdomen, liver & spleen with no noted masses or tenderness. Other                                                                                                        Extremities: WNL[ ] Normal no evidence of cyanosis or deformity Edema: none[ ]trace[ ]1+[ ]2+[ ]3+[ ]4+[ ]  Lower Extremity Pulses: Right[ ] Left[ ]Varicosities[ ]  SKIN :WNL[ ] Normal exam to inspection & palation. Other:                                                          LABS:                        12.6   12.42 )-----------( 280      ( 02 Jan 2022 10:59 )             38.0     01-02    140  |  105  |  33.5<H>  ----------------------------<  206<H>  4.3   |  17.0<L>  |  1.18    Ca    9.1      02 Jan 2022 10:59    TPro  6.7  /  Alb  3.6  /  TBili  0.9  /  DBili  x   /  AST  41<H>  /  ALT  16  /  AlkPhos  86  01-02    PT/INR - ( 02 Jan 2022 10:59 )   PT: 15.7 sec;   INR: 1.37 ratio         PTT - ( 02 Jan 2022 10:59 )  PTT:29.9 sec          Cardiac Cath: no report avail.    TTE / LAM: NA                         Surgeon: Terri    Consult requesting by: Felipe    HISTORY OF PRESENT ILLNESS:  87y Male with PMH of HTN, Hld, Ashd (stents x 14), PPM, atrial fib  and RA who presents with chest discomfort.  Patient has been having intermittent chest pain for several day. He was hospitalized at [facility] from 12/28-12/30 and observed and discharged home chest pain free .  Then on 12/31 patient had >2 hours of chest discomfort and then it went away.  This am he began to experience chest pain and presented to er after.  In ER  he received plavix and nitro and pain Improved.  Troponin elevated.  Code stemi had been activated and he underwent a cardiac cath.  Now s/p atherectomy and stent to RCA with known LAD in stent restenosis> report not yet available.  Consulted for CABG evaluation.    Pt lying in bed.  Denies CP or SOB.  "I feel much better".  NAD noted.        PAST MEDICAL & SURGICAL HISTORY:  HTN (hypertension)    Hyperlipidemia    CAD (coronary artery disease)    Stented coronary artery    MI (myocardial infarction)  1988    History of hemorrhoidectomy    History of inguinal hernia repair    Absent tonsil    Pacemaker        MEDICATIONS  (STANDING):    MEDICATIONS  (PRN):    Antiplatelet therapy:                           Last dose/amt:    Allergies    Plavix (Other)    Intolerances    Lipitor (Muscle Pain)      SOCIAL HISTORY:  Smoker: [ ] Yes  [ x] No        PACK YEARS:                         WHEN QUIT?  ETOH use: [x ] Yes  [ ] No    1 glass of wine or 1 beer a few times per week.        Ilicit Drug use:  [ ] Yes  [ x] No  Occupation: retired  Live with: Wife  Assisted device use: occasionally uses a cane    FAMILY HISTORY:  No pertinent family history in first degree relatives        Review of Systems  CONSTITUTIONAL:  Fevers[ ] chills[ ] sweats[ ] fatigue[ ] weight loss[ ] weight gain [ ]                                     NEGATIVE [x ]   NEURO:  parathesias[ ] seizures [ ]  syncope [ ]  confusion [ ]                                                                                NEGATIVE[x ]   EYES: glasses[ ]  blurry vision[ ]  discharge[ ] pain[ ] glaucoma [ ]                                                                          NEGATIVE[x ]   ENMT:  difficulty hearing [ ]  vertigo[ ]  dysphagia[ ] epistaxis[ ] recent dental work [ ]                                                    Negative[x]   CV:  chest pain[x] palpitations[ ] GOODMAN [ ] diaphoresis [ ]                                                                                           NEGATIVE[ ]   RESPIRATORY:  wheezing[ ] SOB[ ] cough [ ] sputum[ ] hemoptysis[ ]                                                                  NEGATIVE[x ]   GI:  nausea[ ]  vommiting [ ]  diarrhea[ ] constipation [ ] melena [ ]                                                                      NEGATIVE[x ]   : hematuria[ ]  dysuria[ ] urgency[ ] incontinence[ ]                                                                                            NEGATIVE[x ]   MUSKULOSKELETAL:  arthritis[x ]  joint swelling [ ] muscle weakness [ ]                                                                NEGATIVE[ ]   SKIN/BREAST:  rash[ ] itching [ ]  hair loss[ ] masses[ ]                                                                                              NEGATIVE[x ]   PSYCH:  dementia [ ] depresion [ ] anxiety[ ]                                                                                                               NEGATIVE[x ]   HEME/LYMPH:  bruises easily[ ] enlarged lymph nodes[ ] tender lymph nodes[ ]                                               NEGATIVE[x ]   ENDOCRINE:  cold intolerance[ ] heat intolerance[ ] polydipsia[ ]                                                                          NEGATIVE[x ]     PHYSICAL EXAM  Vital Signs Last 24 Hrs  T(C): 36.9 (02 Jan 2022 11:07), Max: 37.1 (02 Jan 2022 10:43)  T(F): 98.4 (02 Jan 2022 11:07), Max: 98.8 (02 Jan 2022 10:43)  HR: 87 (02 Jan 2022 11:10) (87 - 94)  BP: 168/74 (02 Jan 2022 11:10) (157/76 - 168/74)  BP(mean): --  RR: 18 (02 Jan 2022 11:10) (18 - 18)  SpO2: 100% (02 Jan 2022 11:10) (100% - 100%)    CONSTITUTIONAL:                                                                     Neuro: WNL[x ] Normal exam oriented to person/place & time with no focal motor or sensory  deficits. Other                     Eyes: WNL[x ]   Normal exam of conjunctiva & lids, pupils equally reactive. Other     ENT: WNL[x ]    Normal exam of nasal/oral mucosa with absence of cyanosis. Other  Neck: WNL[x ]  Normal exam of jugular veins, trachea & thyroid. Other  Chest: WNL[x ] Normal lung exam with good air movement absence of wheezes, rales, or rhonchi: Other                                                                                CV:  Auscultation: normal [x ] S3[ ] S4[ ] Irregular [ ] Rub[ ] Clicks[ ]    Murmurs none:[x ]systolic [ ]  diastolic [ ] holosystolic [ ]  Carotids: No Bruits[ x] Other                 Abdominal Aorta: normal [ ] nonpalpable[ ]Other                                                                                      GI:           WNL[x ] Normal exam of abdomen, liver & spleen with no noted masses or tenderness. Other                                                                                                        Extremities: WNL[x ] Normal no evidence of cyanosis or deformity Edema: none[ x]trace[ ]1+[ ]2+[ ]3+[ ]4+[ ]  Lower Extremity Pulses: Right[x ] Left[x ]  SKIN :WNL[x ] Normal exam to inspection & palpation. Other:  scabs noted to face.                                                          LABS:                        12.6   12.42 )-----------( 280      ( 02 Jan 2022 10:59 )             38.0     01-02    140  |  105  |  33.5<H>  ----------------------------<  206<H>  4.3   |  17.0<L>  |  1.18    Ca    9.1      02 Jan 2022 10:59    TPro  6.7  /  Alb  3.6  /  TBili  0.9  /  DBili  x   /  AST  41<H>  /  ALT  16  /  AlkPhos  86  01-02    PT/INR - ( 02 Jan 2022 10:59 )   PT: 15.7 sec;   INR: 1.37 ratio         PTT - ( 02 Jan 2022 10:59 )  PTT:29.9 sec          Cardiac Cath: no report avail.    TTE / LAM: NA

## 2022-01-02 NOTE — ED PROVIDER NOTE - PHYSICAL EXAMINATION
General: well appearing, NAD  Head: NC/AT  Cardiac: RRR, no m/r/g  Respiratory: CTABL, equal chest wall expansions, no conversational dyspnea  Abdomen: soft, ND, NT  Neuro: AAOx3,coordinated movement of all 4 extremities  Psych: calm, cooperative, normal affect  Skin: warm and dry General: well appearing, NAD  Head: NC/AT  Cardiac: RRR, no m/r/g  Respiratory: coarse breath sounds b/l, equal chest wall expansions, no conversational dyspnea  Abdomen: soft, ND, NT  Neuro: AAOx3,coordinated movement of all 4 extremities  Psych: calm, cooperative, normal affect  Skin: warm and dry

## 2022-01-02 NOTE — ED PROVIDER NOTE - NSICDXPASTMEDICALHX_GEN_ALL_CORE_FT
PAST MEDICAL HISTORY:  CAD (coronary artery disease)     HTN (hypertension)     Hyperlipidemia     MI (myocardial infarction) 1988    Stented coronary artery

## 2022-01-02 NOTE — H&P ADULT - NSHPLABSRESULTS_GEN_ALL_CORE
Pending Labs reviewed  CXR is pending  Ekg Sinus with st elevation inf leads with reciprocal changes

## 2022-01-02 NOTE — CONSULT NOTE ADULT - PROBLEM SELECTOR RECOMMENDATION 9
Consulted for CABG eval.  Dr. Murray discussed case with Dr. Wang.  Per cardiology likely poor targets and poor surgical candidate.  Recommendations to follow.  WIll follow.  Beta blocker as tolerated, statin, ASA. Pt Received Plavix today and is now ordered for Plavix. Consulted for CABG eval.  Dr. Murray discussed case with Dr. Wang.  Per cardiology likely poor targets and poor surgical candidate.  Beta blocker as tolerated, statin, ASA. Pt Received Plavix today and is now ordered for Plavix.  Recommendations to follow.

## 2022-01-02 NOTE — ED PROVIDER NOTE - OBJECTIVE STATEMENT
88yo M w/pmh CAD s/p stents x11, on Eliquis, HLD presents for STEMI. Reports intermittent CP since Friday, worse 1h PTA. Took 1 of his home sublingual NG, felt it helped. EMS found inferior STEMI on EKG. Home meds he took today: Eliquis 5, metoprolol 50, Vasotec 10. Never smoker.    Other home meds:  ecotrin 81, valium 2, prednisone 5

## 2022-01-02 NOTE — H&P ADULT - NSHPPHYSICALEXAM_GEN_ALL_CORE
Heent uneremarkable  Neck supple no carotid bruitt  Lungs  clear  Heart s1&s2 regular  Abd soft non tender no palpating masses  Ext no c/c/e  Neuro Alert oriented no gross motor or sensory deficits Heent unremarkable  Neck supple no carotid bruit  Lungs  clear  Heart s1&s2 regular  Abd soft non tender no palpating masses  Ext no c/c/e  Neuro Alert oriented no gross motor or sensory deficits

## 2022-01-02 NOTE — CONSULT NOTE ADULT - ASSESSMENT
87y Male with PMH of HTN, Hld, Ashd (stents x 14), atrial fib  and RA who presents with chest discomfort.  Patient has been having intermittent chest pain for several day. He was hospitalized at [facility] from 12/28-12/30 and observed and discharged home chest pain free .  Then on 12/31 patient had >2 hours of chest discomfort and then it went away.  This am he began to experience chest pain and presented to er after.  In ER  he received plavix and nitro and pain Improved.  Code stemi had been activated and he underwent a cardiac cath.  Now s/p athrectomy and stent to rca > report not yet available.  Consulted for CABG evaluation.   87y Male with PMH of HTN, Hld, Ashd (stents x 14),PPM, atrial fib  and RA who presents with chest discomfort.  Patient has been having intermittent chest pain for several day. He was hospitalized at [facility] from 12/28-12/30 and observed and discharged home chest pain free .  Then on 12/31 patient had >2 hours of chest discomfort and then it went away.  This am he began to experience chest pain and presented to er after.  In ER  he received plavix and nitro and pain Improved.  Code stemi had been activated and he underwent a cardiac cath.  Now s/p athrectomy and stent to rca and known LAD in stent restenosis > report not yet available.  Consulted for CABG evaluation.

## 2022-01-02 NOTE — H&P ADULT - HISTORY OF PRESENT ILLNESS
This is a 86y/o male with PMH of HTN, Hld, Ashd (stents x 14) and RA who presents with chest discomfort.  Patient has been having intermittent chest pain for several day. ROBBIE ordonez was hosp at [facility] from 12/28-12/30 and observed and discharged home chest pain free .  Then on 12/31 patient had >2 hours of chest discomofort severe when it went away he felt much better till this am when he bagan to experience chest pain and presents to er after wife called 911.  Presents in ER with chest discomfort which he received plavix and nitro and pain is much better now.  Code stemi had been activated and he was referred to the cath lab  Patient states he is allergic to plavix but him and his wife does not know reaction he gets. This is a 86y/o male with PMH of HTN, Hld, Ashd (stents x 14) and RA who presents with chest discomfort.  Patient has been having intermittent chest pain for several day. ROBBIE ordonez was hosp at [facility] from 12/28-12/30 and observed and discharged home chest pain free .  Then on 12/31 patient had >2 hours of chest discomfort severe when it went away he felt much better.  This am he began to experience chest pain and presents to er after wife called 911.  Presents in ER with chest discomfort which he received plavix and nitro and pain is much better now.  Code stemi had been activated and he was referred to the cath lab  Patient states he is allergic to plavix but him and his wife does not know reaction he gets. This is a 86y/o male with PMH of HTN, Hld, Ashd (stents x 14) and RA who presents with chest discomfort.  Patient has been having intermittent chest pain for several day. ROBBIE ordonez was hosp at [facility] from 12/28-12/30 and observed and discharged home chest pain free .  Then on 12/31 patient had >2 hours of chest discomfort severe when it went away he felt much better.  This am he began to experience chest pain and presents to er after wife called 911.  Presents in ER with chest discomfort which he received plavix and nitro and pain is much better now.  Code stemi had been activated and he was referred to the cath lab  Patient states he is allergic to plavix but him and his wife does not know reaction he gets.  Now s/p athrectomy and stent to rca  see above for full report  This is a 88y/o male with PMH of HTN, Hld, Ashd (stents x 14), atrial fib  and RA who presents with chest discomfort.  Patient has been having intermittent chest pain for several day. ROBBIE ordonez was hosp at [facility] from 12/28-12/30 and observed and discharged home chest pain free .  Then on 12/31 patient had >2 hours of chest discomfort severe when it went away he felt much better.  This am he began to experience chest pain and presents to er after wife called 911.  Presents in ER with chest discomfort which he received plavix and nitro and pain is much better now.  Code stemi had been activated and he was referred to the cath lab  Patient states he is allergic to plavix but him and his wife does not know reaction he gets.  Now s/p athrectomy and stent to rca  see above for full report  This is a 88y/o male with PMH of HTN, Hld, Ashd (stents x 14), atrial fib  and RA who presents with chest discomfort.  Patient has been having intermittent chest pain for several day. ROBBIE ordonez was hosp at [facility] from 12/28-12/30 and observed and discharged home chest pain free .  Then on 12/31 patient had >2 hours of chest discomfort severe when it went away he felt much better.  This am he began to experience chest pain and presents to er after wife called 911.  Presents in ER with chest discomfort which he received plavix and nitro and pain is much better now.  Code stemi had been activated and he was referred to the cath lab  Patient states he is allergic to plavix but him and his wife does not know reaction he gets.  Now s/p athrectomy and stent to rca  full report pending

## 2022-01-02 NOTE — PATIENT PROFILE ADULT - PUBLIC BENEFITS
no

## 2022-01-02 NOTE — PROGRESS NOTE ADULT - ASSESSMENT
Patient is a 87 year old male with a pmhx of HTN, Hld, Ashd (stents x 14), PPM, atrial fib  and RA who presents with chest discomfort, found to have:    1. Inferior wall MI    Plan  Neuro: no active issues   Cardio: s/p inferior wall MI with stent to RCA. Also noted to have LAD dz (CTS consulted for CABG). Start BB, statin, DAPT, follow up TTE , Ef noted ~30%, check carotid duplex . Sulfolk heart   Pulm: no active issue  GI: Diet as tolerated , PPI  Renal: strict I/Os, renally dose meds  ID: no active issues   Heme: Cont Eliquis for afib, as well as DAPT, SCDs   Endo: no active issues     Dispo: Admit to MICU, case d/w Dr. De Jesus

## 2022-01-02 NOTE — ED ADULT NURSE NOTE - OBJECTIVE STATEMENT
CAD s/p stents x11, on Eliquis, HLD presents for STEMI. Reports intermittent CP since Friday, worse 1h PTA. Took 1 of his home sublingual NG,...

## 2022-01-02 NOTE — ED PROVIDER NOTE - ATTENDING CONTRIBUTION TO CARE
86 yo M  with hx CAD s/p coronary stenting x 11 s/p PPM, high cholesterol , ? A-fib on Eliquis followed by [facility]/Dr. Heaton c/o intermittent chest pain x last 3 days.  Pain worse and more persistent x last 45 min PTA.  Pt took SL NTG x 1 PA with moderate relief.  Pre-hospital EKG c/w acute inferior wall MI.  Code STEMI activated on arrival.  On exam elderly M awake and alert in NAD, PERRL, mm moist, Neck supple, Cor Reg Gr 2/6 FARAZ, Lungs coarse BS b/l, abd Soft, NT Ext no edema, cyanosis, FROM, Neuro non-focal.  Case d/w Interventional Cardio/Dr. Wang and will take pt to cath lab.  Case d/w MICU PA and pt accepted to s/o Dr. De Jesus

## 2022-01-03 LAB
ALBUMIN SERPL ELPH-MCNC: 3.3 G/DL — SIGNIFICANT CHANGE UP (ref 3.3–5.2)
ALP SERPL-CCNC: 72 U/L — SIGNIFICANT CHANGE UP (ref 40–120)
ALT FLD-CCNC: 35 U/L — SIGNIFICANT CHANGE UP
ANION GAP SERPL CALC-SCNC: 15 MMOL/L — SIGNIFICANT CHANGE UP (ref 5–17)
APPEARANCE UR: CLEAR — SIGNIFICANT CHANGE UP
AST SERPL-CCNC: 228 U/L — HIGH
BASOPHILS # BLD AUTO: 0.02 K/UL — SIGNIFICANT CHANGE UP (ref 0–0.2)
BASOPHILS NFR BLD AUTO: 0.2 % — SIGNIFICANT CHANGE UP (ref 0–2)
BILIRUB SERPL-MCNC: 1 MG/DL — SIGNIFICANT CHANGE UP (ref 0.4–2)
BILIRUB UR-MCNC: NEGATIVE — SIGNIFICANT CHANGE UP
BUN SERPL-MCNC: 27.2 MG/DL — HIGH (ref 8–20)
CALCIUM SERPL-MCNC: 8.3 MG/DL — LOW (ref 8.6–10.2)
CHLORIDE SERPL-SCNC: 102 MMOL/L — SIGNIFICANT CHANGE UP (ref 98–107)
CHOLEST SERPL-MCNC: 113 MG/DL — SIGNIFICANT CHANGE UP
CO2 SERPL-SCNC: 18 MMOL/L — LOW (ref 22–29)
COLOR SPEC: YELLOW — SIGNIFICANT CHANGE UP
CREAT SERPL-MCNC: 1.06 MG/DL — SIGNIFICANT CHANGE UP (ref 0.5–1.3)
DIFF PNL FLD: NEGATIVE — SIGNIFICANT CHANGE UP
EOSINOPHIL # BLD AUTO: 0.08 K/UL — SIGNIFICANT CHANGE UP (ref 0–0.5)
EOSINOPHIL NFR BLD AUTO: 1 % — SIGNIFICANT CHANGE UP (ref 0–6)
GLUCOSE SERPL-MCNC: 92 MG/DL — SIGNIFICANT CHANGE UP (ref 70–99)
GLUCOSE UR QL: NEGATIVE MG/DL — SIGNIFICANT CHANGE UP
HCT VFR BLD CALC: 35.4 % — LOW (ref 39–50)
HDLC SERPL-MCNC: 30 MG/DL — LOW
HGB BLD-MCNC: 11.7 G/DL — LOW (ref 13–17)
IMM GRANULOCYTES NFR BLD AUTO: 0.5 % — SIGNIFICANT CHANGE UP (ref 0–1.5)
KETONES UR-MCNC: NEGATIVE — SIGNIFICANT CHANGE UP
LEUKOCYTE ESTERASE UR-ACNC: NEGATIVE — SIGNIFICANT CHANGE UP
LIPID PNL WITH DIRECT LDL SERPL: 62 MG/DL — SIGNIFICANT CHANGE UP
LYMPHOCYTES # BLD AUTO: 0.83 K/UL — LOW (ref 1–3.3)
LYMPHOCYTES # BLD AUTO: 10.3 % — LOW (ref 13–44)
MCHC RBC-ENTMCNC: 30.2 PG — SIGNIFICANT CHANGE UP (ref 27–34)
MCHC RBC-ENTMCNC: 33.1 GM/DL — SIGNIFICANT CHANGE UP (ref 32–36)
MCV RBC AUTO: 91.2 FL — SIGNIFICANT CHANGE UP (ref 80–100)
MONOCYTES # BLD AUTO: 0.94 K/UL — HIGH (ref 0–0.9)
MONOCYTES NFR BLD AUTO: 11.7 % — SIGNIFICANT CHANGE UP (ref 2–14)
MRSA PCR RESULT.: SIGNIFICANT CHANGE UP
NEUTROPHILS # BLD AUTO: 6.12 K/UL — SIGNIFICANT CHANGE UP (ref 1.8–7.4)
NEUTROPHILS NFR BLD AUTO: 76.3 % — SIGNIFICANT CHANGE UP (ref 43–77)
NITRITE UR-MCNC: NEGATIVE — SIGNIFICANT CHANGE UP
NON HDL CHOLESTEROL: 83 MG/DL — SIGNIFICANT CHANGE UP
PH UR: 5 — SIGNIFICANT CHANGE UP (ref 5–8)
PLATELET # BLD AUTO: 210 K/UL — SIGNIFICANT CHANGE UP (ref 150–400)
POTASSIUM SERPL-MCNC: 3.9 MMOL/L — SIGNIFICANT CHANGE UP (ref 3.5–5.3)
POTASSIUM SERPL-SCNC: 3.9 MMOL/L — SIGNIFICANT CHANGE UP (ref 3.5–5.3)
PROT SERPL-MCNC: 6.3 G/DL — LOW (ref 6.6–8.7)
PROT UR-MCNC: NEGATIVE — SIGNIFICANT CHANGE UP
RBC # BLD: 3.88 M/UL — LOW (ref 4.2–5.8)
RBC # FLD: 13.6 % — SIGNIFICANT CHANGE UP (ref 10.3–14.5)
S AUREUS DNA NOSE QL NAA+PROBE: SIGNIFICANT CHANGE UP
SODIUM SERPL-SCNC: 134 MMOL/L — LOW (ref 135–145)
SP GR SPEC: 1.01 — SIGNIFICANT CHANGE UP (ref 1.01–1.02)
T4 FREE SERPL-MCNC: 1.8 NG/DL — SIGNIFICANT CHANGE UP (ref 0.9–1.8)
TRIGL SERPL-MCNC: 106 MG/DL — SIGNIFICANT CHANGE UP
UROBILINOGEN FLD QL: NEGATIVE MG/DL — SIGNIFICANT CHANGE UP
WBC # BLD: 8.03 K/UL — SIGNIFICANT CHANGE UP (ref 3.8–10.5)
WBC # FLD AUTO: 8.03 K/UL — SIGNIFICANT CHANGE UP (ref 3.8–10.5)

## 2022-01-03 PROCEDURE — 99232 SBSQ HOSP IP/OBS MODERATE 35: CPT

## 2022-01-03 PROCEDURE — 99233 SBSQ HOSP IP/OBS HIGH 50: CPT

## 2022-01-03 PROCEDURE — 74018 RADEX ABDOMEN 1 VIEW: CPT | Mod: 26

## 2022-01-03 PROCEDURE — 93010 ELECTROCARDIOGRAM REPORT: CPT

## 2022-01-03 RX ORDER — LOSARTAN POTASSIUM 100 MG/1
25 TABLET, FILM COATED ORAL DAILY
Refills: 0 | Status: DISCONTINUED | OUTPATIENT
Start: 2022-01-04 | End: 2022-01-08

## 2022-01-03 RX ORDER — ENOXAPARIN SODIUM 100 MG/ML
70 INJECTION SUBCUTANEOUS
Refills: 0 | Status: COMPLETED | OUTPATIENT
Start: 2022-01-03 | End: 2022-01-06

## 2022-01-03 RX ORDER — DIAZEPAM 5 MG
1 TABLET ORAL
Qty: 0 | Refills: 0 | DISCHARGE

## 2022-01-03 RX ORDER — TICAGRELOR 90 MG/1
90 TABLET ORAL EVERY 12 HOURS
Refills: 0 | Status: DISCONTINUED | OUTPATIENT
Start: 2022-01-03 | End: 2022-01-06

## 2022-01-03 RX ADMIN — ENOXAPARIN SODIUM 70 MILLIGRAM(S): 100 INJECTION SUBCUTANEOUS at 17:09

## 2022-01-03 RX ADMIN — SODIUM CHLORIDE 3 MILLILITER(S): 9 INJECTION INTRAMUSCULAR; INTRAVENOUS; SUBCUTANEOUS at 15:04

## 2022-01-03 RX ADMIN — APIXABAN 5 MILLIGRAM(S): 2.5 TABLET, FILM COATED ORAL at 11:12

## 2022-01-03 RX ADMIN — SODIUM CHLORIDE 3 MILLILITER(S): 9 INJECTION INTRAMUSCULAR; INTRAVENOUS; SUBCUTANEOUS at 06:05

## 2022-01-03 RX ADMIN — SODIUM CHLORIDE 3 MILLILITER(S): 9 INJECTION INTRAMUSCULAR; INTRAVENOUS; SUBCUTANEOUS at 21:10

## 2022-01-03 RX ADMIN — SIMVASTATIN 40 MILLIGRAM(S): 20 TABLET, FILM COATED ORAL at 21:04

## 2022-01-03 RX ADMIN — Medication 81 MILLIGRAM(S): at 11:11

## 2022-01-03 RX ADMIN — PANTOPRAZOLE SODIUM 40 MILLIGRAM(S): 20 TABLET, DELAYED RELEASE ORAL at 06:08

## 2022-01-03 RX ADMIN — TICAGRELOR 90 MILLIGRAM(S): 90 TABLET ORAL at 17:08

## 2022-01-03 NOTE — CHART NOTE - NSCHARTNOTEFT_GEN_A_CORE
87M with PMHx of HTN, HLD, CAD sp prior stents, PPM, cAF on AC, RA who presented with CP, EKG with inferior STEPHANIE, +trop. CODE STEMI activated. Urgently to cath lab, cath via R radial which revealed EF 25%, LM nml, LAD 80% prior stent instenosis, Cx 0%, pRCA 95% instent restenosis, treated with BEVERLY x 1, manual closure of radial, no complications. Transferred to ICU post procedure. Prior Echo 2018 with EF 50%. No events overnight, patient remains chest pain free this am. Denies SOB this am. Patient seen by Dr. Giron of [facility] this am, recommending telemetry for 24 hours. LAD stent restenosis will be dealt with as an outpatient per Cardiology. 87M with PMHx of HTN, HLD, CAD sp prior stents, PPM, cAF on AC, RA who presented with CP, EKG with inferior STEPHANIE, +trop. CODE STEMI activated. Urgently to cath lab, cath via R radial which revealed EF 25%, LM nml, LAD 80% prior stent instenosis, Cx 0%, pRCA 95% instent restenosis, treated with BEVERLY x 1, manual closure of radial, no complications. Transferred to ICU post procedure. Prior Echo 2018 with EF 50%. No events overnight, patient remains chest pain free this am. Denies SOB this am. Patient seen by Dr. Giron of [facility] this am, recommending telemetry for 24 hours. LAD stent restenosis will be dealt with as an outpatient per Cardiology.    Patient signed out to Dr. Donis, placed to Telemetry bed. Informed MICU attending, Dr. Chandra, of transfer.

## 2022-01-03 NOTE — PROGRESS NOTE ADULT - SUBJECTIVE AND OBJECTIVE BOX
[facility] Division of Hospital Medicine    Chief Complaint: chest pain    SUBJECTIVE / OVERNIGHT EVENTS: No acute events overnight. HD stable.     Patient denies chest pain, SOB, abd pain, N/V, fever, chills, dysuria or any other complaints. All remainder ROS negative.     MEDICATIONS  (STANDING):  apixaban 5 milliGRAM(s) Oral two times a day  aspirin enteric coated 81 milliGRAM(s) Oral daily  influenza  Vaccine (HIGH DOSE) 0.7 milliLiter(s) IntraMuscular once  metoprolol succinate ER 50 milliGRAM(s) Oral daily  pantoprazole    Tablet 40 milliGRAM(s) Oral before breakfast  simvastatin 40 milliGRAM(s) Oral at bedtime  sodium chloride 0.9% lock flush 3 milliLiter(s) IV Push every 8 hours  ticagrelor 90 milliGRAM(s) Oral every 12 hours    MEDICATIONS  (PRN):        I&O's Summary    2022 07:01  -  2022 07:00  --------------------------------------------------------  IN: 590 mL / OUT: 880 mL / NET: -290 mL    2022 07:01  -  2022 15:06  --------------------------------------------------------  IN: 340 mL / OUT: 300 mL / NET: 40 mL        PHYSICAL EXAM:  Vital Signs Last 24 Hrs  T(C): 36.7 (2022 11:50), Max: 36.9 (2022 23:47)  T(F): 98 (2022 11:50), Max: 98.4 (2022 23:47)  HR: 75 (2022 14:30) (75 - 78)  BP: 103/43 (2022 14:30) (80/25 - 120/64)  BP(mean): 61 (2022 14:30) (42 - 78)  RR: 24 (2022 14:30) (18 - 28)  SpO2: 97% (2022 14:30) (96% - 100%)        CONSTITUTIONAL: NAD, well-developed, well-groomed  ENMT: Moist oral mucosa, no pharyngeal injection or exudates; normal dentition  RESPIRATORY: Normal respiratory effort; lungs are clear to auscultation bilaterally  CARDIOVASCULAR: Regular rate and rhythm, normal S1 and S2, no murmur/rub/gallop; No lower extremity edema; Peripheral pulses are 2+ bilaterally  ABDOMEN: Nontender to palpation, normoactive bowel sounds, no rebound/guarding; No hepatosplenomegaly  MUSCLOSKELETAL:  Normal gait; no clubbing or cyanosis of digits; no joint swelling or tenderness to palpation  PSYCH: A+O to person, place, and time; affect appropriate  NEUROLOGY: CN 2-12 are intact and symmetric; no gross sensory deficits;   SKIN: No rashes; no palpable lesions    LABS:                        11.7   8.03  )-----------( 210      ( 2022 03:48 )             35.4     01-03    134<L>  |  102  |  27.2<H>  ----------------------------<  92  3.9   |  18.0<L>  |  1.06    Ca    8.3<L>      2022 03:48    TPro  6.3<L>  /  Alb  3.3  /  TBili  1.0  /  DBili  x   /  AST  228<H>  /  ALT  35  /  AlkPhos  72  01-03    PT/INR - ( 2022 19:50 )   PT: 19.2 sec;   INR: 1.70 ratio         PTT - ( 2022 19:50 )  PTT:33.2 sec  CARDIAC MARKERS ( 2022 19:50 )  x     / 6.95 ng/mL / x     / x     / x      CARDIAC MARKERS ( 2022 14:52 )  x     / 2.95 ng/mL / 1458 U/L / x     / 152.9 ng/mL  CARDIAC MARKERS ( 2022 10:59 )  x     / 0.43 ng/mL / 124 U/L / x     / x          Urinalysis Basic - ( 2022 03:49 )    Color: Yellow / Appearance: Clear / S.010 / pH: x  Gluc: x / Ketone: Negative  / Bili: Negative / Urobili: Negative mg/dL   Blood: x / Protein: Negative / Nitrite: Negative   Leuk Esterase: Negative / RBC: x / WBC x   Sq Epi: x / Non Sq Epi: x / Bacteria: x        CAPILLARY BLOOD GLUCOSE            RADIOLOGY & ADDITIONAL TESTS:  Results Reviewed:   Imaging Personally Reviewed:  Electrocardiogram Personally Reviewed:                                           [facility] Division of Hospital Medicine    Chief Complaint: chest pain    SUBJECTIVE / OVERNIGHT EVENTS: No acute events overnight. HD stable. Patient denies chest pain and SOB.     Patient denies chest pain, SOB, abd pain, N/V, fever, chills, dysuria or any other complaints. All remainder ROS negative.     MEDICATIONS  (STANDING):  apixaban 5 milliGRAM(s) Oral two times a day  aspirin enteric coated 81 milliGRAM(s) Oral daily  influenza  Vaccine (HIGH DOSE) 0.7 milliLiter(s) IntraMuscular once  metoprolol succinate ER 50 milliGRAM(s) Oral daily  pantoprazole    Tablet 40 milliGRAM(s) Oral before breakfast  simvastatin 40 milliGRAM(s) Oral at bedtime  sodium chloride 0.9% lock flush 3 milliLiter(s) IV Push every 8 hours  ticagrelor 90 milliGRAM(s) Oral every 12 hours    MEDICATIONS  (PRN):        I&O's Summary    2022 07:01  -  2022 07:00  --------------------------------------------------------  IN: 590 mL / OUT: 880 mL / NET: -290 mL    2022 07:01  -  2022 15:06  --------------------------------------------------------  IN: 340 mL / OUT: 300 mL / NET: 40 mL        PHYSICAL EXAM:  Vital Signs Last 24 Hrs  T(C): 36.7 (2022 11:50), Max: 36.9 (2022 23:47)  T(F): 98 (2022 11:50), Max: 98.4 (2022 23:47)  HR: 75 (2022 14:30) (75 - 78)  BP: 103/43 (2022 14:30) (80/25 - 120/64)  BP(mean): 61 (2022 14:30) (42 - 78)  RR: 24 (2022 14:30) (18 - 28)  SpO2: 97% (2022 14:30) (96% - 100%)        CONSTITUTIONAL: NAD, well-developed, well-groomed  ENMT: Moist oral mucosa, no pharyngeal injection or exudates; normal dentition  RESPIRATORY: Normal respiratory effort; lungs are clear to auscultation bilaterally  CARDIOVASCULAR: Regular rate and rhythm, normal S1 and S2, no murmur/rub/gallop; No lower extremity edema; Peripheral pulses are 2+ bilaterally  ABDOMEN: Nontender to palpation, normoactive bowel sounds, no rebound/guarding; No hepatosplenomegaly  MUSCLOSKELETAL:  Normal gait; no clubbing or cyanosis of digits; no joint swelling or tenderness to palpation  PSYCH: A+O to person, place, and time; affect appropriate  NEUROLOGY: CN 2-12 are intact and symmetric; no gross sensory deficits;   SKIN: No rashes; no palpable lesions    LABS:                        11.7   8.03  )-----------( 210      ( 2022 03:48 )             35.4     01-03    134<L>  |  102  |  27.2<H>  ----------------------------<  92  3.9   |  18.0<L>  |  1.06    Ca    8.3<L>      2022 03:48    TPro  6.3<L>  /  Alb  3.3  /  TBili  1.0  /  DBili  x   /  AST  228<H>  /  ALT  35  /  AlkPhos  72  01-03    PT/INR - ( 2022 19:50 )   PT: 19.2 sec;   INR: 1.70 ratio         PTT - ( 2022 19:50 )  PTT:33.2 sec  CARDIAC MARKERS ( 2022 19:50 )  x     / 6.95 ng/mL / x     / x     / x      CARDIAC MARKERS ( 2022 14:52 )  x     / 2.95 ng/mL / 1458 U/L / x     / 152.9 ng/mL  CARDIAC MARKERS ( 2022 10:59 )  x     / 0.43 ng/mL / 124 U/L / x     / x          Urinalysis Basic - ( 2022 03:49 )    Color: Yellow / Appearance: Clear / S.010 / pH: x  Gluc: x / Ketone: Negative  / Bili: Negative / Urobili: Negative mg/dL   Blood: x / Protein: Negative / Nitrite: Negative   Leuk Esterase: Negative / RBC: x / WBC x   Sq Epi: x / Non Sq Epi: x / Bacteria: x        CAPILLARY BLOOD GLUCOSE            RADIOLOGY & ADDITIONAL TESTS:  Results Reviewed:   Imaging Personally Reviewed:  Electrocardiogram Personally Reviewed:

## 2022-01-03 NOTE — PROGRESS NOTE ADULT - PROBLEM SELECTOR PLAN 1
-  14 prior stents  - 1 BEVERLY to RCA 1/2/22 with Residual LAD dx  - Continue BB as tolerated  - Continue ASA, brilinta and statin for stent patency  - CTS to follow  - Dr Murray to review images and determine if patient is a candidate for open heart surgery for LAD disease  - Plan discussed with Dr. Murray -  14 prior stents  - 1 BEVERLY to RCA 1/2/22 with residual 80% mLAD in-stent restenosis as per cardiology note   - patient does not want surgery at this time  - cardiology to re-evaluate as outpatient  - patient can f/u with Dr. Murray outpt if amenable to surgery  - Continue BB as tolerated  - Continue ASA, brilinta and statin for stent patency  - Plan discussed with Dr. Murray, will sign off

## 2022-01-03 NOTE — PROGRESS NOTE ADULT - ASSESSMENT
87y Male with PMH of HTN, Hld, Ashd (stents x 14),PPM, atrial fib  and RA who presents with chest discomfort.  Patient has been having intermittent chest pain for several day. He was hospitalized at [facility] from 12/28-12/30 and observed and discharged home chest pain free .  Then on 12/31 patient had >2 hours of chest discomfort and then it went away.  This am he began to experience chest pain and presented to er after.  In ER  he received plavix and nitro and pain Improved.  Code stemi had been activated and he underwent a cardiac cath.  Now s/p athrectomy and stent to rca and known LAD in stent restenosis. Consulted for CABG evaluation.

## 2022-01-03 NOTE — PROGRESS NOTE ADULT - SUBJECTIVE AND OBJECTIVE BOX
Patient is a 87y old  Male who presents with a chief complaint of Chest Discomfort (2022 19:28)      BRIEF HOSPITAL COURSE:   87M with PMHx of HTN, HLD, CAD sp prior stents, PPM, cAF on AC, RA who presented with CP, EKG with inferior STEPHANIE, +trop. CODE STEMI activated. Urgently to cath lab, cath via R radial which revealed EF 25%, LM nml, LAD 80% prior stent instenosis, Cx 0%, pRCA 95% instent restenosis, treated with BEVERLY x 1, manual closure of radial, no complications. Transferred to ICU post procedure. Prior Echo 2018 with EF 50%.    Events last 24 hours: afebrile, hemodynamics stable, tele A paced overnight, no ectopy noted. Denies CP, SOB, abd pain, arm/hand pain, numbness or tingling.     PAST MEDICAL & SURGICAL HISTORY:  HTN (hypertension)    Hyperlipidemia    CAD (coronary artery disease)    Stented coronary artery    MI (myocardial infarction)  1988    Other persistent atrial fibrillation    History of hemorrhoidectomy    History of inguinal hernia repair    Absent tonsil    Pacemaker        Review of Systems:  12 pt ROS negative unless otherwise stated above      Medications:    metoprolol succinate ER 50 milliGRAM(s) Oral daily          apixaban 5 milliGRAM(s) Oral two times a day  aspirin enteric coated 81 milliGRAM(s) Oral daily  clopidogrel Tablet 75 milliGRAM(s) Oral daily    pantoprazole    Tablet 40 milliGRAM(s) Oral before breakfast      simvastatin 40 milliGRAM(s) Oral at bedtime    sodium chloride 0.9% lock flush 3 milliLiter(s) IV Push every 8 hours    influenza  Vaccine (HIGH DOSE) 0.7 milliLiter(s) IntraMuscular once              ICU Vital Signs Last 24 Hrs  T(C): 36.9 (2022 23:47), Max: 37.1 (2022 10:43)  T(F): 98.4 (2022 23:47), Max: 98.8 (2022 10:43)  HR: 75 (2022 01:00) (75 - 98)  BP: 95/48 (2022 01:00) (89/46 - 168/74)  BP(mean): 63 (2022 01:00) (59 - 90)  ABP: --  ABP(mean): --  RR: 21 (2022 01:00) (11 - 27)  SpO2: 99% (2022 01:00) (96% - 100%)      I&O's Summary    2022 07:01  -  2022 01:59  --------------------------------------------------------  IN: 560 mL / OUT: 580 mL / NET: -20 mL              LABS:                        11.4   9.67  )-----------( 218      ( 2022 19:50 )             34.9     -    140  |  105  |  33.5<H>  ----------------------------<  206<H>  4.3   |  17.0<L>  |  1.18    Ca    9.1      2022 10:59    TPro  6.7  /  Alb  3.6  /  TBili  0.9  /  DBili  x   /  AST  41<H>  /  ALT  16  /  AlkPhos  86  01-02      CARDIAC MARKERS ( 2022 19:50 )  x     / 6.95 ng/mL / x     / x     / x      CARDIAC MARKERS ( 2022 14:52 )  x     / 2.95 ng/mL / 1458 U/L / x     / 152.9 ng/mL  CARDIAC MARKERS ( 2022 10:59 )  x     / 0.43 ng/mL / 124 U/L / x     / x          CAPILLARY BLOOD GLUCOSE        PT/INR - ( 2022 19:50 )   PT: 19.2 sec;   INR: 1.70 ratio         PTT - ( 2022 19:50 )  PTT:33.2 sec      Physical Examination:    General: No acute distress.  Alert, oriented, interactive, nonfocal    HEENT: Pupils equal, reactive to light.  Symmetric.    PULM: Clear to auscultation bilaterally    CVS: Regular rate and rhythm, a paced on tele    ABD: Soft, nondistended, nontender, normoactive bowel sounds, no rebound or guarding    EXT: No edema, R wrist without hematoma, 2+ radial pulse, good cap refill    SKIN: Warm and well perfused, no rashes noted.    RADIOLOGY:   [facility]  Department of Cardiology  [address] [place] [address]  [phone]  Cath Lab Report -- Comprehensive Report  Patient: TATIANA RIBERA  Study date: 2022  Account number: [id number]  MR number: [id number]  : 1934  Gender: Male  Race: W  Case Physician(s):  MD Juan Manuel Salinas MD  Referring Physician:  Kaushal Thomas MD  INDICATIONS: Initial STEMI.  HISTORY: History of 2 myocardial infarction(s). The patient has  hypertension and medication-treated dyslipidemia. PRIOR CARDIOVASCULAR  PROCEDURES: Stent.  PROCEDURE:  --  Left heart catheterization with ventriculography.  --  Left coronary angiography.  --  Right coronary angiography.  --  Rotational Atherectomy.  --  Intervention on proximal RCA: drug-eluting stent.  TECHNIQUE: The risks and alternatives of the procedures and conscious  sedation were explained to the patient and informed consent was obtained.  Cardiac catheterization performed emergently. Coronary intervention  performed emergently.  Local anesthetic given. Right radial artery access. Left heart  catheterization. Ventriculography was performed. A 6F AR1.0 LAUNCHER  catheter was utilized. Imaging was performed using an DAMICO projection. Left  coronary artery angiography. The vessel was injected utilizing a jl4  catheter. Right coronary artery angiography. The vessel was injected  utilizing a 6F AR1.0 LAUNCHER catheter. RADIATION EXPOSURE: 32.6 min. A  successful drug-eluting stent was performed on the 95 % lesion in the  proximal RCA. Following intervention there was an excellent angiographic  appearance with a 10 % residual stenosis. The intervention was performed  at the site of a prior brachytherapy and drug-eluting stent. According to  the ACC/AHA classification system, this lesion was a type C lesion. There  was CASSANDRA 3 flow before the procedure and CASSANDRA 3 flow after the procedure.  There was no dissection. Vessel setup was performed. A 6F AR1.0 LAUNCHER  guiding catheter was used to intubate the vessel. Balloon angioplasty was  performed, using a EUPHORA 3.0MM X 20MM balloon, with 8 inflations and a  maximum inflation pressure of 18 diana. During the procedure, a new ClearbonON BLUE 190CM STRAIGHT wire was advanced across the lesion. Balloon  angioplasty was performed, using a 3.5 X 20 NC EUPHORA BALLOON balloon,  with 3 inflations and a maximum inflation pressure of 20 dinaa. Cutting  balloon angioplasty was performed, using a WOLVERINE CUTTING BALLOON 3MM X  15MM balloon, with 3 inflations and a maximum inflation pressure of 14  diana. Laser atherectomy was performed using a RX 1.4MM ELCA CORONARY  catheter and 5 pass(es) across the lesion. Balloon angioplasty was  performed, using a ANGIOSCULPT TEZ RX PTCA 3.5 X 20 X 139 balloon, with 1  inflations and a maximum inflation pressure of 18 diana. Balloon angioplasty  was performed, using a NC EMERGE 4.00 X 8MM balloon, with 7 inflations and  a maximum inflation pressure of 20 diana. During the procedure, a new wiseri BLUE 190CM STRAIGHT wire was advanced across the lesion. Balloon  angioplasty was performed, using a 4.0 X 12 NC EUPHORA BALLOON balloon,  with 5 inflations and a maximum inflation pressure of 30 diana. Rotational  atherectomy was performed, with a ROTAPRO2 1.5MM nilam and 7 passes. During  the procedure, a new ROTAWIRE DRIVE FLOPPY wire was advanced across the  lesion. Balloon angioplasty was performed, using a EUPHORA 3.0MM X 30MM  balloon, with 1 inflations and a maximum inflation pressure of 22 diana. A  TAN 4.00 X 38MM drug-eluting stent was placed across the lesion and  deployed at a maximum inflation pressure of 14 diana. Balloon angioplasty  was performed, using a NC EMERGE 4.00 X 20MM balloon, with 1 inflations  and a maximum inflation pressure of 30 diana. Rotational Atherectomy.  CONTRAST GIVEN: Omnipaque 30 ml. Omnipaque 70 ml.  MEDICATIONS GIVEN: Nitroglycerin, 100 mcg, IA. Heparin, 3000 units, IV.  Heparin, 2000 units, IV. Heparin, 2000 units, IV. 1% Lidocaine, 10 ml,  subcutaneously. Cardene, 200 mcg. 0.9NS, 200 ml, IV.  HEMODYNAMICS: Hemodynamic assessment demonstrates moderately elevated  LVEDP.  VENTRICLES: Analysis of regional contractile function demonstrated severe  anterolateral hypokinesis, apical akinesis, and diaphragmatic akinesis.  Global left ventricular function was severely depressed. EF calculated by  contrast ventriculography was 25 % and EF estimated was 30 %.  VALVES: AORTIC VALVE: There was no aortic stenosis.  CORONARY VESSELS: The coronary circulation is right dominant.  LM:   --  LM: Normal.  LAD:   --  Mid LAD: There was a diffuse 80 % stenosis at the site of a  prior stent.  CX:   --  Circumflex: Angiography showed minor luminal irregularities with  no flow limiting lesions.  --  Mid circumflex: There was a tubular 0 % stenosis at the site of a prior  stent.  RCA:   --  Proximal RCA: There was a tubular 95 % stenosis at the site of a  prior stent.  --  Distal RCA: There was a 50 % stenosis.  --  RPDA: There was a tubular 90 % stenosis. The lesion was associated with  a small filling defect consistent with thrombus. There was CASSANDRA grade 3  flow through the vessel (brisk flow).  --  RPLS: There was a discrete 90 % stenosis at the site of a prior stent.  COMPLICATIONS: No complications occurred during the cath lab visit. No  complications occurred during the cath lab visit. No complications  occurred during the cath lab visit.  SUMMARY:  HEMODYNAMICS: Hemodynamic assessment demonstrates moderately elevated  LVEDP.  DIAGNOSTIC IMPRESSIONS: There is significant double vessel coronary artery  disease.  DIAGNOSTIC RECOMMENDATIONS: Add clopidogrel (Plavix). Add aspirin. Continue  eliquis  INTERVENTIONAL RECOMMENDATIONS: Add clopidogrel (Plavix). Add aspirin.  Continue eliquis Patient management should include aggressive medical  therapy.  consider pci of LAD  DISPOSITION: The patient left the catheterization laboratory in guarded  condition.  Prepared and signed by  Juan Manuel Wang MD  Signed 2022 13:41:54  EXAM:  ECHO TTE WO CON COMP W DOPP      PROCEDURE DATE:  2022   .      INTERPRETATION:  TRANSTHORACIC ECHOCARDIOGRAM REPORT        Patient Name:   TATIANA RIBERA Patient Location: [facility] Rec #:  [id number]       Accession #:      [id number]  Account #:                     Height:           67.0 in 170.2 cm  YOB: 1934      Weight:           158.7 lb 72.00 kg  Patient Age:    87 years       BSA:              1.83 m²  Patient Gender: M              BP:               112/54 mmHg      Date of Exam:        2022 3:29:17 PM  Sonographer:         Beto Nieves Jr, Jr  Referring Physician: Kaushal De Jesus MD    Procedure:     2D Echo/Doppler/Color Doppler Complete.  Indications:   ST elevation (STEMI) myocardial infarction of unspecified   site -                 I21.3; Cardiac murmur, unspecified - R01.1  Diagnosis:     Nonrheumatic mitral (valve) insufficiency - I34.0  Study Details: Technically suboptimal study. Study quality was adversely                 affected due to body habitus and lung interference.        2D AND M-MODE MEASUREMENTS (normal ranges within parentheses):  Left                 Normal   Aorta/Left            Normal  Ventricle:                    Atrium:  IVSd (2D):    1.26  (0.7-1.1) Aortic Root    2.96  (2.4-3.7)                 cm             (2D):           cm  LVPWd (2D):   1.06  (0.7-1.1) Left Atrium    5.49  (1.9-4.0)                 cm             (2D):           cm  LVIDd (2D):   5.68  (3.4-5.7) LA Volume      53.3                 cm             Index         ml/m²  LVIDs (2D):   4.97            Right Ventricle:                 cm             TAPSE:           1.37 cm  LV FS (2D):   12.5   (>25%)                  %  Relative Wall 0.37   (<0.42)  Thickness    LV DIASTOLIC FUNCTION:  MV Peak E: 0.62 m/s Decel Time: 153 msec  MV Peak A: 0.80 m/s  E/A Ratio: 0.77    SPECTRAL DOPPLER ANALYSIS (where applicable):  Mitral Valve:  MV P1/2 Time: 44.37 msec  MV Area, PHT: 4.96 cm²    Mitral Insufficiency by PISA:  MR Volume:  MR Flow Rate: 84.36 ml/s MR EROA: 17.32 mm²    Aortic Valve: AoV Max Kalin: 0.97 m/s AoV Peak PG: 3.8 mmHg AoV Mean P.8 mmHg    LVOT Vmax: 0.77 m/s LVOT VTI: 0.140 m LVOT Diameter: 2.23 cm    AoV Area, Vmax: 3.11 cm² AoV Area, VTI: 2.95 cm² AoV Area, Vmn: 2.69 cm²  Ao VTI: 0.185  Aortic Insufficiency:  AI Half-time: 357 msec    Tricuspid Valve and PA/RV Systolic Pressure: TR Max Velocity: 2.81 m/s RA   Pressure: 3 mmHg RVSP/PASP: 34.6 mmHg      PHYSICIAN INTERPRETATION:  Left Ventricle: Endocardial visualization was enhanced with intravenous   echo contrast. The left ventricular internal cavity size is normal.  Global LV systolic function was severely decreased. Left ventricular   ejection fraction, by visual estimation, is 20 to 25%.      LV Wall Scoring:  The entire inferior wall, mid and apical anterior septum, mid and apical  inferior septum, basal and mid inferolateral wall, and apex are akinetic.   The  entire anterior wall, basal and mid anterolateral wall, basal anteroseptal  segment, basal inferoseptal segment, and apical lateral segment are  hypokinetic.    Right Ventricle: RV systolic function is mildly reduced. TV S' 0.1 m/s.  Left Atrium: Severely enlarged left atrium.  Right Atrium: The right atrium is normal in size.  Pericardium: Trivial pericardial effusion is present. The pericardial   effusion is localized near the right atrium. There is no evidence of   cardiac tamponade.  Mitral Valve: Thickening and calcification of the anterior and posterior   mitral valve leaflets. Mobility is moderately decreased for both   leaflets. Moderate to severe mitral valve regurgitation is seen.  Tricuspid Valve: Moderate tricuspid regurgitation is visualized.  Aortic Valve: Peak transaortic gradient equals 3.8 mmHg, mean transaortic   gradient equals 1.8 mmHg, the calculated aortic valve area equals 2.95   cm² by the continuity equation consistent with normally opening aortic   valve. Mild aortic valve regurgitation is seen.  Pulmonic Valve: The pulmonic valve was not well visualized. Trace   pulmonic valve regurgitation.  Aorta: The aortic root is normal in size and structure.  Pulmonary Artery: The pulmonary artery is not well seen.  Venous: The inferior vena cava was normal sized, with respiratory size   variation greater than 50%.      Summary:   1. Left ventricular ejection fraction, by visual estimation, is 20 to   25%. No LV thrombus.   2. Severely decreased global left ventricular systolic function.   3. Multiple left ventricular regional wall motion abnormalities exist.   See wall motion findings.   4. Severely enlarged left atrium.   5. There is mild septal left ventricular hypertrophy.   6. Mildly reduced RV systolic function.   7. Moderate to severe mitral valve regurgitation.   8. Thickening and calcification of the anterior and posterior mitral   valve leaflets.   9. Moderate tricuspid regurgitation.  10. Mild aortic regurgitation.  11. Endocardial visualization was enhanced with intravenous echo contrast.  12. Moderately decreased mitral leaflet mobility.    MD Tresa Electronically signed on 2022 at 4:39:04 PM            *** Final ***              CAL BENAVIDEZ DO; Attending Cardiologist  This document has been electronically signed. 2022  4:39PM

## 2022-01-03 NOTE — PHYSICAL THERAPY INITIAL EVALUATION ADULT - ADDITIONAL COMMENTS
Pt lives in a house with 5 STEPHANIE with HR and no steps inside. Pt does not have DME. Spouse is home and able to assist as needed.

## 2022-01-03 NOTE — CONSULT NOTE ADULT - SUBJECTIVE AND OBJECTIVE BOX
[place] [facility], [place], [facility]                                                    [address], [address], [place] [address]                                                         PHONE: [phone]    FAX: [phone] [address], [address], [place] [address]                                                 PHONE: [phone]    FAX: [phone]  *******************************************************************************    Reason for Consult: Chest pain    HPI:  TATIANA RIBERA is a 87y Male with h/o CAD s/p MI & multiple stents, ICM, PPM, chronic atrial fibrillation (on Eliquis), HTN, HL, RA a/w substernal chest pain.  Patient was admitted to [facility] from 12/28-12/30/21 with atypical chest pain and was discharged home.  Yesterday he developed recurrent chest pain and was noted to have an inferior STEMI.  He had an emergent cardiac catheterization s/p atherectomy and stent to the RCA with known LAD ISR.  He is now asymptomatic with no chest pain, SOB, palpitations, LH, syncope, orthopnea, PND, LE edema.  No fever, chills or cough.    PAST MEDICAL & SURGICAL HISTORY:  HTN (hypertension)    Hyperlipidemia    CAD (coronary artery disease)    Stented coronary artery    MI (myocardial infarction)  1988    Other persistent atrial fibrillation    History of hemorrhoidectomy    History of inguinal hernia repair    Absent tonsil    Pacemaker        Lipitor (Muscle Pain)  Plavix (Other)      MEDICATIONS  (STANDING):  apixaban 5 milliGRAM(s) Oral two times a day  aspirin enteric coated 81 milliGRAM(s) Oral daily  clopidogrel Tablet 75 milliGRAM(s) Oral daily  influenza  Vaccine (HIGH DOSE) 0.7 milliLiter(s) IntraMuscular once  metoprolol succinate ER 50 milliGRAM(s) Oral daily  pantoprazole    Tablet 40 milliGRAM(s) Oral before breakfast  simvastatin 40 milliGRAM(s) Oral at bedtime  sodium chloride 0.9% lock flush 3 milliLiter(s) IV Push every 8 hours    MEDICATIONS  (PRN):      Social History: no active tobacco / EtOH / IVDA    Family History: No pertinent family history in first degree relatives        ROS: As noted above, otherwise unremarkable.    Vital Signs Last 24 Hrs  T(C): 36.8 (03 Jan 2022 03:40), Max: 37.1 (02 Jan 2022 10:43)  T(F): 98.3 (03 Jan 2022 03:40), Max: 98.8 (02 Jan 2022 10:43)  HR: 75 (03 Jan 2022 07:00) (75 - 98)  BP: 93/46 (03 Jan 2022 07:00) (89/46 - 168/74)  BP(mean): 61 (03 Jan 2022 07:00) (59 - 90)  RR: 26 (03 Jan 2022 07:00) (11 - 27)  SpO2: 98% (03 Jan 2022 07:00) (96% - 100%)    I&O's Detail    02 Jan 2022 07:01  -  03 Jan 2022 07:00  --------------------------------------------------------  IN:    Oral Fluid: 590 mL  Total IN: 590 mL    OUT:    Voided (mL): 880 mL  Total OUT: 880 mL    Total NET: -290 mL        I&O's Summary    02 Jan 2022 07:01  -  03 Jan 2022 07:00  --------------------------------------------------------  IN: 590 mL / OUT: 880 mL / NET: -290 mL            PHYSICAL EXAM:  General: Appears well developed, well nourished, no acute distress  HEENT: Head: normocephalic, atraumatic  Eyes: Pupils equal and reactive  Neck: Supple, no carotid bruit, no JVD, no HJR  CARDIOVASCULAR: Normal S1 and S2, no murmur, rub, or gallop  LUNGS: Clear to auscultation bilaterally, no rales, rhonchi or wheeze  ABDOMEN: Soft, nontender, non-distended, positive bowel sounds, no mass or bruit  EXTREMITIES: No edema, distal pulses WNL  SKIN: Warm and dry with normal turgor  NEURO: Alert & oriented x 3, grossly intact  PSYCH: normal mood and affect    LABS:                        11.7   8.03  )-----------( 210      ( 03 Jan 2022 03:48 )             35.4     01-03    134<L>  |  102  |  27.2<H>  ----------------------------<  92  3.9   |  18.0<L>  |  1.06    Ca    8.3<L>      03 Jan 2022 03:48    TPro  6.3<L>  /  Alb  3.3  /  TBili  1.0  /  DBili  x   /  AST  228<H>  /  ALT  35  /  AlkPhos  72  01-03    CARDIAC MARKERS ( 02 Jan 2022 19:50 )  x     / 6.95 ng/mL / x     / x     / x      CARDIAC MARKERS ( 02 Jan 2022 14:52 )  x     / 2.95 ng/mL / 1458 U/L / x     / 152.9 ng/mL  CARDIAC MARKERS ( 02 Jan 2022 10:59 )  x     / 0.43 ng/mL / 124 U/L / x     / x          PT/INR - ( 02 Jan 2022 19:50 )   PT: 19.2 sec;   INR: 1.70 ratio         PTT - ( 02 Jan 2022 19:50 )  PTT:33.2 sec    RADIOLOGY & ADDITIONAL STUDIES:    ECG: NSR @ 92 bpm, non-specific IVCD, inferior acute STEMI, possible RV involvement    CARDIAC CATHETERIZATION:  HEMODYNAMICS: Hemodynamic assessment demonstrates moderately elevated  LVEDP.  VENTRICLES: Analysis of regional contractile function demonstrated severe  anterolateral hypokinesis, apical akinesis, and diaphragmatic akinesis.  Global left ventricular function was severely depressed. EF calculated by  contrast ventriculography was 25 % and EF estimated was 30 %.  VALVES: AORTIC VALVE: There was no aortic stenosis.  CORONARY VESSELS: The coronary circulation is right dominant.  LM:   --  LM: Normal.  LAD:   --  Mid LAD: There was a diffuse 80 % stenosis at the site of a  prior stent.  CX:   --  Circumflex: Angiography showed minor luminal irregularities with  no flow limiting lesions.  --  Mid circumflex: There was a tubular 0 % stenosis at the site of a prior  stent.  RCA:   --  Proximal RCA: There was a tubular 95 % stenosis at the site of a  prior stent.  --  Distal RCA: There was a 50 % stenosis.  --  RPDA: There was a tubular 90 % stenosis. The lesion was associated with  a small filling defect consistent with thrombus. There was CASSANDRA grade 3  flow through the vessel (brisk flow).  --  RPLS: There was a discrete 90 % stenosis at the site of a prior stent.  COMPLICATIONS: No complications occurred during the cath lab visit. No  complications occurred during the cath lab visit. No complications  occurred during the cath lab visit.  SUMMARY:  HEMODYNAMICS: Hemodynamic assessment demonstrates moderately elevated  LVEDP.  DIAGNOSTIC IMPRESSIONS: There is significant double vessel coronary artery  disease.  A successful drug-eluting stent was performed on the 95 % lesion in the  proximal RCA. Following intervention there was an excellent angiographic  appearance with a 10 % residual stenosis. The intervention was performed  at the site of a prior brachytherapy and drug-eluting stent.   DIAGNOSTIC RECOMMENDATIONS: Add clopidogrel (Plavix). Add aspirin. Continue  eliquis  INTERVENTIONAL RECOMMENDATIONS: Add clopidogrel (Plavix). Add aspirin.  Continue eliquis Patient management should include aggressive medical  therapy. Consider pci of LAD      Telemetery personally reviewed: NSR 70-80s, no events      Assessment and Plan:  In summary, TATIANA RIBERA is a 87M a/w chest pain, inferior STEMI s/p PCI/BEVERLY pRCA, residual 80% mLAD ISR, h/o CAD s/p MI & multiple stents, ICM, PPM, chronic atrial fibrillation (on Eliquis), HTN, HL, RA    - Monitor on telemetry x24 hrs  - Repeat EKG  - Echocardiogram today  - Patient clinically stable s/p PCI/BEVERLY pRCA.  There is a residual 80% mLAD in-stent restenosis.  He is asymptomatic with no chest pain and stable hemodynamics.  Plan for outpatient   - Rhythm/hemodynamics stable   - Will treat with Lopressor 25 mg PO q12h & Nitropaste 1/2" q6h for now and titrate PRN  - ASA daily in place  - Check lipids, Lipitor 20 mg PO daily for now    We will follow with you.  Thank you for allowing me to participate in the care of your patient.      Sincerely,    Senthil Giron MD                                                               [place] [facility], [facility], [place]                                                    [address], [address], [place] [address]                                                         PHONE: [phone]    FAX: [phone] [address], [address], [place] [address]                                                 PHONE: [phone]    FAX: [phone]  *******************************************************************************    Reason for Consult: Chest pain    HPI:  TATIANA RIBERA is a 87y Male with h/o CAD s/p MI & multiple stents, ICM, PPM, chronic atrial fibrillation (on Eliquis), HTN, HL, RA a/w substernal chest pain.  Patient was admitted to [facility] from 12/28-12/30/21 with atypical chest pain and was discharged home.  Yesterday he developed recurrent chest pain and was noted to have an inferior STEMI.  He had an emergent cardiac catheterization s/p atherectomy and stent to the RCA with known LAD ISR.  He is now asymptomatic with no chest pain, SOB, palpitations, LH, syncope, orthopnea, PND, LE edema.  No fever, chills or cough.    PAST MEDICAL & SURGICAL HISTORY:  HTN (hypertension)    Hyperlipidemia    CAD (coronary artery disease)    Stented coronary artery    MI (myocardial infarction)  1988    Other persistent atrial fibrillation    History of hemorrhoidectomy    History of inguinal hernia repair    Absent tonsil    Pacemaker        Lipitor (Muscle Pain)  Plavix (Other)      MEDICATIONS  (STANDING):  apixaban 5 milliGRAM(s) Oral two times a day  aspirin enteric coated 81 milliGRAM(s) Oral daily  clopidogrel Tablet 75 milliGRAM(s) Oral daily  influenza  Vaccine (HIGH DOSE) 0.7 milliLiter(s) IntraMuscular once  metoprolol succinate ER 50 milliGRAM(s) Oral daily  pantoprazole    Tablet 40 milliGRAM(s) Oral before breakfast  simvastatin 40 milliGRAM(s) Oral at bedtime  sodium chloride 0.9% lock flush 3 milliLiter(s) IV Push every 8 hours    MEDICATIONS  (PRN):      Social History: no active tobacco / EtOH / IVDA    Family History: No pertinent family history in first degree relatives        ROS: As noted above, otherwise unremarkable.    Vital Signs Last 24 Hrs  T(C): 36.8 (03 Jan 2022 03:40), Max: 37.1 (02 Jan 2022 10:43)  T(F): 98.3 (03 Jan 2022 03:40), Max: 98.8 (02 Jan 2022 10:43)  HR: 75 (03 Jan 2022 07:00) (75 - 98)  BP: 93/46 (03 Jan 2022 07:00) (89/46 - 168/74)  BP(mean): 61 (03 Jan 2022 07:00) (59 - 90)  RR: 26 (03 Jan 2022 07:00) (11 - 27)  SpO2: 98% (03 Jan 2022 07:00) (96% - 100%)    I&O's Detail    02 Jan 2022 07:01  -  03 Jan 2022 07:00  --------------------------------------------------------  IN:    Oral Fluid: 590 mL  Total IN: 590 mL    OUT:    Voided (mL): 880 mL  Total OUT: 880 mL    Total NET: -290 mL        I&O's Summary    02 Jan 2022 07:01  -  03 Jan 2022 07:00  --------------------------------------------------------  IN: 590 mL / OUT: 880 mL / NET: -290 mL            PHYSICAL EXAM:  General: Appears well developed, well nourished, no acute distress  HEENT: Head: normocephalic, atraumatic  Eyes: Pupils equal and reactive  Neck: Supple, no carotid bruit, no JVD, no HJR  CARDIOVASCULAR: Normal S1 and S2, II/VI HSM > apex  LUNGS: Clear to auscultation bilaterally, no rales, rhonchi or wheeze  ABDOMEN: Soft, nontender, non-distended, positive bowel sounds, no mass or bruit  EXTREMITIES: No edema, distal pulses WNL  SKIN: Warm and dry with normal turgor  NEURO: Alert & oriented x 3, grossly intact  PSYCH: normal mood and affect    LABS:                        11.7   8.03  )-----------( 210      ( 03 Jan 2022 03:48 )             35.4     01-03    134<L>  |  102  |  27.2<H>  ----------------------------<  92  3.9   |  18.0<L>  |  1.06    Ca    8.3<L>      03 Jan 2022 03:48    TPro  6.3<L>  /  Alb  3.3  /  TBili  1.0  /  DBili  x   /  AST  228<H>  /  ALT  35  /  AlkPhos  72  01-03    CARDIAC MARKERS ( 02 Jan 2022 19:50 )  x     / 6.95 ng/mL / x     / x     / x      CARDIAC MARKERS ( 02 Jan 2022 14:52 )  x     / 2.95 ng/mL / 1458 U/L / x     / 152.9 ng/mL  CARDIAC MARKERS ( 02 Jan 2022 10:59 )  x     / 0.43 ng/mL / 124 U/L / x     / x          PT/INR - ( 02 Jan 2022 19:50 )   PT: 19.2 sec;   INR: 1.70 ratio         PTT - ( 02 Jan 2022 19:50 )  PTT:33.2 sec    RADIOLOGY & ADDITIONAL STUDIES:    ECG: NSR @ 92 bpm, non-specific IVCD, inferior acute STEMI, possible RV involvement    CARDIAC CATHETERIZATION:  HEMODYNAMICS: Hemodynamic assessment demonstrates moderately elevated  LVEDP.  VENTRICLES: Analysis of regional contractile function demonstrated severe  anterolateral hypokinesis, apical akinesis, and diaphragmatic akinesis.  Global left ventricular function was severely depressed. EF calculated by  contrast ventriculography was 25 % and EF estimated was 30 %.  VALVES: AORTIC VALVE: There was no aortic stenosis.  CORONARY VESSELS: The coronary circulation is right dominant.  LM:   --  LM: Normal.  LAD:   --  Mid LAD: There was a diffuse 80 % stenosis at the site of a  prior stent.  CX:   --  Circumflex: Angiography showed minor luminal irregularities with  no flow limiting lesions.  --  Mid circumflex: There was a tubular 0 % stenosis at the site of a prior  stent.  RCA:   --  Proximal RCA: There was a tubular 95 % stenosis at the site of a  prior stent.  --  Distal RCA: There was a 50 % stenosis.  --  RPDA: There was a tubular 90 % stenosis. The lesion was associated with  a small filling defect consistent with thrombus. There was CASSANDRA grade 3  flow through the vessel (brisk flow).  --  RPLS: There was a discrete 90 % stenosis at the site of a prior stent.  COMPLICATIONS: No complications occurred during the cath lab visit. No  complications occurred during the cath lab visit. No complications  occurred during the cath lab visit.  SUMMARY:  HEMODYNAMICS: Hemodynamic assessment demonstrates moderately elevated  LVEDP.  DIAGNOSTIC IMPRESSIONS: There is significant double vessel coronary artery  disease.  A successful drug-eluting stent was performed on the 95 % lesion in the  proximal RCA. Following intervention there was an excellent angiographic  appearance with a 10 % residual stenosis. The intervention was performed  at the site of a prior brachytherapy and drug-eluting stent.   DIAGNOSTIC RECOMMENDATIONS: Add clopidogrel (Plavix). Add aspirin. Continue  eliquis  INTERVENTIONAL RECOMMENDATIONS: Add clopidogrel (Plavix). Add aspirin.  Continue eliquis Patient management should include aggressive medical  therapy. Consider pci of LAD    Echocardiogram (1/2/22):  PHYSICIAN INTERPRETATION:  Left Ventricle: Endocardial visualization was enhanced with intravenous   echo contrast. The left ventricular internal cavity size is normal.  Global LV systolic function was severely decreased. Left ventricular   ejection fraction, by visual estimation, is 20 to 25%.  LV Wall Scoring:  The entire inferior wall, mid and apical anterior septum, mid and apical  inferior septum, basal and mid inferolateral wall, and apex are akinetic.   The entire anterior wall, basal and mid anterolateral wall, basal anteroseptal  segment, basal inferoseptal segment, and apical lateral segment are  hypokinetic.  Right Ventricle: RV systolic function is mildly reduced. TV S' 0.1 m/s.  Left Atrium: Severely enlarged left atrium.  Right Atrium: The right atrium is normal in size.  Pericardium: Trivial pericardial effusion is present. The pericardial   effusion is localized near the right atrium. There is no evidence of   cardiac tamponade.  Mitral Valve: Thickening and calcification of the anterior and posterior   mitral valve leaflets. Mobility is moderately decreased for both   leaflets. Moderate to severe mitral valve regurgitation is seen.  Tricuspid Valve: Moderate tricuspid regurgitation is visualized.  Aortic Valve: Peak transaortic gradient equals 3.8 mmHg, mean transaortic   gradient equals 1.8 mmHg, the calculated aortic valve area equals 2.95   cm² by the continuity equation consistent with normally opening aortic   valve. Mild aortic valve regurgitation is seen.  Pulmonic Valve: The pulmonic valve was not well visualized. Trace   pulmonic valve regurgitation.  Aorta: The aortic root is normal in size and structure.  Pulmonary Artery: The pulmonary artery is not well seen.  Venous: The inferior vena cava was normal sized, with respiratory size   variation greater than 50%.  Summary:   1. Left ventricular ejection fraction, by visual estimation, is 20 to   25%. No LV thrombus.   2. Severely decreased global left ventricular systolic function.   3. Multiple left ventricular regional wall motion abnormalities exist.   See wall motion findings.   4. Severely enlarged left atrium.   5. There is mild septal left ventricular hypertrophy.   6. Mildly reduced RV systolic function.   7. Moderate to severe mitral valve regurgitation.   8. Thickening and calcification of the anterior and posterior mitral   valve leaflets.   9. Moderate tricuspid regurgitation.  10. Mild aortic regurgitation.  11. Endocardial visualization was enhanced with intravenous echo contrast.  12. Moderately decreased mitral leaflet mobility.      Telemetry personally reviewed: NSR 70-80s, no events      Assessment and Plan:  In summary, TATIANA RIBERA is a 87M a/w chest pain, inferior STEMI s/p PCI/BEVERLY pRCA (with residual 80% mLAD ISR), h/o CAD s/p MI & multiple stents, ICM (EF 20-25%), moderate-severe MR, PPM, chronic atrial fibrillation (on Eliquis), HTN, HL, RA    - Monitor on telemetry x24 hrs  - Repeat EKG  - Patient clinically stable s/p PCI/BEVERLY pRCA.  There is a residual 80% mLAD in-stent restenosis.  He is asymptomatic with no chest pain and stable hemodynamics.  He does not want further inpatient procedures performed at this time.  Plan for eventual repeat PCI/stent mLAD to be scheduled as an outpatient in a few weeks (he is again requesting a radial approach).  A full description of the risks, benefits and alternatives were provided to the patient.  He demonstrates clear understanding and wishes to proceed with this treatment plan.  - Echocardiogram 1/2/22 demonstrated severely decreased LV fxn (EF 20-25%). The entire inferior wall, mid and apical anterior septum, mid and apical inferior septum, basal and mid inferolateral wall, and apex are akinetic. The entire anterior wall, basal and mid anterolateral wall, basal anteroseptal segment, basal inferoseptal segment, and apical lateral segment are  hypokinetic. RV systolic function is mildly reduced, severe LAE, trivial pericardial effusion is present. The pericardial effusion is localized near the right atrium. There is no evidence of   cardiac tamponade. Mild AR, moderate-severe MR, trace AZ, moderate TR.  - Rhythm/hemodynamics stable.  Continue current dose of Toprol XL 50 daily for now and titrate PRN.  - ASA 81 & Plavix 75 daily in place for now  - Simvastatin 40 mg PO daily in place (lipids 1/3/22: , LDL 62, HDL 30, Trig 106)  - Patient has a h/o chronic AF with an increased [id number] score maintained on oral AC with Eliquis 5 bid which was resumed, no evidence of bleeding.  - Monitor today, out of bed, ambulate.  If patient remains asymptomatic and clinically stable, plan for discharge home tomorrow morning with outpatient follow-up at [facility] within 1 week.    We will follow with you.  Thank you for allowing me to participate in the care of your patient.      Sincerely,    Senthil Giron MD

## 2022-01-03 NOTE — CHART NOTE - NSCHARTNOTEFT_GEN_A_CORE
Cardiology NP follow up note/ Radial site check/ care managed by [facility]  seen by Dr. Yee this am   Awake alert   denies complaints of chest pain/sob/dizziness/palps  Right radial site no hematoma, good radial pulse, no bleeding noted   dsg removed  Radial care discussed, f/u with [facility] for staged procedure, med compliance discussed  meds reviewed , on Simvastatin 40 daily, unable to tolerate lipitor due to muscle aches  consider adding ACE/ ARB as BP and pt hemodynamically stable . Cardiology NP follow up note/ Radial site check/ care managed by [facility]  seen by Dr. Yee this am   Awake alert   denies complaints of chest pain/sob/dizziness/palps  Right radial site no hematoma, good radial pulse, no bleeding noted   dsg removed  Radial care discussed, f/u with [facility] for staged procedure, med compliance discussed  meds reviewed , on Simvastatin 40 daily, unable to tolerate lipitor due to muscle aches  consider adding ACE/ ARB as BP and pt hemodynamically stable .  cardiac rehab info provided/referral and communication to cardiac rehab completed Cardiology NP follow up note/ Radial site check/ care managed by [facility]  seen by Dr. Yee this am   Awake alert   denies complaints of chest pain/sob/dizziness/palps  Right radial site no hematoma, good radial pulse, no bleeding noted   dsg removed  Radial care discussed, f/u with [facility] for staged procedure, med compliance discussed  meds reviewed , on Simvastatin 40 daily, unable to tolerate lipitor due to muscle aches  pt claims allergy to Plavix, however "not sure of reaction"  consider adding low dose  ACE/ ARB as BP and pt hemodynamically stable .  cardiac rehab info provided/referral and communication to cardiac rehab completed

## 2022-01-03 NOTE — PROGRESS NOTE ADULT - ASSESSMENT
87M with PMHx of HTN, HLD, CAD sp prior stents, PPM, cAF on AC, RA who presented with CP, EKG with inferior STEPHANIE, +trop. CODE STEMI activated. Urgently to cath lab, cath via R radial which revealed EF 25%, LM nml, LAD 80% prior stent instenosis, Cx 0%, pRCA 95% instent restenosis, treated with BEVERLY x 1, manual closure of radial, no complications. Transferred to ICU post procedure. Prior Echo 2018 with EF 50%. No events overnight, patient remains chest pain free this am. Denies SOB this am. Patient seen by Dr. Giron of [facility] this am, recommending telemetry for 24 hours. LAD stent restenosis will be dealt with as an outpatient per Cardiology.    #Chest pain  - Found to have IWSTEMI S/P PCI/BEVERLY pRCA  - Appreciate cardiology recs- will c/w toprol, ASA, PLavix and simvastatin  - Patient has residual 80% mLAD in-stent re-stensois and will need repeat PCI/stent mLAD in few weeks  - will need outpatient follow up with [facility]    #A. fib  - c/w toprol and eliquis    Dispo- Pending PT recs 87M with PMHx of HTN, HLD, CAD sp prior stents, PPM, cAF on AC, RA who presented with CP, EKG with inferior STEPHANIE, +trop. CODE STEMI activated. Urgently to cath lab, cath via R radial which revealed EF 25%, LM nml, LAD 80% prior stent instenosis, Cx 0%, pRCA 95% instent restenosis, treated with BEVERLY x 1, manual closure of radial, no complications. Transferred to ICU post procedure. Prior Echo 2018 with EF 50%. No events overnight, patient remains chest pain free this am. Denies SOB this am. Patient seen by Dr. Giron of [facility] this am, recommending telemetry for 24 hours. LAD stent restenosis will be dealt with as an outpatient per Cardiology.    #Chest pain  - Hx of CAD w/ 14 prior stents  - Found to have IWSTEMI S/P PCI/BEVERLY pRCA  - Appreciate cardiology recs- will c/w toprol, ASA, Brilinta and simvastatin  - Patient has residual 80% mLAD in-stent re-stensois and will need repeat PCI/stent mLAD in few weeks. However, patient endorses getting this done during this admission. Discussed with cardiology, will switch Eliquis --> lovenox  - will need outpatient follow up with [facility]    #A. fib  - c/w toprol  - Switched Eliquis --> Lovenox    Updated patient's wife, Honey on 1/3.   Dispo- Pending PT recs- home w/ no skilled PT needs. Will monitor on tele for 24 hours

## 2022-01-03 NOTE — PROGRESS NOTE ADULT - ASSESSMENT
Impression:  1. acute inferior STEMI, CAD, sp cardiac cath, placement of stent in RCA  2. acute systolic heart failure ? in setting of MI, no signs of decompensated HF currently  3. essential HTN  4. HLD  5. moderate to severe MR      Plan:  Neuro - nonfocal, no neurosuppressants    CV - hemodynamically stable, a paced on tele, no ectopy noted         DAPT with ASA/plavix         cont eliquis for cAF underlying         cont BB, goal HR <110         keep K~4 and Mg>2 for optimal arrhythmia suppression         CTS consulted, at this point does not appear to have anatomy amendable to surgical intervention          high intensity statin, check lipid panel         if Cr stable in am will initiate ACE post MI         trend trop         f/u Cardio    Pulm - stable, no labored    GI -  PO diet as tolerated    Renal - Cr stable, strict I/Os, check repeat BMP this am.    Heme -  full AC with eliquis in addition to DAPT with ASA/plavix, no signs of active bleeding    ID - stable, no indication for abx at this time.      Endo -  Aggressive glycemic control to limit FS glucose to < 180mg/dl, check A1c, BS stable, TSH low, check free T4.         Impression:  1. acute inferior STEMI, CAD, sp cardiac cath, placement of stent in RCA  2. acute systolic heart failure ? in setting of MI, no signs of decompensated HF currently  3. essential HTN  4. HLD  5. moderate to severe MR      Plan:  Neuro - nonfocal, no neurosuppressants    CV - hemodynamically stable, a paced on tele, no ectopy noted         DAPT with ASA/plavix         cont eliquis for cAF underlying         cont BB, goal HR <110         keep K~4 and Mg>2 for optimal arrhythmia suppression         CTS consulted, at this point does not appear to have anatomy amendable to surgical intervention          high intensity statin, check lipid panel         if Cr stable and BP will allow will reevaluate in am for ACE initiation         trend trop         f/u Cardio    Pulm - stable, no labored    GI -  PO diet as tolerated    Renal - Cr stable, strict I/Os, check repeat BMP this am.    Heme -  full AC with eliquis in addition to DAPT with ASA/plavix, no signs of active bleeding    ID - stable, no indication for abx at this time.      Endo -  Aggressive glycemic control to limit FS glucose to < 180mg/dl, check A1c, BS stable, TSH low, check free T4.

## 2022-01-03 NOTE — PROGRESS NOTE ADULT - SUBJECTIVE AND OBJECTIVE BOX
Subjective: Patient requesting additional stents rather than open heart surgery. States "I'm 87 years old, I do not need a big operation" Patient also complaints of dypnea on exertion while walking in hallway with nurse, better at rest no associated with chest pain. denies CP, palpitations, cough, fever, chills, itchiness/rash, diaphoresis, vision changes, HA, dizziness/lightheadedness, numbness/tingling, abd pain, N/V.    VITAL SIGNS  Vital Signs Last 24 Hrs  T(C): 36.7 (22 @ 11:50), Max: 36.9 (22 @ 23:47)  T(F): 98 (22 @ 11:50), Max: 98.4 (22 @ 23:47)  HR: 78 (22 @ 12:00) (75 - 98)  BP: 120/64 (22 @ 12:00) (80/25 - 130/65)  RR: 21 (22 @ 12:00) (11 - 27)  SpO2: 100% (22 @ 12:00) (96% - 100%)  on (O2)             MEDICATIONS  apixaban 5 milliGRAM(s) Oral two times a day  aspirin enteric coated 81 milliGRAM(s) Oral daily  influenza  Vaccine (HIGH DOSE) 0.7 milliLiter(s) IntraMuscular once  metoprolol succinate ER 50 milliGRAM(s) Oral daily  pantoprazole    Tablet 40 milliGRAM(s) Oral before breakfast  simvastatin 40 milliGRAM(s) Oral at bedtime  sodium chloride 0.9% lock flush 3 milliLiter(s) IV Push every 8 hours  ticagrelor 90 milliGRAM(s) Oral every 12 hours    PHYSICAL EXAM  Constitutional: NAD  Neuro: A+O x 3, non-focal, speech clear and intact  HEENT: NC/AT  CV: +S1S2, no murmurs or rub  Pulm/chest:  diminished at b/l base, otherwise lung sounds CTA and equal bilaterally, no accessory muscle use noted  Abd: +BS, soft, NT, ND  Ext: HUERTA x 4, no edema  Skin: warm, well perfused  Psych: calm, appropriate affect     @ 07:01  -   @ 07:00  --------------------------------------------------------  IN: 590 mL / OUT: 880 mL / NET: -290 mL     @ 07:01  -   @ 13:32  --------------------------------------------------------  IN: 340 mL / OUT: 300 mL / NET: 40 mL    Weights:  Daily     Daily Weight in k (2022 01:16)  Admit Wt: Drug Dosing Weight  Height (cm): 170.2 (2022 10:43)  Weight (kg): 72 (2022 13:30)  BMI (kg/m2): 24.9 (2022 13:30)  BSA (m2): 1.83 (2022 13:30)    LABS      134<L>  |  102  |  27.2<H>  ----------------------------<  92  3.9   |  18.0<L>  |  1.06    Ca    8.3<L>      2022 03:48    TPro  6.3<L>  /  Alb  3.3  /  TBili  1.0  /  DBili  x   /  AST  228<H>  /  ALT  35  /  AlkPhos  72                                   11.7   8.03  )-----------( 210      ( 2022 03:48 )             35.4          PT/INR - ( 2022 19:50 )   PT: 19.2 sec;   INR: 1.70 ratio         PTT - ( 2022 19:50 )  PTT:33.2 sec  Bilirubin Total, Serum: 1.0 mg/dL (22 @ 03:48)    Prealbumin, Serum: 16 mg/dL ( @ 19:50)      Serum Pro-Brain Natriuretic Peptide: 8257 pg/mL (22 @ 19:50)    Last CXR: < from: Xray Chest 2 Views PA/Lat (18 @ 11:56) >  FINDINGS:    The lungs  are clear.  No pleural abnormality is seen.         Heart size within normal limits allowing for magnification effect of AP   projection. Cardiac device wireleads are within right atrium and right   ventricle.     Visualized osseous structures are intact.    IMPRESSION:   No evidence of active chest disease.        Cardiac device wire leads are within right atrium and right ventricle.     Last EKG: < from: 12 Lead ECG (22 @ 10:47) >  Ventricular Rate 92 BPM    Atrial Rate 92 BPM    P-R Interval 150 ms    QRS Duration 128 ms    Q-T Interval 360 ms    QTC Calculation(Bazett) 445 ms    P Axis 49 degrees    R Axis 50 degrees    T Axis 103 degrees    Diagnosis Line *** Poor data quality, interpretation may be adversely affected  Normal sinus rhythm  Non-specific intra-ventricular conduction block  Inferior infarct , possibly acute  Cannot rule out Anteroseptal infarct , age undetermined  T wave abnormality, consider lateral ischemia  ** ** ACUTE MI / STEMI ** **  Consider right ventricular involvement in acute inferior infarct  Abnormal ECG    Echo: < from: TTE Echo Complete w/o Contrast w/ Doppler (22 @ 15:29) >  PHYSICIAN INTERPRETATION:  Left Ventricle: Endocardial visualization was enhanced with intravenous   echo contrast. The left ventricular internal cavity size is normal.  Global LV systolic function was severely decreased. Left ventricular   ejection fraction, by visual estimation, is 20 to 25%.      LV Wall Scoring:  The entire inferior wall, mid and apical anterior septum, mid and apical  inferior septum, basal and mid inferolateral wall, and apex are akinetic.   The  entire anterior wall, basal and mid anterolateral wall, basal anteroseptal  segment, basal inferoseptal segment, and apical lateral segment are  hypokinetic.    Right Ventricle: RV systolic function is mildly reduced. TV S' 0.1 m/s.  Left Atrium: Severely enlarged left atrium.  Right Atrium: The right atrium is normal in size.  Pericardium: Trivial pericardial effusion is present. The pericardial   effusion is localized near the right atrium. There is no evidence of   cardiac tamponade.  Mitral Valve: Thickening and calcification of the anterior and posterior   mitral valve leaflets. Mobility is moderately decreased for both   leaflets. Moderate to severe mitral valve regurgitation is seen.  Tricuspid Valve: Moderate tricuspid regurgitation is visualized.  Aortic Valve: Peak transaortic gradient equals 3.8 mmHg, mean transaortic   gradient equals 1.8 mmHg, the calculated aortic valve area equals 2.95   cm² by the continuity equation consistent with normally opening aortic   valve. Mild aortic valve regurgitation is seen.  Pulmonic Valve: The pulmonic valve was not well visualized. Trace   pulmonic valve regurgitation.  Aorta: The aortic root is normal in size and structure.  Pulmonary Artery: The pulmonary artery is not well seen.  Venous: The inferior vena cava was normal sized, with respiratory size   variation greater than 50%.    Summary:   1. Left ventricular ejection fraction, by visual estimation, is 20 to   25%. No LV thrombus.   2. Severely decreased global left ventricular systolic function.   3. Multiple left ventricular regional wall motion abnormalities exist.   See wall motion findings.   4. Severely enlarged left atrium.   5. There is mild septal left ventricular hypertrophy.   6. Mildly reduced RV systolic function.   7. Moderate to severe mitral valve regurgitation.   8. Thickening and calcification of the anterior and posterior mitral   valve leaflets.   9. Moderate tricuspid regurgitation.  10. Mild aortic regurgitation.  11. Endocardial visualization was enhanced with intravenous echo contrast.  12. Moderately decreased mitral leaflet mobility.    Cath:  < from: Cardiac Catheterization (22 @ 11:22) >  TECHNIQUE: The risks and alternatives of the procedures and conscious  sedation were explained to the patient and informed consent was obtained.  Cardiac catheterization performed emergently. Coronary intervention  performed emergently.  Local anesthetic given. Right radial artery access. Left heart  catheterization. Ventriculography was performed. A 6F AR1.0 LAUNCHER  catheter was utilized. Imaging was performed using an DAMICO projection. Left  coronary artery angiography. The vessel was injected utilizing a jl4  catheter. Right coronary artery angiography. The vessel was injected  utilizing a 6F AR1.0 LAUNCHER catheter. RADIATION EXPOSURE: 32.6 min. A  successful drug-eluting stent was performed on the 95 % lesion in the  proximal RCA. Following intervention there was an excellent angiographic  appearance with a 10 % residual stenosis. The intervention was performed  at the site of a prior brachytherapy and drug-eluting stent. According to  the ACC/AHA classification system, this lesion was a type C lesion. There  was CASSANDRA 3 flow before the procedure and CASSANDRA 3 flow after the procedure.  There was no dissection. Vessel setup was performed. A 6F AR1.0 LAUNCHER  guiding catheter was used to intubate the vessel. Balloon angioplasty was  performed, using a EUPHORA 3.0MM X 20MM balloon, with 8 inflations and a  maximum inflation pressure of 18 diana. During the procedure, a new ASAHI  MARCIANO BLUE 190CM STRAIGHT wire was advanced across the lesion. Balloon  angioplasty was performed, using a 3.5 X 20 NC EUPHORA BALLOON balloon,  with 3 inflations and a maximum inflation pressure of 20 diana. Cutting  balloon angioplasty was performed, using a WOLVERINE CUTTING BALLOON 3MM X  15MM balloon, with 3inflations and a maximum inflation pressure of 14  diana. Laser atherectomy was performed using a RX 1.4MM ELCA CORONARY  catheter and 5 pass(es) across the lesion. Balloon angioplasty was  performed, using a ANGIOSCULPT TEZ RX PTCA 3.5 X 20 X 139 balloon, with 1  inflations and a maximum inflation pressure of 18 diana. Balloon angioplasty  was performed, using a NC EMERGE 4.00 X 8MM balloon, with 7 inflations and  a maximum inflation pressure of 20 diana. During the procedure, a new ASAHI  MARCIANO BLUE 190CM STRAIGHT wire was advanced across the lesion. Balloon  angioplasty was performed, using a 4.0 X 12 NC EUPHORA BALLOON balloon,  with 5 inflations and a maximum inflation pressure of 30 diana. Rotational  atherectomy was performed, with a ROTAPRO2 1.5MM nilam and 7 passes. During  the procedure, a new ROTAWIRE DRIVE FLOPPY wire was advanced across the  lesion. Balloon angioplasty was performed, using a EUPHORA 3.0MM X 30MM  balloon, with 1 inflations and a maximum inflation pressure of 22 diana. A  TAN 4.00 X 38MM drug-eluting stent was placed across the lesion and  deployed at a maximum inflation pressure of 14 diana. Balloon angioplasty  was performed, using a NC EMERGE 4.00 X 20MM balloon, with 1 inflations  and a maximum inflation pressure of 30 diana. Rotational Atherectomy.  CONTRAST GIVEN: Omnipaque 30 ml. Omnipaque 70 ml.  MEDICATIONS GIVEN: Nitroglycerin, 100 mcg, IA. Heparin, 3000 units, IV.  Heparin, 2000 units, IV. Heparin, 2000 units, IV. 1% Lidocaine, 10 ml,  subcutaneously. Cardene, 200 mcg. 0.9NS, 200 ml, IV.  HEMODYNAMICS: Hemodynamic assessment demonstrates moderately elevated  LVEDP.  VENTRICLES: Analysis of regional contractile function demonstrated severe  anterolateral hypokinesis, apical akinesis, and diaphragmatic akinesis.  Global left ventricular function was severely depressed. EF calculated by  contrast ventriculography was 25 % and EF estimated was 30 %.  VALVES: AORTIC VALVE: There was no aortic stenosis.  CORONARY VESSELS: The coronary circulation is right dominant.  LM:   --  LM: Normal.  LAD:   --  Mid LAD: There was a diffuse 80 % stenosis at the site of a  prior stent.  CX:   --  Circumflex: Angiography showed minor luminal irregularities with  no flow limiting lesions.  --  Mid circumflex: There was a tubular 0 % stenosis at the site of a prior  stent.  RCA:   --  Proximal RCA: There was a tubular 95 % stenosis at the site of a  prior stent.  --  Distal RCA: There was a 50 % stenosis.  --  RPDA: There was a tubular 90 % stenosis. The lesion was associated with  a small filling defect consistent with thrombus. There was CASSANDRA grade 3  flow through the vessel (brisk flow).  --  RPLS: There was a discrete 90 % stenosis at the site of a prior stent.  COMPLICATIONS: No complications occurred during the cath lab visit. No  complications occurred during the cath lab visit. No complications  occurred during the cath lab visit.  SUMMARY:  HEMODYNAMICS: Hemodynamic assessment demonstrates moderately elevated  LVEDP.  DIAGNOSTIC IMPRESSIONS: There is significant double vessel coronary artery  disease.  DIAGNOSTIC RECOMMENDATIONS: Add clopidogrel (Plavix). Add aspirin. Continue  eliquis  INTERVENTIONAL RECOMMENDATIONS: Add clopidogrel (Plavix). Add aspirin.  Continue eliquis Patient managementshould include aggressive medical  therapy.  consider pci of LAD  DISPOSITION: The patient left the catheterization laboratory in guarded  condition.  Prepared and signed by  Juan Manuel Wang MD  Signed 2022 13:41:54  HEMODYNAMIC TABLES  Pressures:  Baseline  Pressures:  - HR: 89  Pressures:  - Rhythm:  Pressures:  -- Aortic Pressure (S/D/M): 180/80/99  Pressures:  -- Aortic Pressure (S/D/M): 127/60/83  Pressures:  -- Left Ventricle (s/edp): 145/24/--  Outputs:  Baseline  Outputs:  -- CALCULATIONS[age]  Outputs:  -- CALCULATIONS: Body Surface Area: 1.79  Outputs:  -- CALCULATIONS: Height in cm: 170.00  Outputs:  -- CALCULATIONS: Sex: Male  Outputs:  -- CALCULATIONS: Weight in k.00  Outputs:  -- OUTPUTS: O2 consumption:223.44  Outputs:  -- OUTPUTS: Vo2 Indexed: 125.00    PAST MEDICAL & SURGICAL HISTORY:  HTN (hypertension)    Hyperlipidemia    CAD (coronary artery disease)    Stented coronary artery    MI (myocardial infarction)      Other persistent atrial fibrillation    History of hemorrhoidectomy    History of inguinal hernia repair    Absent tonsil    Pacemaker    ASSESSMENT  87y Male was admitted on (Date) from (home/other facility) with    Prehospital course:    Hospital course:

## 2022-01-04 LAB
ALBUMIN SERPL ELPH-MCNC: 3 G/DL — LOW (ref 3.3–5.2)
ALP SERPL-CCNC: 62 U/L — SIGNIFICANT CHANGE UP (ref 40–120)
ALT FLD-CCNC: 25 U/L — SIGNIFICANT CHANGE UP
ANION GAP SERPL CALC-SCNC: 12 MMOL/L — SIGNIFICANT CHANGE UP (ref 5–17)
AST SERPL-CCNC: 85 U/L — HIGH
BILIRUB SERPL-MCNC: 0.7 MG/DL — SIGNIFICANT CHANGE UP (ref 0.4–2)
BUN SERPL-MCNC: 26.4 MG/DL — HIGH (ref 8–20)
CALCIUM SERPL-MCNC: 8.2 MG/DL — LOW (ref 8.6–10.2)
CHLORIDE SERPL-SCNC: 106 MMOL/L — SIGNIFICANT CHANGE UP (ref 98–107)
CO2 SERPL-SCNC: 19 MMOL/L — LOW (ref 22–29)
CREAT SERPL-MCNC: 1.27 MG/DL — SIGNIFICANT CHANGE UP (ref 0.5–1.3)
GLUCOSE SERPL-MCNC: 93 MG/DL — SIGNIFICANT CHANGE UP (ref 70–99)
HCT VFR BLD CALC: 31.3 % — LOW (ref 39–50)
HGB BLD-MCNC: 10.3 G/DL — LOW (ref 13–17)
MAGNESIUM SERPL-MCNC: 2.1 MG/DL — SIGNIFICANT CHANGE UP (ref 1.6–2.6)
MCHC RBC-ENTMCNC: 29.9 PG — SIGNIFICANT CHANGE UP (ref 27–34)
MCHC RBC-ENTMCNC: 32.9 GM/DL — SIGNIFICANT CHANGE UP (ref 32–36)
MCV RBC AUTO: 90.7 FL — SIGNIFICANT CHANGE UP (ref 80–100)
NT-PROBNP SERPL-SCNC: HIGH PG/ML (ref 0–300)
PLATELET # BLD AUTO: 201 K/UL — SIGNIFICANT CHANGE UP (ref 150–400)
POTASSIUM SERPL-MCNC: 4.4 MMOL/L — SIGNIFICANT CHANGE UP (ref 3.5–5.3)
POTASSIUM SERPL-SCNC: 4.4 MMOL/L — SIGNIFICANT CHANGE UP (ref 3.5–5.3)
PROT SERPL-MCNC: 5.5 G/DL — LOW (ref 6.6–8.7)
RBC # BLD: 3.45 M/UL — LOW (ref 4.2–5.8)
RBC # FLD: 13.6 % — SIGNIFICANT CHANGE UP (ref 10.3–14.5)
SODIUM SERPL-SCNC: 136 MMOL/L — SIGNIFICANT CHANGE UP (ref 135–145)
TROPONIN T SERPL-MCNC: 4.11 NG/ML — HIGH (ref 0–0.06)
WBC # BLD: 7.78 K/UL — SIGNIFICANT CHANGE UP (ref 3.8–10.5)
WBC # FLD AUTO: 7.78 K/UL — SIGNIFICANT CHANGE UP (ref 3.8–10.5)

## 2022-01-04 PROCEDURE — 99233 SBSQ HOSP IP/OBS HIGH 50: CPT

## 2022-01-04 RX ADMIN — SODIUM CHLORIDE 3 MILLILITER(S): 9 INJECTION INTRAMUSCULAR; INTRAVENOUS; SUBCUTANEOUS at 05:25

## 2022-01-04 RX ADMIN — ENOXAPARIN SODIUM 70 MILLIGRAM(S): 100 INJECTION SUBCUTANEOUS at 17:58

## 2022-01-04 RX ADMIN — TICAGRELOR 90 MILLIGRAM(S): 90 TABLET ORAL at 05:27

## 2022-01-04 RX ADMIN — SIMVASTATIN 40 MILLIGRAM(S): 20 TABLET, FILM COATED ORAL at 21:23

## 2022-01-04 RX ADMIN — LOSARTAN POTASSIUM 25 MILLIGRAM(S): 100 TABLET, FILM COATED ORAL at 05:27

## 2022-01-04 RX ADMIN — SODIUM CHLORIDE 3 MILLILITER(S): 9 INJECTION INTRAMUSCULAR; INTRAVENOUS; SUBCUTANEOUS at 12:32

## 2022-01-04 RX ADMIN — TICAGRELOR 90 MILLIGRAM(S): 90 TABLET ORAL at 17:58

## 2022-01-04 RX ADMIN — Medication 81 MILLIGRAM(S): at 12:33

## 2022-01-04 RX ADMIN — PANTOPRAZOLE SODIUM 40 MILLIGRAM(S): 20 TABLET, DELAYED RELEASE ORAL at 05:27

## 2022-01-04 RX ADMIN — Medication 50 MILLIGRAM(S): at 05:27

## 2022-01-04 RX ADMIN — ENOXAPARIN SODIUM 70 MILLIGRAM(S): 100 INJECTION SUBCUTANEOUS at 05:26

## 2022-01-04 RX ADMIN — SODIUM CHLORIDE 3 MILLILITER(S): 9 INJECTION INTRAMUSCULAR; INTRAVENOUS; SUBCUTANEOUS at 21:20

## 2022-01-04 NOTE — PROGRESS NOTE ADULT - SUBJECTIVE AND OBJECTIVE BOX
[facility] Division of Hospital Medicine    Chief Complaint: chest pain      SUBJECTIVE / OVERNIGHT EVENTS: No acute events overnight. HD stable. Denies chest pain and SOB.     Patient denies chest pain, SOB, abd pain, N/V, fever, chills, dysuria or any other complaints. All remainder ROS negative.     MEDICATIONS  (STANDING):  aspirin enteric coated 81 milliGRAM(s) Oral daily  enoxaparin Injectable 70 milliGRAM(s) SubCutaneous two times a day  influenza  Vaccine (HIGH DOSE) 0.7 milliLiter(s) IntraMuscular once  losartan 25 milliGRAM(s) Oral daily  metoprolol succinate ER 50 milliGRAM(s) Oral daily  pantoprazole    Tablet 40 milliGRAM(s) Oral before breakfast  simvastatin 40 milliGRAM(s) Oral at bedtime  sodium chloride 0.9% lock flush 3 milliLiter(s) IV Push every 8 hours  ticagrelor 90 milliGRAM(s) Oral every 12 hours    MEDICATIONS  (PRN):        I&O's Summary    2022 07:01  -  2022 07:00  --------------------------------------------------------  IN: 660 mL / OUT: 1250 mL / NET: -590 mL    2022 07:01  -  2022 13:25  --------------------------------------------------------  IN: 80 mL / OUT: 250 mL / NET: -170 mL        PHYSICAL EXAM:  Vital Signs Last 24 Hrs  T(C): 36.7 (2022 12:04), Max: 37.1 (2022 16:02)  T(F): 98 (2022 12:04), Max: 98.8 (2022 16:02)  HR: 75 (2022 12:00) (75 - 79)  BP: 113/62 (2022 12:00) (90/52 - 121/59)  BP(mean): 77 (2022 12:00) (61 - 94)  RR: 25 (2022 12:00) (14 - 30)  SpO2: 99% (2022 12:00) (96% - 100%)    CONSTITUTIONAL: NAD, well-developed  RESPIRATORY: Normal respiratory effort; lungs are clear to auscultation bilaterally  CARDIOVASCULAR: Regular rate and rhythm, normal S1 and S2  ABDOMEN: Nontender to palpation, normoactive bowel sounds  PSYCH: A+O to person, place, and time; affect appropriate  NEUROLOGY: CN 2-12 are intact and symmetric; no gross sensory deficits;   SKIN: No rashes; no palpable lesions    LABS:                        10.3   7.78  )-----------( 201      ( 2022 04:04 )             31.3     01-04    136  |  106  |  26.4<H>  ----------------------------<  93  4.4   |  19.0<L>  |  1.27    Ca    8.2<L>      2022 04:04  Mg     2.1     01-04    TPro  5.5<L>  /  Alb  3.0<L>  /  TBili  0.7  /  DBili  x   /  AST  85<H>  /  ALT  25  /  AlkPhos  62  01-04    PT/INR - ( 2022 19:50 )   PT: 19.2 sec;   INR: 1.70 ratio         PTT - ( 2022 19:50 )  PTT:33.2 sec  CARDIAC MARKERS ( 2022 04:04 )  x     / 4.11 ng/mL / x     / x     / x      CARDIAC MARKERS ( 2022 19:50 )  x     / 6.95 ng/mL / x     / x     / x      CARDIAC MARKERS ( 2022 14:52 )  x     / 2.95 ng/mL / 1458 U/L / x     / 152.9 ng/mL      Urinalysis Basic - ( 2022 03:49 )    Color: Yellow / Appearance: Clear / S.010 / pH: x  Gluc: x / Ketone: Negative  / Bili: Negative / Urobili: Negative mg/dL   Blood: x / Protein: Negative / Nitrite: Negative   Leuk Esterase: Negative / RBC: x / WBC x   Sq Epi: x / Non Sq Epi: x / Bacteria: x        CAPILLARY BLOOD GLUCOSE            RADIOLOGY & ADDITIONAL TESTS:  Results Reviewed:   Imaging Personally Reviewed:  Electrocardiogram Personally Reviewed:

## 2022-01-04 NOTE — PROGRESS NOTE ADULT - ASSESSMENT
87M with PMHx of HTN, HLD, CAD sp prior stents, PPM, cAF on AC, RA who presented with CP, EKG with inferior STEPHANIE, +trop. CODE STEMI activated. Urgently to cath lab, cath via R radial which revealed EF 25%, LM nml, LAD 80% prior stent instenosis, Cx 0%, pRCA 95% instent restenosis, treated with BEVERLY x 1, manual closure of radial, no complications. Transferred to ICU post procedure. Prior Echo 2018 with EF 50%. No events overnight, patient remains chest pain free this am. Denies SOB this am. Patient seen by Dr. Giron of [facility] this am, recommending telemetry for 24 hours. LAD stent restenosis will be dealt with as an outpatient per Cardiology.    #Chest pain- IWSTEMI  - Hx of CAD w/ 14 prior stents  - Found to have IWSTEMI S/P PCI/BEVERLY pRCA  - Appreciate cardiology recs- will c/w toprol, ASA, Brilinta and simvastatin  - Patient has residual 80% mLAD in-stent re-stensois and will need repeat PCI/stent mLAD in few weeks. However, patient endorses getting this done during this admission. Discussed with cardiology, switched Eliquis --> lovenox  - Will need repeat PCI/stent on thursday due to in-stent stenosis  - will need outpatient follow up with [facility]    #ALYSHA. fib  - c/w toprol XL 50 mg QD  - C/w Lovenox    #ICM  - c/w losartan 25 mg QD  - Will touch base with cardiology in regards to lifevest    Updated patient's wife, Honey on 1/3.   Dispo- Pending cardiac cath. PT recs- home w/ no skilled PT needs.

## 2022-01-04 NOTE — PROGRESS NOTE ADULT - SUBJECTIVE AND OBJECTIVE BOX
[facility], [facility]                                                    [address], [address], [place] [address]                                                         PHONE: [phone]    FAX: [phone] [address], [address], [place] [address]                                                 PHONE: [phone]    FAX: [phone]  *******************************************************************************  cc: STEMI    HPI: 87y Male with h/o CAD s/p MI & multiple stents, ICM, PPM, chronic atrial fibrillation (on Eliquis), HTN, HL, RA a/w substernal chest pain.  Patient was admitted to [facility] from 12/28-12/30/21 with atypical chest pain and was discharged home.  On day of presentation to [facility] patient developed recurrent chest pain and was noted to have an inferior STEMI.  He had an emergent cardiac catheterization s/p atherectomy and stent to the RCA with known LAD ISR.  He is now asymptomatic with no chest pain, SOB, palpitations, LH, syncope, orthopnea, PND, LE edema.  No fever, chills or cough.      Overnight events/Subjective Assessment: no new CV complaints    INTERPRETATION OF TELEMETRY (personally reviewed): SR    PAST MEDICAL & SURGICAL HISTORY:  HTN (hypertension)    Hyperlipidemia    CAD (coronary artery disease)    Stented coronary artery    MI (myocardial infarction)  1988    Other persistent atrial fibrillation    History of hemorrhoidectomy    History of inguinal hernia repair    Absent tonsil    Pacemaker        Lipitor (Muscle Pain)  Plavix (Other)      MEDICATIONS  (STANDING):  aspirin enteric coated 81 milliGRAM(s) Oral daily  enoxaparin Injectable 70 milliGRAM(s) SubCutaneous two times a day  influenza  Vaccine (HIGH DOSE) 0.7 milliLiter(s) IntraMuscular once  losartan 25 milliGRAM(s) Oral daily  metoprolol succinate ER 50 milliGRAM(s) Oral daily  pantoprazole    Tablet 40 milliGRAM(s) Oral before breakfast  simvastatin 40 milliGRAM(s) Oral at bedtime  sodium chloride 0.9% lock flush 3 milliLiter(s) IV Push every 8 hours  ticagrelor 90 milliGRAM(s) Oral every 12 hours    MEDICATIONS  (PRN):      Vital Signs Last 24 Hrs  T(C): 36.9 (04 Jan 2022 04:00), Max: 37.1 (03 Jan 2022 16:02)  T(F): 98.5 (04 Jan 2022 04:00), Max: 98.8 (03 Jan 2022 16:02)  HR: 75 (04 Jan 2022 07:00) (75 - 79)  BP: 110/54 (04 Jan 2022 07:00) (80/25 - 121/59)  BP(mean): 71 (04 Jan 2022 07:00) (42 - 80)  RR: 18 (04 Jan 2022 07:00) (18 - 30)  SpO2: 99% (04 Jan 2022 07:00) (96% - 100%)    I&O's Detail    03 Jan 2022 07:01  -  04 Jan 2022 07:00  --------------------------------------------------------  IN:    Oral Fluid: 660 mL  Total IN: 660 mL    OUT:    Voided (mL): 1250 mL  Total OUT: 1250 mL    Total NET: -590 mL        I&O's Summary    03 Jan 2022 07:01  -  04 Jan 2022 07:00  --------------------------------------------------------  IN: 660 mL / OUT: 1250 mL / NET: -590 mL            PHYSICAL EXAM:  General: Appears well developed, well nourished, no acute distress. not in acute pain  HEAD: normal cephalic. Atraumatic  PUPILS: equal and reactive to light  EARS: normal hearing  NECK: supple. no JVD or HJR. no carotid bruits. no visible lymphadenopathy  NOSE: no gross abnormalities  CHEST: symmetric chest wall expansion  CARDIOVASCULAR: Normal rate. Regular rhythm. Normal S1 and S2, no S3/S4,  no murmur, rub, or gallop  LUNGS: Normal effort. Normal respiratory rate. Breath sounds are clear to auscultation bilaterally. No respiratory distress. No stridor.  no rales, rhonchi or wheeze. no decreased Breath sounds  ABDOMEN: Soft, nontender, non-distended, positive bowel sounds, no mass or bruit. no abdominal tenderness. No rebound. no ascites  EXTREMITIES: No clubbing, cyanosis or edema. normal range of motion  PULSES:  distal pulses WNL  SKIN: Warm and dry with normal turgor. no visible rash or cyanosis   NEURO: Alert & oriented x 3, grossly intact with no focal weakness  PSYCH: normal mood and affect. Grossly normal insight and judgement exhibited    FAMILY HISTORY:  No pertinent family history of ischemic heart disease in mother, father or in first degree relatives        SOCIAL HISTORY:  no active smoking. No ETOH/No IVDA    REVIEW OF SYSTEMS:  All pertinent positive and negative ROS as above. All other ROS are negative.         LABS:                        10.3   7.78  )-----------( 201      ( 04 Jan 2022 04:04 )             31.3     01-04    136  |  106  |  26.4<H>  ----------------------------<  93  4.4   |  19.0<L>  |  1.27    Ca    8.2<L>      04 Jan 2022 04:04  Mg     2.1     01-04    TPro  5.5<L>  /  Alb  3.0<L>  /  TBili  0.7  /  DBili  x   /  AST  85<H>  /  ALT  25  /  AlkPhos  62  01-04    CARDIAC MARKERS ( 04 Jan 2022 04:04 )  x     / 4.11 ng/mL / x     / x     / x      CARDIAC MARKERS ( 02 Jan 2022 19:50 )  x     / 6.95 ng/mL / x     / x     / x      CARDIAC MARKERS ( 02 Jan 2022 14:52 )  x     / 2.95 ng/mL / 1458 U/L / x     / 152.9 ng/mL  CARDIAC MARKERS ( 02 Jan 2022 10:59 )  x     / 0.43 ng/mL / 124 U/L / x     / x          PT/INR - ( 02 Jan 2022 19:50 )   PT: 19.2 sec;   INR: 1.70 ratio         PTT - ( 02 Jan 2022 19:50 )  PTT:33.2 sec  Serum Pro-Brain Natriuretic Peptide: 15813 pg/mL (01-04 @ 04:04)  Serum Pro-Brain Natriuretic Peptide: 8257 pg/mL (01-02 @ 19:50)  serum  Lipids:     Thyroid Stimulating Hormone, Serum: 0.15 uIU/mL (01-02 @ 19:50)      RADIOLOGY & ADDITIONAL STUDIES:    ECG:    ECHO: < from: TTE Echo Complete w/o Contrast w/ Doppler (01.02.22 @ 15:29) >  Summary:   1. Left ventricular ejection fraction, by visual estimation, is 20 to   25%. No LV thrombus.   2. Severely decreased global left ventricular systolic function.   3. Multiple left ventricular regional wall motion abnormalities exist.   See wall motion findings.   4. Severely enlarged left atrium.   5. There is mild septal left ventricular hypertrophy.   6. Mildly reduced RV systolic function.   7. Moderate to severe mitral valve regurgitation.   8. Thickening and calcification of the anterior and posterior mitral   valve leaflets.   9. Moderate tricuspid regurgitation.  10. Mild aortic regurgitation.  11. Endocardial visualization was enhanced with intravenous echo contrast.  12. Moderately decreased mitral leaflet mobility.    MD Tresa Electronically signed on 1/2/2022 at 4:39:04 PM    < end of copied text >      CARDIAC CATHETERIZATION: < from: Cardiac Catheterization (01.02.22 @ 11:22) >  VENTRICLES: Analysis of regional contractile function demonstrated severe  anterolateral hypokinesis, apical akinesis, and diaphragmatic akinesis.  Global left ventricular function was severely depressed. EF calculated by  contrast ventriculography was 25 % and EF estimated was 30 %.  VALVES: AORTIC VALVE: There was no aortic stenosis.  CORONARY VESSELS: The coronary circulation is right dominant.  LM:   --  LM: Normal.  LAD:   --  Mid LAD: There was a diffuse 80 % stenosis at the site of a  prior stent.  CX:   --  Circumflex: Angiography showed minor luminal irregularities with  no flow limiting lesions.  --  Mid circumflex: There was a tubular 0 % stenosis at the site of a prior  stent.  RCA:   --  Proximal RCA: There was a tubular 95 % stenosis at the site of a  prior stent.  --  Distal RCA: There was a 50 % stenosis.  --  RPDA: There was a tubular 90 % stenosis. The lesion was associated with  a small filling defect consistent with thrombus. There was CASSANDRA grade 3  flow through the vessel (brisk flow).  --  RPLS: There was a discrete 90 % stenosis at the site of a prior stent.  COMPLICATIONS: No complications occurred during the cath lab visit. No  complications occurred during the cath lab visit. No complications  occurred during the cath lab visit.    < end of copied text >      ASSESSMENT AND PLAN:  In summary, TATIANA RIBERA is a 87y Male with past medical history significant for       Key Fischer MD                                                               [facility], [facility]                                                    [address], [address], [place] [address]                                                         PHONE: [phone]    FAX: [phone] [address], [address], [place] [address]                                                 PHONE: [phone]    FAX: [phone]  *******************************************************************************  cc: STEMI    HPI: 87y Male with h/o CAD s/p MI & multiple stents, ICM, PPM, chronic atrial fibrillation (on Eliquis), HTN, HL, RA a/w substernal chest pain.  Patient was admitted to [facility] from -21 with atypical chest pain and was discharged home.  On day of presentation to [facility] patient developed recurrent chest pain and was noted to have an inferior STEMI.  He had an emergent cardiac catheterization s/p atherectomy and stent to the RCA with known LAD ISR.  He is now asymptomatic with no chest pain, SOB, palpitations, LH, syncope, orthopnea, PND, LE edema.  No fever, chills or cough.      Overnight events/Subjective Assessment: no new CV complaints complains of SOB. no chest pain.    INTERPRETATION OF TELEMETRY (personally reviewed): SR    PAST MEDICAL & SURGICAL HISTORY:  HTN (hypertension)    Hyperlipidemia    CAD (coronary artery disease)    Stented coronary artery    MI (myocardial infarction)  1988    Other persistent atrial fibrillation    History of hemorrhoidectomy    History of inguinal hernia repair    Absent tonsil    Pacemaker        Lipitor (Muscle Pain)  Plavix (Other)      MEDICATIONS  (STANDING):  aspirin enteric coated 81 milliGRAM(s) Oral daily  enoxaparin Injectable 70 milliGRAM(s) SubCutaneous two times a day  influenza  Vaccine (HIGH DOSE) 0.7 milliLiter(s) IntraMuscular once  losartan 25 milliGRAM(s) Oral daily  metoprolol succinate ER 50 milliGRAM(s) Oral daily  pantoprazole    Tablet 40 milliGRAM(s) Oral before breakfast  simvastatin 40 milliGRAM(s) Oral at bedtime  sodium chloride 0.9% lock flush 3 milliLiter(s) IV Push every 8 hours  ticagrelor 90 milliGRAM(s) Oral every 12 hours    MEDICATIONS  (PRN):      Vital Signs Last 24 Hrs  T(C): 36.9 (2022 04:00), Max: 37.1 (2022 16:02)  T(F): 98.5 (2022 04:00), Max: 98.8 (2022 16:02)  HR: 75 (2022 07:00) (75 - 79)  BP: 110/54 (2022 07:00) (80/25 - 121/59)  BP(mean): 71 (2022 07:00) (42 - 80)  RR: 18 (2022 07:00) (18 - 30)  SpO2: 99% (2022 07:00) (96% - 100%)    I&O's Detail    2022 07:01  -  2022 07:00  --------------------------------------------------------  IN:    Oral Fluid: 660 mL  Total IN: 660 mL    OUT:    Voided (mL): 1250 mL  Total OUT: 1250 mL    Total NET: -590 mL        I&O's Summary    2022 07:01  -  2022 07:00  --------------------------------------------------------  IN: 660 mL / OUT: 1250 mL / NET: -590 mL            PHYSICAL EXAM:  General: Appears well developed, well nourished, no acute distress. not in acute pain  HEAD: normal cephalic. Atraumatic  PUPILS: equal and reactive to light  EARS: normal hearing  NECK: supple. no JVD or HJR. no carotid bruits. no visible lymphadenopathy  NOSE: no gross abnormalities  CHEST: symmetric chest wall expansion  CARDIOVASCULAR: Normal rate. Regular rhythm. Normal S1 and S2, no S3/S4,  no murmur, rub, or gallop  LUNGS: Normal effort. Normal respiratory rate. Breath sounds are clear to auscultation bilaterally. No respiratory distress. No stridor.  no rales, rhonchi or wheeze. no decreased Breath sounds  ABDOMEN: Soft, nontender, non-distended, positive bowel sounds, no mass or bruit. no abdominal tenderness. No rebound. no ascites  EXTREMITIES: No clubbing, cyanosis or edema. normal range of motion  PULSES:  distal pulses WNL  SKIN: Warm and dry with normal turgor. no visible rash or cyanosis   NEURO: Alert & oriented x 3, grossly intact with no focal weakness  PSYCH: normal mood and affect. Grossly normal insight and judgement exhibited    FAMILY HISTORY:  No pertinent family history of ischemic heart disease in mother, father or in first degree relatives        SOCIAL HISTORY:  no active smoking. No ETOH/No IVDA    REVIEW OF SYSTEMS:  All pertinent positive and negative ROS as above. All other ROS are negative.         LABS:                        10.3   7.78  )-----------( 201      ( 2022 04:04 )             31.3     01-04    136  |  106  |  26.4<H>  ----------------------------<  93  4.4   |  19.0<L>  |  1.27    Ca    8.2<L>      2022 04:04  Mg     2.1     01-04    TPro  5.5<L>  /  Alb  3.0<L>  /  TBili  0.7  /  DBili  x   /  AST  85<H>  /  ALT  25  /  AlkPhos  62  01-04    CARDIAC MARKERS ( 2022 04:04 )  x     / 4.11 ng/mL / x     / x     / x      CARDIAC MARKERS ( 2022 19:50 )  x     / 6.95 ng/mL / x     / x     / x      CARDIAC MARKERS ( 2022 14:52 )  x     / 2.95 ng/mL / 1458 U/L / x     / 152.9 ng/mL  CARDIAC MARKERS ( 2022 10:59 )  x     / 0.43 ng/mL / 124 U/L / x     / x          PT/INR - ( 2022 19:50 )   PT: 19.2 sec;   INR: 1.70 ratio         PTT - ( 2022 19:50 )  PTT:33.2 sec  Serum Pro-Brain Natriuretic Peptide: 95570 pg/mL ( @ 04:04)  Serum Pro-Brain Natriuretic Peptide: 8257 pg/mL ( @ 19:50)  serum  Lipids:     Thyroid Stimulating Hormone, Serum: 0.15 uIU/mL ( @ 19:50)      RADIOLOGY & ADDITIONAL STUDIES:    EC/3 evolving IPLWMI HR 76    ECHO: < from: TTE Echo Complete w/o Contrast w/ Doppler (22 @ 15:29) >  Summary:   1. Left ventricular ejection fraction, by visual estimation, is 20 to   25%. No LV thrombus.   2. Severely decreased global left ventricular systolic function.   3. Multiple left ventricular regional wall motion abnormalities exist.   See wall motion findings.   4. Severely enlarged left atrium.   5. There is mild septal left ventricular hypertrophy.   6. Mildly reduced RV systolic function.   7. Moderate to severe mitral valve regurgitation.   8. Thickening and calcification of the anterior and posterior mitral   valve leaflets.   9. Moderate tricuspid regurgitation.  10. Mild aortic regurgitation.  11. Endocardial visualization was enhanced with intravenous echo contrast.  12. Moderately decreased mitral leaflet mobility.    MD Tresa Electronically signed on 2022 at 4:39:04 PM    < end of copied text >      CARDIAC CATHETERIZATION: < from: Cardiac Catheterization (22 @ 11:22) >  VENTRICLES: Analysis of regional contractile function demonstrated severe  anterolateral hypokinesis, apical akinesis, and diaphragmatic akinesis.  Global left ventricular function was severely depressed. EF calculated by  contrast ventriculography was 25 % and EF estimated was 30 %.  VALVES: AORTIC VALVE: There was no aortic stenosis.  CORONARY VESSELS: The coronary circulation is right dominant.  LM:   --  LM: Normal.  LAD:   --  Mid LAD: There was a diffuse 80 % stenosis at the site of a  prior stent.  CX:   --  Circumflex: Angiography showed minor luminal irregularities with  no flow limiting lesions.  --  Mid circumflex: There was a tubular 0 % stenosis at the site of a prior  stent.  RCA:   --  Proximal RCA: There was a tubular 95 % stenosis at the site of a  prior stent.  --  Distal RCA: There was a 50 % stenosis.  --  RPDA: There was a tubular 90 % stenosis. The lesion was associated with  a small filling defect consistent with thrombus. There was CASSANDRA grade 3  flow through the vessel (brisk flow).  --  RPLS: There was a discrete 90 % stenosis at the site of a prior stent.  COMPLICATIONS: No complications occurred during the cath lab visit. No  complications occurred during the cath lab visit. No complications  occurred during the cath lab visit.    < end of copied text >      ASSESSMENT AND PLAN:  In summary, TATIANA RIBERA is a 87y Male with past medical history significant for h/o CAD s/p MI & multiple stents, ICM, PPM, chronic atrial fibrillation (on Eliquis), HTN, HL, RA a/w substernal chest pain.  Patient was admitted to [facility] from -21 with atypical chest pain and was discharged home.  On day of presentation to [facility] patient developed recurrent chest pain and was noted to have an inferior STEMI.  He had an emergent cardiac catheterization s/p atherectomy and stent to the RCA with known LAD ISR.  He is now asymptomatic with no chest pain, SOB, palpitations, LH, syncope, orthopnea, PND, LE edema.  No fever, chills or cough.    - STEMI. Peak troponin 6.95.  s/p PCI/BEVERLY pRCA.  There is a residual 80% mLAD in-stent restenosis.  He is asymptomatic with no chest pain and stable hemodynamics. He c/o SOB. Plan for intervention and  repeat PCI/stent mLAD on Thursday due to in stent stenosis.     - Risk, benefits and treatment alternatives have been dw the patient. Pt wishes for procedure to be done while in patient and this will be scheduled for Thursday with Dr Thomas.     - Echocardiogram 22 demonstrated severely decreased LV fxn (EF 20-25%). The entire inferior wall, mid and apical anterior septum, mid and apical inferior septum, basal and mid inferolateral wall, and apex are akinetic. The entire anterior wall, basal and mid anterolateral wall, basal anteroseptal segment, basal inferoseptal segment, and apical lateral segment are hypokinetic. RV systolic function is mildly reduced, severe LAE, trivial pericardial effusion is present. The pericardial effusion is localized near the right atrium. There is no evidence of cardiac tamponade. Mild AR, moderate-severe MR, trace AL, moderate TR.    - Rhythm/hemodynamics stable.  Continue current dose of Toprol XL 50 daily for now and titrate PRN.    - ASA 81 & brilinta in place for now due to BEVERLY    - Mild SOB most likely due to brilinta. Lungs are clear on exam. Hopefully will tolerate brilinta    - Component of anxiety.    - ICM. on losartan 25mg daily. May require lifevest on DC    - HTN. metoprolol in place. BP is controlled.    - HL.  Simvastatin 40 mg PO daily in place (lipids 1/3/22: , LDL 62, HDL 30, Trig 106)    - Patient has a h/o chronic AF with an increased [id number] score maintained on oral AC with Eliquis 5 bid, to be held in anticipation of cath with stenting of the LAD planned for Thursday.    - Telemetry monitoring personally reviewed by me. SR    - ECG personally reviewed by me. evolving IPLWMI with ST depressions noted    - Echocardiogram imaging personally reviewed by me    - radiologic imaging reviewed    - Laboratory data reviewed.    - I spoke with nursing Kim in the unit.    - I have personally reviewed all obtainable prior records and data    Key Fischer MD

## 2022-01-05 LAB
ALBUMIN SERPL ELPH-MCNC: 3 G/DL — LOW (ref 3.3–5.2)
ALP SERPL-CCNC: 66 U/L — SIGNIFICANT CHANGE UP (ref 40–120)
ALT FLD-CCNC: 20 U/L — SIGNIFICANT CHANGE UP
ANION GAP SERPL CALC-SCNC: 14 MMOL/L — SIGNIFICANT CHANGE UP (ref 5–17)
AST SERPL-CCNC: 38 U/L — SIGNIFICANT CHANGE UP
BILIRUB SERPL-MCNC: 0.7 MG/DL — SIGNIFICANT CHANGE UP (ref 0.4–2)
BUN SERPL-MCNC: 26.7 MG/DL — HIGH (ref 8–20)
CALCIUM SERPL-MCNC: 8.1 MG/DL — LOW (ref 8.6–10.2)
CHLORIDE SERPL-SCNC: 101 MMOL/L — SIGNIFICANT CHANGE UP (ref 98–107)
CO2 SERPL-SCNC: 19 MMOL/L — LOW (ref 22–29)
CREAT SERPL-MCNC: 1.29 MG/DL — SIGNIFICANT CHANGE UP (ref 0.5–1.3)
GLUCOSE SERPL-MCNC: 102 MG/DL — HIGH (ref 70–99)
HCT VFR BLD CALC: 33.8 % — LOW (ref 39–50)
HGB BLD-MCNC: 11 G/DL — LOW (ref 13–17)
MCHC RBC-ENTMCNC: 30.4 PG — SIGNIFICANT CHANGE UP (ref 27–34)
MCHC RBC-ENTMCNC: 32.5 GM/DL — SIGNIFICANT CHANGE UP (ref 32–36)
MCV RBC AUTO: 93.4 FL — SIGNIFICANT CHANGE UP (ref 80–100)
PLATELET # BLD AUTO: 212 K/UL — SIGNIFICANT CHANGE UP (ref 150–400)
POTASSIUM SERPL-MCNC: 4.3 MMOL/L — SIGNIFICANT CHANGE UP (ref 3.5–5.3)
POTASSIUM SERPL-SCNC: 4.3 MMOL/L — SIGNIFICANT CHANGE UP (ref 3.5–5.3)
PROT SERPL-MCNC: 5.9 G/DL — LOW (ref 6.6–8.7)
RBC # BLD: 3.62 M/UL — LOW (ref 4.2–5.8)
RBC # FLD: 13.3 % — SIGNIFICANT CHANGE UP (ref 10.3–14.5)
SODIUM SERPL-SCNC: 134 MMOL/L — LOW (ref 135–145)
WBC # BLD: 10.68 K/UL — HIGH (ref 3.8–10.5)
WBC # FLD AUTO: 10.68 K/UL — HIGH (ref 3.8–10.5)

## 2022-01-05 PROCEDURE — 99233 SBSQ HOSP IP/OBS HIGH 50: CPT

## 2022-01-05 RX ORDER — METOPROLOL TARTRATE 50 MG
2.5 TABLET ORAL ONCE
Refills: 0 | Status: COMPLETED | OUTPATIENT
Start: 2022-01-05 | End: 2022-01-05

## 2022-01-05 RX ORDER — DIAZEPAM 5 MG
2 TABLET ORAL
Refills: 0 | Status: DISCONTINUED | OUTPATIENT
Start: 2022-01-05 | End: 2022-01-08

## 2022-01-05 RX ORDER — MAGNESIUM HYDROXIDE 400 MG/1
30 TABLET, CHEWABLE ORAL ONCE
Refills: 0 | Status: COMPLETED | OUTPATIENT
Start: 2022-01-05 | End: 2022-01-05

## 2022-01-05 RX ADMIN — Medication 2 MILLIGRAM(S): at 13:56

## 2022-01-05 RX ADMIN — SODIUM CHLORIDE 3 MILLILITER(S): 9 INJECTION INTRAMUSCULAR; INTRAVENOUS; SUBCUTANEOUS at 21:04

## 2022-01-05 RX ADMIN — Medication 2.5 MILLIGRAM(S): at 21:23

## 2022-01-05 RX ADMIN — Medication 5 MILLIGRAM(S): at 17:34

## 2022-01-05 RX ADMIN — MAGNESIUM HYDROXIDE 30 MILLILITER(S): 400 TABLET, CHEWABLE ORAL at 21:45

## 2022-01-05 RX ADMIN — Medication 0.25 MILLIGRAM(S): at 07:57

## 2022-01-05 RX ADMIN — Medication 81 MILLIGRAM(S): at 10:56

## 2022-01-05 RX ADMIN — ENOXAPARIN SODIUM 70 MILLIGRAM(S): 100 INJECTION SUBCUTANEOUS at 21:23

## 2022-01-05 RX ADMIN — TICAGRELOR 90 MILLIGRAM(S): 90 TABLET ORAL at 05:30

## 2022-01-05 RX ADMIN — Medication 0.5 MILLIGRAM(S): at 23:09

## 2022-01-05 RX ADMIN — Medication 2 MILLIGRAM(S): at 19:26

## 2022-01-05 RX ADMIN — Medication 50 MILLIGRAM(S): at 05:28

## 2022-01-05 RX ADMIN — TICAGRELOR 90 MILLIGRAM(S): 90 TABLET ORAL at 17:35

## 2022-01-05 RX ADMIN — SODIUM CHLORIDE 3 MILLILITER(S): 9 INJECTION INTRAMUSCULAR; INTRAVENOUS; SUBCUTANEOUS at 05:22

## 2022-01-05 RX ADMIN — SODIUM CHLORIDE 3 MILLILITER(S): 9 INJECTION INTRAMUSCULAR; INTRAVENOUS; SUBCUTANEOUS at 13:18

## 2022-01-05 RX ADMIN — ENOXAPARIN SODIUM 70 MILLIGRAM(S): 100 INJECTION SUBCUTANEOUS at 10:55

## 2022-01-05 RX ADMIN — Medication 0.25 MILLIGRAM(S): at 10:55

## 2022-01-05 NOTE — PROGRESS NOTE ADULT - SUBJECTIVE AND OBJECTIVE BOX
[facility] Division of Hospital Medicine    Chief Complaint: chest pain    SUBJECTIVE / OVERNIGHT EVENTS: No acute events overnight. Patient endorses feeling anxious and endorses SOB. Denies f/c/h/d/n/v.     Patient denies chest pain, SOB, abd pain, N/V, fever, chills, dysuria or any other complaints. All remainder ROS negative.     MEDICATIONS  (STANDING):  aspirin enteric coated 81 milliGRAM(s) Oral daily  enoxaparin Injectable 70 milliGRAM(s) SubCutaneous two times a day  influenza  Vaccine (HIGH DOSE) 0.7 milliLiter(s) IntraMuscular once  losartan 25 milliGRAM(s) Oral daily  metoprolol succinate ER 50 milliGRAM(s) Oral daily  pantoprazole    Tablet 40 milliGRAM(s) Oral before breakfast  predniSONE   Tablet 5 milliGRAM(s) Oral daily  simvastatin 40 milliGRAM(s) Oral at bedtime  sodium chloride 0.9% lock flush 3 milliLiter(s) IV Push every 8 hours  ticagrelor 90 milliGRAM(s) Oral every 12 hours    MEDICATIONS  (PRN):  diazepam    Tablet 2 milliGRAM(s) Oral two times a day PRN anxiety        I&O's Summary    04 Jan 2022 07:01  -  05 Jan 2022 07:00  --------------------------------------------------------  IN: 620 mL / OUT: 1400 mL / NET: -780 mL    05 Jan 2022 07:01  -  05 Jan 2022 14:23  --------------------------------------------------------  IN: 0 mL / OUT: 150 mL / NET: -150 mL        PHYSICAL EXAM:  Vital Signs Last 24 Hrs  T(C): 36.7 (05 Jan 2022 12:00), Max: 36.7 (04 Jan 2022 19:11)  T(F): 98 (05 Jan 2022 12:00), Max: 98.1 (04 Jan 2022 19:11)  HR: 73 (05 Jan 2022 12:00) (73 - 82)  BP: 130/67 (05 Jan 2022 10:55) (84/42 - 145/74)  BP(mean): 88 (05 Jan 2022 10:55) (55 - 98)  RR: 16 (05 Jan 2022 12:00) (14 - 27)  SpO2: 98% (05 Jan 2022 12:00) (98% - 100%)    CONSTITUTIONAL: NAD, well-developed  RESPIRATORY: Normal respiratory effort; lungs are clear to auscultation bilaterally  CARDIOVASCULAR: Regular rate and rhythm, normal S1 and S2  ABDOMEN: Nontender to palpation, normoactive bowel sounds  PSYCH: A+O to person, place, and time; affect appropriate  NEUROLOGY: CN 2-12 are intact and symmetric; no gross sensory deficits;   SKIN: No rashes; no palpable lesions    LABS:                        11.0   10.68 )-----------( 212      ( 05 Jan 2022 06:40 )             33.8     01-05    134<L>  |  101  |  26.7<H>  ----------------------------<  102<H>  4.3   |  19.0<L>  |  1.29    Ca    8.1<L>      05 Jan 2022 06:40  Mg     2.1     01-04    TPro  5.9<L>  /  Alb  3.0<L>  /  TBili  0.7  /  DBili  x   /  AST  38  /  ALT  20  /  AlkPhos  66  01-05      CARDIAC MARKERS ( 04 Jan 2022 04:04 )  x     / 4.11 ng/mL / x     / x     / x              CAPILLARY BLOOD GLUCOSE            RADIOLOGY & ADDITIONAL TESTS:  Results Reviewed:   Imaging Personally Reviewed:  Electrocardiogram Personally Reviewed:

## 2022-01-05 NOTE — PROGRESS NOTE ADULT - ASSESSMENT
87M with PMHx of HTN, HLD, CAD sp prior stents, PPM, cAF on AC, RA who presented with CP, EKG with inferior STEPHANIE, +trop. CODE STEMI activated. Urgently to cath lab, cath via R radial which revealed EF 25%, LM nml, LAD 80% prior stent instenosis, Cx 0%, pRCA 95% instent restenosis, treated with BEVRELY x 1, manual closure of radial, no complications. Transferred to ICU post procedure. Prior Echo 2018 with EF 50%. No events overnight, patient remains chest pain free this am. Denies SOB this am. Patient seen by Dr. Giron of [facility] this am, recommending telemetry for 24 hours. LAD stent restenosis will be dealt with as an outpatient per Cardiology.    #Chest pain- IWSTEMI  - Hx of CAD w/ 14 prior stents  - Found to have IWSTEMI S/P PCI/BEVERLY pRCA  - Appreciate cardiology recs- will c/w toprol, ASA, Brilinta and simvastatin  - Patient has residual 80% mLAD in-stent re-stensois and will need repeat PCI/stent mLAD in few weeks. However, patient endorses getting this done during this admission. Discussed with cardiology, switched Eliquis --> lovenox  - Will need repeat PCI/stent on thursday due to in-stent stenosis  - will need outpatient follow up with [facility]    #A. fib  - c/w toprol XL 50 mg QD  - C/w Lovenox    #RA  - started home prednisone 5 mg QD    #Anxiety  - c/w Valium 2 mg BID PRN for anxiety    #ICM  - c/w losartan 25 mg QD  - Will touch base with cardiology in regards to lifevest    Updated patient's wife, Honey on 1/5.   Dispo- Pending cardiac cath. PT recs- home w/ no skilled PT needs.

## 2022-01-05 NOTE — CHART NOTE - NSCHARTNOTEFT_GEN_A_CORE
Confidential Drug Utilization Report  Search Terms: bib lópez, 02/18/1934  Search Date: 01/05/2022 16:12:38 PM  Searching on behalf of: Myself    The Drug Utilization Report below displays all of the controlled substance prescriptions, if any, that your patient has filled in the last twelve months. The information displayed on this report is compiled from pharmacy submissions to the Department, and accurately reflects the information as submitted by the pharmacies.    This report was requested by: El Donis | Reference #: [id number]    Others' Prescriptions  Patient Name: Bib LópezBirth Date: 02/18/1934  Address: [address] [place] [id number]: Male  Rx Written	Rx Dispensed	Drug	Quantity	Days Supply	Prescriber Name	Prescriber Malena #	Payment Method	Dispenser  11/11/2021	11/13/2021	diazepam 2 mg tablet	90	30	Tootie Eldridge MD	[id number]	[facility]  07/14/2021	07/16/2021	diazepam 2 mg tablet	90	30	Tootie Eldridge MD	[id number]	[facility]  03/23/2021	03/24/2021	diazepam 2 mg tablet	90	30	AzTootie gamez MD	[id number]	[facility]  * - Drugs marked with an asterisk are compound drugs. If the compound drug is made up of more than one controlled substance, then each controlled substance will be a separate row in the table.

## 2022-01-06 ENCOUNTER — TRANSCRIPTION ENCOUNTER (OUTPATIENT)
Age: 87
End: 2022-01-06

## 2022-01-06 LAB
ALBUMIN SERPL ELPH-MCNC: 3.2 G/DL — LOW (ref 3.3–5.2)
ALP SERPL-CCNC: 70 U/L — SIGNIFICANT CHANGE UP (ref 40–120)
ALT FLD-CCNC: 28 U/L — SIGNIFICANT CHANGE UP
ANION GAP SERPL CALC-SCNC: 15 MMOL/L — SIGNIFICANT CHANGE UP (ref 5–17)
AST SERPL-CCNC: 33 U/L — SIGNIFICANT CHANGE UP
BILIRUB SERPL-MCNC: 0.7 MG/DL — SIGNIFICANT CHANGE UP (ref 0.4–2)
BLD GP AB SCN SERPL QL: SIGNIFICANT CHANGE UP
BUN SERPL-MCNC: 30.7 MG/DL — HIGH (ref 8–20)
BUN SERPL-MCNC: 31.1 MG/DL — HIGH (ref 8–20)
BUN SERPL-MCNC: 31.2 MG/DL — HIGH (ref 8–20)
CALCIUM SERPL-MCNC: 8.2 MG/DL — LOW (ref 8.6–10.2)
CALCIUM SERPL-MCNC: 8.3 MG/DL — LOW (ref 8.6–10.2)
CALCIUM SERPL-MCNC: 8.4 MG/DL — LOW (ref 8.6–10.2)
CHLORIDE SERPL-SCNC: 98 MMOL/L — SIGNIFICANT CHANGE UP (ref 98–107)
CHLORIDE SERPL-SCNC: 98 MMOL/L — SIGNIFICANT CHANGE UP (ref 98–107)
CHLORIDE SERPL-SCNC: 99 MMOL/L — SIGNIFICANT CHANGE UP (ref 98–107)
CO2 SERPL-SCNC: 18 MMOL/L — LOW (ref 22–29)
CO2 SERPL-SCNC: 18 MMOL/L — LOW (ref 22–29)
CO2 SERPL-SCNC: 19 MMOL/L — LOW (ref 22–29)
CREAT SERPL-MCNC: 1.15 MG/DL — SIGNIFICANT CHANGE UP (ref 0.5–1.3)
CREAT SERPL-MCNC: 1.15 MG/DL — SIGNIFICANT CHANGE UP (ref 0.5–1.3)
CREAT SERPL-MCNC: 1.28 MG/DL — SIGNIFICANT CHANGE UP (ref 0.5–1.3)
GLUCOSE SERPL-MCNC: 121 MG/DL — HIGH (ref 70–99)
GLUCOSE SERPL-MCNC: 158 MG/DL — HIGH (ref 70–99)
GLUCOSE SERPL-MCNC: 173 MG/DL — HIGH (ref 70–99)
HCT VFR BLD CALC: 32.7 % — LOW (ref 39–50)
HGB BLD-MCNC: 11.1 G/DL — LOW (ref 13–17)
MAGNESIUM SERPL-MCNC: 2.2 MG/DL — SIGNIFICANT CHANGE UP (ref 1.6–2.6)
MAGNESIUM SERPL-MCNC: 2.5 MG/DL — SIGNIFICANT CHANGE UP (ref 1.6–2.6)
MCHC RBC-ENTMCNC: 30.7 PG — SIGNIFICANT CHANGE UP (ref 27–34)
MCHC RBC-ENTMCNC: 33.9 GM/DL — SIGNIFICANT CHANGE UP (ref 32–36)
MCV RBC AUTO: 90.6 FL — SIGNIFICANT CHANGE UP (ref 80–100)
NT-PROBNP SERPL-SCNC: HIGH PG/ML (ref 0–300)
PHOSPHATE SERPL-MCNC: 2.7 MG/DL — SIGNIFICANT CHANGE UP (ref 2.4–4.7)
PHOSPHATE SERPL-MCNC: 3.3 MG/DL — SIGNIFICANT CHANGE UP (ref 2.4–4.7)
PLATELET # BLD AUTO: 237 K/UL — SIGNIFICANT CHANGE UP (ref 150–400)
POTASSIUM SERPL-MCNC: 4.3 MMOL/L — SIGNIFICANT CHANGE UP (ref 3.5–5.3)
POTASSIUM SERPL-MCNC: 4.7 MMOL/L — SIGNIFICANT CHANGE UP (ref 3.5–5.3)
POTASSIUM SERPL-MCNC: 4.8 MMOL/L — SIGNIFICANT CHANGE UP (ref 3.5–5.3)
POTASSIUM SERPL-SCNC: 4.3 MMOL/L — SIGNIFICANT CHANGE UP (ref 3.5–5.3)
POTASSIUM SERPL-SCNC: 4.7 MMOL/L — SIGNIFICANT CHANGE UP (ref 3.5–5.3)
POTASSIUM SERPL-SCNC: 4.8 MMOL/L — SIGNIFICANT CHANGE UP (ref 3.5–5.3)
PROT SERPL-MCNC: 6 G/DL — LOW (ref 6.6–8.7)
RBC # BLD: 3.61 M/UL — LOW (ref 4.2–5.8)
RBC # FLD: 13.4 % — SIGNIFICANT CHANGE UP (ref 10.3–14.5)
SARS-COV-2 RNA SPEC QL NAA+PROBE: SIGNIFICANT CHANGE UP
SODIUM SERPL-SCNC: 131 MMOL/L — LOW (ref 135–145)
SODIUM SERPL-SCNC: 132 MMOL/L — LOW (ref 135–145)
SODIUM SERPL-SCNC: 132 MMOL/L — LOW (ref 135–145)
TROPONIN T SERPL-MCNC: 3.59 NG/ML — HIGH (ref 0–0.06)
WBC # BLD: 12.14 K/UL — HIGH (ref 3.8–10.5)
WBC # FLD AUTO: 12.14 K/UL — HIGH (ref 3.8–10.5)

## 2022-01-06 PROCEDURE — 99232 SBSQ HOSP IP/OBS MODERATE 35: CPT

## 2022-01-06 PROCEDURE — 71045 X-RAY EXAM CHEST 1 VIEW: CPT | Mod: 26

## 2022-01-06 PROCEDURE — 93010 ELECTROCARDIOGRAM REPORT: CPT

## 2022-01-06 RX ORDER — APIXABAN 2.5 MG/1
2.5 TABLET, FILM COATED ORAL
Refills: 0 | Status: DISCONTINUED | OUTPATIENT
Start: 2022-01-07 | End: 2022-01-08

## 2022-01-06 RX ORDER — FUROSEMIDE 40 MG
40 TABLET ORAL ONCE
Refills: 0 | Status: COMPLETED | OUTPATIENT
Start: 2022-01-06 | End: 2022-01-06

## 2022-01-06 RX ORDER — CLOPIDOGREL BISULFATE 75 MG/1
75 TABLET, FILM COATED ORAL DAILY
Refills: 0 | Status: DISCONTINUED | OUTPATIENT
Start: 2022-01-07 | End: 2022-01-08

## 2022-01-06 RX ADMIN — Medication 81 MILLIGRAM(S): at 10:42

## 2022-01-06 RX ADMIN — ENOXAPARIN SODIUM 70 MILLIGRAM(S): 100 INJECTION SUBCUTANEOUS at 22:23

## 2022-01-06 RX ADMIN — LOSARTAN POTASSIUM 25 MILLIGRAM(S): 100 TABLET, FILM COATED ORAL at 05:34

## 2022-01-06 RX ADMIN — Medication 2 MILLIGRAM(S): at 22:21

## 2022-01-06 RX ADMIN — Medication 5 MILLIGRAM(S): at 05:34

## 2022-01-06 RX ADMIN — TICAGRELOR 90 MILLIGRAM(S): 90 TABLET ORAL at 05:34

## 2022-01-06 RX ADMIN — SODIUM CHLORIDE 3 MILLILITER(S): 9 INJECTION INTRAMUSCULAR; INTRAVENOUS; SUBCUTANEOUS at 22:30

## 2022-01-06 RX ADMIN — Medication 50 MILLIGRAM(S): at 10:42

## 2022-01-06 RX ADMIN — Medication 40 MILLIGRAM(S): at 22:55

## 2022-01-06 RX ADMIN — SODIUM CHLORIDE 3 MILLILITER(S): 9 INJECTION INTRAMUSCULAR; INTRAVENOUS; SUBCUTANEOUS at 05:51

## 2022-01-06 NOTE — DISCHARGE NOTE PROVIDER - NSDCCPTREATMENT_GEN_ALL_CORE_FT
PRINCIPAL PROCEDURE  Procedure: Left heart catheterization  Findings and Treatment: BEVERLY to RCA and laser baloon angioplasty to m/d LAD

## 2022-01-06 NOTE — DISCHARGE NOTE PROVIDER - PROVIDER TOKENS
PROVIDER:[TOKEN:[[id number]],FOLLOWUP:[1 week]] PROVIDER:[TOKEN:[[id number]]],PROVIDER:[TOKEN:[[id number]]]

## 2022-01-06 NOTE — PROGRESS NOTE ADULT - SUBJECTIVE AND OBJECTIVE BOX
[facility], [facility]                                          [address] [facility], [address], [place] [address]                                               PHONE: [phone]    FAX: [phone] [address], [address], [place] [address]                                       PHONE: [phone]    FAX: [phone]  *******************************************************************************    Overnight events/Subjective Assessment:  episode of self-limiting CP overnight in setting of AF with RVR.  Currently in SR.  Now s/p staged intervention on the LAD.  No dyspnea, palpitations.    Lipitor (Muscle Pain)  Plavix (Other)    MEDICATIONS  (STANDING):  aspirin enteric coated 81 milliGRAM(s) Oral daily  enoxaparin Injectable 70 milliGRAM(s) SubCutaneous two times a day  influenza  Vaccine (HIGH DOSE) 0.7 milliLiter(s) IntraMuscular once  losartan 25 milliGRAM(s) Oral daily  metoprolol succinate ER 50 milliGRAM(s) Oral daily  pantoprazole    Tablet 40 milliGRAM(s) Oral before breakfast  predniSONE   Tablet 5 milliGRAM(s) Oral daily  simvastatin 40 milliGRAM(s) Oral at bedtime  sodium chloride 0.9% lock flush 3 milliLiter(s) IV Push every 8 hours    MEDICATIONS  (PRN):  diazepam    Tablet 2 milliGRAM(s) Oral two times a day PRN anxiety      Vital Signs Last 24 Hrs  T(C): 36.6 (06 Jan 2022 11:14), Max: 36.8 (06 Jan 2022 05:30)  T(F): 97.8 (06 Jan 2022 11:14), Max: 98.2 (06 Jan 2022 05:30)  HR: 75 (06 Jan 2022 11:14) (54 - 119)  BP: 146/67 (06 Jan 2022 11:14) (105/53 - 149/69)  BP(mean): 98 (05 Jan 2022 16:24) (98 - 98)  RR: 18 (06 Jan 2022 11:14) (16 - 22)  SpO2: 97% (06 Jan 2022 11:14) (93% - 99%)    I&O's Detail    05 Jan 2022 07:01  -  06 Jan 2022 07:00  --------------------------------------------------------  IN:  Total IN: 0 mL    OUT:    Voided (mL): 150 mL  Total OUT: 150 mL    Total NET: -150 mL        I&O's Summary    05 Jan 2022 07:01  -  06 Jan 2022 07:00  --------------------------------------------------------  IN: 0 mL / OUT: 150 mL / NET: -150 mL            PHYSICAL EXAM:  General: Appears well developed, well nourished, no acute distress, elderly  HEENT: Head: normocephalic, atraumatic  Eyes: Pupils equal and reactive  Neck: Supple, no carotid bruit, no JVD, no HJR  CARDIOVASCULAR: Normal S1 and S2, 1/6 HSM  LUNGS: Clear to auscultation bilaterally, no rales, rhonchi or wheeze  ABDOMEN: Soft, nontender, non-distended, positive bowel sounds, no mass or bruit  EXTREMITIES: No edema, distal pulses WNL  SKIN: Warm and dry with normal turgor  NEURO: Alert & oriented x 3, grossly intact  PSYCH: appropriate mood and affect        LABS:                        11.1   12.14 )-----------( 237      ( 06 Jan 2022 05:52 )             32.7     01-06    132<L>  |  99  |  31.2<H>  ----------------------------<  121<H>  4.8   |  18.0<L>  |  1.15    Ca    8.2<L>      06 Jan 2022 05:52  Phos  2.7     01-06  Mg     2.2     01-06    TPro  6.0<L>  /  Alb  3.2<L>  /  TBili  0.7  /  DBili  x   /  AST  33  /  ALT  28  /  AlkPhos  70  01-06    CARDIAC MARKERS ( 06 Jan 2022 01:35 )  x     / 3.59 ng/mL / x     / x     / x            Serum Pro-Brain Natriuretic Peptide: 20643 pg/mL (01-04 @ 04:04)  Serum Pro-Brain Natriuretic Peptide: 8257 pg/mL (01-02 @ 19:50)  serum  Lipids:     Thyroid Stimulating Hormone, Serum: 0.15 uIU/mL (01-02 @ 19:50)    ECHO: < from: TTE Echo Complete w/o Contrast w/ Doppler (01.02.22 @ 15:29) >  Summary:   1. Left ventricular ejection fraction, by visual estimation, is 20 to   25%. No LV thrombus.   2. Severely decreased global left ventricular systolic function.   3. Multiple left ventricular regional wall motion abnormalities exist.   See wall motion findings.   4. Severely enlarged left atrium.   5. There is mild septal left ventricular hypertrophy.   6. Mildly reduced RV systolic function.   7. Moderate to severe mitral valve regurgitation.   8. Thickening and calcification of the anterior and posterior mitral   valve leaflets.   9. Moderate tricuspid regurgitation.  10. Mild aortic regurgitation.  11. Endocardial visualization was enhanced with intravenous echo contrast.  12. Moderately decreased mitral leaflet mobility.    MD Tresa Electronically signed on 1/2/2022 at 4:39:04 PM      ASSESSMENT AND PLAN:  TATIANA RIBERA is a 87y Male with CAD s/p MI & multiple stents, ICM, PPM, chronic atrial fibrillation (on Eliquis), HTN, HL, RA a/w substernal chest pain/inferior STEMI.  He had an emergent cardiac catheterization s/p atherectomy and stent to the RCA with known LAD ISR.     - STEMI. Peak troponin 6.95.  s/p PCI/BEVERLY pRCA.   - s/p laser atherectomy and scoring balloon angioplasty of the mid and distal LAD ISR with significant improvement in luminal area on 1/6/21.  - Echocardiogram 1/2/22 demonstrated severely decreased LV fxn (EF 20-25%). The entire inferior wall, mid and apical anterior septum, mid and apical inferior septum, basal and mid inferolateral wall, and apex are akinetic. The entire anterior wall, basal and mid anterolateral wall, basal anteroseptal segment, basal inferoseptal segment, and apical lateral segment are hypokinetic. RV systolic function is mildly reduced, severe LAE, trivial pericardial effusion is present. The pericardial effusion is localized near the right atrium. There is no evidence of cardiac tamponade. Mild AR, moderate-severe MR, trace IA, moderate TR.  - Rhythm/hemodynamics stable.  Continue current dose of Toprol XL 50 daily and Losartan 25mg daily for now and titrate PRN.  - Continue ASA 81mg daily  - Allergy to Plavix is listed.  He reports body aches.  This is not clearly an allergy.  He will be on Triple Therapy for 1 month.  Plavix is less potent than Brilinta and should result in lower bleeding risk.  Pt agreeable to try Plavix.  If he develops any symptoms, will change to Brilinta.  - ASA, Plavix with Eliquis to resume in AM if access site is stable.  PPI while on triple therapy.  - HL.  Simvastatin 40 mg PO daily in place (lipids 1/3/22: , LDL 62, HDL 30, Trig 106)  - Patient has a h/o chronic AF with an increased [id number] score maintained on oral AC with Eliquis 5 bid  - Discharge in AM if stable    Kaushal Thomas MD

## 2022-01-06 NOTE — DISCHARGE NOTE PROVIDER - NSDCMRMEDTOKEN_GEN_ALL_CORE_FT
Ecotrin Adult Low Strength 81 mg oral delayed release tablet: 1 tab(s) orally once a day  Eliquis 5 mg oral tablet: 1 tab(s) orally 2 times a day  enalapril 20 mg oral tablet: 1 tab(s) orally once a day  metoprolol succinate 50 mg oral tablet, extended release: 1 tab(s) orally once a day  predniSONE 5 mg oral tablet: 1 tab(s) orally once a day  Valium 2 mg oral tablet: 1 tab(s) orally three times daily as needed  confirmed on iSTOP filled 11/13/21 for 30 days supply on    apixaban 2.5 mg oral tablet: 1 tab(s) orally 2 times a day  clopidogrel 75 mg oral tablet: 1 tab(s) orally once a day  Ecotrin Adult Low Strength 81 mg oral delayed release tablet: 1 tab(s) orally once a day  losartan 25 mg oral tablet: 1 tab(s) orally once a day  metoprolol succinate 50 mg oral tablet, extended release: 1 tab(s) orally once a day  pantoprazole 40 mg oral delayed release tablet: 1 tab(s) orally once a day (before a meal)  predniSONE 5 mg oral tablet: 1 tab(s) orally once a day  simvastatin 40 mg oral tablet: 1 tab(s) orally once a day (at bedtime)  Valium 2 mg oral tablet: 1 tab(s) orally three times daily as needed  confirmed on iSTOP filled 11/13/21 for 30 days supply on    apixaban 2.5 mg oral tablet: 1 tab(s) orally 2 times a day  clopidogrel 75 mg oral tablet: 1 tab(s) orally once a day  Ecotrin Adult Low Strength 81 mg oral delayed release tablet: 1 tab(s) orally once a day  losartan 25 mg oral tablet: 1 tab(s) orally once a day  metoprolol succinate 50 mg oral tablet, extended release: 1 tab(s) orally once a day  pantoprazole 40 mg oral delayed release tablet: 1 tab(s) orally once a day (before a meal)  predniSONE 5 mg oral tablet: 1 tab(s) orally once a day  senna oral tablet: 2 tab(s) orally once (at bedtime)  simvastatin 40 mg oral tablet: 1 tab(s) orally once a day (at bedtime)  Valium 2 mg oral tablet: 1 tab(s) orally three times daily as needed  confirmed on iSTOP filled 11/13/21 for 30 days supply on

## 2022-01-06 NOTE — DISCHARGE NOTE PROVIDER - NSDCCPCAREPLAN_GEN_ALL_CORE_FT
PRINCIPAL DISCHARGE DIAGNOSIS  Diagnosis: ST elevation MI (STEMI)  Assessment and Plan of Treatment:        PRINCIPAL DISCHARGE DIAGNOSIS  Diagnosis: ST elevation MI (STEMI)  Assessment and Plan of Treatment: S/P PCI to RCA and LAD  Continue Plavix and ASA 81 mg daily  Continue statin  If any issues develop with your taking Plavix please call your Cardiologist immediately. Do not stop taking unless you speak with your Cardiologist.  Limb precautions      SECONDARY DISCHARGE DIAGNOSES  Diagnosis: Ischemic cardiomyopathy  Assessment and Plan of Treatment: Maintain Losartan  Follow up with your Cardiologist for re evaluation of EF for consideration of ICD    Diagnosis: Atrial fibrillation  Assessment and Plan of Treatment: Continue Toprol for rate control  Your  Eliquis dose has been decreased to 2.5 mg BID  Follow up with your Cardiologist for further management

## 2022-01-06 NOTE — DISCHARGE NOTE NURSING/CASE MANAGEMENT/SOCIAL WORK - NSDCFUADDAPPT_GEN_ALL_CORE_FT
PHARMACY:  [facility]  THE PT. DOES NOT HAVE ANY DIFFICULTY IN OBTAINING OR TAKING HIS MEDICATIONS    CARDIOLOGY FOLLOW UP APPOINTMENT SCHEDULED  ON 1/14/2022 AT 7:00 AM  WITH DR. CAZARES ([phone]) IN THE [place] OFFICE  PLEASE BRING YOUR DISCHARGE PAPERWORK TO YOUR APPOINTMENT           THE PT. DOES NOT HAVE ANY DIFFICULTY IN OBTAINING OR TAKING HIS MEDICATIONS  REQUESTING [facility] TO BED    CARDIOLOGY FOLLOW UP APPOINTMENT SCHEDULED  ON 1/14/2022 AT 7:00 AM  WITH DR. CAZARES ([phone]) IN THE [place] OFFICE  PLEASE BRING YOUR DISCHARGE PAPERWORK TO YOUR APPOINTMENT

## 2022-01-06 NOTE — DISCHARGE NOTE PROVIDER - CARE PROVIDER_API CALL
Senthil Giron)  Cardiology; Internal Medicine  [address]  [place] [address]  Phone: [phone]  Fax: [phone]  Follow Up Time: 1 week   Boston Carrillo)  Cardiac Electrophysiology; Cardiovascular Disease  [address], [address]  [place] [address]  Phone: [phone]  Fax: [phone]  Follow Up Time:     Senthil Giron)  Cardiology; Internal Medicine  [address]  [place] [address]  Phone: [phone]  Fax: [phone]  Follow Up Time:

## 2022-01-06 NOTE — DISCHARGE NOTE PROVIDER - ATTENDING DISCHARGE PHYSICAL EXAMINATION:
Vital Signs Last 24 Hrs  T(F): 97.4 (07 Jan 2022 09:30), Max: 97.9 (06 Jan 2022 22:00)  HR: 75 (07 Jan 2022 09:30) (74 - 81)  BP: 115/63 (07 Jan 2022 09:30) (112/48 - 142/68)  RR: 17 (07 Jan 2022 09:30) (14 - 25)  SpO2: 95% (07 Jan 2022 09:30) (95% - 100%)    Physical Exam:  Constitutional: NAD, awake and alert, well-developed  Neck: Soft and supple, No LAD, No JVD  Respiratory: cta b/l no wheezing no rhonchi  Cardiovascular: +s1/s2 no edema b/l le  Gastrointestinal: soft nt nd bs+  Vascular: 2+ peripheral pulses  Neurological: A/O x 3, no focal deficits  Musculoskeletal: 5/5 strength b/l upper and lower extremities Vital Signs Last 24 Hrs  T(C): 36.3 (08 Jan 2022 08:36), Max: 36.8 (07 Jan 2022 20:32)  T(F): 97.3 (08 Jan 2022 08:36), Max: 98.2 (07 Jan 2022 20:32)  HR: 75 (08 Jan 2022 08:36) (72 - 80)  BP: 127/69 (08 Jan 2022 08:36) (122/63 - 129/65)  BP(mean): --  RR: 18 (08 Jan 2022 08:36) (18 - 18)  SpO2: 98% (08 Jan 2022 08:36) (85% - 99%)    Physical Exam:  Constitutional: NAD, awake and alert, well-developed  Neck: Soft and supple, No LAD, No JVD  Respiratory: cta b/l no wheezing no rhonchi  Cardiovascular: +s1/s2 no edema b/l le  Gastrointestinal: soft nt nd bs+  Vascular: 2+ peripheral pulses  Neurological: A/O x 3, no focal deficits  Musculoskeletal: 5/5 strength b/l upper and lower extremities

## 2022-01-06 NOTE — CHART NOTE - NSCHARTNOTEFT_GEN_A_CORE
Called by RN after pt c/o CP.    87y Male with past medical history significant for h/o CAD s/p MI & multiple stents, ICM, PPM, chronic atrial fibrillation (on Eliquis), HTN, HL, RA a/w substernal chest pain.  Patient was admitted to [facility] from 12/28-12/30/21 with atypical chest pain and was discharged home.  On day of presentation to [facility] patient developed recurrent chest pain and was noted to have an inferior STEMI.  He had an emergent cardiac catheterization s/p atherectomy and stent to the RCA with known LAD ISR.   There is a residual 80% mLAD in-stent restenosis.  Pt continues to c/o SOB. Plan for intervention and  repeat PCI/stent mLAD on Thursday due to in stent stenosis.   Pt was atrially paced during day, converted to atrial fib during change of shift, HR upto 120s, 2.5mg Lopressor IVP given.    As per RN, pt c/o pain when HR drops to Called by RN after pt c/o CP.    87y Male with past medical history significant for h/o CAD s/p MI & multiple stents, ICM, PPM, chronic atrial fibrillation (on Eliquis), HTN, HL, RA a/w substernal chest pain.  Patient was admitted to [facility] from 12/28-12/30/21 with atypical chest pain and was discharged home.  On day of presentation to [facility] patient developed recurrent chest pain and was noted to have an inferior STEMI.  He had an emergent cardiac catheterization s/p atherectomy and stent to the RCA with known LAD ISR.   There is a residual 80% mLAD in-stent restenosis.  Pt continues to c/o SOB. Plan for intervention and  repeat PCI/stent mLAD on Thursday due to in stent stenosis.   Pt was atrially paced during day, converted to atrial fib during change of shift, HR upto 120s, 2.5mg Lopressor IVP given.    As per RN, pt c/o epigastric pain + SOB when HR down to 60s, paced rhythm.  Upon converting to A. fib, he states pain & SOB subsides.  Pt is poor historian regarding HPI.  Upon my arrival, pt denying any pain, still c/o SOB  ICU Vital Signs Last 24 Hrs  T(C): 36.7 (05 Jan 2022 22:54), Max: 36.7 (05 Jan 2022 12:00)  T(F): 98 (05 Jan 2022 22:54), Max: 98 (05 Jan 2022 12:00)  HR: 54 (06 Jan 2022 00:36) (54 - 119)  BP: 144/97 (06 Jan 2022 00:36) (119/74 - 147/78)  BP(mean): 98 (05 Jan 2022 16:24) (62 - 98)  ABP: --  ABP(mean): --  RR: 20 (06 Jan 2022 00:36) (16 - 22)  SpO2: 96% (06 Jan 2022 00:36) (93% - 100%)  General   Pt is A&Ox3 in no distress.    Lungs      fair air entry w/ diminished BS in lower fields  Heart      s1/s2 irregular  Ext          no edema  Subsided CP  SOB (baseline) likely related to cardiac  Placed on 2L O2 NC, sating 99%  Due to pt's extensive cardiac history, will obtain stat EKG  EKG - Atrial Fibrillation w/ RVR ; inverted t waves noted in multiple leads  BMP, Mg, Phos, Troponin stat  Converted back to atrial paced rhythm VR 76  RN to monitor and escalate prn Called by RN after pt c/o CP.    87y Male with past medical history significant for h/o CAD s/p MI & multiple stents, ICM, PPM, chronic atrial fibrillation (on Eliquis), HTN, HL, RA a/w substernal chest pain.  Patient was admitted to [facility] from 12/28-12/30/21 with atypical chest pain and was discharged home.  On day of presentation to [facility] patient developed recurrent chest pain and was noted to have an inferior STEMI.  He had an emergent cardiac catheterization s/p atherectomy and stent to the RCA with known LAD ISR.  There is a residual 80% mLAD in-stent restenosis.  Pt continues to c/o SOB. Plan for intervention and  repeat PCI/stent mLAD today due to in stent stenosis.   Pt was atrially paced during day, converted to atrial fib during change of shift, HR upto 120s, 2.5mg Lopressor IVP given.    As per RN, pt c/o epigastric pain + SOB when HR down to 60s, paced rhythm.  Upon converting to A. fib, he states pain & SOB subsides.  Pt is poor historian regarding HPI.  Upon my arrival, pt denying any pain, still c/o SOB  ICU Vital Signs Last 24 Hrs  T(C): 36.7 (05 Jan 2022 22:54), Max: 36.7 (05 Jan 2022 12:00)  T(F): 98 (05 Jan 2022 22:54), Max: 98 (05 Jan 2022 12:00)  HR: 54 (06 Jan 2022 00:36) (54 - 119)  BP: 144/97 (06 Jan 2022 00:36) (119/74 - 147/78)  BP(mean): 98 (05 Jan 2022 16:24) (62 - 98)  ABP: --  ABP(mean): --  RR: 20 (06 Jan 2022 00:36) (16 - 22)  SpO2: 96% (06 Jan 2022 00:36) (93% - 100%)  General   Pt is A&Ox3 in no distress.    Lungs      fair air entry w/ diminished BS in lower fields  Heart      s1/s2 irregular  Ext          no edema  Subsided CP  SOB (baseline) likely related to cardiac  Placed on 2L O2 NC, sating 99%  Due to pt's extensive cardiac history, will obtain stat EKG  EKG - Atrial Fibrillation w/ RVR ; inverted t waves noted in multiple leads  BMP, Mg, Phos, Troponin stat  Converted back to atrial paced rhythm VR 76  RN to monitor and escalate prn Called by RN after pt c/o CP.    87y Male with past medical history significant for h/o CAD s/p MI & multiple stents, ICM, PPM, chronic atrial fibrillation (on Eliquis), HTN, HL, RA a/w substernal chest pain.  Patient was admitted to [facility] from 12/28-12/30/21 with atypical chest pain and was discharged home.  On day of presentation to [facility] patient developed recurrent chest pain and was noted to have an inferior STEMI.  He had an emergent cardiac catheterization s/p atherectomy and stent to the RCA with known LAD ISR.  There is a residual 80% mLAD in-stent restenosis.  Pt continues to c/o SOB. Plan for intervention and  repeat PCI/stent mLAD today due to in stent stenosis.   Pt was atrially paced during day, converted to atrial fib during change of shift, HR upto 120s, 2.5mg Lopressor IVP given.    As per RN, pt c/o epigastric pain + SOB when HR down to 60s, paced rhythm.  Upon converting to A. fib, he states pain & SOB subsides.  Pt is poor historian regarding HPI.  Upon my arrival, pt denying any pain, still c/o SOB  ICU Vital Signs Last 24 Hrs  T(C): 36.7 (05 Jan 2022 22:54), Max: 36.7 (05 Jan 2022 12:00)  T(F): 98 (05 Jan 2022 22:54), Max: 98 (05 Jan 2022 12:00)  HR: 54 (06 Jan 2022 00:36) (54 - 119)  BP: 144/97 (06 Jan 2022 00:36) (119/74 - 147/78)  BP(mean): 98 (05 Jan 2022 16:24) (62 - 98)  ABP: --  ABP(mean): --  RR: 20 (06 Jan 2022 00:36) (16 - 22)  SpO2: 96% (06 Jan 2022 00:36) (93% - 100%)  General   Pt is A&Ox3 in no distress.    Lungs      fair air entry w/ diminished BS in lower fields  Heart      s1/s2 irregular  Ext          no edema  Subsided CP  SOB (baseline) likely related to cardiac  Placed on 2L O2 NC, sating 99%  Due to pt's extensive cardiac history, will obtain stat EKG  EKG - Atrial Fibrillation w/ RVR ; inverted t waves noted in multiple leads  BMP, Mg, Phos, Troponin stat  Converted back to atrial paced rhythm VR 76  RN to monitor and escalate prn    02:35  01-06    132<L>  |  98  |  30.7<H>  ----------------------------<  173<H>  4.7   |  19.0<L>  |  1.28    Ca    8.4<L>      06 Jan 2022 01:35  Phos  2.7     01-06  Mg     2.2     01-06  Troponin 3.59 trending down s/p PCI  RN to monitor and escalate for change in pt's status

## 2022-01-06 NOTE — DISCHARGE NOTE PROVIDER - NSDCFUADDAPPT_GEN_ALL_CORE_FT
PHARMACY:  [facility]  THE PT. DOES NOT HAVE ANY DIFFICULTY IN OBTAINING OR TAKING HIS MEDICATIONS    CARDIOLOGY FOLLOW UP APPOINTMENT SCHEDULED  ON 1/14/2022 AT 7:00 AM  WITH DR. CAZARES ([phone]) IN THE [place] OFFICE  PLEASE BRING YOUR DISCHARGE PAPERWORK TO YOUR APPOINTMENT

## 2022-01-06 NOTE — DISCHARGE NOTE NURSING/CASE MANAGEMENT/SOCIAL WORK - PATIENT PORTAL LINK FT
You can access the FollowMyHealth Patient Portal offered by [facility] by registering at the following website: [email or web address]. By joining [facility]’s FollowMyHealth portal, you will also be able to view your health information using other applications (apps) compatible with our system.

## 2022-01-06 NOTE — CHART NOTE - NSCHARTNOTEFT_GEN_A_CORE
Pt seen and examined at bedside for c/o SOB. Able to speak in full sentences, unlabored, sating 100% 2LNC.  Pt is A&Ox4 but is a poor historian. Limited history obtained. Possibly anxiety component to symptoms.   Pt denies chest pain/ pressure, palpitations, dizziness, headaches, abdominal pain, n/v/d/c, or any other complaints.     VITALS:  T(C): 36.6 (01-06-22 @ 11:14), Max: 36.8 (01-06-22 @ 05:30)  HR: 77 (01-06-22 @ 17:15) (54 - 119)  BP: 124/67 (01-06-22 @ 18:44) (105/53 - 149/69)  RR: 20 (01-06-22 @ 18:44) (14 - 25)  SpO2: 99% (01-06-22 @ 18:44) (95% - 100%)    PHYSICAL EXAM:  GENERAL: Pt lying in bed comfortably in NAD  HEAD:  Atraumatic   EYES: EOMI, PERRL, conjunctiva and sclera clear  ENT: Moist mucous membranes  CHEST/LUNG: crackles b/l bases, unlabored  HEART: S1, S2, Regular rate and rhythm   EXTREMITIES:  2+ Peripheral Pulses, brisk capillary refill. No clubbing, cyanosis, or edema  NERVOUS SYSTEM:  Alert & Oriented X3, speech clear. Answers questions appropriately. Full and equal strength B/L upper and lower extremities.         A/P:     87M with PMHx of HTN, HLD, CAD sp prior stents, PPM, cAF on AC, RA who presented with CP, EKG with inferior STEPHANIE, +trop. CODE STEMI activated. Urgently to cath lab, cath via R radial which revealed EF 25%, LM nml, LAD 80% prior stent instenosis, Cx 0%, pRCA 95% instent restenosis, treated with BEVERLY x 1, manual closure of radial, no complications. Patient seen by Dr. Giron of [facility] this am, recommending telemetry for 24 hours. s/p  laser atherectomy and scoring balloon angioplasty of the mid and distal LAD ISR with significant improvement in luminal area on 1/6/21.     SOB    -CXR  -40mg IV lasix  -BMP, Mag, Phos, BNP  -2mg valium PO  -RN to notify of any acute change in pt status

## 2022-01-06 NOTE — PROGRESS NOTE ADULT - ASSESSMENT
ADDENDUM:    Now s/p LHC via RFA with laser balloon to mid and distal LAD closed with 6Fr Angioseal. Procedure performed by Dr. Thomas, he recieved IV Heparin and was loaded with Plavix 600 mg po as well as, IV sedation intraprocedurally. He arrived to recovery in NAD and HDS, RFA access site stable, no bleed/hematoma, distal pulse +  He tolerated the procedure well  Denies any c/o CP or SOB    Exam:  Constitutional: A & O x 3  HEENT:   Normal oral mucosa, PERRL, EOMI	  Cardiovascular: Normal S1 S2, No JVD, No murmurs, No edema  Respiratory: Lungs clear to auscultation	  Gastrointestinal:  Soft, Non-tender, + BS	  Extremities: RFA access site without hematoma or ecchymosis.    Vascular: Peripheral pulses palpable 2+ bilaterally      Plan:  -Formal cath report pending  -Post procedure management/monitoring per protocol  -Access site precautions  -Bedrest x 2 hours post procedure  -Labs and EKG in am  -Repeat ECG if any clinical indication or change on tele  -Continue current medical therapy  -Dual anti platelet therapy with aspirin/plavix(Aspirin x 1 month only)  -Begin Eliquis 2.5mg bid in am if no access issues  -Cont BB with Toprol 50mg po daily **  -Cont statin therapy with Zocor 40 mg qd as pt is intolerant to Lipitor  -Educated regarding strict adherence with DAPT and Eliquis  -Educated regarding post procedure management and care  -Discussed the importance of RF modification  -Cardiac rehab info provided/referral and communication to cardiac rehab completed  -F/U outpt in 1-2 weeks with Cardiologist Dr. Yee  -DISPO: Plan for D/C in am if remains HDS, ECG and labs in am stable and without complications     ADDENDUM:    Now s/p LHC via RFA with laser balloon to mid and distal LAD closed with 6Fr Angioseal. Procedure performed by Dr. Thomas, he recieved IV Heparin and was loaded with Plavix 600 mg po as well as, IV sedation intraprocedurally. He arrived to recovery in NAD and HDS, RFA access site stable, no bleed/hematoma, distal pulse +  He tolerated the procedure well  Denies any c/o CP or SOB    Exam:  Constitutional: A & O x 3  HEENT:   Normal oral mucosa, PERRL, EOMI	  Cardiovascular: Normal S1 S2, No JVD, No murmurs, No edema  Respiratory: Lungs clear to auscultation	  Gastrointestinal:  Soft, Non-tender, + BS	  Extremities: RFA access site without hematoma or ecchymosis.    Vascular: Peripheral pulses palpable 2+ bilaterally      Plan:  -Formal cath report pending  -Post procedure management/monitoring per protocol  -Access site precautions  -Bedrest x 2 hours post procedure  -Labs and EKG in am  -Repeat ECG if any clinical indication or change on tele  -Continue current medical therapy  -Dual anti platelet therapy with aspirin/plavix(Aspirin x 1 month only)  -Begin Eliquis 2.5mg bid(in light of age and triple therapy) in am if no access issues  -Cont BB with Toprol 50mg po daily **  -Cont statin therapy with Zocor 40 mg qd as pt is intolerant to Lipitor  -Educated regarding strict adherence with DAPT and Eliquis  -Educated regarding post procedure management and care  -Discussed the importance of RF modification  -Cardiac rehab info provided/referral and communication to cardiac rehab completed  -F/U outpt in 1-2 weeks with Cardiologist Dr. Yee  -DISPO: Plan for D/C in am if remains HDS, ECG and labs in am stable and without complications     ADDENDUM:    Now s/p LHC via RFA with laser balloon to mid and distal LAD closed with 6Fr Angioseal. Procedure performed by Dr. Thomas, he recieved IV Heparin and was loaded with Plavix 600 mg po as well as, IV sedation intraprocedurally. He arrived to recovery in NAD and HDS, RFA access site stable, no bleed/hematoma, distal pulse +  He tolerated the procedure well  Denies any c/o CP or SOB    Exam:  Constitutional: A & O x 3  HEENT:   Normal oral mucosa, PERRL, EOMI	  Cardiovascular: Normal S1 S2, No JVD, No murmurs, No edema  Respiratory: Lungs clear to auscultation	  Gastrointestinal:  Soft, Non-tender, + BS	  Extremities: RFA access site without hematoma or ecchymosis.    Vascular: Peripheral pulses palpable 2+ bilaterally      Plan:  -Formal cath report pending  -Post procedure management/monitoring per protocol  -Access site precautions  -Bedrest x 2 hours post procedure  -Labs and EKG in am  -Repeat ECG if any clinical indication or change on tele  -Continue current medical therapy  -Dual anti platelet therapy with aspirin/plavix(Aspirin x 1 month only)  -Begin Eliquis 2.5mg bid(in light of age and triple therapy) in am if no access issues; consideration to increasing Eliquis to 5mg bid after ASA completed  -Cont BB with Toprol 50mg po daily **  -Cont statin therapy with Zocor 40 mg qd as pt is intolerant to Lipitor  -Educated regarding strict adherence with DAPT and Eliquis  -Educated regarding post procedure management and care  -Discussed the importance of RF modification  -Cardiac rehab info provided/referral and communication to cardiac rehab completed  -F/U outpt in 1-2 weeks with Cardiologist Dr. Yee  -DISPO: Plan for D/C in am if remains HDS, ECG and labs in am stable and without complications

## 2022-01-06 NOTE — PROGRESS NOTE ADULT - SUBJECTIVE AND OBJECTIVE BOX
[facility] Division of Hospital Medicine    Chief Complaint: chest pain      SUBJECTIVE / OVERNIGHT EVENTS: Patient was in A. fib to 120s and received IV lopressor 2.5 mg, x 1. Denies chest pain, SOB, n/v/f/c/h/d. HD stable.     Patient denies chest pain, SOB, abd pain, N/V, fever, chills, dysuria or any other complaints. All remainder ROS negative.     MEDICATIONS  (STANDING):  aspirin enteric coated 81 milliGRAM(s) Oral daily  enoxaparin Injectable 70 milliGRAM(s) SubCutaneous two times a day  influenza  Vaccine (HIGH DOSE) 0.7 milliLiter(s) IntraMuscular once  losartan 25 milliGRAM(s) Oral daily  metoprolol succinate ER 50 milliGRAM(s) Oral daily  pantoprazole    Tablet 40 milliGRAM(s) Oral before breakfast  predniSONE   Tablet 5 milliGRAM(s) Oral daily  simvastatin 40 milliGRAM(s) Oral at bedtime  sodium chloride 0.9% lock flush 3 milliLiter(s) IV Push every 8 hours    MEDICATIONS  (PRN):  diazepam    Tablet 2 milliGRAM(s) Oral two times a day PRN anxiety        I&O's Summary    05 Jan 2022 07:01  -  06 Jan 2022 07:00  --------------------------------------------------------  IN: 0 mL / OUT: 150 mL / NET: -150 mL        PHYSICAL EXAM:  Vital Signs Last 24 Hrs  T(C): 36.6 (06 Jan 2022 11:14), Max: 36.8 (06 Jan 2022 05:30)  T(F): 97.8 (06 Jan 2022 11:14), Max: 98.2 (06 Jan 2022 05:30)  HR: 75 (06 Jan 2022 11:14) (54 - 119)  BP: 146/67 (06 Jan 2022 11:14) (105/53 - 149/69)  BP(mean): 98 (05 Jan 2022 16:24) (98 - 98)  RR: 18 (06 Jan 2022 11:14) (16 - 22)  SpO2: 97% (06 Jan 2022 11:14) (93% - 99%)      CONSTITUTIONAL: NAD, well-developed  RESPIRATORY: Normal respiratory effort; lungs are clear to auscultation bilaterally  CARDIOVASCULAR: Regular rate and rhythm, normal S1 and S2  ABDOMEN: Nontender to palpation, normoactive bowel sounds  PSYCH: A+O to person, place, and time; affect appropriate  NEUROLOGY: CN 2-12 are intact and symmetric; no gross sensory deficits;   SKIN: No rashes; no palpable lesions    LABS:                        11.1   12.14 )-----------( 237      ( 06 Jan 2022 05:52 )             32.7     01-06    132<L>  |  99  |  31.2<H>  ----------------------------<  121<H>  4.8   |  18.0<L>  |  1.15    Ca    8.2<L>      06 Jan 2022 05:52  Phos  2.7     01-06  Mg     2.2     01-06    TPro  6.0<L>  /  Alb  3.2<L>  /  TBili  0.7  /  DBili  x   /  AST  33  /  ALT  28  /  AlkPhos  70  01-06      CARDIAC MARKERS ( 06 Jan 2022 01:35 )  x     / 3.59 ng/mL / x     / x     / x              CAPILLARY BLOOD GLUCOSE            RADIOLOGY & ADDITIONAL TESTS:  Results Reviewed:   Imaging Personally Reviewed:  Electrocardiogram Personally Reviewed:

## 2022-01-06 NOTE — PROGRESS NOTE ADULT - SUBJECTIVE AND OBJECTIVE BOX
Pre- Procedure Progress Note      87y Male with h/o CAD s/p MI & multiple stents, ICM, PPM, chronic atrial fibrillation (on Eliquis), HTN, HL, RA a/w substernal chest pain.  Patient was admitted to [facility] from -21 with atypical chest pain and was discharged home.  On day of presentation to [facility] patient developed recurrent chest pain and was noted to have an inferior STEMI.  He had an emergent cardiac catheterization s/p atherectomy and stent to the RCA with known LAD ISR.  Presents to cath holding for intervention and  repeat PCI/stent mLAD due to in stent stenosis.   Pt reports having had CP last PM but no CP today. Denies any SOB, palps, or lightheadedness    Prior Cardiac Interventions:       < from: Cardiac Catheterization (22 @ 11:22) >  [facility]  Department of Cardiology  [address] [place] [address]  [phone]  Cath Lab Report -- Comprehensive Report  Patient: TATIANA RIBERA  Study date: 2022  Account number: [id number]  MR number: [id number]  : 1934  Gender: Male  Race: W  Case Physician(s):  MD Juan Manuel Salinas MD  Referring Physician:  Kaushal Thomas MD  INDICATIONS: Initial STEMI.  HISTORY: History of 2 myocardial infarction(s). The patient has  hypertensionand medication-treated dyslipidemia. PRIOR CARDIOVASCULAR  PROCEDURES: Stent.  PROCEDURE:  --  Left heart catheterization with ventriculography.  --  Left coronary angiography.  --  Right coronary angiography.  --  Rotational Atherectomy.  --  Intervention on proximal RCA: drug-eluting stent.  TECHNIQUE: The risks and alternatives of the procedures and conscious  sedation were explained to the patient and informed consent was obtained.  Cardiac catheterization performed emergently. Coronary intervention  performed emergently.  Local anesthetic given. Right radial artery access. Left heart  catheterization. Ventriculography was performed. A 6F AR1.0 LAUNCHER  catheter was utilized. Imaging was performed using an DAMICO projection. Left  coronary artery angiography. The vessel was injected utilizing a jl4  catheter. Right coronary artery angiography. The vessel was injected  utilizing a 6F AR1.0 LAUNCHER catheter. RADIATION EXPOSURE: 32.6 min. A  successful drug-eluting stent was performed on the 95 % lesion in the  proximal RCA. Following intervention there was an excellent angiographic  appearance with a 10 % residual stenosis. The intervention was performed  at the site of a prior brachytherapy and drug-eluting stent. According to  the ACC/AHA classification system, this lesion was a type C lesion. There  was CASSANDRA 3 flow before the procedure and CASSANDRA 3 flow after the procedure.  There was no dissection. Vessel setup was performed. A 6F AR1.0 LAUNCHER  guiding catheter was used to intubate the vessel. Balloon angioplasty was  performed, using a EUPHORA 3.0MM X 20MM balloon, with 8 inflations and a  maximum inflation pressure of 18 diana. During the procedure, a new ASAHI  MARCIANO BLUE 190CM STRAIGHT wire was advanced across the lesion. Balloon  angioplasty was performed, using a 3.5 X 20 NC EUPHORA BALLOON balloon,  with 3 inflations and a maximum inflation pressure of 20 diana. Cutting  balloon angioplasty was performed, using a WOLVERINE CUTTING BALLOON 3MM X  15MM balloon, with 3inflations and a maximum inflation pressure of 14  diana. Laser atherectomy was performed using a RX 1.4MM ELCA CORONARY  catheter and 5 pass(es) across the lesion. Balloon angioplasty was  performed, using a ANGIOSCULPT TEZ RX PTCA 3.5 X 20 X 139 balloon, with 1  inflations and a maximum inflation pressure of 18 diana. Balloon angioplasty  was performed, using a NC EMERGE 4.00 X 8MM balloon, with 7 inflations and  a maximum inflation pressure of 20 diana. During the procedure, a new ASAHI  MARCIANO BLUE 190CM STRAIGHT wire was advanced across the lesion. Balloon  angioplasty was performed, using a 4.0 X 12 NC EUPHORA BALLOON balloon,  with 5 inflations and a maximum inflation pressure of 30 diana. Rotational  atherectomy was performed, with a ROTAPRO2 1.5MM nilam and 7 passes. During  the procedure, a new ROTAWIRE DRIVE FLOPPY wire was advanced across the  lesion. Balloon angioplasty was performed, using a EUPHORA 3.0MM X 30MM  balloon, with 1 inflations and a maximum inflation pressure of 22 diana. A  TAN 4.00 X 38MM drug-eluting stent was placed across the lesion and  deployed at a maximum inflation pressure of 14 diana. Balloon angioplasty  was performed, using a NC EMERGE 4.00 X 20MM balloon, with 1 inflations  and a maximum inflation pressure of 30 diana. Rotational Atherectomy.  CONTRAST GIVEN: Omnipaque 30 ml. Omnipaque 70 ml.  MEDICATIONS GIVEN: Nitroglycerin, 100 mcg, IA. Heparin, 3000 units, IV.  Heparin, 2000 units, IV. Heparin, 2000 units, IV. 1% Lidocaine, 10 ml,  subcutaneously. Cardene, 200 mcg. 0.9NS, 200 ml, IV.  HEMODYNAMICS: Hemodynamic assessment demonstrates moderately elevated  LVEDP.  VENTRICLES: Analysis of regional contractile function demonstrated severe  anterolateral hypokinesis, apical akinesis, and diaphragmatic akinesis.  Global left ventricular function was severely depressed. EF calculated by  contrast ventriculography was 25 % and EF estimated was 30 %.  VALVES: AORTIC VALVE: There was no aortic stenosis.  CORONARY VESSELS: The coronary circulation is right dominant.  LM:   --  LM: Normal.  LAD:   --  Mid LAD: There was a diffuse 80 % stenosis at the site of a  prior stent.  CX:   --  Circumflex: Angiography showed minor luminal irregularities with  no flow limiting lesions.  --  Mid circumflex: There was a tubular 0 % stenosis at the site of a prior  stent.  RCA:   --  Proximal RCA: There was a tubular 95 % stenosis at the site of a  prior stent.  --  Distal RCA: There was a 50 % stenosis.  --  RPDA: There was a tubular 90 % stenosis. The lesion was associated with  a small filling defect consistent with thrombus. There was CASSANDRA grade 3  flow through the vessel (brisk flow).  --  RPLS: There was a discrete 90 % stenosis at the site of a prior stent.  COMPLICATIONS: No complications occurred during the cath lab visit. No  complications occurred during the cath lab visit. No complications  occurred during the cath lab visit.  SUMMARY:  HEMODYNAMICS: Hemodynamic assessment demonstrates moderately elevated  LVEDP.  DIAGNOSTIC IMPRESSIONS: There is significant double vessel coronary artery  disease.  DIAGNOSTIC RECOMMENDATIONS: Add clopidogrel (Plavix). Add aspirin. Continue  eliquis  INTERVENTIONAL RECOMMENDATIONS: Add clopidogrel (Plavix). Add aspirin.  Continue eliquis Patient managementshould include aggressive medical  therapy.  consider pci of LAD  DISPOSITION: The patient left the catheterization laboratory in guarded  condition.  Prepared and signed by  Juan Manuel Wang MD  Signed 2022 13:41:54  HEMODYNAMIC TABLES  Pressures:  Baseline  Pressures:  - HR: 89  Pressures:  - Rhythm:  Pressures:  -- Aortic Pressure (S/D/M): 180/80/99  Pressures:  -- Aortic Pressure (S/D/M): 127/60/83  Pressures:  -- Left Ventricle (s/edp): 145/24/--  Outputs:  Baseline  Outputs:  -- CALCULATIONS[age]  Outputs:  -- CALCULATIONS: Body Surface Area: 1.79  Outputs:  -- CALCULATIONS: Height in cm: 170.00  Outputs:  -- CALCULATIONS: Sex: Male  Outputs:  -- CALCULATIONS: Weight in k.00  Outputs:  -- OUTPUTS: O2 consumption:223.44  Outputs:  -- OUTPUTS: Vo2 Indexed: 125.00    < end of copied text >    Echo (Date, Findings): < from: TTE Echo Complete w/o Contrast w/ Doppler (22 @ 15:29) >  EXAM:  ECHO TTE WO CON COMP W DOPP      PROCEDURE DATE:  2022   .      INTERPRETATION:  TRANSTHORACIC ECHOCARDIOGRAM REPORT        Patient Name:   TATIANA RIBERA Patient Location: [facility] Rec #:  [id number]       Accession #:      [id number]  Account #:                     Height:           67.0 in 170.2 cm  YOB: 1934      Weight:           158.7 lb 72.00 kg  Patient Age:    87 years       BSA:              1.83 m²  Patient Gender: M              BP:               112/54 mmHg      Date of Exam:        2022 3:29:17 PM  Sonographer:         Beto Nieves Jr, Jr  Referring Physician: Kaushal De Jesus MD    Procedure:     2D Echo/Doppler/Color Doppler Complete.  Indications:   ST elevation (STEMI) myocardial infarction of unspecified   site -                 I21.3; Cardiac murmur, unspecified - R01.1  Diagnosis:     Nonrheumatic mitral (valve) insufficiency - I34.0  Study Details: Technically suboptimal study. Study quality was adversely                 affecteddue to body habitus and lung interference.        2D AND M-MODE MEASUREMENTS (normal ranges within parentheses):  Left                 Normal   Aorta/Left            Normal  Ventricle:                    Atrium:  IVSd (2D):    1.26  (0.7-1.1) Aortic Root    2.96  (2.4-3.7)                 cm             (2D):           cm  LVPWd (2D):   1.06  (0.7-1.1) Left Atrium    5.49  (1.9-4.0)                 cm             (2D):           cm  LVIDd (2D):   5.68  (3.4-5.7) LA Volume      53.3   cm             Index         ml/m²  LVIDs (2D):   4.97            Right Ventricle:                 cm             TAPSE:           1.37 cm  LV FS (2D):   12.5   (>25%)                  %  Relative Wall 0.37   (<0.42)  Thickness    LV DIASTOLIC FUNCTION:  MV Peak E: 0.62 m/s Decel Time: 153 msec  MV Peak A: 0.80 m/s  E/A Ratio: 0.77    SPECTRAL DOPPLER ANALYSIS (where applicable):  Mitral Valve:  MV P1/2 Time: 44.37 msec  MV Area, PHT: 4.96 cm²    Mitral Insufficiency by PISA:  MR Volume:  MR Flow Rate: 84.36 ml/s MR EROA: 17.32 mm²    Aortic Valve: AoV Max Kalin: 0.97 m/s AoV Peak PG: 3.8 mmHg AoV Mean P.8 mmHg    LVOT Vmax: 0.77 m/s LVOT VTI: 0.140 m LVOT Diameter: 2.23 cm    AoV Area, Vmax: 3.11 cm² AoV Area, VTI: 2.95 cm² AoV Area, Vmn: 2.69 cm²  Ao VTI: 0.185  Aortic Insufficiency:  AI Half-time: 357 msec    Tricuspid Valve and PA/RV Systolic Pressure: TR Max Velocity: 2.81 m/s RA   Pressure: 3 mmHg RVSP/PASP: 34.6 mmHg      PHYSICIAN INTERPRETATION:  Left Ventricle: Endocardial visualization was enhanced with intravenous   echo contrast. The left ventricular internal cavity size is normal.  Global LV systolic function was severely decreased. Left ventricular   ejection fraction, by visual estimation, is 20 to 25%.      LV Wall Scoring:  The entire inferior wall, mid and apical anterior septum, mid and apical  inferior septum, basal and mid inferolateral wall, and apex are akinetic.   The  entire anterior wall, basal and mid anterolateral wall, basal anteroseptal  segment, basal inferoseptal segment, and apical lateral segment are  hypokinetic.    Right Ventricle: RV systolic function is mildly reduced. TV S' 0.1 m/s.  Left Atrium: Severely enlarged left atrium.  Right Atrium: The right atrium is normal in size.  Pericardium: Trivial pericardial effusion is present. The pericardial   effusion is localized near the right atrium. There is no evidence of   cardiac tamponade.  Mitral Valve: Thickening and calcification of the anterior and posterior   mitral valve leaflets. Mobility is moderately decreased for both   leaflets. Moderate to severe mitral valve regurgitation is seen.  Tricuspid Valve: Moderate tricuspid regurgitation is visualized.  Aortic Valve: Peak transaortic gradient equals 3.8 mmHg, mean transaortic   gradient equals 1.8 mmHg, the calculated aortic valve area equals 2.95   cm² by the continuity equation consistent with normally opening aortic   valve. Mild aortic valve regurgitation is seen.  Pulmonic Valve: The pulmonic valve was not well visualized. Trace   pulmonic valve regurgitation.  Aorta: The aortic root is normal in size and structure.  Pulmonary Artery: The pulmonary artery is not well seen.  Venous: The inferior vena cava was normal sized, with respiratory size   variation greater than 50%.      Summary:   1. Left ventricular ejection fraction, by visual estimation, is 20 to   25%. No LV thrombus.   2. Severely decreased global left ventricular systolic function.   3. Multiple left ventricular regional wall motion abnormalities exist.   See wall motion findings.   4. Severely enlarged left atrium.   5. There is mild septal left ventricular hypertrophy.   6. Mildly reduced RV systolic function.   7. Moderate to severe mitral valve regurgitation.   8. Thickening and calcification of the anterior and posterior mitral   valve leaflets.   9. Moderate tricuspid regurgitation.  10. Mild aortic regurgitation.  11. Endocardial visualization was enhanced with intravenous echo contrast.  12. Moderately decreased mitral leaflet mobility.      < end of copied text >      Additional Diagnostics:    Risk Assessments:  ASA: 3  Mallampati: 2  Bleeding Risk: 5.7%  Creatinine: 1.15  GFR: 57    Associated Risk Factors:        Need for Therapeutic Anticoagulation: AFIB       Antiplatelet or Contrast Allergy: PLAVIX    Antianginal Therapies:        Beta Blockers:  Toprol 50 mg qd       Calcium Channel Blockers:        Long Acting Nitrates:        Ranexa:     	  MEDICATIONS:  losartan 25 milliGRAM(s) Oral daily  metoprolol succinate ER 50 milliGRAM(s) Oral daily        diazepam    Tablet 2 milliGRAM(s) Oral two times a day PRN    pantoprazole    Tablet 40 milliGRAM(s) Oral before breakfast    predniSONE   Tablet 5 milliGRAM(s) Oral daily  simvastatin 40 milliGRAM(s) Oral at bedtime    aspirin enteric coated 81 milliGRAM(s) Oral daily  enoxaparin Injectable 70 milliGRAM(s) SubCutaneous two times a day  influenza  Vaccine (HIGH DOSE) 0.7 milliLiter(s) IntraMuscular once  sodium chloride 0.9% lock flush 3 milliLiter(s) IV Push every 8 hours  ticagrelor 90 milliGRAM(s) Oral every 12 hours        PHYSICAL EXAM:    Constitutional: A & O x 3  HEENT:   Normal oral mucosa, PERRL, EOMI	  Cardiovascular: Normal S1 S2, No JVD, No murmurs, No edema  Respiratory: Lungs clear to auscultation	  Gastrointestinal:  Soft, Non-tender, + BS	  Extremities: RRA access site with small soft nonexpanding hematoma with accompanying ecchymosis. Radial pulse 2+, R hand WWP, no motor or sensory deficits  Vascular: Peripheral pulses palpable 2+ bilaterally      T(C): 36.5 (22 @ 08:00), Max: 36.8 (22 @ 05:30)  HR: 78 (22 @ 10:46) (54 - 119)  BP: 149/69 (22 @ 10:46) (105/53 - 149/69)  RR: 20 (22 @ 10:46) (16 - 22)  SpO2: 98% (22 @ 10:46) (93% - 99%)  Wt(kg): --      I&O's Summary    2022 07:01  -  2022 07:00  --------------------------------------------------------  IN: 0 mL / OUT: 150 mL / NET: -150 mL    TELEMETRY: SR	      ECG:  	    LABS:	 	    CARDIAC MARKERS ( 2022 01:35 )  CKx     / CKMBx     / Troponin T3.59 ng/mL /                         11.1   12.14 )-----------( 237      ( 2022 05:52 )             32.7     01-    132<L>  |  99  |  31.2<H>  ----------------------------<  121<H>  4.8   |  18.0<L>  |  1.15    Ca    8.2<L>      2022 05:52  Phos  2.7     -  Mg     2.2     -06    TPro  6.0<L>  /  Alb  3.2<L>  /  TBili  0.7  /  DBili  x   /  AST  33  /  ALT  28  /  AlkPhos  70      Impression: 86 y/o M S/P STEMI S/P BEVERLY to RCA with plan for staged mLAD intervention    Plan:  -patient seen and examined  -confirmed appropriate NPO duration  -ECG and Labs reviewed  -Received Brilinta 90 mg po pre-cath  -procedure discussed with patient; risks and benefits explained, questions answered  -consent obtained by attending IC

## 2022-01-06 NOTE — DISCHARGE NOTE PROVIDER - NSDCFUADDINST_GEN_ALL_CORE_FT
Restricted use with no heavy lifting of affected arm for 48 hours.  No submerging the arm in water for 48 hours.  You may start showering today.  Call your doctor for any bleeding, swelling, loss of sensation in the hand or fingers, or fingers turning blue.  If heavy bleeding or large lumps form, hold pressure at the spot and come to the Emergency Room.  No heavy lifting, driving, sex, tub baths, swimming, or any activity that submerges the lower half of the body in water for 48 hours.  Limited walking and stairs for 48 hours.    Change the bandaid after 24 hours and every 24 hours after that.  Keep the puncture site dry and covered with a bandaid until a scab forms.    Observe the site frequently.  If bleeding or a large lump (the size of a golf ball or bigger) occurs lie flat, apply continuous direct pressure just above the puncture site for at least 10 minutes, and notify your physician immediately.  If the bleeding cannot be controlled, call 911 immediately for assistance.  Notify your physician of pain, swelling or any drainage.    Notify your physician immediately if coldness, numbness, discoloration or pain in your foot occurs.   Right Wrist:  Restricted use with no heavy lifting of affected arm for 48 hours.  No submerging the arm in water for 48 hours.  You may start showering today.  Call your doctor for any bleeding, swelling, loss of sensation in the hand or fingers, or fingers turning blue.  If heavy bleeding or large lumps form, hold pressure at the spot and come to the Emergency Room.  No heavy lifting, driving, sex, tub baths, swimming, or any activity that submerges the lower half of the body in water for 48 hours.  Limited walking and stairs for 48 hours.    Change the bandaid after 24 hours and every 24 hours after that.  Keep the puncture site dry and covered with a bandaid until a scab forms.    Observe the site frequently.  If bleeding or a large lump (the size of a golf ball or bigger) occurs lie flat, apply continuous direct pressure just above the puncture site for at least 10 minutes, and notify your physician immediately.  If the bleeding cannot be controlled, call 911 immediately for assistance.  Notify your physician of pain, swelling or any drainage.    Notify your physician immediately if coldness, numbness, discoloration or pain in your foot occurs.    Right Groin:  No heavy lifting, driving, sex, tub baths, swimming, or any activity that submerges the lower half of the body in water for 48 hours.  Limited walking and stairs for 48 hours.    Change the bandaid after 24 hours and every 24 hours after that.  Keep the puncture site dry and covered with a bandaid until a scab forms.    Observe the site frequently.  If bleeding or a large lump (the size of a golf ball or bigger) occurs lie flat, apply continuous direct pressure just above the puncture site for at least 10 minutes, and notify your physician immediately.  If the bleeding cannot be controlled, call 911 immediately for assistance.  Notify your physician of pain, swelling or any drainage.    Notify your physician immediately if coldness, numbness, discoloration or pain in your foot occurs.

## 2022-01-06 NOTE — DISCHARGE NOTE PROVIDER - HOSPITAL COURSE
87y Male with h/o CAD s/p MI & multiple stents, ICM, PPM, chronic atrial fibrillation (on Eliquis), HTN, HL, RA a/w substernal chest pain.  Patient was admitted to [facility] from 12/28-12/30/21 with atypical chest pain and was discharged home.  On day of presentation to [facility] patient developed recurrent chest pain and was noted to have an inferior STEMI.  He had an emergent cardiac catheterization s/p atherectomy and stent to the RCA with known LAD ISR.  s/p LHC via RFA with laser balloon to mid and distal LAD closed with 6Fr Angioseal. Procedure performed by Dr. Thomas, he recieved IV Heparin and was loaded with Plavix 600 mg po as well as, IV sedation intraprocedurally. He arrived to recovery in NAD and HDS, RFA access site stable, no bleed/hematoma, distal pulse +  He tolerated the procedure well    87y Male with h/o CAD s/p MI & multiple stents, ICM, PPM, chronic atrial fibrillation (on Eliquis), HTN, HL, RA a/w substernal chest pain.  Patient was admitted to [facility] from 12/28-12/30/21 with atypical chest pain and was discharged home.  On day of presentation to [facility] patient developed recurrent chest pain and was noted to have an inferior STEMI.  He had an emergent cardiac catheterization s/p atherectomy and stent to the RCA with known LAD ISR. Patient returned to cath lab on 1/6, now   s/p LHC via RFA with laser balloon to mid and distal LAD closed with 6Fr Angioseal. Procedure performed by Dr. Thomas, he recieved IV Heparin and was loaded with Plavix 600 mg po as well as, IV sedation intraprocedurally. Patient will need re assessment of EF in the outpt setting following revascularization for consideration of ICD. Maintain losartan/statin/BB. Eliquis resumed at 2.5 mg BID as per Dr. Thomas.  He will be on Triple Therapy for 1 month.  Plavix is less potent than Brilinta and should result in lower bleeding risk.  Pt is medically stable for discharge.

## 2022-01-06 NOTE — DISCHARGE NOTE NURSING/CASE MANAGEMENT/SOCIAL WORK - NSDCPEFALRISK_GEN_ALL_CORE
For information on Fall & Injury Prevention, visit: [email or web address] OR  [email or web address] OR  https://www.cdc.gov/steadi/patient.html

## 2022-01-06 NOTE — PROGRESS NOTE ADULT - ASSESSMENT
87M with PMHx of HTN, HLD, CAD sp prior stents, PPM, cAF on AC, RA who presented with CP, EKG with inferior STEPHANIE, +trop. CODE STEMI activated. Urgently to cath lab, cath via R radial which revealed EF 25%, LM nml, LAD 80% prior stent instenosis, Cx 0%, pRCA 95% instent restenosis, treated with BEVERLY x 1, manual closure of radial, no complications. Transferred to ICU post procedure. Prior Echo 2018 with EF 50%. No events overnight, patient remains chest pain free this am. Denies SOB this am. Patient seen by Dr. Giron of [facility] this am, recommending telemetry for 24 hours. LAD stent restenosis will be dealt with as an outpatient per Cardiology.    #Chest pain- IWSTEMI  - Hx of CAD w/ 14 prior stents  - Found to have IWSTEMI S/P PCI/BEVERLY pRCA  - Patient has residual 80% mLAD in-stent re-stensois and will need repeat PCI/stent mLAD in few weeks  - Appreciate cardiology recs- will c/w toprol, ASA and simvastatin. Birlinta   - S/p C-   - will need outpatient follow up with [facility]    #A. fib  - c/w toprol XL 50 mg QD  - C/w Lovenox    #RA  - started home prednisone 5 mg QD    #Anxiety  - c/w Valium 2 mg BID PRN for anxiety    #ICM  - c/w losartan 25 mg QD  - Will touch base with cardiology in regards to lifevest    Updated patient's wife, Honey on 1/5.   Dispo- Pending cardiac cath. PT recs- home w/ no skilled PT needs.  87M with PMHx of HTN, HLD, CAD sp prior stents, PPM, cAF on AC, RA who presented with CP, EKG with inferior STEPHANIE, +trop. CODE STEMI activated. Urgently to cath lab, cath via R radial which revealed EF 25%, LM nml, LAD 80% prior stent instenosis, Cx 0%, pRCA 95% instent restenosis, treated with BEVERLY x 1, manual closure of radial, no complications. Transferred to ICU post procedure. Prior Echo 2018 with EF 50%. No events overnight, patient remains chest pain free this am. Denies SOB this am. Patient seen by Dr. Giron of [facility] this am, recommending telemetry for 24 hours. LAD stent restenosis will be dealt with as an outpatient per Cardiology.    #Chest pain- IWSTEMI  - Hx of CAD w/ 14 prior stents  - Found to have IWSTEMI S/P PCI/BEVERLY pRCA  - Patient has residual 80% mLAD in-stent re-stensois and will need repeat PCI/stent mLAD in few weeks  - Appreciate cardiology recs- will c/w toprol, ASA and simvastatin. Birlinta discontinued and plavix started. Eliquis started. Patient will need to be on eliquis for 1 month.   - S/p laser atherectomy and scoring balloon angioplasty of the mid and distal LAD ISR with significant improvement in luminal area on 1/6/21.  - will need outpatient follow up with [facility]    #A. fib  - c/w toprol XL 50 mg QD    #RA  - c/w home prednisone 5 mg QD    #Anxiety  - c/w Valium 2 mg BID PRN for anxiety    #ICM  - c/w losartan 25 mg QD  - Will touch base with cardiology in regards to lifevest    Updated patient's wife, Honey on 1/6.   Dispo- PT recs- home w/ no skilled PT needs. Likely discharge tomorrow on 1/7. His daughter, Sondra to pick him up tomorrow.

## 2022-01-07 LAB
ALBUMIN SERPL ELPH-MCNC: 3.3 G/DL — SIGNIFICANT CHANGE UP (ref 3.3–5.2)
ALP SERPL-CCNC: 76 U/L — SIGNIFICANT CHANGE UP (ref 40–120)
ALT FLD-CCNC: 27 U/L — SIGNIFICANT CHANGE UP
ANION GAP SERPL CALC-SCNC: 17 MMOL/L — SIGNIFICANT CHANGE UP (ref 5–17)
AST SERPL-CCNC: 45 U/L — HIGH
BASOPHILS # BLD AUTO: 0.04 K/UL — SIGNIFICANT CHANGE UP (ref 0–0.2)
BASOPHILS NFR BLD AUTO: 0.3 % — SIGNIFICANT CHANGE UP (ref 0–2)
BILIRUB SERPL-MCNC: 0.6 MG/DL — SIGNIFICANT CHANGE UP (ref 0.4–2)
BUN SERPL-MCNC: 32.8 MG/DL — HIGH (ref 8–20)
CALCIUM SERPL-MCNC: 8.2 MG/DL — LOW (ref 8.6–10.2)
CHLORIDE SERPL-SCNC: 99 MMOL/L — SIGNIFICANT CHANGE UP (ref 98–107)
CO2 SERPL-SCNC: 18 MMOL/L — LOW (ref 22–29)
CREAT SERPL-MCNC: 1.26 MG/DL — SIGNIFICANT CHANGE UP (ref 0.5–1.3)
EOSINOPHIL # BLD AUTO: 0.02 K/UL — SIGNIFICANT CHANGE UP (ref 0–0.5)
EOSINOPHIL NFR BLD AUTO: 0.1 % — SIGNIFICANT CHANGE UP (ref 0–6)
GLUCOSE SERPL-MCNC: 167 MG/DL — HIGH (ref 70–99)
HCT VFR BLD CALC: 33.2 % — LOW (ref 39–50)
HGB BLD-MCNC: 11 G/DL — LOW (ref 13–17)
IMM GRANULOCYTES NFR BLD AUTO: 1 % — SIGNIFICANT CHANGE UP (ref 0–1.5)
LYMPHOCYTES # BLD AUTO: 0.48 K/UL — LOW (ref 1–3.3)
LYMPHOCYTES # BLD AUTO: 3.4 % — LOW (ref 13–44)
MANUAL SMEAR VERIFICATION: SIGNIFICANT CHANGE UP
MCHC RBC-ENTMCNC: 30.1 PG — SIGNIFICANT CHANGE UP (ref 27–34)
MCHC RBC-ENTMCNC: 33.1 GM/DL — SIGNIFICANT CHANGE UP (ref 32–36)
MCV RBC AUTO: 90.7 FL — SIGNIFICANT CHANGE UP (ref 80–100)
MONOCYTES # BLD AUTO: 1.45 K/UL — HIGH (ref 0–0.9)
MONOCYTES NFR BLD AUTO: 10.3 % — SIGNIFICANT CHANGE UP (ref 2–14)
NEUTROPHILS # BLD AUTO: 12 K/UL — HIGH (ref 1.8–7.4)
NEUTROPHILS NFR BLD AUTO: 84.9 % — HIGH (ref 43–77)
PLAT MORPH BLD: NORMAL — SIGNIFICANT CHANGE UP
PLATELET # BLD AUTO: 266 K/UL — SIGNIFICANT CHANGE UP (ref 150–400)
POTASSIUM SERPL-MCNC: 3.7 MMOL/L — SIGNIFICANT CHANGE UP (ref 3.5–5.3)
POTASSIUM SERPL-SCNC: 3.7 MMOL/L — SIGNIFICANT CHANGE UP (ref 3.5–5.3)
PROT SERPL-MCNC: 6.1 G/DL — LOW (ref 6.6–8.7)
RBC # BLD: 3.66 M/UL — LOW (ref 4.2–5.8)
RBC # FLD: 13.9 % — SIGNIFICANT CHANGE UP (ref 10.3–14.5)
RBC BLD AUTO: NORMAL — SIGNIFICANT CHANGE UP
SODIUM SERPL-SCNC: 134 MMOL/L — LOW (ref 135–145)
WBC # BLD: 14.13 K/UL — HIGH (ref 3.8–10.5)
WBC # FLD AUTO: 14.13 K/UL — HIGH (ref 3.8–10.5)

## 2022-01-07 PROCEDURE — 93926 LOWER EXTREMITY STUDY: CPT | Mod: 26,RT

## 2022-01-07 PROCEDURE — 99232 SBSQ HOSP IP/OBS MODERATE 35: CPT

## 2022-01-07 RX ORDER — PANTOPRAZOLE SODIUM 20 MG/1
1 TABLET, DELAYED RELEASE ORAL
Qty: 30 | Refills: 0
Start: 2022-01-07 | End: 2022-02-05

## 2022-01-07 RX ORDER — CLOPIDOGREL BISULFATE 75 MG/1
1 TABLET, FILM COATED ORAL
Qty: 30 | Refills: 0
Start: 2022-01-07 | End: 2022-02-05

## 2022-01-07 RX ORDER — APIXABAN 2.5 MG/1
1 TABLET, FILM COATED ORAL
Qty: 60 | Refills: 0
Start: 2022-01-07 | End: 2022-02-05

## 2022-01-07 RX ORDER — FUROSEMIDE 40 MG
40 TABLET ORAL ONCE
Refills: 0 | Status: COMPLETED | OUTPATIENT
Start: 2022-01-07 | End: 2022-01-07

## 2022-01-07 RX ORDER — LOSARTAN POTASSIUM 100 MG/1
1 TABLET, FILM COATED ORAL
Qty: 30 | Refills: 0
Start: 2022-01-07 | End: 2022-02-05

## 2022-01-07 RX ORDER — APIXABAN 2.5 MG/1
1 TABLET, FILM COATED ORAL
Qty: 0 | Refills: 0 | DISCHARGE

## 2022-01-07 RX ORDER — SIMVASTATIN 20 MG/1
1 TABLET, FILM COATED ORAL
Qty: 30 | Refills: 0
Start: 2022-01-07 | End: 2022-02-05

## 2022-01-07 RX ADMIN — LOSARTAN POTASSIUM 25 MILLIGRAM(S): 100 TABLET, FILM COATED ORAL at 05:50

## 2022-01-07 RX ADMIN — APIXABAN 2.5 MILLIGRAM(S): 2.5 TABLET, FILM COATED ORAL at 21:37

## 2022-01-07 RX ADMIN — Medication 40 MILLIGRAM(S): at 02:45

## 2022-01-07 RX ADMIN — PANTOPRAZOLE SODIUM 40 MILLIGRAM(S): 20 TABLET, DELAYED RELEASE ORAL at 06:39

## 2022-01-07 RX ADMIN — Medication 5 MILLIGRAM(S): at 05:09

## 2022-01-07 RX ADMIN — SODIUM CHLORIDE 3 MILLILITER(S): 9 INJECTION INTRAMUSCULAR; INTRAVENOUS; SUBCUTANEOUS at 21:36

## 2022-01-07 RX ADMIN — Medication 50 MILLIGRAM(S): at 06:39

## 2022-01-07 RX ADMIN — SODIUM CHLORIDE 3 MILLILITER(S): 9 INJECTION INTRAMUSCULAR; INTRAVENOUS; SUBCUTANEOUS at 13:24

## 2022-01-07 RX ADMIN — APIXABAN 2.5 MILLIGRAM(S): 2.5 TABLET, FILM COATED ORAL at 05:49

## 2022-01-07 RX ADMIN — SODIUM CHLORIDE 3 MILLILITER(S): 9 INJECTION INTRAMUSCULAR; INTRAVENOUS; SUBCUTANEOUS at 05:56

## 2022-01-07 RX ADMIN — Medication 81 MILLIGRAM(S): at 13:26

## 2022-01-07 NOTE — CHART NOTE - NSCHARTNOTEFT_GEN_A_CORE
Pt seen at bedside for eval of R groin. Without complaints. Resting comfortably in NAD.     Denies chest pain, SOB, n/v/d/c, abdominal pain, HA, dizziness, blurry vision    Vital Signs:  T(C): 36.3 (01-07-22 @ 09:30), Max: 36.4 (01-07-22 @ 00:15)  T(F): 97.4 (01-07-22 @ 09:30), Max: 97.5 (01-07-22 @ 00:15)  HR: 72 (01-07-22 @ 20:32) (72 - 81)  BP: 123/54 (01-07-22 @ 20:32) (112/48 - 123/54)  RR: 18 (01-07-22 @ 20:32) (15 - 18)  SpO2: 95% (01-07-22 @ 20:32) (95% - 97%)    PHYSICAL EXAM:  CONSTITUTIONAL: Resting comfortably in NAD  RESPIRATORY: CTABL  CARDIOVASCULAR: Regular rate and rhythm, normal S1 and S2  ABDOMEN: Nontender to palpation, normoactive bowel sounds. RT groin tender to palpation.   PSYCH: A+O to person, place, and time; affect appropriate  NEUROLOGY: CN 2-12 are intact and symmetric; no gross sensory deficits;     US Duplex Arterial Lower Ext Ltd, Right (01.07.22)  IMPRESSION: No right groin pseudoaneurysm identified.    There is a 0.6 cm hypoechoic focus anterior to the right common femoral   vein without internal vascularity, likely a hematoma. Clinical   correlation and follow-up is recommended.    Plan:    D/w vascular surgery: stating it is unlikely a hematoma, no vascular involvement, no pseudoaneurysm, no intervention at this time and to reconsult as needed   Pressure dressing to R groin placed by cardiology PA  RN to notify of any acute change in pt status Pt seen at bedside for eval of R groin. Without complaints. Resting comfortably in NAD.     Denies chest pain, SOB, n/v/d/c, abdominal pain, HA, dizziness, blurry vision    Vital Signs:  T(C): 36.3 (01-07-22 @ 09:30), Max: 36.4 (01-07-22 @ 00:15)  T(F): 97.4 (01-07-22 @ 09:30), Max: 97.5 (01-07-22 @ 00:15)  HR: 72 (01-07-22 @ 20:32) (72 - 81)  BP: 123/54 (01-07-22 @ 20:32) (112/48 - 123/54)  RR: 18 (01-07-22 @ 20:32) (15 - 18)  SpO2: 95% (01-07-22 @ 20:32) (95% - 97%)    PHYSICAL EXAM:  CONSTITUTIONAL: Resting comfortably in NAD  RESPIRATORY: CTABL  CARDIOVASCULAR: Regular rate and rhythm, normal S1 and S2  ABDOMEN: Nontender to palpation, normoactive bowel sounds. RT groin tender to palpation.   PSYCH: A+O to person, place, and time; affect appropriate  NEUROLOGY: CN 2-12 are intact and symmetric; no gross sensory deficits;     US Duplex Arterial Lower Ext Ltd, Right (01.07.22)  IMPRESSION: No right groin pseudoaneurysm identified.    There is a 0.6 cm hypoechoic focus anterior to the right common femoral   vein without internal vascularity, likely a hematoma. Clinical   correlation and follow-up is recommended.    Plan:    -D/w vascular surgery: stating it is unlikely a hematoma, no vascular involvement, no pseudoaneurysm, no intervention at this time and to reconsult as needed   -Pressure dressing to R groin placed by cardiology PA  -RN to notify of any acute change in pt status

## 2022-01-07 NOTE — PROGRESS NOTE ADULT - ASSESSMENT
Now s/p LHC via RFA with laser balloon to mid and distal LAD closed with 6Fr Angioseal. Procedure performed by Dr. Thomas, he recieved IV Heparin and was loaded with Plavix 600 mg po as well as, IV sedation intraprocedural.       Severe ISR of the mid and distal LAD stents  (2 layers of stent already in place for majority of the segment)  - Successful laser atherectomy, and scoring balloon angioplasty of the mid  and distal LAD with significant improvement in luminal area as confirmed   by IVUS imaging  - Severe LV systolic dysfunction by echo s/p IWSTEMI  - LVEDP elevated at 26mmHg  Plan  -right groin tender upon palpitations + bruit /+thrill - ultrasound pending.    -Continue current medical therapy  -Dual anti platelet therapy with aspirin/plavix(Aspirin x 1 month only)  -Begin Eliquis 2.5mg bid(in light of age and triple therapy) in am if no access issues; consideration to increasing Eliquis to 5mg bid after ASA completed  -Cont BB with Toprol 50mg po daily **  -Cont statin therapy with Zocor 40 mg qd as pt is intolerant to Lipitor  -Educated regarding strict adherence with DAPT and Eliquis  -Educated regarding post procedure management and care  -Discussed the importance of RF modification  -Cardiac rehab info provided/referral and communication to cardiac rehab completed  -F/U outpt in 1-2 weeks with Cardiologist Dr. Yee

## 2022-01-07 NOTE — PROGRESS NOTE ADULT - SUBJECTIVE AND OBJECTIVE BOX
[facility] Division of Hospital Medicine    Chief Complaint:  chest pain    SUBJECTIVE / OVERNIGHT EVENTS: No acute events overnight. HD stable. Patient endorses RT groin pain. Denies n/v/f/c/h/d.     Patient denies chest pain, SOB, abd pain, N/V, fever, chills, dysuria or any other complaints. All remainder ROS negative.     MEDICATIONS  (STANDING):  apixaban 2.5 milliGRAM(s) Oral two times a day  aspirin enteric coated 81 milliGRAM(s) Oral daily  clopidogrel Tablet 75 milliGRAM(s) Oral daily  influenza  Vaccine (HIGH DOSE) 0.7 milliLiter(s) IntraMuscular once  losartan 25 milliGRAM(s) Oral daily  metoprolol succinate ER 50 milliGRAM(s) Oral daily  pantoprazole    Tablet 40 milliGRAM(s) Oral before breakfast  predniSONE   Tablet 5 milliGRAM(s) Oral daily  simvastatin 40 milliGRAM(s) Oral at bedtime  sodium chloride 0.9% lock flush 3 milliLiter(s) IV Push every 8 hours    MEDICATIONS  (PRN):  diazepam    Tablet 2 milliGRAM(s) Oral two times a day PRN anxiety        I&O's Summary    06 Jan 2022 07:01  -  07 Jan 2022 07:00  --------------------------------------------------------  IN: 0 mL / OUT: 1475 mL / NET: -1475 mL        PHYSICAL EXAM:  Vital Signs Last 24 Hrs  T(C): 36.3 (07 Jan 2022 09:30), Max: 36.6 (06 Jan 2022 22:00)  T(F): 97.4 (07 Jan 2022 09:30), Max: 97.9 (06 Jan 2022 22:00)  HR: 76 (07 Jan 2022 15:00) (75 - 81)  BP: 122/63 (07 Jan 2022 15:00) (112/48 - 128/66)  BP(mean): 69 (07 Jan 2022 04:00) (69 - 79)  RR: 18 (07 Jan 2022 15:00) (15 - 25)  SpO2: 97% (07 Jan 2022 15:00) (95% - 99%)        CONSTITUTIONAL: NAD, well-developed  RESPIRATORY: Normal respiratory effort; lungs are clear to auscultation bilaterally  CARDIOVASCULAR: Regular rate and rhythm, normal S1 and S2  ABDOMEN: Nontender to palpation, normoactive bowel sounds. RT groin tender to palpation.   PSYCH: A+O to person, place, and time; affect appropriate  NEUROLOGY: CN 2-12 are intact and symmetric; no gross sensory deficits;   SKIN: No rashes; no palpable lesions      LABS:                        11.0   14.13 )-----------( 266      ( 07 Jan 2022 05:29 )             33.2     01-07    134<L>  |  99  |  32.8<H>  ----------------------------<  167<H>  3.7   |  18.0<L>  |  1.26    Ca    8.2<L>      07 Jan 2022 05:29  Phos  3.3     01-06  Mg     2.5     01-06    TPro  6.1<L>  /  Alb  3.3  /  TBili  0.6  /  DBili  x   /  AST  45<H>  /  ALT  27  /  AlkPhos  76  01-07      CARDIAC MARKERS ( 06 Jan 2022 01:35 )  x     / 3.59 ng/mL / x     / x     / x              CAPILLARY BLOOD GLUCOSE            RADIOLOGY & ADDITIONAL TESTS:  Results Reviewed:   Imaging Personally Reviewed:  Electrocardiogram Personally Reviewed:

## 2022-01-07 NOTE — PROGRESS NOTE ADULT - SUBJECTIVE AND OBJECTIVE BOX
[facility], [facility]                                                    [address], [address], [place] [address]                                                         PHONE: [phone]    FAX: [phone] [address], [address], [place] [address]                                                 PHONE: [phone]    FAX: [phone]  *******************************************************************************  cc: STEMI    HPI:v 87M with h/o CAD s/p MI & multiple stents, ICM, PPM, chronic atrial fibrillation (on Eliquis), HTN, HL, RA a/w substernal chest pain.  Patient was admitted to [facility] from 12/28-12/30/21 with atypical chest pain and was discharged home.  On day of presentation to [facility] patient developed recurrent chest pain and was noted to have an inferior STEMI.  He had an emergent cardiac catheterization s/p atherectomy and stent to the RCA with known LAD ISR.  He is now asymptomatic with no chest pain, SOB, palpitations, LH, syncope, orthopnea, PND, LE edema.  No fever, chills or cough. Now s/p laser and PTCA of the LAD yesterday.        Overnight events/Subjective Assessment: anxious. SOB related to anxiety. no CP     INTERPRETATION OF TELEMETRY (personally reviewed): SR    PAST MEDICAL & SURGICAL HISTORY:  HTN (hypertension)    Hyperlipidemia    CAD (coronary artery disease)    Stented coronary artery    MI (myocardial infarction)  1988    Other persistent atrial fibrillation    History of hemorrhoidectomy    History of inguinal hernia repair    Absent tonsil    Pacemaker        Lipitor (Muscle Pain)  Plavix (Other)      MEDICATIONS  (STANDING):  apixaban 2.5 milliGRAM(s) Oral two times a day  aspirin enteric coated 81 milliGRAM(s) Oral daily  clopidogrel Tablet 75 milliGRAM(s) Oral daily  influenza  Vaccine (HIGH DOSE) 0.7 milliLiter(s) IntraMuscular once  losartan 25 milliGRAM(s) Oral daily  metoprolol succinate ER 50 milliGRAM(s) Oral daily  pantoprazole    Tablet 40 milliGRAM(s) Oral before breakfast  predniSONE   Tablet 5 milliGRAM(s) Oral daily  simvastatin 40 milliGRAM(s) Oral at bedtime  sodium chloride 0.9% lock flush 3 milliLiter(s) IV Push every 8 hours    MEDICATIONS  (PRN):  diazepam    Tablet 2 milliGRAM(s) Oral two times a day PRN anxiety      Vital Signs Last 24 Hrs  T(C): 36.4 (07 Jan 2022 04:00), Max: 36.6 (06 Jan 2022 11:14)  T(F): 97.5 (07 Jan 2022 04:00), Max: 97.9 (06 Jan 2022 22:00)  HR: 81 (07 Jan 2022 04:00) (74 - 81)  BP: 112/48 (07 Jan 2022 04:00) (112/48 - 149/69)  BP(mean): 69 (07 Jan 2022 04:00) (69 - 79)  RR: 18 (07 Jan 2022 04:00) (14 - 25)  SpO2: 97% (07 Jan 2022 04:00) (95% - 100%)    I&O's Detail    06 Jan 2022 07:01  -  07 Jan 2022 07:00  --------------------------------------------------------  IN:  Total IN: 0 mL    OUT:    Voided (mL): 1475 mL  Total OUT: 1475 mL    Total NET: -1475 mL        I&O's Summary    06 Jan 2022 07:01  -  07 Jan 2022 07:00  --------------------------------------------------------  IN: 0 mL / OUT: 1475 mL / NET: -1475 mL            PHYSICAL EXAM:  General: Appears well developed, well nourished, no acute distress. not in acute pain  HEAD: normal cephalic. Atraumatic  PUPILS: equal and reactive to light  EARS: normal hearing  NECK: supple. no JVD or HJR. no carotid bruits. no visible lymphadenopathy  NOSE: no gross abnormalities  CHEST: symmetric chest wall expansion  CARDIOVASCULAR: Normal rate. Regular rhythm. Normal S1 and S2, no S3/S4,  no murmur, rub, or gallop  LUNGS: Normal effort. Normal respiratory rate. Breath sounds are clear to auscultation bilaterally. No respiratory distress. No stridor.  no rales, rhonchi or wheeze. no decreased Breath sounds  ABDOMEN: Soft, nontender, non-distended, positive bowel sounds, no mass or bruit. no abdominal tenderness. No rebound. no ascites  EXTREMITIES: No clubbing, cyanosis or edema. normal range of motion  PULSES:  distal pulses WNL  SKIN: Warm and dry with normal turgor. no visible rash or cyanosis   NEURO: Alert & oriented x 3, grossly intact with no focal weakness  PSYCH: normal mood and affect. Grossly normal insight and judgement exhibited    FAMILY HISTORY:  No pertinent family history of ischemic heart disease for mother, father or in first degree relatives        SOCIAL HISTORY:  no active smoking. No ETOH/No IVDA    REVIEW OF SYSTEMS:  all pertinent positive and negative ROS as above. All other ROS are negative        LABS:                        11.0   14.13 )-----------( 266      ( 07 Jan 2022 05:29 )             33.2     01-07    134<L>  |  99  |  32.8<H>  ----------------------------<  167<H>  3.7   |  18.0<L>  |  1.26    Ca    8.2<L>      07 Jan 2022 05:29  Phos  3.3     01-06  Mg     2.5     01-06    TPro  6.1<L>  /  Alb  3.3  /  TBili  0.6  /  DBili  x   /  AST  45<H>  /  ALT  27  /  AlkPhos  76  01-07    CARDIAC MARKERS ( 06 Jan 2022 01:35 )  x     / 3.59 ng/mL / x     / x     / x            Serum Pro-Brain Natriuretic Peptide: 80281 pg/mL (01-06 @ 23:17)  Serum Pro-Brain Natriuretic Peptide: 42477 pg/mL (01-04 @ 04:04)  Serum Pro-Brain Natriuretic Peptide: 8257 pg/mL (01-02 @ 19:50)  serum  Lipids:     Thyroid Stimulating Hormone, Serum: 0.15 uIU/mL (01-02 @ 19:50)      RADIOLOGY & ADDITIONAL STUDIES:  < from: Cardiac Catheterization (01.06.22 @ 12:16) >  CORONARY VESSELS: The coronary circulation is right dominant.  LM:   --  LM: Angiography showed minor luminal irregularities with no flow  limiting lesions.  LAD:   --  Mid LAD: There was a tubular 90 % stenosis at the site of a  prior stent, in-stent. There was CASSANDRA grade 3 flow through the vessel  (brisk flow). This is a likely culprit for the patient's clinical  presentation. An intervention was performed.  --  Distal LAD: There was a discrete 98 %stenosis at the site of a prior  stent, in-stent. There was CASSANDRA grade 3 flow through the vessel (brisk  flow). This is a likely culprit for the patient's clinical presentation.  An intervention was performed.  CX:   --  Proximal circumflex: There was a 20 % stenosis at the site of a  prior stent.  RCA:   --  RCA: This vessel was not injected.  COMPLICATIONS: No complications occurred during the cath lab visit.  DIAGNOSTIC IMPRESSIONS: - Severe ISR of the mid and distal LAD stents (2  layers of stent already in place for majority of the segment)  - Successful laser atherectomy, and scoring balloon angioplasty of the mid  and distal LAD with significant improvement in luminal area as confirmed   by IVUS imaging  - Severe LV systolic dysfunction by echo s/p IWSTEMI  - LVEDP elevated at 26mmHg  DIAGNOSTIC RECOMMENDATIONS: - ASA 81mg daily. Plavix loaded on the table.  Plavix is listed as an allergy. He reports body aches. Will continue  Plavix 75mg daily for now.  - Eliquis 5mg BID to resumein AM if vascular access site is stable. Triple  therapy for 1 month with PPI. After 1 month, Plavix and Eliquis alone.   After 1 year, ASA and Eliquis alone.  - If he does not tolerate Plavix, will change to Brilinta  - Continue BB and ARB, statin  - Monitor overnight, discharge in AM if stable  - Outpatient follow up at the [facility] in 1 week  - Repeat echo in outpatient setting to evaluate candidacy for upgrade to   ICD  INTERVENTIONAL IMPRESSIONS: - Severe ISR of the mid and distalLAD stents  (2 layers of stent already in place for majority of the segment)  - Successful laser atherectomy, and scoring balloon angioplasty of the mid  and distal LAD with significant improvement in luminal area as confirmed   by IVUS imaging  - Severe LV systolic dysfunction by echo s/p IWSTEMI  - LVEDP elevated at 26mmHg  INTERVENTIONAL RECOMMENDATIONS: - ASA 81mg daily. Plavix loaded on the  table. Plavix is listed as an allergy. He reports body aches. Will  continue Plavix 75mg daily for now.  - Eliquis 5mg BID to resume in AM if vascular access site is stable. Triple  therapy for 1 month with PPI. After 1 month, Plavix and Eliquis alone.   After 1 year, ASA and Eliquis alone.  - If he does not tolerate Plavix, will change to Brilinta  - Continue BB and ARB, statin  - Monitor overnight, discharge in AM if stable  - Outpatient follow up at the [facility] in 1 week  - Repeat echo in outpatient setting to evaluate candidacy for upgrade to   ICD  Prepared and signed by  Clarissa HEADLEY    < end of copied text >      CARDIAC CATHETERIZATION: < from: Cardiac Catheterization (01.02.22 @ 11:22) >  VENTRICLES: Analysis of regional contractile function demonstrated severe  anterolateral hypokinesis, apical akinesis, and diaphragmatic akinesis.  Global left ventricular function was severely depressed. EF calculated by  contrast ventriculography was 25 % and EF estimated was 30 %.  VALVES: AORTIC VALVE: There was no aortic stenosis.  CORONARY VESSELS: The coronary circulation is right dominant.  LM:   --  LM: Normal.  LAD:   --  Mid LAD: There was a diffuse 80 % stenosis at the site of a  prior stent.  CX:   --  Circumflex: Angiography showed minor luminal irregularities with  no flow limiting lesions.  --  Mid circumflex: There was a tubular 0 % stenosis at the site of a prior  stent.  RCA:   --  Proximal RCA: There was a tubular 95 % stenosis at the site of a  prior stent.  --  Distal RCA: There was a 50 % stenosis.  --  RPDA: There was a tubular 90 % stenosis. The lesion was associated with  a small filling defect consistent with thrombus. There was CASSANDRA grade 3  flow through the vessel (brisk flow).  --  RPLS: There was a discrete 90 % stenosis at the site of a prior stent.  COMPLICATIONS: No complications occurred during the cath lab visit. No  complications occurred during the cath lab visit. No complications  occurred during the cath lab visit.    < end of copied text >      ASSESSMENT AND PLAN:  In summary, TATIANA RIBERA is a 87y Male with past medical history significant for  CAD s/p MI & multiple stents, ICM, PPM, chronic atrial fibrillation (on Eliquis), HTN, HL, RA a/w substernal chest pain.  Patient was admitted to [facility] from 12/28-12/30/21 with atypical chest pain and was discharged home.  On day of presentation to [facility] patient developed recurrent chest pain and was noted to have an inferior STEMI.  He had an emergent cardiac catheterization s/p atherectomy and stent to the RCA with known LAD ISR.  He is now asymptomatic with no chest pain, SOB, palpitations, LH, syncope, orthopnea, PND, LE edema.  No fever, chills or cough. Now s/p laser and PTCA of the LAD yesterday.    - STEMI. Peak troponin 6.95.  s/p PCI/BEVERLY pRCA. . Now s/p staged laser and PTCA of the LAD yesterday.  - s/p laser atherectomy and scoring balloon angioplasty of the mid and distal LAD ISR with significant improvement in luminal area on 1/6/21.  - Echocardiogram 1/2/22 demonstrated severely decreased LV fxn (EF 20-25%). The entire inferior wall, mid and apical anterior septum, mid and apical inferior septum, basal and mid inferolateral wall, and apex are akinetic. The entire anterior wall, basal and mid anterolateral wall, basal anteroseptal segment, basal inferoseptal segment, and apical lateral segment are hypokinetic. RV systolic function is mildly reduced, severe LAE, trivial pericardial effusion is present. The pericardial effusion is localized near the right atrium. There is no evidence of cardiac tamponade. Mild AR, moderate-severe MR, trace MO, moderate TR.  - Rhythm/hemodynamics stable.  Continue current dose of Toprol XL 50 daily and Losartan 25mg daily for now and titrate PRN.  - Continue ASA 81mg daily  - Allergy to Plavix is listed.  He reports body aches.  This is not clearly an allergy.  He will be on Triple Therapy for 1 month.  Plavix is less potent than Brilinta and should result in lower bleeding risk.  Pt agreeable to try Plavix.  If he develops any symptoms, will change to Brilinta.  - ASA, Plavix with Eliquis.  PPI while on triple therapy.  - HL.  Simvastatin 40 mg PO daily in place (lipids 1/3/22: , LDL 62, HDL 30, Trig 106)  - Patient has a h/o chronic AF with an increased [id number] score maintained on oral AC with Eliquis 5 bid  - ICM. will need re assessment of EF in the outpt setting following revascularization for consideration of ICD. Maintain losartan  - Anxiety management as per medical  - Discharge today. Outpt fu via our office in one week      Key Fischer MD

## 2022-01-07 NOTE — PROGRESS NOTE ADULT - ASSESSMENT
87M with PMHx of HTN, HLD, CAD sp prior stents, PPM, cAF on AC, RA who presented with CP, EKG with inferior STEPHANIE, +trop. CODE STEMI activated. Urgently to cath lab, cath via R radial which revealed EF 25%, LM nml, LAD 80% prior stent instenosis, Cx 0%, pRCA 95% instent restenosis, treated with BEVERLY x 1, manual closure of radial, no complications. Transferred to ICU post procedure. Prior Echo 2018 with EF 50%. No events overnight, patient remains chest pain free this am. Denies SOB this am. Patient seen by Dr. Giron of [facility] this am, recommending telemetry for 24 hours. LAD stent restenosis will be dealt with as an outpatient per Cardiology.    #Chest pain- IWSTEMI  - Hx of CAD w/ 14 prior stents  - Found to have IWSTEMI S/P PCI/BEVERLY pRCA  - Patient has residual 80% mLAD in-stent re-stensois and will need repeat PCI/stent mLAD in few weeks  - Appreciate cardiology recs- will c/w toprol, ASA and simvastatin. Birlinta discontinued and plavix started. Eliquis started. Patient will need to be on eliquis for 1 month.   - S/p laser atherectomy and scoring balloon angioplasty of the mid and distal LAD ISR with significant improvement in luminal area on 1/6/21.  - will need outpatient follow up with [facility]  - F/U US RT groin w/ bruit/thrill. If negative, will be discharged home today    #A. fib  - c/w toprol XL 50 mg QD    #RA  - c/w home prednisone 5 mg QD    #Anxiety  - c/w Valium 2 mg BID PRN for anxiety    #ICM  - c/w losartan 25 mg QD  - Will touch base with cardiology in regards to lifevest    Updated patient's wife, Honey on 1/6.   Dispo- PT recs- home w/ no skilled PT needs. Likely discharge tomorrow on 1/7. His daughter, Sondra to pick him up tomorrow.

## 2022-01-07 NOTE — PROGRESS NOTE ADULT - SUBJECTIVE AND OBJECTIVE BOX
NP post cath assessment  This is a 88y/o male with PMH of HTN, Hld, Ashd (stents x 14), atrial fib  and RA who presents with chest discomfort.  Patient has been having intermittent chest pain for several day. ROBBIE ordonez was hosp at [facility] from - and observed and discharged home chest pain free .  Then on  patient had >2 hours of chest discomfort severe when it went away he felt much better.  This am he began to experience chest pain and presents to er after wife called 911.  Presents in ER with chest discomfort which he received plavix and nitro and pain is much better now.  Code stemi had been activated and he was referred to the cath lab  Patient states he is allergic to plavix but him and his wife does not know reaction he gets.  Now s/p athrectomy and stent to rca  full report pending  (2022 11:28)    PMH  ST elevation myocardial infarction (STEMI)  No pertinent family history in first degree relatives  hTN (hypertension)  Hyperlipidemia  CAD (coronary artery disease)  Stented coronary artery  MI (myocardial infarction)  Other persistent atrial fibrillation  Left heart catheterization  Chronic atrial fibrillation  Historyof hemrrhoidectomy  History of inguinal hernia repair  Absent tonsil  Pacemaker  Ischemic cardiomyopathy      REVIEW OF SYSTEMS  General: Denies fever, chills, pain, no discomfort  Respiratory and Thorax:  Denies cough, sob, or any discomfort  Cardiovascular:  Denies chest pain, palpiations, or any discomfort	  Gastrointestinal:  Denies n/v/d, constipation, or any discomfort  Genitourinary:  Denies frequency, burning, or pain  Musculoskeletal:  Denies joint pain, swelling, or any discomfort   Neurological:  Denies headache, dizzyness, blurred vision, numbing or tingling  Psychiatric:  Denies sadness or depression  Hematology  Adryan bleeding o swelling    MEDICATIONS:  losartan 25 milliGRAM(s) Oral daily  metoprolol succinate ER 50 milliGRAM(s) Oral daily  diazepam    Tablet 2 milliGRAM(s) Oral two times a day PRN  pantoprazole    Tablet 40 milliGRAM(s) Oral before breakfast  predniSONE   Tablet 5 milliGRAM(s) Oral daily  simvastatin 40 milliGRAM(s) Oral at bedtime  apixaban 2.5 milliGRAM(s) Oral two times a day  aspirin enteric coated 81 milliGRAM(s) Oral daily  clopidogrel Tablet 75 milliGRAM(s) Oral daily  influenza  Vaccine (HIGH DOSE) 0.7 milliLiter(s) IntraMuscular once  sodium chloride 0.9% lock flush 3 milliLiter(s) IV Push every 8 hours    PHYSICAL EXAM:  T(C): 36.3 (22 @ 09:30), Max: 36.6 (22 @ 22:00)  HR: 76 (22 @ 15:00) (75 - 81)  BP: 122/63 (22 @ 15:00) (112/48 - 128/66)  RR: 18 (22 @ 15:00) (15 - 25)  SpO2: 97% (22 @ 15:00) (95% - 99%)    I&O's Summary  2022 07:01  -  2022 07:00  --------------------------------------------------------  IN: 0 mL / OUT: 1475 mL / NET: -1475 mL  Daily Weight in k.7 (2022 04:00)    Appearance: Normal	  HEENT:   Normal oral mucosa, PERRL, EOMI	  Lymphatic: No lymphadenopathy  Cardiovascular: Normal S1 S2, No JVD, No murmurs, No edema  Respiratory: Lungs clear to auscultation	  Psychiatry: A & O x 3, Mood & affect appropriate  Gastrointestinal:  Soft, Non-tender, + BS	  Skin: No rashes, No ecchymoses, No cyanosis  Neurologic: Non-focal  Extremities: Normal range of motion, No clubbing, cyanosis or edema  Vascular: Peripheral pulses palpable 2+ bilaterally  Procedure Site:    TELEMETRY:  Reviwed.                          11.0   14.13 )-----------( 266      ( 2022 05:29 )             33.2     134<L>  |  99  |  32.8<H>  ----------------------------<  167<H>  3.7   |  18.0<L>  |  1.26  Ca    8.2<L>      2022 05:29  Phos  3.3     -  Mg     2.5       TPro  6.1<L>  /  Alb  3.3  /  TBili  0.6  /  DBili  x   /  AST  45<H>  /  ALT  27  /  AlkPhos  76    proBNP: Serum Pro-Brain Natriuretic Peptide: 43177 pg/mL ( @ 23:17)

## 2022-01-08 VITALS
HEART RATE: 72 BPM | OXYGEN SATURATION: 94 % | DIASTOLIC BLOOD PRESSURE: 72 MMHG | SYSTOLIC BLOOD PRESSURE: 132 MMHG | RESPIRATION RATE: 18 BRPM | TEMPERATURE: 98 F

## 2022-01-08 LAB
ALBUMIN SERPL ELPH-MCNC: 3.2 G/DL — LOW (ref 3.3–5.2)
ALP SERPL-CCNC: 65 U/L — SIGNIFICANT CHANGE UP (ref 40–120)
ALT FLD-CCNC: 22 U/L — SIGNIFICANT CHANGE UP
ANION GAP SERPL CALC-SCNC: 15 MMOL/L — SIGNIFICANT CHANGE UP (ref 5–17)
AST SERPL-CCNC: 28 U/L — SIGNIFICANT CHANGE UP
BILIRUB SERPL-MCNC: 0.7 MG/DL — SIGNIFICANT CHANGE UP (ref 0.4–2)
BUN SERPL-MCNC: 39.6 MG/DL — HIGH (ref 8–20)
CALCIUM SERPL-MCNC: 8.1 MG/DL — LOW (ref 8.6–10.2)
CHLORIDE SERPL-SCNC: 98 MMOL/L — SIGNIFICANT CHANGE UP (ref 98–107)
CO2 SERPL-SCNC: 20 MMOL/L — LOW (ref 22–29)
CREAT SERPL-MCNC: 1.4 MG/DL — HIGH (ref 0.5–1.3)
GLUCOSE SERPL-MCNC: 115 MG/DL — HIGH (ref 70–99)
HCT VFR BLD CALC: 31.1 % — LOW (ref 39–50)
HCT VFR BLD CALC: 31.8 % — LOW (ref 39–50)
HGB BLD-MCNC: 10.4 G/DL — LOW (ref 13–17)
HGB BLD-MCNC: 10.7 G/DL — LOW (ref 13–17)
MCHC RBC-ENTMCNC: 30.1 PG — SIGNIFICANT CHANGE UP (ref 27–34)
MCHC RBC-ENTMCNC: 30.3 PG — SIGNIFICANT CHANGE UP (ref 27–34)
MCHC RBC-ENTMCNC: 33.4 GM/DL — SIGNIFICANT CHANGE UP (ref 32–36)
MCHC RBC-ENTMCNC: 33.6 GM/DL — SIGNIFICANT CHANGE UP (ref 32–36)
MCV RBC AUTO: 90.1 FL — SIGNIFICANT CHANGE UP (ref 80–100)
MCV RBC AUTO: 90.1 FL — SIGNIFICANT CHANGE UP (ref 80–100)
PLATELET # BLD AUTO: 246 K/UL — SIGNIFICANT CHANGE UP (ref 150–400)
PLATELET # BLD AUTO: 293 K/UL — SIGNIFICANT CHANGE UP (ref 150–400)
POTASSIUM SERPL-MCNC: 3.9 MMOL/L — SIGNIFICANT CHANGE UP (ref 3.5–5.3)
POTASSIUM SERPL-SCNC: 3.9 MMOL/L — SIGNIFICANT CHANGE UP (ref 3.5–5.3)
PROT SERPL-MCNC: 5.9 G/DL — LOW (ref 6.6–8.7)
RBC # BLD: 3.45 M/UL — LOW (ref 4.2–5.8)
RBC # BLD: 3.53 M/UL — LOW (ref 4.2–5.8)
RBC # FLD: 13.9 % — SIGNIFICANT CHANGE UP (ref 10.3–14.5)
RBC # FLD: 13.9 % — SIGNIFICANT CHANGE UP (ref 10.3–14.5)
SODIUM SERPL-SCNC: 133 MMOL/L — LOW (ref 135–145)
WBC # BLD: 11.36 K/UL — HIGH (ref 3.8–10.5)
WBC # BLD: 14.31 K/UL — HIGH (ref 3.8–10.5)
WBC # FLD AUTO: 11.36 K/UL — HIGH (ref 3.8–10.5)
WBC # FLD AUTO: 14.31 K/UL — HIGH (ref 3.8–10.5)

## 2022-01-08 PROCEDURE — 99239 HOSP IP/OBS DSCHRG MGMT >30: CPT

## 2022-01-08 RX ORDER — CLOPIDOGREL BISULFATE 75 MG/1
1 TABLET, FILM COATED ORAL
Qty: 30 | Refills: 0
Start: 2022-01-08 | End: 2022-02-06

## 2022-01-08 RX ORDER — SIMVASTATIN 20 MG/1
1 TABLET, FILM COATED ORAL
Qty: 30 | Refills: 0
Start: 2022-01-08 | End: 2022-02-06

## 2022-01-08 RX ORDER — PANTOPRAZOLE SODIUM 20 MG/1
1 TABLET, DELAYED RELEASE ORAL
Qty: 30 | Refills: 0
Start: 2022-01-08 | End: 2022-02-06

## 2022-01-08 RX ORDER — SENNA PLUS 8.6 MG/1
2 TABLET ORAL ONCE
Refills: 0 | Status: COMPLETED | OUTPATIENT
Start: 2022-01-08 | End: 2022-01-08

## 2022-01-08 RX ORDER — APIXABAN 2.5 MG/1
1 TABLET, FILM COATED ORAL
Qty: 60 | Refills: 0
Start: 2022-01-08 | End: 2022-02-06

## 2022-01-08 RX ORDER — SENNA PLUS 8.6 MG/1
2 TABLET ORAL
Qty: 0 | Refills: 0 | DISCHARGE
Start: 2022-01-08

## 2022-01-08 RX ORDER — LOSARTAN POTASSIUM 100 MG/1
1 TABLET, FILM COATED ORAL
Qty: 30 | Refills: 0
Start: 2022-01-08 | End: 2022-02-06

## 2022-01-08 RX ADMIN — SODIUM CHLORIDE 3 MILLILITER(S): 9 INJECTION INTRAMUSCULAR; INTRAVENOUS; SUBCUTANEOUS at 05:31

## 2022-01-08 RX ADMIN — SENNA PLUS 2 TABLET(S): 8.6 TABLET ORAL at 15:54

## 2022-01-08 RX ADMIN — LOSARTAN POTASSIUM 25 MILLIGRAM(S): 100 TABLET, FILM COATED ORAL at 05:34

## 2022-01-08 RX ADMIN — Medication 81 MILLIGRAM(S): at 15:54

## 2022-01-08 RX ADMIN — Medication 5 MILLIGRAM(S): at 05:34

## 2022-01-08 RX ADMIN — PANTOPRAZOLE SODIUM 40 MILLIGRAM(S): 20 TABLET, DELAYED RELEASE ORAL at 05:33

## 2022-01-08 RX ADMIN — Medication 50 MILLIGRAM(S): at 05:33

## 2022-01-08 RX ADMIN — CLOPIDOGREL BISULFATE 75 MILLIGRAM(S): 75 TABLET, FILM COATED ORAL at 15:54

## 2022-01-08 NOTE — DIETITIAN INITIAL EVALUATION ADULT. - PERTINENT MEDS FT
MEDICATIONS  (STANDING):  apixaban 2.5 milliGRAM(s) Oral two times a day  aspirin enteric coated 81 milliGRAM(s) Oral daily  clopidogrel Tablet 75 milliGRAM(s) Oral daily  influenza  Vaccine (HIGH DOSE) 0.7 milliLiter(s) IntraMuscular once  losartan 25 milliGRAM(s) Oral daily  metoprolol succinate ER 50 milliGRAM(s) Oral daily  pantoprazole    Tablet 40 milliGRAM(s) Oral before breakfast  predniSONE   Tablet 5 milliGRAM(s) Oral daily  simvastatin 40 milliGRAM(s) Oral at bedtime  sodium chloride 0.9% lock flush 3 milliLiter(s) IV Push every 8 hours    MEDICATIONS  (PRN):  diazepam    Tablet 2 milliGRAM(s) Oral two times a day PRN anxiety

## 2022-01-08 NOTE — DIETITIAN INITIAL EVALUATION ADULT. - PERTINENT LABORATORY DATA
01-08 Na133 mmol/L<L> Glu 115 mg/dL<H> K+ 3.9 mmol/L Cr  1.40 mg/dL<H> BUN 39.6 mg/dL<H> Phos n/a   Alb 3.2 g/dL<L> PAB n/a

## 2022-01-08 NOTE — DIETITIAN INITIAL EVALUATION ADULT. - OTHER INFO
88yo M w/pmh CAD s/p stents x11, on Eliquis, HLD presents for STEMI. Reports intermittent CP since Friday, worse 1h PTA. Pt is overweight, intake is good and skin is intact.

## 2022-01-08 NOTE — PROGRESS NOTE ADULT - ASSESSMENT
87M with PMHx of HTN, HLD, CAD sp prior stents, PPM, cAF on AC, RA who presented with CP, EKG with inferior STEPHANIE, +trop. CODE STEMI activated. Urgently to cath lab, cath via R radial which revealed EF 25%, LM nml, LAD 80% prior stent instenosis, Cx 0%, pRCA 95% instent restenosis, treated with BEVERLY x 1, manual closure of radial, no complications. Transferred to ICU post procedure. Prior Echo 2018 with EF 50%. No events overnight, patient remains chest pain free this am. Denies SOB this am. Patient seen by Dr. Giron of [facility] this am, recommending telemetry for 24 hours. LAD stent restenosis will be dealt with as an outpatient per Cardiology.      IW STEMI  - Has hx of CAD w/ 14 prior stents  - Found to have IW STEMI s/p PCI w/ BEVERLY to pRCA  - Patient has residual 80% mLAD in-stent re-stensois and will need repeat PCI w/ stent to mLAD in few weeks  - Maintain on toprol, ASA and simvastatin   - Birlinta discontinued and plavix started    - Eliquis started and will need to be on eliquis for 1 month   - s/p laser atherectomy and scoring balloon angioplasty of the mid and distal LAD ISR w/ significant improvement in luminal area on 1/6/21  - Will need outpatient follow up with [facility]  - Right groin US negative for pseudoaneurysm but hematoma reported.  Vascular evaluated and note that findings unlikely a hematoma, no vascular involvement, no pseudoaneurysm, no intervention at this time and to reconsult as needed      Normocytic anemia   - H/H trending down this morning  - Hemodynamically stable and no active bleeding noted  - Repeat H/H ordered for noon and if stable will d/c home      JOE likely prerenal  - Monitor renal fxn closely   - Cautiously hydrate if needed   - Avoid nephrotoxic medications or renally dose if needed      AFib  - Rate controlled  - On toprol XL 50 mg QD  - On Eliquis at this time for AC      RA  - On home prednisone 5 mg QD      Anxiety  - On Valium 2 mg BID PRN for anxiety      ICM  - Maintain on losartan 25 mg QD  - Will need repeat TTE outpt to reassess LVEF  - May need ICD as per cardiology         VTE ppx: on eliquis   Dispo: pending repeat CBC to assess H/H and if stable will d/c home.

## 2022-01-08 NOTE — PROGRESS NOTE ADULT - REASON FOR ADMISSION
Chest Discomfort

## 2022-01-08 NOTE — PROGRESS NOTE ADULT - SUBJECTIVE AND OBJECTIVE BOX
Chief Complaint:  chest discomfort    SUBJECTIVE / OVERNIGHT EVENTS: no acute events reported overnight.  Pt reports constipation.  His last BM was 2 days ago.  He feels he has to have a BM but unable to go.  He is passing flatus.  He denies chest pain, palpitations, SOB, abd pain, N/V, fever, chills, dysuria or any other complaints. All remainder ROS negative.         PHYSICAL EXAM:  Vital Signs Last 24 Hrs  T(C): 36.3 (08 Jan 2022 08:36), Max: 36.8 (07 Jan 2022 20:32)  T(F): 97.3 (08 Jan 2022 08:36), Max: 98.2 (07 Jan 2022 20:32)  HR: 75 (08 Jan 2022 08:36) (72 - 80)  BP: 127/69 (08 Jan 2022 08:36) (122/63 - 129/65)  BP(mean): --  RR: 18 (08 Jan 2022 08:36) (18 - 18)  SpO2: 98% (08 Jan 2022 08:36) (85% - 99%)      GENERAL: pt examined bedside, laying comfortably in bed in NAD  HEENT: NC/AT, moist oral mucosa, clear conjunctiva, sclera nonicteric  RESPIRATORY: Normal respiratory effort; CTA b/l, no wheezing, rhonchi, rales  CARDIOVASCULAR: RRR, normal S1 and S2  ABDOMEN: soft, NT/ND, normoactive bowel sounds, no rebound/guarding  EXTREMITIES: No cynaosis, no clubbing, no lower extremity edema; Peripheral pulses are 2+ bilaterally  PSYCH: affect appropriate and cooperative  NEUROLOGY: A+O to person, place, and time, no focal neurologic deficits appreciated   SKIN: rt groin dressing CDI         LABS:                        10.4   11.36 )-----------( 246      ( 08 Jan 2022 06:59 )             31.1     01-08    133<L>  |  98  |  39.6<H>  ----------------------------<  115<H>  3.9   |  20.0<L>  |  1.40<H>    Ca    8.1<L>      08 Jan 2022 06:59  Phos  3.3     01-06  Mg     2.5     01-06    TPro  5.9<L>  /  Alb  3.2<L>  /  TBili  0.7  /  DBili  x   /  AST  28  /  ALT  22  /  AlkPhos  65  01-08        CAPILLARY BLOOD GLUCOSE            RADIOLOGY & ADDITIONAL TESTS:          MEDICATIONS  (STANDING):  apixaban 2.5 milliGRAM(s) Oral two times a day  aspirin enteric coated 81 milliGRAM(s) Oral daily  clopidogrel Tablet 75 milliGRAM(s) Oral daily  influenza  Vaccine (HIGH DOSE) 0.7 milliLiter(s) IntraMuscular once  losartan 25 milliGRAM(s) Oral daily  metoprolol succinate ER 50 milliGRAM(s) Oral daily  pantoprazole    Tablet 40 milliGRAM(s) Oral before breakfast  predniSONE   Tablet 5 milliGRAM(s) Oral daily  simvastatin 40 milliGRAM(s) Oral at bedtime  sodium chloride 0.9% lock flush 3 milliLiter(s) IV Push every 8 hours    MEDICATIONS  (PRN):  diazepam    Tablet 2 milliGRAM(s) Oral two times a day PRN anxiety

## 2022-01-10 ENCOUNTER — NON-APPOINTMENT (OUTPATIENT)
Age: 87
End: 2022-01-10

## 2022-01-10 PROBLEM — I48.19 OTHER PERSISTENT ATRIAL FIBRILLATION: Chronic | Status: ACTIVE | Noted: 2022-01-02

## 2022-01-11 ENCOUNTER — APPOINTMENT (OUTPATIENT)
Dept: CARE COORDINATION | Facility: HOME HEALTH | Age: 87
End: 2022-01-11
Payer: MEDICARE

## 2022-01-11 VITALS
OXYGEN SATURATION: 98 % | TEMPERATURE: 97.3 F | HEART RATE: 92 BPM | RESPIRATION RATE: 22 BRPM | SYSTOLIC BLOOD PRESSURE: 134 MMHG | DIASTOLIC BLOOD PRESSURE: 81 MMHG

## 2022-01-11 DIAGNOSIS — F41.9 ANXIETY DISORDER, UNSPECIFIED: ICD-10-CM

## 2022-01-11 DIAGNOSIS — R06.02 SHORTNESS OF BREATH: ICD-10-CM

## 2022-01-11 DIAGNOSIS — I21.3 ST ELEVATION (STEMI) MYOCARDIAL INFARCTION OF UNSPECIFIED SITE: ICD-10-CM

## 2022-01-11 DIAGNOSIS — I50.20 UNSPECIFIED SYSTOLIC (CONGESTIVE) HEART FAILURE: ICD-10-CM

## 2022-01-11 PROCEDURE — 99496 TRANSJ CARE MGMT HIGH F2F 7D: CPT

## 2022-01-11 RX ORDER — ALPRAZOLAM 0.25 MG/1
0.25 TABLET ORAL 3 TIMES DAILY
Qty: 15 | Refills: 0 | Status: ACTIVE | COMMUNITY
Start: 2022-01-11 | End: 1900-01-01

## 2022-01-11 RX ORDER — FUROSEMIDE 20 MG/1
20 TABLET ORAL
Qty: 7 | Refills: 0 | Status: ACTIVE | COMMUNITY
Start: 2022-01-11 | End: 1900-01-01

## 2022-01-11 RX ORDER — APIXABAN 2.5 MG/1
2.5 TABLET, FILM COATED ORAL
Qty: 60 | Refills: 1 | Status: ACTIVE | COMMUNITY
Start: 2022-01-11

## 2022-01-11 RX ORDER — SERTRALINE 25 MG/1
25 TABLET, FILM COATED ORAL
Qty: 51 | Refills: 0 | Status: ACTIVE | COMMUNITY
Start: 2022-01-11 | End: 1900-01-01

## 2022-01-11 RX ORDER — CLOPIDOGREL 75 MG/1
75 TABLET, FILM COATED ORAL
Qty: 90 | Refills: 2 | Status: ACTIVE | COMMUNITY
Start: 2022-01-11

## 2022-01-13 LAB
ALBUMIN SERPL ELPH-MCNC: 3.7 G/DL
ALP BLD-CCNC: 84 U/L
ALT SERPL-CCNC: 25 U/L
ANION GAP SERPL CALC-SCNC: 17 MMOL/L
AST SERPL-CCNC: 22 U/L
BASOPHILS # BLD AUTO: 0.03 K/UL
BASOPHILS NFR BLD AUTO: 0.2 %
BILIRUB SERPL-MCNC: 0.6 MG/DL
BUN SERPL-MCNC: 40 MG/DL
CALCIUM SERPL-MCNC: 8.8 MG/DL
CHLORIDE SERPL-SCNC: 96 MMOL/L
CO2 SERPL-SCNC: 19 MMOL/L
CREAT SERPL-MCNC: 1.54 MG/DL
EOSINOPHIL # BLD AUTO: 0.02 K/UL
EOSINOPHIL NFR BLD AUTO: 0.2 %
GLUCOSE SERPL-MCNC: 149 MG/DL
HCT VFR BLD CALC: 34.9 %
HGB BLD-MCNC: 11.4 G/DL
IMM GRANULOCYTES NFR BLD AUTO: 2 %
LYMPHOCYTES # BLD AUTO: 1.01 K/UL
LYMPHOCYTES NFR BLD AUTO: 7.9 %
MAN DIFF?: NORMAL
MCHC RBC-ENTMCNC: 30.4 PG
MCHC RBC-ENTMCNC: 32.7 GM/DL
MCV RBC AUTO: 93.1 FL
MONOCYTES # BLD AUTO: 1.49 K/UL
MONOCYTES NFR BLD AUTO: 11.6 %
NEUTROPHILS # BLD AUTO: 9.99 K/UL
NEUTROPHILS NFR BLD AUTO: 78.1 %
PLATELET # BLD AUTO: 357 K/UL
POTASSIUM SERPL-SCNC: 4.6 MMOL/L
PROT SERPL-MCNC: 6.2 G/DL
RBC # BLD: 3.75 M/UL
RBC # FLD: 14.6 %
SODIUM SERPL-SCNC: 132 MMOL/L
WBC # FLD AUTO: 12.8 K/UL

## 2022-01-13 NOTE — CDI QUERY NOTE - NSCDIOTHERTXTBX_GEN_ALL_CORE_HH
HPI:  87 year old male, presented with "chest discomfort" per the H&P.  The 2022 Echo report shows "Left ventricular ejection fraction, by visual estimation, is 20 to 25%".  The 1/3/2022 Critical Care Progress Note documents "acute systolic heart failure".  The 2022 Chart Note documents "SOB-CXR 40mg IV lasix-BMP, Mag, Phos, BNP-2mg valium PO-RN to notify of any acute change in pt status".  The 2022 CXR report shows "Findings suspicious for interstitial edema/congestive heart failure".        Please clarify the status of Acute Systolic Heart Failure.    A)  Acute Systolic Heart Failure, ruled in  B)  Acute Systolic Heart Failure, ruled out  C)  Other, please specify  D)  Not clinically significant    Supporting Documentation:         H&P Adult [Charted Location: [address] 3110 01] [Authored: 2022 11:28]- for Visit: [id number], In Progress, Revised, Signed w/additional Signatures Pending, General     History of Present Illness:  Reason for Admission: Chest Discomfort  History of Present Illness:   This is a 88y/o male with PMH of HTN, Hld, Ashd (stents x 14), atrial fib  and RA who presents with chest discomfort.  Patient has been having intermittent chest pain for several day. H e was hosp at [facility] from - and observed and discharged home chest pain free .  Then on  patient had >2 hours of chest discomfort severe when it went away he felt much better.  This am he began to experience chest pain and presents to er after wife called 911.  Presents in ER with chest discomfort which he received plavix and nitro and pain is much better now.  Code stemi had been activated and he was referred to the cath lab  Patient states he is allergic to plavix but him and his wife does not know reaction he gets.  Now s/p athrectomy and stent to rca  full report pending    Assessment and Plan:    Assessment:  · Assessment	  This is a 88y/o male with PMH of HTN, Hld, Ashd (stents x 14), Atrial fib and RA who presents with chest discomfort.  Patient has been having intermittent chest pain for several day. H e was hosp at [facility] from - and observed and discharged home chest pain free .  Then on  patient had >2 hours of chest discomfort severe when it went away he felt much better till this am when he bagan to experience chest pain and presents to er after wife called 911.  Presents in ER with chest discomfort which he received plavix and nitro and pain is much better now.  Code stemi had been activated and he was referred to the cath lab  Patient states he is allergic to plavix but him and his wife does not know reaction he gets.  Now s/p athrectomy and stent to RCA  see above for full report     STEMI s/p RCA stent  Admit to ICU  Has residual LAD dx and will need intervention for in stent stenosis  [facility] to consult and provide further intervention  Trop is pending  Cxr is pending  Asa plavix zocar  Eliquis may be started tonight if no bleeding from radial site  restart ace in am if bp is ok  Right radial arm neuro vasc checks  Ekg in am  Cbc bmp in the am  Life style changes  Cardiac rehab discussed with patient  cardiac rehab info provided/referral and communication to cardiac rehab completed                Progress Note Adult-Critical Care Physician Assistant [Charted Location: [address] 3110 01] [Authored: 2022 01:58]  Assessment and Plan:   · Assessment	  Impression:  1. acute inferior STEMI, CAD, sp cardiac cath, placement of stent in RCA  2. acute systolic heart failure ? in setting of MI, no signs of decompensated HF currently  3. essential HTN  4. HLD  5. moderate to severe MR             Discharge Note Provider [Charted Location: [facility] 4221 01] [Authored: 2022 14:14]- for Visit: [id number], Complete, Revised, Signed in Full, General     Hospital Course:  Discharge Date	2022  	  Admission Date	2022 10:45  Reason for Admission	Chest Discomfort  Hospital Course	   87y Male with h/o CAD s/p MI & multiple stents, ICM, PPM, chronic atrial fibrillation (on Eliquis), HTN, HL, RA a/w substernal chest pain.  Patient was admitted to [facility] from -21 with atypical chest pain and was discharged home.  On day of presentation to [facility] patient developed recurrent chest pain and was noted to have an inferior STEMI.  He had an emergent cardiac catheterization s/p atherectomy and stent to the RCA with known LAD ISR. Patient returned to cath lab on , now   s/p LHC via RFA with laser balloon to mid and distal LAD closed with 6Fr Angioseal. Procedure performed by Dr. Thomas, he recieved IV Heparin and was loaded with Plavix 600 mg po as well as, IV sedation intraprocedurally. Patient will need re assessment of EF in the outpt setting following revascularization for consideration of ICD. Maintain losartan/statin/BB. Eliquis resumed at 2.5 mg BID as per Dr. Thomas.  He will be on Triple Therapy for 1 month.  Plavix is less potent than Brilinta and should result in lower bleeding risk.  Pt is medically stable for discharge.             Chart Note-Event Note Physician Assistant [Charted Location: [facility]] [Authored: 2022 22:15]  SOB    -CXR  -40mg IV lasix  -BMP, Mag, Phos, BNP  -2mg valium PO  -RN to notify of any acute change in pt status          ACC: [id number] EXAM:  XR CHEST PORTABLE URGENT 1V                          PROCEDURE DATE:  2022      INTERPRETATION:  Clinical history: Shortness of breath    Chest single view    There has been development of bilateral interstitial infiltrates in a   pattern suspicious for interstitial edema/congestive heart failure. Heart   size appears prominent. No pneumothorax.    IMPRESSION: Findings suspicious for interstitial edema/congestive heart   failure    --- End of Report ---              EXAM:  ECHO TTE WO CON COMP W DOPP      PROCEDURE DATE:  2022   .  INTERPRETATION:  TRANSTHORACIC ECHOCARDIOGRAM REPORT    Patient Name:   TATIANA RIBERA Patient Location: Inpatient  Medical Rec #:  [id number]       Accession #:      [id number]  Account #:                     Height:           67.0 in 170.2 cm  YOB: 1934      Weight:           158.7 lb 72.00 kg  Patient Age:    87 years       BSA:              1.83 m²  Patient Gender: M              BP:               112/54 mmHg    Date of Exam:        2022 3:29:17 PM  Sonographer:         Beto Nieves Jr, Jr  Referring Physician: Kaushal De Jesus MD    Procedure:     2D Echo/Doppler/Color Doppler Complete.  Indications:   ST elevation (STEMI) myocardial infarction of unspecified   site -                 I21.3; Cardiac murmur, unspecified - R01.1  Diagnosis:     Nonrheumatic mitral (valve) insufficiency - I34.0  Study Details: Technically suboptimal study. Study quality was adversely                 affected due to body habitus and lung interference.      2D AND M-MODE MEASUREMENTS (normal ranges within parentheses):  Left                 Normal   Aorta/Left            Normal  Ventricle:                    Atrium:  IVSd (2D):    1.26  (0.7-1.1) Aortic Root    2.96  (2.4-3.7)                 cm             (2D):           cm  LVPWd (2D):   1.06  (0.7-1.1) Left Atrium    5.49  (1.9-4.0)                 cm             (2D):           cm  LVIDd (2D):   5.68  (3.4-5.7) LA Volume      53.3                 cm             Index         ml/m²  LVIDs (2D):   4.97            Right Ventricle:                 cm             TAPSE:           1.37 cm  LV FS (2D):   12.5   (>25%)                  %  Relative Wall 0.37   (<0.42)  Thickness    LV DIASTOLIC FUNCTION:  MV Peak E: 0.62 m/s Decel Time: 153 msec  MV Peak A: 0.80 m/s  E/A Ratio: 0.77    SPECTRAL DOPPLER ANALYSIS (where applicable):  Mitral Valve:  MV P1/2 Time: 44.37 msec  MV Area, PHT: 4.96 cm²    Mitral Insufficiency by PISA:  MR Volume:  MR Flow Rate: 84.36 ml/s MR EROA: 17.32 mm²    Aortic Valve: AoV Max Kalin: 0.97 m/s AoV Peak PG: 3.8 mmHg AoV Mean P.8 mmHg    LVOT Vmax: 0.77 m/s LVOT VTI: 0.140 m LVOT Diameter: 2.23 cm    AoV Area, Vmax: 3.11 cm² AoV Area, VTI: 2.95 cm² AoV Area, Vmn: 2.69 cm²  Ao VTI: 0.185  Aortic Insufficiency:  AI Half-time: 357 msec    Tricuspid Valve and PA/RV Systolic Pressure: TR Max Velocity: 2.81 m/s RA   Pressure: 3 mmHg RVSP/PASP: 34.6 mmHg      PHYSICIAN INTERPRETATION:  Left Ventricle: Endocardial visualization was enhanced with intravenous   echo contrast. The left ventricular internal cavity size is normal.  Global LV systolic function was severely decreased. Left ventricular   ejection fraction, by visual estimation, is 20 to 25%.      LV Wall Scoring:  The entire inferior wall, mid and apical anterior septum, mid and apical  inferior septum, basal and mid inferolateral wall, and apex are akinetic.   The  entire anterior wall, basal and mid anterolateral wall, basal anteroseptal  segment, basal inferoseptal segment, and apical lateral segment are  hypokinetic.    Right Ventricle: RV systolic function is mildly reduced. TV S' 0.1 m/s.  Left Atrium: Severely enlarged left atrium.  Right Atrium: The right atrium is normal in size.  Pericardium: Trivial pericardial effusion is present. The pericardial   effusion is localized near the right atrium. There is no evidence of   cardiac tamponade.  Mitral Valve: Thickening and calcification of the anterior and posterior   mitral valve leaflets. Mobility is moderately decreased for both   leaflets. Moderate to severe mitral valve regurgitation is seen.  Tricuspid Valve: Moderate tricuspid regurgitation is visualized.  Aortic Valve: Peak transaortic gradient equals 3.8 mmHg, mean transaortic   gradient equals 1.8 mmHg, the calculated aortic valve area equals 2.95   cm² by the continuity equation consistent with normally opening aortic   valve. Mild aortic valve regurgitation is seen.  Pulmonic Valve: The pulmonic valve was not well visualized. Trace   pulmonic valve regurgitation.  Aorta: The aortic root is normal in size and structure.  Pulmonary Artery: The pulmonary artery is not well seen.  Venous: The inferior vena cava was normal sized, with respiratory size   variation greater than 50%.      Summary:   1. Left ventricular ejection fraction, by visual estimation, is 20 to   25%. No LV thrombus.   2. Severely decreased global left ventricular systolic function.   3. Multiple left ventricular regional wall motion abnormalities exist.   See wall motion findings.   4. Severely enlarged left atrium.   5. There is mild septal left ventricular hypertrophy.   6. Mildly reduced RV systolic function.   7. Moderate to severe mitral valve regurgitation.   8. Thickening and calcification of the anterior and posterior mitral   valve leaflets.   9. Moderate tricuspid regurgitation.  10. Mild aortic regurgitation.  11. Endocardial visualization was enhanced with intravenous echo contrast.  12. Moderately decreased mitral leaflet mobility.    MD Tresa Electronically signed on 2022 at 4:39:04 PM            *** Final ***              CAL BENAVIDEZ DO; Attending Cardiologist  This document has been electronically signed. 2022  4:39PM

## 2022-01-16 ENCOUNTER — INPATIENT (INPATIENT)
Facility: HOSPITAL | Age: 87
LOS: 11 days | Discharge: HOME | DRG: 291 | End: 2022-01-28
Attending: INTERNAL MEDICINE | Admitting: HOSPITALIST
Payer: MEDICARE

## 2022-01-16 VITALS
RESPIRATION RATE: 20 BRPM | OXYGEN SATURATION: 98 % | TEMPERATURE: 98 F | HEART RATE: 87 BPM | WEIGHT: 156.97 LBS | SYSTOLIC BLOOD PRESSURE: 134 MMHG | DIASTOLIC BLOOD PRESSURE: 74 MMHG | HEIGHT: 65 IN

## 2022-01-16 DIAGNOSIS — Z95.0 PRESENCE OF CARDIAC PACEMAKER: Chronic | ICD-10-CM

## 2022-01-16 DIAGNOSIS — Z90.89 ACQUIRED ABSENCE OF OTHER ORGANS: Chronic | ICD-10-CM

## 2022-01-16 DIAGNOSIS — I50.21 ACUTE SYSTOLIC (CONGESTIVE) HEART FAILURE: ICD-10-CM

## 2022-01-16 LAB
ALBUMIN SERPL ELPH-MCNC: 3.5 G/DL — SIGNIFICANT CHANGE UP (ref 3.3–5.2)
ALP SERPL-CCNC: 96 U/L — SIGNIFICANT CHANGE UP (ref 40–120)
ALT FLD-CCNC: 19 U/L — SIGNIFICANT CHANGE UP
ANION GAP SERPL CALC-SCNC: 17 MMOL/L — SIGNIFICANT CHANGE UP (ref 5–17)
ANISOCYTOSIS BLD QL: SLIGHT — SIGNIFICANT CHANGE UP
APTT BLD: 30.2 SEC — SIGNIFICANT CHANGE UP (ref 27.5–35.5)
AST SERPL-CCNC: 16 U/L — SIGNIFICANT CHANGE UP
BASOPHILS # BLD AUTO: 0 K/UL — SIGNIFICANT CHANGE UP (ref 0–0.2)
BASOPHILS NFR BLD AUTO: 0 % — SIGNIFICANT CHANGE UP (ref 0–2)
BILIRUB SERPL-MCNC: 0.8 MG/DL — SIGNIFICANT CHANGE UP (ref 0.4–2)
BUN SERPL-MCNC: 55.7 MG/DL — HIGH (ref 8–20)
CALCIUM SERPL-MCNC: 8.6 MG/DL — SIGNIFICANT CHANGE UP (ref 8.6–10.2)
CHLORIDE SERPL-SCNC: 90 MMOL/L — LOW (ref 98–107)
CO2 SERPL-SCNC: 20 MMOL/L — LOW (ref 22–29)
CREAT SERPL-MCNC: 1.78 MG/DL — HIGH (ref 0.5–1.3)
DACRYOCYTES BLD QL SMEAR: SLIGHT — SIGNIFICANT CHANGE UP
EOSINOPHIL # BLD AUTO: 0.14 K/UL — SIGNIFICANT CHANGE UP (ref 0–0.5)
EOSINOPHIL NFR BLD AUTO: 0.9 % — SIGNIFICANT CHANGE UP (ref 0–6)
FLUAV AG NPH QL: SIGNIFICANT CHANGE UP
FLUBV AG NPH QL: SIGNIFICANT CHANGE UP
GLUCOSE SERPL-MCNC: 116 MG/DL — HIGH (ref 70–99)
HCT VFR BLD CALC: 35.3 % — LOW (ref 39–50)
HGB BLD-MCNC: 12 G/DL — LOW (ref 13–17)
INR BLD: 1.89 RATIO — HIGH (ref 0.88–1.16)
LYMPHOCYTES # BLD AUTO: 0.98 K/UL — LOW (ref 1–3.3)
LYMPHOCYTES # BLD AUTO: 6.1 % — LOW (ref 13–44)
MACROCYTES BLD QL: SLIGHT — SIGNIFICANT CHANGE UP
MANUAL SMEAR VERIFICATION: SIGNIFICANT CHANGE UP
MCHC RBC-ENTMCNC: 30.8 PG — SIGNIFICANT CHANGE UP (ref 27–34)
MCHC RBC-ENTMCNC: 34 GM/DL — SIGNIFICANT CHANGE UP (ref 32–36)
MCV RBC AUTO: 90.5 FL — SIGNIFICANT CHANGE UP (ref 80–100)
MICROCYTES BLD QL: SLIGHT — SIGNIFICANT CHANGE UP
MONOCYTES # BLD AUTO: 0.83 K/UL — SIGNIFICANT CHANGE UP (ref 0–0.9)
MONOCYTES NFR BLD AUTO: 5.2 % — SIGNIFICANT CHANGE UP (ref 2–14)
NEUTROPHILS # BLD AUTO: 14.06 K/UL — HIGH (ref 1.8–7.4)
NEUTROPHILS NFR BLD AUTO: 87.8 % — HIGH (ref 43–77)
NT-PROBNP SERPL-SCNC: HIGH PG/ML (ref 0–300)
PLAT MORPH BLD: NORMAL — SIGNIFICANT CHANGE UP
PLATELET # BLD AUTO: 286 K/UL — SIGNIFICANT CHANGE UP (ref 150–400)
PLATELET COUNT - ESTIMATE: NORMAL — SIGNIFICANT CHANGE UP
POLYCHROMASIA BLD QL SMEAR: SLIGHT — SIGNIFICANT CHANGE UP
POTASSIUM SERPL-MCNC: 3.3 MMOL/L — LOW (ref 3.5–5.3)
POTASSIUM SERPL-SCNC: 3.3 MMOL/L — LOW (ref 3.5–5.3)
PROT SERPL-MCNC: 6.2 G/DL — LOW (ref 6.6–8.7)
PROTHROM AB SERPL-ACNC: 21.3 SEC — HIGH (ref 10.6–13.6)
RBC # BLD: 3.9 M/UL — LOW (ref 4.2–5.8)
RBC # FLD: 14.7 % — HIGH (ref 10.3–14.5)
RBC BLD AUTO: NORMAL — SIGNIFICANT CHANGE UP
RSV RNA NPH QL NAA+NON-PROBE: SIGNIFICANT CHANGE UP
SARS-COV-2 RNA SPEC QL NAA+PROBE: SIGNIFICANT CHANGE UP
SMUDGE CELLS # BLD: PRESENT — SIGNIFICANT CHANGE UP
SODIUM SERPL-SCNC: 127 MMOL/L — LOW (ref 135–145)
STOMATOCYTES BLD QL SMEAR: SLIGHT — SIGNIFICANT CHANGE UP
TROPONIN T SERPL-MCNC: 0.76 NG/ML — HIGH (ref 0–0.06)
TROPONIN T SERPL-MCNC: 0.82 NG/ML — HIGH (ref 0–0.06)
WBC # BLD: 16.01 K/UL — HIGH (ref 3.8–10.5)
WBC # FLD AUTO: 16.01 K/UL — HIGH (ref 3.8–10.5)

## 2022-01-16 PROCEDURE — 99285 EMERGENCY DEPT VISIT HI MDM: CPT | Mod: GC

## 2022-01-16 PROCEDURE — 71046 X-RAY EXAM CHEST 2 VIEWS: CPT | Mod: 26

## 2022-01-16 PROCEDURE — 99223 1ST HOSP IP/OBS HIGH 75: CPT

## 2022-01-16 PROCEDURE — 93010 ELECTROCARDIOGRAM REPORT: CPT

## 2022-01-16 RX ORDER — APIXABAN 2.5 MG/1
2.5 TABLET, FILM COATED ORAL EVERY 12 HOURS
Refills: 0 | Status: DISCONTINUED | OUTPATIENT
Start: 2022-01-16 | End: 2022-01-28

## 2022-01-16 RX ORDER — INFLUENZA VIRUS VACCINE 15; 15; 15; 15 UG/.5ML; UG/.5ML; UG/.5ML; UG/.5ML
0.7 SUSPENSION INTRAMUSCULAR ONCE
Refills: 0 | Status: DISCONTINUED | OUTPATIENT
Start: 2022-01-16 | End: 2022-01-28

## 2022-01-16 RX ORDER — CLOPIDOGREL BISULFATE 75 MG/1
75 TABLET, FILM COATED ORAL DAILY
Refills: 0 | Status: DISCONTINUED | OUTPATIENT
Start: 2022-01-16 | End: 2022-01-28

## 2022-01-16 RX ORDER — ASPIRIN/CALCIUM CARB/MAGNESIUM 324 MG
162 TABLET ORAL ONCE
Refills: 0 | Status: COMPLETED | OUTPATIENT
Start: 2022-01-16 | End: 2022-01-16

## 2022-01-16 RX ORDER — FUROSEMIDE 40 MG
40 TABLET ORAL ONCE
Refills: 0 | Status: COMPLETED | OUTPATIENT
Start: 2022-01-16 | End: 2022-01-16

## 2022-01-16 RX ORDER — PANTOPRAZOLE SODIUM 20 MG/1
40 TABLET, DELAYED RELEASE ORAL
Refills: 0 | Status: DISCONTINUED | OUTPATIENT
Start: 2022-01-16 | End: 2022-01-28

## 2022-01-16 RX ORDER — FUROSEMIDE 40 MG
40 TABLET ORAL DAILY
Refills: 0 | Status: DISCONTINUED | OUTPATIENT
Start: 2022-01-17 | End: 2022-01-18

## 2022-01-16 RX ORDER — METOPROLOL TARTRATE 50 MG
50 TABLET ORAL DAILY
Refills: 0 | Status: DISCONTINUED | OUTPATIENT
Start: 2022-01-16 | End: 2022-01-17

## 2022-01-16 RX ADMIN — Medication 40 MILLIGRAM(S): at 15:21

## 2022-01-16 RX ADMIN — PANTOPRAZOLE SODIUM 40 MILLIGRAM(S): 20 TABLET, DELAYED RELEASE ORAL at 18:39

## 2022-01-16 RX ADMIN — CLOPIDOGREL BISULFATE 75 MILLIGRAM(S): 75 TABLET, FILM COATED ORAL at 18:39

## 2022-01-16 RX ADMIN — APIXABAN 2.5 MILLIGRAM(S): 2.5 TABLET, FILM COATED ORAL at 18:39

## 2022-01-16 RX ADMIN — Medication 162 MILLIGRAM(S): at 15:21

## 2022-01-16 RX ADMIN — Medication 50 MILLIGRAM(S): at 18:39

## 2022-01-16 NOTE — ED PROVIDER NOTE - PHYSICAL EXAMINATION
Gen: no acute distress  Head: normocephalic, atraumatic  EENT: EOMI  Lung: +increased work of breathing, +wheezing at the bases  CV: normal s1/s2, +JVD; b/l lower extremity edema up to ankles  Abd: soft, non-tender, non-distended, no rebound tenderness or guarding  MSK:  no visible deformities, full range of motion in all 4 extremities  Neuro: No focal neurologic deficits

## 2022-01-16 NOTE — H&P ADULT - ASSESSMENT
87M with a history of atrial fibrillation, rheumatoid arthritis, and coronary artery disease with recent hospitalization requiring percutaneous coronary intervention who presented with dyspnea on exertion and orthopnea with lower extremity edema.    Acute on chronic systolic heart failure - BNP significantly elevated with pulmonary vascular congestion. To continue diuresis with intravenous furosemide. To monitor urine output and weights. Prior echocardiogram on 1/2/22 noted an ejection fraction of 20-25%. Cardiology consulted.    Coronary artery disease - Noted to have recent percutaneous coronary intervention. On aspirin and clopidogrel. No chest pain. Troponin elevated in the setting of acute kidney injury.    Atrial fibrillation - On metoprolol with apixaban for anticoagulation.    Acute kidney injury - To continue monitoring while on furosemide. Losartan held for now. Monitoring urine output.    Hyponatremia - Hypervolemia. For further diuresis.    Rheumatoid arthritis - The patient reported that he was previously taking prednisone but not recently.

## 2022-01-16 NOTE — PATIENT PROFILE ADULT - FALL HARM RISK - RISK INTERVENTIONS

## 2022-01-16 NOTE — ED PROVIDER NOTE - OBJECTIVE STATEMENT
87yM with PMH of HTN, Hld, CAD (stents x 14), afib on Eliquis, pacemaker, MI (Jan2022) presenting with 1 week of worsening shortness of breath. Patient recently discharged from [facility] for inferior wall MI s/p stent. Follows with [facility]. Now presenting with dyspnea at rest, worse with exertion, orthopnea, and worse lower leg swelling. 87yM with PMH of HTN, Hld, CAD (stents x 14), afib on Eliquis, pacemaker, MI (Jan2022) presenting with 1 week of worsening shortness of breath. Patient recently discharged from [facility] for inferior wall MI s/p stent. Follows with [facility]. Now presenting with dyspnea at rest, worse with exertion, orthopnea, and worse lower leg swelling. Patient denies chest pain, fevers, chills, N/V, diarrhea. Per EMR patient not currently on diuretics. EF prior to discharge noted to be ~20%

## 2022-01-16 NOTE — H&P ADULT - HISTORY OF PRESENT ILLNESS
87M with a prior hospitalization for STEMI requiring percutaneous coronary intervention with stent placement to the RCA and LAD presented with increasing dyspnea on exertion and also at rest. The patient reported mild dyspnea after discharge from the hospital which continued to increase. He reported swelling in the legs and shortness of breath when lying down which had made it difficult to sleep. He denied any chest pain or palpitations. He reports being compliant with his medications and is unaware of any relieving factors. He denied any similar symptoms in the past. There wad no associated fevers, chills, or recent sick contacts. 87M with a prior hospitalization for STEMI requiring percutaneous coronary intervention with stent placement to the RCA and LAD presented with increasing dyspnea on exertion and also at rest. The patient reported mild dyspnea after discharge from the hospital which continued to increase. He reported swelling in the legs and shortness of breath when lying down which had made it difficult to sleep. He denied any chest pain or palpitations. He reports being compliant with his medications and is unaware of any relieving factors. He denied any similar symptoms in the past. There wad no associated fevers, chills, or recent sick contacts. The patient was noted to have dyspnea and hypoxia when laying down in the emergency department.

## 2022-01-16 NOTE — ED PROVIDER NOTE - ATTENDING CONTRIBUTION TO CARE
AJM: pt presenting with signs of heart failure. + b/l LE edema,. sob, orthopnea, gay. +b lines on pocus. no pericardial effusion + decreased EF. +trop though decreased from last week when discharged. lasix given. stable on room air. admit

## 2022-01-16 NOTE — H&P ADULT - NSHPPHYSICALEXAM_GEN_ALL_CORE
Vital Signs Last 24 Hrs  T(C): 36.3 (16 Jan 2022 15:39), Max: 36.7 (16 Jan 2022 14:08)  T(F): 97.4 (16 Jan 2022 15:39), Max: 98 (16 Jan 2022 14:08)  HR: 73 (16 Jan 2022 15:39) (73 - 87)  BP: 120/73 (16 Jan 2022 15:39) (120/73 - 134/74)  BP(mean): --  RR: 20 (16 Jan 2022 15:39) (20 - 20)  SpO2: 97% (16 Jan 2022 15:39) (97% - 98%)    General appearance: No acute distress, Awake, Alert  HEENT: Normocephalic, Atraumatic, Conjunctiva clear, EOMI  Neck: Supple, No JVD, No tenderness  Lungs: Breath sound equal bilaterally, No wheezes, Basilar rales  Cardiovascular: S1S2, Regular rhythm  Abdomen: Soft, Nontender, Nondistended, No guarding/rebound, Positive bowel sounds  Extremities: No clubbing, No cyanosis, No calf tenderness, Arthritic changes to the fingers, Pedal edema  Neuro: Strength equal bilaterally, No tremors  Psychiatric: Appropriate mood, Normal affect

## 2022-01-16 NOTE — ED PROVIDER NOTE - CLINICAL SUMMARY MEDICAL DECISION MAKING FREE TEXT BOX
87yM with PMH of HTN, Hld, CAD (stents x 14), afib on Eliquis, pacemaker, MI (Jan2022) presenting with 1 week of worsening shortness of breath. Concern for acute heart failure in the setting of recent STEMI. POCUS with b-lines throughout lung fields. EKG without ischemic changes. Will send trop, bnp, and likely admit.

## 2022-01-16 NOTE — ED CLERICAL - NS ED CLERK NOTE PRE-ARRIVAL INFORMATION; ADDITIONAL PRE-ARRIVAL INFORMATION
This patient is enrolled in a readmission reduction program and has active care navigation. This patient can be followed up by the care navigation team within 24 hours. To arrange close follow-up or to obtain additional clinical information about this patient, please call the contact number above. Please speak with the [facility] ED Case Manager for assistance with discharge planning

## 2022-01-17 LAB
ANION GAP SERPL CALC-SCNC: 16 MMOL/L — SIGNIFICANT CHANGE UP (ref 5–17)
ANION GAP SERPL CALC-SCNC: 18 MMOL/L — HIGH (ref 5–17)
BUN SERPL-MCNC: 51.6 MG/DL — HIGH (ref 8–20)
BUN SERPL-MCNC: 53.2 MG/DL — HIGH (ref 8–20)
CALCIUM SERPL-MCNC: 8 MG/DL — LOW (ref 8.6–10.2)
CALCIUM SERPL-MCNC: 8.2 MG/DL — LOW (ref 8.6–10.2)
CHLORIDE SERPL-SCNC: 94 MMOL/L — LOW (ref 98–107)
CHLORIDE SERPL-SCNC: 98 MMOL/L — SIGNIFICANT CHANGE UP (ref 98–107)
CO2 SERPL-SCNC: 20 MMOL/L — LOW (ref 22–29)
CO2 SERPL-SCNC: 20 MMOL/L — LOW (ref 22–29)
CREAT SERPL-MCNC: 1.7 MG/DL — HIGH (ref 0.5–1.3)
CREAT SERPL-MCNC: 2.04 MG/DL — HIGH (ref 0.5–1.3)
GLUCOSE SERPL-MCNC: 115 MG/DL — HIGH (ref 70–99)
GLUCOSE SERPL-MCNC: 91 MG/DL — SIGNIFICANT CHANGE UP (ref 70–99)
HCT VFR BLD CALC: 35.5 % — LOW (ref 39–50)
HGB BLD-MCNC: 12.2 G/DL — LOW (ref 13–17)
MCHC RBC-ENTMCNC: 30.7 PG — SIGNIFICANT CHANGE UP (ref 27–34)
MCHC RBC-ENTMCNC: 34.4 GM/DL — SIGNIFICANT CHANGE UP (ref 32–36)
MCV RBC AUTO: 89.4 FL — SIGNIFICANT CHANGE UP (ref 80–100)
PLATELET # BLD AUTO: 256 K/UL — SIGNIFICANT CHANGE UP (ref 150–400)
POTASSIUM SERPL-MCNC: 2.7 MMOL/L — CRITICAL LOW (ref 3.5–5.3)
POTASSIUM SERPL-MCNC: 4.4 MMOL/L — SIGNIFICANT CHANGE UP (ref 3.5–5.3)
POTASSIUM SERPL-SCNC: 2.7 MMOL/L — CRITICAL LOW (ref 3.5–5.3)
POTASSIUM SERPL-SCNC: 4.4 MMOL/L — SIGNIFICANT CHANGE UP (ref 3.5–5.3)
RBC # BLD: 3.97 M/UL — LOW (ref 4.2–5.8)
RBC # FLD: 14.7 % — HIGH (ref 10.3–14.5)
SODIUM SERPL-SCNC: 132 MMOL/L — LOW (ref 135–145)
SODIUM SERPL-SCNC: 134 MMOL/L — LOW (ref 135–145)
TSH SERPL-MCNC: 0.36 UIU/ML — SIGNIFICANT CHANGE UP (ref 0.27–4.2)
WBC # BLD: 15.81 K/UL — HIGH (ref 3.8–10.5)
WBC # FLD AUTO: 15.81 K/UL — HIGH (ref 3.8–10.5)

## 2022-01-17 PROCEDURE — C1753: CPT

## 2022-01-17 PROCEDURE — U0003: CPT

## 2022-01-17 PROCEDURE — 87637 SARSCOV2&INF A&B&RSV AMP PRB: CPT

## 2022-01-17 PROCEDURE — U0005: CPT

## 2022-01-17 PROCEDURE — 84443 ASSAY THYROID STIM HORMONE: CPT

## 2022-01-17 PROCEDURE — 99233 SBSQ HOSP IP/OBS HIGH 50: CPT

## 2022-01-17 PROCEDURE — 85610 PROTHROMBIN TIME: CPT

## 2022-01-17 PROCEDURE — 93880 EXTRACRANIAL BILAT STUDY: CPT

## 2022-01-17 PROCEDURE — 93458 L HRT ARTERY/VENTRICLE ANGIO: CPT | Mod: 78,XU

## 2022-01-17 PROCEDURE — C1887: CPT

## 2022-01-17 PROCEDURE — C1885: CPT

## 2022-01-17 PROCEDURE — 74018 RADEX ABDOMEN 1 VIEW: CPT

## 2022-01-17 PROCEDURE — 92920 PRQ TRLUML C ANGIOP 1ART&/BR: CPT | Mod: LD

## 2022-01-17 PROCEDURE — 93005 ELECTROCARDIOGRAM TRACING: CPT

## 2022-01-17 PROCEDURE — 99152 MOD SED SAME PHYS/QHP 5/>YRS: CPT

## 2022-01-17 PROCEDURE — 81003 URINALYSIS AUTO W/O SCOPE: CPT

## 2022-01-17 PROCEDURE — 85576 BLOOD PLATELET AGGREGATION: CPT

## 2022-01-17 PROCEDURE — 99153 MOD SED SAME PHYS/QHP EA: CPT

## 2022-01-17 PROCEDURE — 82550 ASSAY OF CK (CPK): CPT

## 2022-01-17 PROCEDURE — 83735 ASSAY OF MAGNESIUM: CPT

## 2022-01-17 PROCEDURE — 84484 ASSAY OF TROPONIN QUANT: CPT

## 2022-01-17 PROCEDURE — 86850 RBC ANTIBODY SCREEN: CPT

## 2022-01-17 PROCEDURE — 85025 COMPLETE CBC W/AUTO DIFF WBC: CPT

## 2022-01-17 PROCEDURE — C9606: CPT | Mod: RC

## 2022-01-17 PROCEDURE — 85027 COMPLETE CBC AUTOMATED: CPT

## 2022-01-17 PROCEDURE — 83880 ASSAY OF NATRIURETIC PEPTIDE: CPT

## 2022-01-17 PROCEDURE — 85730 THROMBOPLASTIN TIME PARTIAL: CPT

## 2022-01-17 PROCEDURE — 86901 BLOOD TYPING SEROLOGIC RH(D): CPT

## 2022-01-17 PROCEDURE — C1769: CPT

## 2022-01-17 PROCEDURE — 80048 BASIC METABOLIC PNL TOTAL CA: CPT

## 2022-01-17 PROCEDURE — 71045 X-RAY EXAM CHEST 1 VIEW: CPT

## 2022-01-17 PROCEDURE — 36415 COLL VENOUS BLD VENIPUNCTURE: CPT

## 2022-01-17 PROCEDURE — 80061 LIPID PANEL: CPT

## 2022-01-17 PROCEDURE — C1874: CPT

## 2022-01-17 PROCEDURE — C1894: CPT

## 2022-01-17 PROCEDURE — 99285 EMERGENCY DEPT VISIT HI MDM: CPT

## 2022-01-17 PROCEDURE — C1725: CPT

## 2022-01-17 PROCEDURE — 93306 TTE W/DOPPLER COMPLETE: CPT

## 2022-01-17 PROCEDURE — 87641 MR-STAPH DNA AMP PROBE: CPT

## 2022-01-17 PROCEDURE — C1724: CPT

## 2022-01-17 PROCEDURE — 86900 BLOOD TYPING SEROLOGIC ABO: CPT

## 2022-01-17 PROCEDURE — 80053 COMPREHEN METABOLIC PANEL: CPT

## 2022-01-17 PROCEDURE — 83036 HEMOGLOBIN GLYCOSYLATED A1C: CPT

## 2022-01-17 PROCEDURE — 93926 LOWER EXTREMITY STUDY: CPT

## 2022-01-17 PROCEDURE — 87640 STAPH A DNA AMP PROBE: CPT

## 2022-01-17 PROCEDURE — 84439 ASSAY OF FREE THYROXINE: CPT

## 2022-01-17 PROCEDURE — 92978 ENDOLUMINL IVUS OCT C 1ST: CPT | Mod: LD

## 2022-01-17 PROCEDURE — 84100 ASSAY OF PHOSPHORUS: CPT

## 2022-01-17 PROCEDURE — C1760: CPT

## 2022-01-17 PROCEDURE — 82553 CREATINE MB FRACTION: CPT

## 2022-01-17 PROCEDURE — 84134 ASSAY OF PREALBUMIN: CPT

## 2022-01-17 RX ORDER — POTASSIUM CHLORIDE 20 MEQ
10 PACKET (EA) ORAL ONCE
Refills: 0 | Status: COMPLETED | OUTPATIENT
Start: 2022-01-17 | End: 2022-01-17

## 2022-01-17 RX ORDER — POTASSIUM CHLORIDE 20 MEQ
40 PACKET (EA) ORAL EVERY 4 HOURS
Refills: 0 | Status: COMPLETED | OUTPATIENT
Start: 2022-01-17 | End: 2022-01-17

## 2022-01-17 RX ORDER — CARVEDILOL PHOSPHATE 80 MG/1
12.5 CAPSULE, EXTENDED RELEASE ORAL EVERY 12 HOURS
Refills: 0 | Status: DISCONTINUED | OUTPATIENT
Start: 2022-01-17 | End: 2022-01-19

## 2022-01-17 RX ADMIN — APIXABAN 2.5 MILLIGRAM(S): 2.5 TABLET, FILM COATED ORAL at 05:45

## 2022-01-17 RX ADMIN — Medication 50 MILLIGRAM(S): at 05:46

## 2022-01-17 RX ADMIN — Medication 40 MILLIEQUIVALENT(S): at 04:16

## 2022-01-17 RX ADMIN — Medication 40 MILLIEQUIVALENT(S): at 11:19

## 2022-01-17 RX ADMIN — Medication 100 MILLIEQUIVALENT(S): at 09:25

## 2022-01-17 RX ADMIN — CLOPIDOGREL BISULFATE 75 MILLIGRAM(S): 75 TABLET, FILM COATED ORAL at 11:19

## 2022-01-17 RX ADMIN — APIXABAN 2.5 MILLIGRAM(S): 2.5 TABLET, FILM COATED ORAL at 18:05

## 2022-01-17 RX ADMIN — Medication 40 MILLIEQUIVALENT(S): at 07:13

## 2022-01-17 RX ADMIN — CARVEDILOL PHOSPHATE 12.5 MILLIGRAM(S): 80 CAPSULE, EXTENDED RELEASE ORAL at 18:05

## 2022-01-17 RX ADMIN — Medication 40 MILLIGRAM(S): at 11:19

## 2022-01-17 RX ADMIN — PANTOPRAZOLE SODIUM 40 MILLIGRAM(S): 20 TABLET, DELAYED RELEASE ORAL at 05:46

## 2022-01-17 NOTE — CONSULT NOTE ADULT - SUBJECTIVE AND OBJECTIVE BOX
[facility]                                                    [address], [address], [place] [address]                                                         PHONE: [phone]    FAX: [phone] [address], [address], [place] [address]                                                 PHONE: [phone]    FAX: [phone]  *******************************************************************************    Reason for Consult: acute on chronic systolic Congestive Heart Failure    HPI:  TATIANA RIBERA is an 87y Male with h/o CAD s/p MI & multiple stents, ICM, PPM, chronic atrial fibrillation (on Eliquis), HTN, HL, RA a/w substernal chest pain.  Patient was admitted to [facility] from 12/28-12/30/21 with atypical chest pain and was discharged home.  On day of presentation to [facility] earlier this month patient developed recurrent chest pain and was noted to have an inferior STEMI.  He had an emergent cardiac catheterization s/p atherectomy and stent to the RCA with known LAD ISR. Now admitted with increasing dyspnea on exertion and also at rest. The patient reported mild dyspnea after discharge from the hospital which continued to increase. He reported swelling in the legs and shortness of breath when lying down which had made it difficult to sleep. He denied any chest pain or palpitations. He reports being compliant with his medications and is unaware of any relieving factors. He denied any similar symptoms in the past. There wad no associated fevers, chills, or recent sick contacts. The patient was noted to have dyspnea and hypoxia when laying down in the emergency department. Found the patient in the bed, states feels little better. Denies new c/o.        PAST MEDICAL & SURGICAL HISTORY:  HTN (hypertension)    Hyperlipidemia    CAD (coronary artery disease)    Stented coronary artery    MI (myocardial infarction)  1988    Other persistent atrial fibrillation    History of hemorrhoidectomy    History of inguinal hernia repair    Absent tonsil    Pacemaker        Lipitor (Muscle Pain)  Plavix (Other)      MEDICATIONS  (STANDING):  apixaban 2.5 milliGRAM(s) Oral every 12 hours  clopidogrel Tablet 75 milliGRAM(s) Oral daily  furosemide   Injectable 40 milliGRAM(s) IV Push daily  influenza  Vaccine (HIGH DOSE) 0.7 milliLiter(s) IntraMuscular once  metoprolol succinate ER 50 milliGRAM(s) Oral daily  pantoprazole    Tablet 40 milliGRAM(s) Oral before breakfast  potassium chloride    Tablet ER 40 milliEquivalent(s) Oral every 4 hours    MEDICATIONS  (PRN):      Social History: no active tobacco / EtOH / IVDA    Family History: No pertinent family history in first degree relatives        ROS: As noted above, otherwise unremarkable.    Vital Signs Last 24 Hrs  T(C): 36.3 (17 Jan 2022 08:21), Max: 36.7 (16 Jan 2022 14:08)  T(F): 97.4 (17 Jan 2022 08:21), Max: 98 (16 Jan 2022 14:08)  HR: 70 (17 Jan 2022 08:21) (70 - 87)  BP: 113/58 (17 Jan 2022 08:21) (110/64 - 136/63)  BP(mean): --  RR: 20 (17 Jan 2022 08:21) (18 - 20)  SpO2: 98% (17 Jan 2022 08:21) (94% - 99%)    I&O's Detail    16 Jan 2022 07:01  -  17 Jan 2022 07:00  --------------------------------------------------------  IN:  Total IN: 0 mL    OUT:    Voided (mL): 695 mL  Total OUT: 695 mL    Total NET: -695 mL        I&O's Summary    16 Jan 2022 07:01  -  17 Jan 2022 07:00  --------------------------------------------------------  IN: 0 mL / OUT: 695 mL / NET: -695 mL            PHYSICAL EXAM:  General: Appears well developed, well nourished, no acute distress  HEENT: Head: normocephalic, atraumatic  Eyes: Pupils equal and reactive  Neck: Supple, no carotid bruit, no JVD, no HJR  CARDIOVASCULAR: Normal S1 and S2, no murmur, rub, or gallop  LUNGS: Clear to auscultation bilaterally, no rales, rhonchi or wheeze  ABDOMEN: Soft, nontender, non-distended, positive bowel sounds, no mass or bruit  EXTREMITIES: b/l pedal edema, improved as per patient, distal pulses WNL  SKIN: Warm and dry with normal turgor  NEURO: Alert & oriented x 3, grossly intact  PSYCH: normal mood and affect    LABS:                        12.2   15.81 )-----------( 256      ( 17 Jan 2022 03:03 )             35.5     01-17    132<L>  |  94<L>  |  51.6<H>  ----------------------------<  91  2.7<LL>   |  20.0<L>  |  1.70<H>    Ca    8.2<L>      17 Jan 2022 03:03    TPro  6.2<L>  /  Alb  3.5  /  TBili  0.8  /  DBili  x   /  AST  16  /  ALT  19  /  AlkPhos  96  01-16    CARDIAC MARKERS ( 16 Jan 2022 17:24 )  x     / 0.76 ng/mL / x     / x     / x      CARDIAC MARKERS ( 16 Jan 2022 14:47 )  x     / 0.82 ng/mL / x     / x     / x          PT/INR - ( 16 Jan 2022 15:32 )   PT: 21.3 sec;   INR: 1.89 ratio         PTT - ( 16 Jan 2022 15:32 )  PTT:30.2 sec  Serum Pro-Brain Natriuretic Peptide: 99358 pg/mL (01-16-22 @ 14:47)      RADIOLOGY & ADDITIONAL STUDIES:    ECG:   < from: 12 Lead ECG (01.16.22 @ 14:15) >  Atrial-paced rhythm  Non-specific intra-ventricular conduction block  Possible Inferior infarct , age undetermined  Cannot rule out Anterior infarct , age undetermined      ECHO:   < from: TTE Echo Complete w/o Contrast w/ Doppler (01.02.22 @ 15:29) >    Summary:   1. Left ventricular ejection fraction, by visual estimation, is 20 to   25%. No LV thrombus.   2. Severely decreased global left ventricular systolic function.   3. Multiple left ventricular regional wall motion abnormalities exist.   See wall motion findings.   4. Severely enlarged left atrium.   5. There is mild septal left ventricular hypertrophy.   6. Mildly reduced RV systolic function.   7. Moderate to severe mitral valve regurgitation.   8. Thickening and calcification of the anterior and posterior mitral   valve leaflets.   9. Moderate tricuspid regurgitation.  10. Mild aortic regurgitation.  11. Endocardial visualization was enhanced with intravenous echo contrast.  12. Moderately decreased mitral leaflet mobility.        CARDIAC CATHETERIZATION:  < from: Cardiac Catheterization (01.02.22 @ 11:22) >  VENTRICLES: Analysis of regional contractile function demonstrated severe  anterolateral hypokinesis, apical akinesis, and diaphragmatic akinesis.  Global left ventricular function was severely depressed. EF calculated by  contrast ventriculography was 25 % and EF estimated was 30 %.  VALVES: AORTIC VALVE: There was no aortic stenosis.  CORONARY VESSELS: The coronary circulation is right dominant.  LM:   --  LM: Normal.  LAD:   --  Mid LAD: There was a diffuse 80 % stenosis at the site of a  prior stent.  CX:   --  Circumflex: Angiography showed minor luminal irregularities with  no flow limiting lesions.  --  Mid circumflex: There was a tubular 0 % stenosis at the site of a prior  stent.  RCA:   --  Proximal RCA: There was a tubular 95 % stenosis at the site of a  prior stent.  --  Distal RCA: There was a 50 % stenosis.  --  RPDA: There was a tubular 90 % stenosis. The lesion was associated with  a small filling defect consistent with thrombus. There was CASSANDRA grade 3  flow through the vessel (brisk flow).  --  RPLS: There was a discrete 90 % stenosis at the site of a prior stent.  COMPLICATIONS: No complications occurred during the cath lab visit. No  complications occurred during the cath lab visit. No complications  occurred during the cath lab visit.      Assessment and Plan:  In summary, TATIANA RIBERA is a 87y Male with past medical history significant for with h/o CAD s/p MI & multiple stents, ICM, PPM, chronic atrial fibrillation (on Eliquis), HTN, HL, RA a/w substernal chest pain.  Patient was admitted to [facility] from 12/28-12/30/21 with atypical chest pain and was discharged home.  On day of presentation to [facility] earlier this month patient developed recurrent chest pain and was noted to have an inferior STEMI.  He had an emergent cardiac catheterization s/p atherectomy and stent to the RCA with known LAD ISR. Now admitted with increasing dyspnea on exertion and also at rest. The patient reported mild dyspnea after discharge from the hospital which continued to increase. He reported swelling in the legs and shortness of breath when lying down which had made it difficult to sleep. He denied any chest pain or palpitations. He reports being compliant with his medications and is unaware of any relieving factors. He denied any similar symptoms in the past. There wad no associated fevers, chills, or recent sick contacts. The patient was noted to have dyspnea and hypoxia when laying down in the emergency department. Admitted with acute on chronic systolic CHF.       - Monitor on telemetry  - Check troponins x3  - Repeat EKG  - Echocardiogram  - IV diuresis with Lasix 40 mg IV q24h for now.  Monitor repeat CXRs, strict I/Os, daily weights, & BUN/Cr closely and titrate PRN.  - No evidence of overt cardiac ischemia clinically.  Continue medical management for now pending the results of the above.  Plan for eventual ischemic evaluation (likely as an outpatient) once patient is clinically stable.  - Rhythm/hemodynamics stable = continue current doses for now and titrate PRN  - Keep K > 4, Mg > 2  - s/p PCI/BEVERLY pRCA., s/p staged laser and PTCA of the LAD 12/6/22.  - s/p laser atherectomy and scoring balloon angioplasty of the mid and distal LAD ISR with significant improvement in luminal area on 1/6/22.  - Echocardiogram 1/2/22 demonstrated severely decreased LV fxn (EF 20-25%). The entire inferior wall, mid and apical anterior septum, mid and apical inferior septum, basal and mid inferolateral wall, and apex are akinetic. The entire anterior wall, basal and mid anterolateral wall, basal anteroseptal segment, basal inferoseptal segment, and apical lateral segment are hypokinetic. RV systolic function is mildly reduced, severe LAE, trivial pericardial effusion is present. The pericardial effusion is localized near the right atrium. There is no evidence of cardiac tamponade. Mild AR, moderate-severe MR, trace WA, moderate TR.  - Rhythm/hemodynamics stable.  Consider to replacing Toprol XL to Coreg 12.5 mg every 12 hrs, agree with holding Losartan for now, consider Entresto 24/26 mg every 12 hrs prior to discharge if renal function remains stable.   - Continue ASA 81mg daily  - Allergy to Plavix is listed.  He reports body aches.  This is not clearly an allergy.  He will be on Triple Therapy for 1 month.  Plavix is less potent than Brilinta and should result in lower bleeding risk. If he develops any symptoms, will change to Brilinta.  - ASA, Plavix with Eliquis.  PPI while on triple therapy.  - HL.  Simvastatin 40 mg PO daily in place (lipids 1/3/22: , LDL 62, HDL 30, Trig 106)  - Patient has a h/o paroxysmal AF with an increased [id number] score maintained on oral AC with Eliquis 5 bid  - ICM. will need re assessment of EF in the outpt setting following revascularization for consideration of ICD or BIV/ICD.   - Anxiety management as per medical          We will follow with you.  Thank you for allowing me to participate in the care of your patient.      Sincerely,                                                                 [facility]                                                    [address], [address], [place] [address]                                                         PHONE: [phone]    FAX: [phone] [address], [address], [place] [address]                                                 PHONE: [phone]    FAX: [phone]  *******************************************************************************    Reason for Consult: acute on chronic systolic Congestive Heart Failure    HPI:  TATIANA RIBERA is an 87y Male with h/o CAD s/p MI & multiple stents, ICM, PPM, paroxysmal atrial fibrillation (on Eliquis), HTN, HL, RA a/w substernal chest pain.  Patient was admitted to [facility] from 12/28-12/30/21 with atypical chest pain and was discharged home.  On day of presentation to [facility] earlier this month patient developed recurrent chest pain and was noted to have an inferior STEMI.  He had an emergent cardiac catheterization s/p atherectomy and stent to the RCA with known LAD ISR. Now admitted with increasing dyspnea on exertion and also at rest. The patient reported mild dyspnea after discharge from the hospital which continued to increase. He reported swelling in the legs and shortness of breath when lying down which had made it difficult to sleep. He denied any chest pain or palpitations. He reports being compliant with his medications and is unaware of any relieving factors. He denied any similar symptoms in the past. There wad no associated fevers, chills, or recent sick contacts. The patient was noted to have dyspnea and hypoxia when laying down in the emergency department. Found the patient in the bed, states feels little better. Denies new c/o.        PAST MEDICAL & SURGICAL HISTORY:  HTN (hypertension)    Hyperlipidemia    CAD (coronary artery disease)    Stented coronary artery    MI (myocardial infarction)  1988    Other persistent atrial fibrillation    History of hemorrhoidectomy    History of inguinal hernia repair    Absent tonsil    Pacemaker        Lipitor (Muscle Pain)  Plavix (Other)      MEDICATIONS  (STANDING):  apixaban 2.5 milliGRAM(s) Oral every 12 hours  clopidogrel Tablet 75 milliGRAM(s) Oral daily  furosemide   Injectable 40 milliGRAM(s) IV Push daily  influenza  Vaccine (HIGH DOSE) 0.7 milliLiter(s) IntraMuscular once  metoprolol succinate ER 50 milliGRAM(s) Oral daily  pantoprazole    Tablet 40 milliGRAM(s) Oral before breakfast  potassium chloride    Tablet ER 40 milliEquivalent(s) Oral every 4 hours    MEDICATIONS  (PRN):      Social History: no active tobacco / EtOH / IVDA    Family History: No pertinent family history in first degree relatives        ROS: As noted above, otherwise unremarkable.    Vital Signs Last 24 Hrs  T(C): 36.3 (17 Jan 2022 08:21), Max: 36.7 (16 Jan 2022 14:08)  T(F): 97.4 (17 Jan 2022 08:21), Max: 98 (16 Jan 2022 14:08)  HR: 70 (17 Jan 2022 08:21) (70 - 87)  BP: 113/58 (17 Jan 2022 08:21) (110/64 - 136/63)  BP(mean): --  RR: 20 (17 Jan 2022 08:21) (18 - 20)  SpO2: 98% (17 Jan 2022 08:21) (94% - 99%)    I&O's Detail    16 Jan 2022 07:01  -  17 Jan 2022 07:00  --------------------------------------------------------  IN:  Total IN: 0 mL    OUT:    Voided (mL): 695 mL  Total OUT: 695 mL    Total NET: -695 mL        I&O's Summary    16 Jan 2022 07:01  -  17 Jan 2022 07:00  --------------------------------------------------------  IN: 0 mL / OUT: 695 mL / NET: -695 mL            PHYSICAL EXAM:  General: Appears well developed, well nourished, no acute distress  HEENT: Head: normocephalic, atraumatic  Eyes: Pupils equal and reactive  Neck: Supple, no carotid bruit, no JVD, no HJR  CARDIOVASCULAR: Normal S1 and S2, no murmur, rub, or gallop  LUNGS: Clear to auscultation bilaterally, no rales, rhonchi or wheeze  ABDOMEN: Soft, nontender, non-distended, positive bowel sounds, no mass or bruit  EXTREMITIES: b/l pedal edema, improved as per patient, distal pulses WNL  SKIN: Warm and dry with normal turgor  NEURO: Alert & oriented x 3, grossly intact  PSYCH: normal mood and affect    LABS:                        12.2   15.81 )-----------( 256      ( 17 Jan 2022 03:03 )             35.5     01-17    132<L>  |  94<L>  |  51.6<H>  ----------------------------<  91  2.7<LL>   |  20.0<L>  |  1.70<H>    Ca    8.2<L>      17 Jan 2022 03:03    TPro  6.2<L>  /  Alb  3.5  /  TBili  0.8  /  DBili  x   /  AST  16  /  ALT  19  /  AlkPhos  96  01-16    CARDIAC MARKERS ( 16 Jan 2022 17:24 )  x     / 0.76 ng/mL / x     / x     / x      CARDIAC MARKERS ( 16 Jan 2022 14:47 )  x     / 0.82 ng/mL / x     / x     / x          PT/INR - ( 16 Jan 2022 15:32 )   PT: 21.3 sec;   INR: 1.89 ratio         PTT - ( 16 Jan 2022 15:32 )  PTT:30.2 sec  Serum Pro-Brain Natriuretic Peptide: 54601 pg/mL (01-16-22 @ 14:47)      RADIOLOGY & ADDITIONAL STUDIES:    ECG:   < from: 12 Lead ECG (01.16.22 @ 14:15) >  Atrial-paced rhythm  Non-specific intra-ventricular conduction block  Possible Inferior infarct , age undetermined  Cannot rule out Anterior infarct , age undetermined      ECHO:   < from: TTE Echo Complete w/o Contrast w/ Doppler (01.02.22 @ 15:29) >    Summary:   1. Left ventricular ejection fraction, by visual estimation, is 20 to   25%. No LV thrombus.   2. Severely decreased global left ventricular systolic function.   3. Multiple left ventricular regional wall motion abnormalities exist.   See wall motion findings.   4. Severely enlarged left atrium.   5. There is mild septal left ventricular hypertrophy.   6. Mildly reduced RV systolic function.   7. Moderate to severe mitral valve regurgitation.   8. Thickening and calcification of the anterior and posterior mitral   valve leaflets.   9. Moderate tricuspid regurgitation.  10. Mild aortic regurgitation.  11. Endocardial visualization was enhanced with intravenous echo contrast.  12. Moderately decreased mitral leaflet mobility.        CARDIAC CATHETERIZATION:  < from: Cardiac Catheterization (01.02.22 @ 11:22) >  VENTRICLES: Analysis of regional contractile function demonstrated severe  anterolateral hypokinesis, apical akinesis, and diaphragmatic akinesis.  Global left ventricular function was severely depressed. EF calculated by  contrast ventriculography was 25 % and EF estimated was 30 %.  VALVES: AORTIC VALVE: There was no aortic stenosis.  CORONARY VESSELS: The coronary circulation is right dominant.  LM:   --  LM: Normal.  LAD:   --  Mid LAD: There was a diffuse 80 % stenosis at the site of a  prior stent.  CX:   --  Circumflex: Angiography showed minor luminal irregularities with  no flow limiting lesions.  --  Mid circumflex: There was a tubular 0 % stenosis at the site of a prior  stent.  RCA:   --  Proximal RCA: There was a tubular 95 % stenosis at the site of a  prior stent.  --  Distal RCA: There was a 50 % stenosis.  --  RPDA: There was a tubular 90 % stenosis. The lesion was associated with  a small filling defect consistent with thrombus. There was CASSANDRA grade 3  flow through the vessel (brisk flow).  --  RPLS: There was a discrete 90 % stenosis at the site of a prior stent.  COMPLICATIONS: No complications occurred during the cath lab visit. No  complications occurred during the cath lab visit. No complications  occurred during the cath lab visit.      Assessment and Plan:  In summary, TATIANA RIBERA is a 87y Male with past medical history significant for with h/o CAD s/p MI & multiple stents, ICM, PPM, chronic atrial fibrillation (on Eliquis), HTN, HL, RA a/w substernal chest pain.  Patient was admitted to [facility] from 12/28-12/30/21 with atypical chest pain and was discharged home.  On day of presentation to [facility] earlier this month patient developed recurrent chest pain and was noted to have an inferior STEMI.  He had an emergent cardiac catheterization s/p atherectomy and stent to the RCA with known LAD ISR. Now admitted with increasing dyspnea on exertion and also at rest. The patient reported mild dyspnea after discharge from the hospital which continued to increase. He reported swelling in the legs and shortness of breath when lying down which had made it difficult to sleep. He denied any chest pain or palpitations. He reports being compliant with his medications and is unaware of any relieving factors. He denied any similar symptoms in the past. There wad no associated fevers, chills, or recent sick contacts. The patient was noted to have dyspnea and hypoxia when laying down in the emergency department. Admitted with acute on chronic systolic CHF.       - Monitor on telemetry  - Check troponins x3  - Repeat EKG  - Echocardiogram  - IV diuresis with Lasix 40 mg IV q24h for now.  Monitor repeat CXRs, strict I/Os, daily weights, & BUN/Cr closely and titrate PRN.  - No evidence of overt cardiac ischemia clinically.  Continue medical management for now pending the results of the above.  Plan for eventual ischemic evaluation (likely as an outpatient) once patient is clinically stable.  - Rhythm/hemodynamics stable = continue current doses for now and titrate PRN  - Keep K > 4, Mg > 2  - s/p PCI/BEVERLY pRCA., s/p staged laser and PTCA of the LAD 12/6/22.  - s/p laser atherectomy and scoring balloon angioplasty of the mid and distal LAD ISR with significant improvement in luminal area on 1/6/22.  - Echocardiogram 1/2/22 demonstrated severely decreased LV fxn (EF 20-25%). The entire inferior wall, mid and apical anterior septum, mid and apical inferior septum, basal and mid inferolateral wall, and apex are akinetic. The entire anterior wall, basal and mid anterolateral wall, basal anteroseptal segment, basal inferoseptal segment, and apical lateral segment are hypokinetic. RV systolic function is mildly reduced, severe LAE, trivial pericardial effusion is present. The pericardial effusion is localized near the right atrium. There is no evidence of cardiac tamponade. Mild AR, moderate-severe MR, trace [place], moderate TR.  - Rhythm/hemodynamics stable.  Consider to replacing Toprol XL to Coreg 12.5 mg every 12 hrs, agree with holding Losartan for now, consider Entresto 24/26 mg every 12 hrs prior to discharge if renal function remains stable.   - Continue ASA 81mg daily  - Allergy to Plavix is listed.  He reports body aches.  This is not clearly an allergy.  He will be on Triple Therapy for 1 month.  Plavix is less potent than Brilinta and should result in lower bleeding risk. If he develops any symptoms, will change to Brilinta.  - ASA, Plavix with Eliquis.  PPI while on triple therapy.  - HL.  Simvastatin 40 mg PO daily in place (lipids 1/3/22: , LDL 62, HDL 30, Trig 106)  - Patient has a h/o paroxysmal AF with an increased [id number] score maintained on oral AC with Eliquis 5 bid  - ICM. will need re assessment of EF in the outpt setting following revascularization for consideration of ICD or BIV/ICD.   - Anxiety management as per medical          We will follow with you.  Thank you for allowing me to participate in the care of your patient.      Sincerely,

## 2022-01-17 NOTE — PROGRESS NOTE ADULT - SUBJECTIVE AND OBJECTIVE BOX
TATIANA RIBERA  ----------------------------------------  The patient was seen at bedside. Patient with heart failure. Reports some dyspnea without chest pain or palpitations.    Vital Signs Last 24 Hrs  T(C): 36.8 (17 Jan 2022 11:18), Max: 36.8 (17 Jan 2022 11:09)  T(F): 98.3 (17 Jan 2022 11:18), Max: 98.3 (17 Jan 2022 11:09)  HR: 88 (17 Jan 2022 11:09) (70 - 88)  BP: 125/78 (17 Jan 2022 11:09) (110/64 - 136/63)  BP(mean): --  RR: 18 (17 Jan 2022 11:09) (18 - 20)  SpO2: 93% (17 Jan 2022 11:09) (93% - 99%)    PHYSICAL EXAMINATION:  ----------------------------------------  General appearance: No acute distress, Awake, Alert  HEENT: Normocephalic, Atraumatic, Conjunctiva clear, EOMI  Neck: Supple, No JVD, No tenderness  Lungs: Breath sound equal bilaterally, No wheezes, Basilar rales  Cardiovascular: S1S2, Regular rhythm  Abdomen: Soft, Nontender, Nondistended, No guarding/rebound, Positive bowel sounds  Extremities: No clubbing, No cyanosis, No calf tenderness, Arthritic changes to the fingers, Pedal edema slightly improved  Neuro: Strength equal bilaterally, No tremors  Psychiatric: Appropriate mood, Normal affect    LABORATORY STUDIES:  ----------------------------------------             12.2   15.81 )-----------( 256      ( 17 Jan 2022 03:03 )             35.5     01-17    132<L>  |  94<L>  |  51.6<H>  ----------------------------<  91  2.7<LL>   |  20.0<L>  |  1.70<H>    Ca    8.2<L>      17 Jan 2022 03:03    TPro  6.2<L>  /  Alb  3.5  /  TBili  0.8  /  DBili  x   /  AST  16  /  ALT  19  /  AlkPhos  96  01-16    LIVER FUNCTIONS - ( 16 Jan 2022 14:47 )  Alb: 3.5 g/dL / Pro: 6.2 g/dL / ALK PHOS: 96 U/L / ALT: 19 U/L / AST: 16 U/L / GGT: x           PT/INR - ( 16 Jan 2022 15:32 )   PT: 21.3 sec;   INR: 1.89 ratio    PTT - ( 16 Jan 2022 15:32 )  PTT:30.2 sec    CARDIAC MARKERS ( 16 Jan 2022 17:24 )  x     / 0.76 ng/mL / x     / x     / x      CARDIAC MARKERS ( 16 Jan 2022 14:47 )  x     / 0.82 ng/mL / x     / x     / x        MEDICATIONS  (STANDING):  apixaban 2.5 milliGRAM(s) Oral every 12 hours  clopidogrel Tablet 75 milliGRAM(s) Oral daily  furosemide   Injectable 40 milliGRAM(s) IV Push daily  influenza  Vaccine (HIGH DOSE) 0.7 milliLiter(s) IntraMuscular once  metoprolol succinate ER 50 milliGRAM(s) Oral daily  pantoprazole    Tablet 40 milliGRAM(s) Oral before breakfast      ASSESSMENT / PLAN:  ----------------------------------------  87M with a history of atrial fibrillation, rheumatoid arthritis, and coronary artery disease with recent hospitalization requiring percutaneous coronary intervention who presented with dyspnea on exertion and orthopnea with lower extremity edema. He was found to have elevated BNP and chest Xray with pulmonary vascular congestion consistent with heart failure.    Acute on chronic systolic heart failure - Discussed with Cardiology. To continue diuresis with intravenous furosemide. To monitor urine output and weights. Prior echocardiogram on 1/2/22 noted an ejection fraction of 20-25%. For transition from metoprolol to carvedilol. Eventual initiation of Entresto if renal function allows.    Coronary artery disease - Noted to have recent percutaneous coronary intervention. On aspirin and clopidogrel. No chest pain. Troponin elevated in the setting of acute kidney injury.    Atrial fibrillation - On metoprolol with apixaban for anticoagulation.    Acute kidney injury - To continue monitoring while on furosemide. Losartan held for now. Monitoring urine output.    Hyponatremia - Improved on repeat studies. To continue with diuresis. For potassium supplementation for hypokalemia.    Rheumatoid arthritis - The patient reported that he was previously taking prednisone but not recently.   TATIANA RIBERA  ----------------------------------------  The patient was seen at bedside. Patient with heart failure. Reports some dyspnea without chest pain or palpitations.    Vital Signs Last 24 Hrs  T(C): 36.8 (17 Jan 2022 11:18), Max: 36.8 (17 Jan 2022 11:09)  T(F): 98.3 (17 Jan 2022 11:18), Max: 98.3 (17 Jan 2022 11:09)  HR: 88 (17 Jan 2022 11:09) (70 - 88)  BP: 125/78 (17 Jan 2022 11:09) (110/64 - 136/63)  BP(mean): --  RR: 18 (17 Jan 2022 11:09) (18 - 20)  SpO2: 93% (17 Jan 2022 11:09) (93% - 99%)    PHYSICAL EXAMINATION:  ----------------------------------------  General appearance: No acute distress, Awake, Alert  HEENT: Normocephalic, Atraumatic, Conjunctiva clear, EOMI  Neck: Supple, No JVD, No tenderness  Lungs: Breath sound equal bilaterally, No wheezes, Basilar rales  Cardiovascular: S1S2, Regular rhythm  Abdomen: Soft, Nontender, Nondistended, No guarding/rebound, Positive bowel sounds  Extremities: No clubbing, No cyanosis, No calf tenderness, Arthritic changes to the fingers, Pedal edema slightly improved  Neuro: Strength equal bilaterally, No tremors  Psychiatric: Appropriate mood, Normal affect    LABORATORY STUDIES:  ----------------------------------------             12.2   15.81 )-----------( 256      ( 17 Jan 2022 03:03 )             35.5     01-17    132<L>  |  94<L>  |  51.6<H>  ----------------------------<  91  2.7<LL>   |  20.0<L>  |  1.70<H>    Ca    8.2<L>      17 Jan 2022 03:03    TPro  6.2<L>  /  Alb  3.5  /  TBili  0.8  /  DBili  x   /  AST  16  /  ALT  19  /  AlkPhos  96  01-16    LIVER FUNCTIONS - ( 16 Jan 2022 14:47 )  Alb: 3.5 g/dL / Pro: 6.2 g/dL / ALK PHOS: 96 U/L / ALT: 19 U/L / AST: 16 U/L / GGT: x           PT/INR - ( 16 Jan 2022 15:32 )   PT: 21.3 sec;   INR: 1.89 ratio    PTT - ( 16 Jan 2022 15:32 )  PTT:30.2 sec    CARDIAC MARKERS ( 16 Jan 2022 17:24 )  x     / 0.76 ng/mL / x     / x     / x      CARDIAC MARKERS ( 16 Jan 2022 14:47 )  x     / 0.82 ng/mL / x     / x     / x        MEDICATIONS  (STANDING):  apixaban 2.5 milliGRAM(s) Oral every 12 hours  clopidogrel Tablet 75 milliGRAM(s) Oral daily  furosemide   Injectable 40 milliGRAM(s) IV Push daily  influenza  Vaccine (HIGH DOSE) 0.7 milliLiter(s) IntraMuscular once  metoprolol succinate ER 50 milliGRAM(s) Oral daily  pantoprazole    Tablet 40 milliGRAM(s) Oral before breakfast      ASSESSMENT / PLAN:  ----------------------------------------  87M with a history of atrial fibrillation, rheumatoid arthritis, and coronary artery disease with recent hospitalization requiring percutaneous coronary intervention who presented with dyspnea on exertion and orthopnea with lower extremity edema. He was found to have elevated BNP and chest Xray with pulmonary vascular congestion consistent with heart failure.    Acute on chronic systolic heart failure - Discussed with Cardiology. To continue diuresis with intravenous furosemide. To monitor urine output and weights. Prior echocardiogram on 1/2/22 noted an ejection fraction of 20-25%. For transition from metoprolol to carvedilol. Eventual initiation of Entresto if renal function allows.    Coronary artery disease - Noted to have recent percutaneous coronary intervention. On aspirin and clopidogrel. No chest pain. Troponin elevated in the setting of acute kidney injury.    Atrial fibrillation - On metoprolol with apixaban for anticoagulation.    Acute kidney injury - To continue monitoring while on furosemide. Losartan held for now. Monitoring urine output.    Hyponatremia - Improved on repeat studies. To continue with diuresis. For potassium supplementation for hypokalemia.    Rheumatoid arthritis - The patient reported that he was previously taking prednisone but not consistently.    Leukocytosis - Afebrile.

## 2022-01-18 ENCOUNTER — TRANSCRIPTION ENCOUNTER (OUTPATIENT)
Age: 87
End: 2022-01-18

## 2022-01-18 LAB
ANION GAP SERPL CALC-SCNC: 15 MMOL/L — SIGNIFICANT CHANGE UP (ref 5–17)
BUN SERPL-MCNC: 55.9 MG/DL — HIGH (ref 8–20)
CALCIUM SERPL-MCNC: 8.6 MG/DL — SIGNIFICANT CHANGE UP (ref 8.6–10.2)
CHLORIDE SERPL-SCNC: 95 MMOL/L — LOW (ref 98–107)
CO2 SERPL-SCNC: 22 MMOL/L — SIGNIFICANT CHANGE UP (ref 22–29)
CREAT SERPL-MCNC: 1.93 MG/DL — HIGH (ref 0.5–1.3)
GLUCOSE SERPL-MCNC: 131 MG/DL — HIGH (ref 70–99)
HCT VFR BLD CALC: 36.3 % — LOW (ref 39–50)
HGB BLD-MCNC: 12.1 G/DL — LOW (ref 13–17)
MAGNESIUM SERPL-MCNC: 2.3 MG/DL — SIGNIFICANT CHANGE UP (ref 1.6–2.6)
MCHC RBC-ENTMCNC: 30.9 PG — SIGNIFICANT CHANGE UP (ref 27–34)
MCHC RBC-ENTMCNC: 33.3 GM/DL — SIGNIFICANT CHANGE UP (ref 32–36)
MCV RBC AUTO: 92.6 FL — SIGNIFICANT CHANGE UP (ref 80–100)
PLATELET # BLD AUTO: 237 K/UL — SIGNIFICANT CHANGE UP (ref 150–400)
POTASSIUM SERPL-MCNC: 4.6 MMOL/L — SIGNIFICANT CHANGE UP (ref 3.5–5.3)
POTASSIUM SERPL-SCNC: 4.6 MMOL/L — SIGNIFICANT CHANGE UP (ref 3.5–5.3)
RBC # BLD: 3.92 M/UL — LOW (ref 4.2–5.8)
RBC # FLD: 15.2 % — HIGH (ref 10.3–14.5)
SODIUM SERPL-SCNC: 132 MMOL/L — LOW (ref 135–145)
WBC # BLD: 11.28 K/UL — HIGH (ref 3.8–10.5)
WBC # FLD AUTO: 11.28 K/UL — HIGH (ref 3.8–10.5)

## 2022-01-18 PROCEDURE — 99233 SBSQ HOSP IP/OBS HIGH 50: CPT

## 2022-01-18 RX ORDER — FUROSEMIDE 40 MG
40 TABLET ORAL DAILY
Refills: 0 | Status: DISCONTINUED | OUTPATIENT
Start: 2022-01-18 | End: 2022-01-20

## 2022-01-18 RX ORDER — HYDRALAZINE HCL 50 MG
25 TABLET ORAL THREE TIMES A DAY
Refills: 0 | Status: DISCONTINUED | OUTPATIENT
Start: 2022-01-18 | End: 2022-01-22

## 2022-01-18 RX ORDER — ASPIRIN/CALCIUM CARB/MAGNESIUM 324 MG
81 TABLET ORAL DAILY
Refills: 0 | Status: DISCONTINUED | OUTPATIENT
Start: 2022-01-18 | End: 2022-01-28

## 2022-01-18 RX ORDER — SIMVASTATIN 20 MG/1
40 TABLET, FILM COATED ORAL AT BEDTIME
Refills: 0 | Status: DISCONTINUED | OUTPATIENT
Start: 2022-01-18 | End: 2022-01-28

## 2022-01-18 RX ORDER — ISOSORBIDE MONONITRATE 60 MG/1
30 TABLET, EXTENDED RELEASE ORAL DAILY
Refills: 0 | Status: DISCONTINUED | OUTPATIENT
Start: 2022-01-18 | End: 2022-01-23

## 2022-01-18 RX ADMIN — Medication 40 MILLIGRAM(S): at 12:24

## 2022-01-18 RX ADMIN — SIMVASTATIN 40 MILLIGRAM(S): 20 TABLET, FILM COATED ORAL at 21:36

## 2022-01-18 RX ADMIN — CARVEDILOL PHOSPHATE 12.5 MILLIGRAM(S): 80 CAPSULE, EXTENDED RELEASE ORAL at 05:49

## 2022-01-18 RX ADMIN — Medication 25 MILLIGRAM(S): at 13:41

## 2022-01-18 RX ADMIN — PANTOPRAZOLE SODIUM 40 MILLIGRAM(S): 20 TABLET, DELAYED RELEASE ORAL at 05:50

## 2022-01-18 RX ADMIN — Medication 25 MILLIGRAM(S): at 21:36

## 2022-01-18 RX ADMIN — Medication 40 MILLIGRAM(S): at 05:50

## 2022-01-18 RX ADMIN — APIXABAN 2.5 MILLIGRAM(S): 2.5 TABLET, FILM COATED ORAL at 05:50

## 2022-01-18 RX ADMIN — APIXABAN 2.5 MILLIGRAM(S): 2.5 TABLET, FILM COATED ORAL at 17:28

## 2022-01-18 RX ADMIN — ISOSORBIDE MONONITRATE 30 MILLIGRAM(S): 60 TABLET, EXTENDED RELEASE ORAL at 12:24

## 2022-01-18 RX ADMIN — Medication 81 MILLIGRAM(S): at 12:24

## 2022-01-18 RX ADMIN — CLOPIDOGREL BISULFATE 75 MILLIGRAM(S): 75 TABLET, FILM COATED ORAL at 12:24

## 2022-01-18 RX ADMIN — CARVEDILOL PHOSPHATE 12.5 MILLIGRAM(S): 80 CAPSULE, EXTENDED RELEASE ORAL at 17:28

## 2022-01-18 NOTE — DISCHARGE NOTE NURSING/CASE MANAGEMENT/SOCIAL WORK - NSDCFUADDAPPT_GEN_ALL_CORE_FT
YOU HAVE A FOLLOW UP CARDIOLOGIST APPOINTMENT WITH DR. JENKINS ON 1/28 AT 11AM [phone]  IN AGREEMENT WITH [facility] TO BED, NO ADDITIONAL MEDICATION EDUCATION REQUIRED. YOU HAVE A FOLLOW UP CARDIOLOGIST APPOINTMENT WITH DR. JENKINS ON 2/4 AT 11AM [phone]  SNF FACILITY WILL MANAGE MEDICATIONS.

## 2022-01-18 NOTE — DISCHARGE NOTE NURSING/CASE MANAGEMENT/SOCIAL WORK - PATIENT PORTAL LINK FT
You can access the FollowMyHealth Patient Portal offered by [facility] by registering at the following website: [email or web address]. By joining [facility]’s FollowMyHealth portal, you will also be able to view your health information using other applications (apps) compatible with our system.

## 2022-01-18 NOTE — PROGRESS NOTE ADULT - SUBJECTIVE AND OBJECTIVE BOX
[facility]                                                    [address], [address], [place] [address]                                                         PHONE: [phone]    FAX: [phone] [address], [address], [place] [address]                                                 PHONE: [phone]    FAX: [phone]  *******************************************************************************  cc: SOB    HPI: TATIANA RIBERA is an 87y Male with h/o CAD s/p MI & multiple stents, ICM, PPM, paroxysmal atrial fibrillation (on Eliquis), HTN, HL, RA a/w substernal chest pain.  Patient was admitted to [facility] from 12/28-12/30/21 with atypical chest pain and was discharged home.  On day of presentation to [facility] earlier this month patient developed recurrent chest pain and was noted to have an inferior STEMI.  He had an emergent cardiac catheterization s/p atherectomy and stent to the RCA with known LAD ISR and subsequent staged laser and PTCA of the LAD on last admit.  Now admitted with increasing dyspnea on exertion and also at rest. The patient reported mild dyspnea after discharge from the hospital which continued to increase. He reported swelling in the legs and shortness of breath when lying down which had made it difficult to sleep. He denied any chest pain or palpitations. He reports being compliant with his medications and is unaware of any relieving factors. He denied any similar symptoms in the past. There wad no associated fevers, chills, or recent sick contacts. The patient was noted to have dyspnea and hypoxia when laying down in the emergency department.       Overnight events/Subjective Assessment:    INTERPRETATION OF TELEMETRY (personally reviewed): SR    PAST MEDICAL & SURGICAL HISTORY:  HTN (hypertension)    Hyperlipidemia    CAD (coronary artery disease)    Stented coronary artery    MI (myocardial infarction)  1988    Other persistent atrial fibrillation    History of hemorrhoidectomy    History of inguinal hernia repair    Absent tonsil    Pacemaker        Lipitor (Muscle Pain)  Plavix (Other)      MEDICATIONS  (STANDING):  apixaban 2.5 milliGRAM(s) Oral every 12 hours  carvedilol 12.5 milliGRAM(s) Oral every 12 hours  clopidogrel Tablet 75 milliGRAM(s) Oral daily  furosemide   Injectable 40 milliGRAM(s) IV Push daily  influenza  Vaccine (HIGH DOSE) 0.7 milliLiter(s) IntraMuscular once  pantoprazole    Tablet 40 milliGRAM(s) Oral before breakfast    MEDICATIONS  (PRN):      Vital Signs Last 24 Hrs  T(C): 36.7 (18 Jan 2022 04:00), Max: 37.2 (17 Jan 2022 16:03)  T(F): 98 (18 Jan 2022 04:00), Max: 99 (17 Jan 2022 19:31)  HR: 77 (18 Jan 2022 04:00) (70 - 88)  BP: 125/77 (18 Jan 2022 04:00) (107/66 - 128/76)  BP(mean): --  RR: 18 (18 Jan 2022 04:00) (18 - 20)  SpO2: 97% (18 Jan 2022 04:00) (93% - 100%)    I&O's Detail    16 Jan 2022 07:01  -  17 Jan 2022 07:00  --------------------------------------------------------  IN:  Total IN: 0 mL    OUT:    Voided (mL): 695 mL  Total OUT: 695 mL    Total NET: -695 mL      17 Jan 2022 07:01  -  18 Jan 2022 06:33  --------------------------------------------------------  IN:  Total IN: 0 mL    OUT:    Voided (mL): 300 mL  Total OUT: 300 mL    Total NET: -300 mL        I&O's Summary    16 Jan 2022 07:01  -  17 Jan 2022 07:00  --------------------------------------------------------  IN: 0 mL / OUT: 695 mL / NET: -695 mL    17 Jan 2022 07:01  -  18 Jan 2022 06:33  --------------------------------------------------------  IN: 0 mL / OUT: 300 mL / NET: -300 mL            PHYSICAL EXAM:  General: Appears well developed, well nourished, no acute distress. not in acute pain  HEAD: normal cephalic. Atraumatic  PUPILS: equal and reactive to light  EARS: normal hearing  NECK: supple. no JVD or HJR. no carotid bruits. no visible lymphadenopathy  NOSE: no gross abnormalities  CHEST: symmetric chest wall expansion  CARDIOVASCULAR: Normal rate. Regular rhythm. Normal S1 and S2, no S3/S4,  no murmur, rub, or gallop  LUNGS: Normal effort. Normal respiratory rate. Breath sounds are clear to auscultation bilaterally. No respiratory distress. No stridor.  no rales, rhonchi or wheeze. no decreased Breath sounds  ABDOMEN: Soft, nontender, non-distended, positive bowel sounds, no mass or bruit. no abdominal tenderness. No rebound. no ascites  EXTREMITIES: No clubbing, cyanosis or edema. normal range of motion  PULSES:  distal pulses WNL  SKIN: Warm and dry with normal turgor. no visible rash or cyanosis   NEURO: Alert & oriented x 3, grossly intact with no focal weakness  PSYCH: normal mood and affect. Grossly normal insight and judgement exhibited    FAMILY HISTORY:  No pertinent family history of ischemic heart disease in mother, father or in first degree relatives        SOCIAL HISTORY:  no active smoking. No ETOH/No IVDA    REVIEW OF SYSTEMS:  Constitutional: no fever, chills or malaise. No weight loss  Head: no trauma  Eyes: no visual deficit. No double vision  Ears: no hearing deficit or ringing in the ears  Nose: no nose bleeds or smell changes or congestion  Throat: no difficult swallowing or painful swallowing  Neck: supple. No lymphadenopathy or swelling  Respiratory: + SOB, no wheeze, asthma, COPD. No cough. No blood in the sputum  Cardiovascular: no CP, palpitations, irregular heart beats. No edema. No PND. No orthopnea. No skin/temperature or color changes  Gastrointestinal: no abdominal pain. No constipation. No diarrhea. No melena. No nausea. No vomiting. No bloating  Genitourinary: no frequency or urgency. No hematuria  Lymphatics: no grossly swollen lymph nodes  Musculoskeletal: no limitation of range of motion. Normal strength. No pain  Integumentary: no visible rash. No itching  Neurologic: no HA. No TIA or stroke symptoms. No seizure. No hx of epilepsy. No tingling or numbness. No weakness. No dizziness  Psychiatric: denied. Reports appropriate mood.        LABS:                        12.2   15.81 )-----------( 256      ( 17 Jan 2022 03:03 )             35.5     01-17    134<L>  |  98  |  53.2<H>  ----------------------------<  115<H>  4.4   |  20.0<L>  |  2.04<H>    Ca    8.0<L>      17 Jan 2022 20:29    TPro  6.2<L>  /  Alb  3.5  /  TBili  0.8  /  DBili  x   /  AST  16  /  ALT  19  /  AlkPhos  96  01-16    CARDIAC MARKERS ( 16 Jan 2022 17:24 )  x     / 0.76 ng/mL / x     / x     / x      CARDIAC MARKERS ( 16 Jan 2022 14:47 )  x     / 0.82 ng/mL / x     / x     / x          PT/INR - ( 16 Jan 2022 15:32 )   PT: 21.3 sec;   INR: 1.89 ratio         PTT - ( 16 Jan 2022 15:32 )  PTT:30.2 sec  Serum Pro-Brain Natriuretic Peptide: 99290 pg/mL (01-16 @ 14:47)  serum  Lipids:     Thyroid Stimulating Hormone, Serum: 0.36 uIU/mL (01-17 @ 03:03)  Thyroid Stimulating Hormone, Serum: 0.15 uIU/mL (01-02 @ 19:50)      RADIOLOGY & ADDITIONAL STUDIES:    < from: Xray Chest 2 Views PA/Lat (01.16.22 @ 15:04) >  IMPRESSION:    Cardiomegaly. Pulmonary vascular congestion and small pleural effusions.   Underlying infiltrates not excluded.    < end of copied text >    < from: Cardiac Catheterization (01.06.22 @ 12:16) >  CORONARY VESSELS: The coronary circulation is right dominant.  LM:   --  LM: Angiography showed minor luminal irregularities with no flow  limiting lesions.  LAD:   --  Mid LAD: There was a tubular 90 % stenosis at the site of a  prior stent, in-stent. There was CASSANDRA grade 3 flow through the vessel  (brisk flow). This is a likely culprit for the patient's clinical  presentation. An intervention was performed.  --  Distal LAD: There was a discrete 98 %stenosis at the site of a prior  stent, in-stent. There was CASSANDRA grade 3 flow through the vessel (brisk  flow). This is a likely culprit for the patient's clinical presentation.  An intervention was performed.  CX:   --  Proximal circumflex: There was a 20 % stenosis at the site of a  prior stent.  RCA:   --  RCA: This vessel was not injected.  COMPLICATIONS: No complications occurred during the cath lab visit.  DIAGNOSTIC IMPRESSIONS: - Severe ISR of the mid and distal LAD stents (2  layers of stent already in place for majority of the segment)  - Successful laser atherectomy, and scoring balloon angioplasty of the mid  and distal LAD with significant improvement in luminal area as confirmed   by IVUS imaging  - Severe LV systolic dysfunction by echo s/p IWSTEMI  - LVEDP elevated at 26mmHg  DIAGNOSTIC RECOMMENDATIONS: - ASA 81mg daily. Plavix loaded on the table.  Plavix is listed as an allergy. He reports body aches. Will continue  Plavix 75mg daily for now.  - Eliquis 5mg BID to resumein AM if vascular access site is stable. Triple  therapy for 1 month with PPI. After 1 month, Plavix and Eliquis alone.   After 1 year, ASA and Eliquis alone.  - If he does not tolerate Plavix, will change to Brilinta  - Continue BB and ARB, statin  - Monitor overnight, discharge in AM if stable  - Outpatient follow up at the [facility] in 1 week  - Repeat echo in outpatient setting to evaluate candidacy for upgrade to   ICD  INTERVENTIONAL IMPRESSIONS: - Severe ISR of the mid and distalLAD stents  (2 layers of stent already in place for majority of the segment)  - Successful laser atherectomy, and scoring balloon angioplasty of the mid  and distal LAD with significant improvement in luminal area as confirmed   by IVUS imaging  - Severe LV systolic dysfunction by echo s/p IWSTEMI  - LVEDP elevated at 26mmHg  INTERVENTIONAL RECOMMENDATIONS: - ASA 81mg daily. Plavix loaded on the  table. Plavix is listed as an allergy. He reports body aches. Will  continue Plavix 75mg daily for now.  - Eliquis 5mg BID to resume in AM if vascular access site is stable. Triple  therapy for 1 month with PPI. After 1 month, Plavix and Eliquis alone.   After 1 year, ASA and Eliquis alone.  - If he does not tolerate Plavix, will change to Brilinta  - Continue BB and ARB, statin  - Monitor overnight, discharge in AM if stable  - Outpatient follow up at the [facility] in 1 week  - Repeat echo in outpatient setting to evaluate candidacy for upgrade to   ICD  Prepared and signed by  Clarissa HEADLEY    < end of copied text >      CARDIAC CATHETERIZATION: < from: Cardiac Catheterization (01.02.22 @ 11:22) >  VENTRICLES: Analysis of regional contractile function demonstrated severe  anterolateral hypokinesis, apical akinesis, and diaphragmatic akinesis.  Global left ventricular function was severely depressed. EF calculated by  contrast ventriculography was 25 % and EF estimated was 30 %.  VALVES: AORTIC VALVE: There was no aortic stenosis.  CORONARY VESSELS: The coronary circulation is right dominant.  LM:   --  LM: Normal.  LAD:   --  Mid LAD: There was a diffuse 80 % stenosis at the site of a  prior stent.  CX:   --  Circumflex: Angiography showed minor luminal irregularities with  no flow limiting lesions.  --  Mid circumflex: There was a tubular 0 % stenosis at the site of a prior  stent.  RCA:   --  Proximal RCA: There was a tubular 95 % stenosis at the site of a  prior stent.  --  Distal RCA: There was a 50 % stenosis.  --  RPDA: There was a tubular 90 % stenosis. The lesion was associated with  a small filling defect consistent with thrombus. There was CASSANDRA grade 3  flow through the vessel (brisk flow).  --  RPLS: There was a discrete 90 % stenosis at the site of a prior stent.  COMPLICATIONS: No complications occurred during the cath lab visit. No  complications occurred during the cath lab visit. No complications  occurred during the cath lab visit.    < end of copied text >    ASSESSMENT AND PLAN:  In summary, TATIANA RIBERA is a 87y Male with past medical history significant for h/o CAD s/p MI & multiple stents, ICM, PPM, paroxysmal atrial fibrillation (on Eliquis), HTN, HL, RA a/w substernal chest pain.  Patient was admitted to [facility] from 12/28-12/30/21 with atypical chest pain and was discharged home.  On day of presentation to [facility] earlier this month patient developed recurrent chest pain and was noted to have an inferior STEMI.  He had an emergent cardiac catheterization s/p atherectomy and stent to the RCA with known LAD ISR and subsequent staged laser and PTCA of the LAD on last admit.  Now admitted with increasing dyspnea on exertion and also at rest. The patient reported mild dyspnea after discharge from the hospital which continued to increase. He reported swelling in the legs and shortness of breath when lying down which had made it difficult to sleep. He denied any chest pain or palpitations. He reports being compliant with his medications and is unaware of any relieving factors. He denied any similar symptoms in the past. There wad no associated fevers, chills, or recent sick contacts. The patient was noted to have dyspnea and hypoxia when laying down in the emergency department.     - Acute on chronic systolic CH. Due to azotemia and JOE, will change IV lasix to poF  - JOE. s/p diuresis. losartan held. will add hydralazine and nitrates due to ICM and JOE  - Mild elevation in troponin s/p recent STEMI and in light of JOE is non specific. There are no sx to suggest ACS. Continued medical therapy will be pursued  -recent STEMI. Peak troponin was 6.95.  s/p PCI/BEVERLY pRCA. and staged laser and PTCA of the LAD. ASA and plavix in place  - s/p laser atherectomy and scoring balloon angioplasty of the mid and distal LAD ISR with significant improvement in luminal area on 1/6/21.  - Echocardiogram 1/2/22 demonstrated severely decreased LV fxn (EF 20-25%). The entire inferior wall, mid and apical anterior septum, mid and apical inferior septum, basal and mid inferolateral wall, and apex are akinetic. The entire anterior wall, basal and mid anterolateral wall, basal anteroseptal segment, basal inferoseptal segment, and apical lateral segment are hypokinetic. RV systolic function is mildly reduced, severe LAE, trivial pericardial effusion is present. The pericardial effusion is localized near the right atrium. There is no evidence of cardiac tamponade. Mild AR, moderate-severe MR, trace DC, moderate TR.  - Rhythm/hemodynamics stable.  Continue current dose of coreg for now and titrate PRN.  - Continue ASA 81mg daily  - Allergy to Plavix is listed.  He reports body aches.  This is not clearly an allergy.  He will be on Triple Therapy for 1 month.  Plavix is less potent than Brilinta and should result in lower bleeding risk.  Pt agreeable to try Plavix.  If he develops any symptoms, will change to Brilinta.  - ASA, Plavix with Eliquis.  PPI while on triple therapy.  - HL.  Simvastatin 40 mg PO daily in place (lipids 1/3/22: , LDL 62, HDL 30, Trig 106)  - Patient has a h/o chronic AF with an increased [id number] score maintained on oral AC with Eliquis 5 bid  - ICM. will need re assessment of EF in the outpt setting following revascularization for consideration of ICD. reassess resumption of losartan based on renal function. off of BB and now on coreg  - Anxiety management as per medical  Key Fischer MD                                                               [facility]                                                    [address], [address], [place] [address]                                                         PHONE: [phone]    FAX: [phone] [address], [address], [place] [address]                                                 PHONE: [phone]    FAX: [phone]  *******************************************************************************  cc: SOB    HPI: TATIANA RIBERA is an 87y Male with h/o CAD s/p MI & multiple stents, ICM, PPM, paroxysmal atrial fibrillation (on Eliquis), HTN, HL, RA a/w substernal chest pain.  Patient was admitted to [facility] from 12/28-12/30/21 with atypical chest pain and was discharged home.  On day of presentation to [facility] earlier this month patient developed recurrent chest pain and was noted to have an inferior STEMI.  He had an emergent cardiac catheterization s/p atherectomy and stent to the RCA with known LAD ISR and subsequent staged laser and PTCA of the LAD on last admit.  Now admitted with increasing dyspnea on exertion and also at rest. The patient reported mild dyspnea after discharge from the hospital which continued to increase. He reported swelling in the legs and shortness of breath when lying down which had made it difficult to sleep. He denied any chest pain or palpitations. He reports being compliant with his medications and is unaware of any relieving factors. He denied any similar symptoms in the past. There wad no associated fevers, chills, or recent sick contacts. The patient was noted to have dyspnea and hypoxia when laying down in the emergency department.       Overnight events/Subjective Assessment: laying flat. sleeping on my arrival. No JVD or LE edema.  No tachypnea. Denies chest pain. On awakening patient, pt reports continued SOB    INTERPRETATION OF TELEMETRY (personally reviewed): PAF 70's    PAST MEDICAL & SURGICAL HISTORY:  HTN (hypertension)    Hyperlipidemia    CAD (coronary artery disease)    Stented coronary artery    MI (myocardial infarction)  1988    Other persistent atrial fibrillation    History of hemorrhoidectomy    History of inguinal hernia repair    Absent tonsil    Pacemaker        Lipitor (Muscle Pain)  Plavix (Other)      MEDICATIONS  (STANDING):  apixaban 2.5 milliGRAM(s) Oral every 12 hours  carvedilol 12.5 milliGRAM(s) Oral every 12 hours  clopidogrel Tablet 75 milliGRAM(s) Oral daily  furosemide   Injectable 40 milliGRAM(s) IV Push daily  influenza  Vaccine (HIGH DOSE) 0.7 milliLiter(s) IntraMuscular once  pantoprazole    Tablet 40 milliGRAM(s) Oral before breakfast    MEDICATIONS  (PRN):      Vital Signs Last 24 Hrs  T(C): 36.7 (18 Jan 2022 04:00), Max: 37.2 (17 Jan 2022 16:03)  T(F): 98 (18 Jan 2022 04:00), Max: 99 (17 Jan 2022 19:31)  HR: 77 (18 Jan 2022 04:00) (70 - 88)  BP: 125/77 (18 Jan 2022 04:00) (107/66 - 128/76)  BP(mean): --  RR: 18 (18 Jan 2022 04:00) (18 - 20)  SpO2: 97% (18 Jan 2022 04:00) (93% - 100%)    I&O's Detail    16 Jan 2022 07:01  -  17 Jan 2022 07:00  --------------------------------------------------------  IN:  Total IN: 0 mL    OUT:    Voided (mL): 695 mL  Total OUT: 695 mL    Total NET: -695 mL      17 Jan 2022 07:01  -  18 Jan 2022 06:33  --------------------------------------------------------  IN:  Total IN: 0 mL    OUT:    Voided (mL): 300 mL  Total OUT: 300 mL    Total NET: -300 mL        I&O's Summary    16 Jan 2022 07:01  -  17 Jan 2022 07:00  --------------------------------------------------------  IN: 0 mL / OUT: 695 mL / NET: -695 mL    17 Jan 2022 07:01  -  18 Jan 2022 06:33  --------------------------------------------------------  IN: 0 mL / OUT: 300 mL / NET: -300 mL            PHYSICAL EXAM:  General: Appears well developed, well nourished, no acute distress. not in acute pain  HEAD: normal cephalic. Atraumatic  PUPILS: equal and reactive to light  EARS: normal hearing  NECK: supple. no JVD or HJR. no carotid bruits. no visible lymphadenopathy  NOSE: no gross abnormalities  CHEST: symmetric chest wall expansion  CARDIOVASCULAR: Normal rate. irregular irregular rhythm. Normal S1 and S2, no S3/S4,  no murmur, rub, or gallop  LUNGS: Normal effort. Normal respiratory rate. Breath sounds are clear to auscultation bilaterally. No respiratory distress. No stridor.  no rales, rhonchi or wheeze. no decreased Breath sounds  ABDOMEN: Soft, nontender, non-distended, positive bowel sounds, no mass or bruit. no abdominal tenderness. No rebound. no ascites  EXTREMITIES: No clubbing, cyanosis or edema. normal range of motion  PULSES:  distal pulses WNL  SKIN: Warm and dry with normal turgor. no visible rash or cyanosis   NEURO: Alert & oriented x 3, grossly intact with no focal weakness  PSYCH: normal mood and affect. Grossly normal insight and judgement exhibited    FAMILY HISTORY:  No pertinent family history of ischemic heart disease in mother, father or in first degree relatives        SOCIAL HISTORY:  no active smoking. No ETOH/No IVDA    REVIEW OF SYSTEMS:  Constitutional: no fever, chills or malaise. No weight loss  Head: no trauma  Eyes: no visual deficit. No double vision  Ears: no hearing deficit or ringing in the ears  Nose: no nose bleeds or smell changes or congestion  Throat: no difficult swallowing or painful swallowing  Neck: supple. No lymphadenopathy or swelling  Respiratory: + SOB, no wheeze, asthma, COPD. No cough. No blood in the sputum  Cardiovascular: no CP, palpitations, irregular heart beats. No edema. No PND. No orthopnea. No skin/temperature or color changes  Gastrointestinal: no abdominal pain. No constipation. No diarrhea. No melena. No nausea. No vomiting. No bloating  Genitourinary: no frequency or urgency. No hematuria  Lymphatics: no grossly swollen lymph nodes  Musculoskeletal: no limitation of range of motion. Normal strength. No pain  Integumentary: no visible rash. No itching  Neurologic: no HA. No TIA or stroke symptoms. No seizure. No hx of epilepsy. No tingling or numbness. No weakness. No dizziness  Psychiatric: denied. Reports appropriate mood.        LABS:                        12.2   15.81 )-----------( 256      ( 17 Jan 2022 03:03 )             35.5     01-17    134<L>  |  98  |  53.2<H>  ----------------------------<  115<H>  4.4   |  20.0<L>  |  2.04<H>    Ca    8.0<L>      17 Jan 2022 20:29    TPro  6.2<L>  /  Alb  3.5  /  TBili  0.8  /  DBili  x   /  AST  16  /  ALT  19  /  AlkPhos  96  01-16    CARDIAC MARKERS ( 16 Jan 2022 17:24 )  x     / 0.76 ng/mL / x     / x     / x      CARDIAC MARKERS ( 16 Jan 2022 14:47 )  x     / 0.82 ng/mL / x     / x     / x          PT/INR - ( 16 Jan 2022 15:32 )   PT: 21.3 sec;   INR: 1.89 ratio         PTT - ( 16 Jan 2022 15:32 )  PTT:30.2 sec  Serum Pro-Brain Natriuretic Peptide: 48644 pg/mL (01-16 @ 14:47)  serum  Lipids:     Thyroid Stimulating Hormone, Serum: 0.36 uIU/mL (01-17 @ 03:03)  Thyroid Stimulating Hormone, Serum: 0.15 uIU/mL (01-02 @ 19:50)      RADIOLOGY & ADDITIONAL STUDIES:    ECG: Atrial paced 76. old IWMI, old AWMI. NSSTT    < from: Xray Chest 2 Views PA/Lat (01.16.22 @ 15:04) >  IMPRESSION:    Cardiomegaly. Pulmonary vascular congestion and small pleural effusions.   Underlying infiltrates not excluded.    < end of copied text >    < from: Cardiac Catheterization (01.06.22 @ 12:16) >  CORONARY VESSELS: The coronary circulation is right dominant.  LM:   --  LM: Angiography showed minor luminal irregularities with no flow  limiting lesions.  LAD:   --  Mid LAD: There was a tubular 90 % stenosis at the site of a  prior stent, in-stent. There was CASSANDRA grade 3 flow through the vessel  (brisk flow). This is a likely culprit for the patient's clinical  presentation. An intervention was performed.  --  Distal LAD: There was a discrete 98 %stenosis at the site of a prior  stent, in-stent. There was CASSANDRA grade 3 flow through the vessel (brisk  flow). This is a likely culprit for the patient's clinical presentation.  An intervention was performed.  CX:   --  Proximal circumflex: There was a 20 % stenosis at the site of a  prior stent.  RCA:   --  RCA: This vessel was not injected.  COMPLICATIONS: No complications occurred during the cath lab visit.  DIAGNOSTIC IMPRESSIONS: - Severe ISR of the mid and distal LAD stents (2  layers of stent already in place for majority of the segment)  - Successful laser atherectomy, and scoring balloon angioplasty of the mid  and distal LAD with significant improvement in luminal area as confirmed   by IVUS imaging  - Severe LV systolic dysfunction by echo s/p IWSTEMI  - LVEDP elevated at 26mmHg  DIAGNOSTIC RECOMMENDATIONS: - ASA 81mg daily. Plavix loaded on the table.  Plavix is listed as an allergy. He reports body aches. Will continue  Plavix 75mg daily for now.  - Eliquis 5mg BID to resumein AM if vascular access site is stable. Triple  therapy for 1 month with PPI. After 1 month, Plavix and Eliquis alone.   After 1 year, ASA and Eliquis alone.  - If he does not tolerate Plavix, will change to Brilinta  - Continue BB and ARB, statin  - Monitor overnight, discharge in AM if stable  - Outpatient follow up at the [facility] in 1 week  - Repeat echo in outpatient setting to evaluate candidacy for upgrade to   ICD  INTERVENTIONAL IMPRESSIONS: - Severe ISR of the mid and distalLAD stents  (2 layers of stent already in place for majority of the segment)  - Successful laser atherectomy, and scoring balloon angioplasty of the mid  and distal LAD with significant improvement in luminal area as confirmed   by IVUS imaging  - Severe LV systolic dysfunction by echo s/p IWSTEMI  - LVEDP elevated at 26mmHg  INTERVENTIONAL RECOMMENDATIONS: - ASA 81mg daily. Plavix loaded on the  table. Plavix is listed as an allergy. He reports body aches. Will  continue Plavix 75mg daily for now.  - Eliquis 5mg BID to resume in AM if vascular access site is stable. Triple  therapy for 1 month with PPI. After 1 month, Plavix and Eliquis alone.   After 1 year, ASA and Eliquis alone.  - If he does not tolerate Plavix, will change to Brilinta  - Continue BB and ARB, statin  - Monitor overnight, discharge in AM if stable  - Outpatient follow up at the [facility] in 1 week  - Repeat echo in outpatient setting to evaluate candidacy for upgrade to   ICD  Prepared and signed by  Clarissa HEADLEY    < end of copied text >      CARDIAC CATHETERIZATION: < from: Cardiac Catheterization (01.02.22 @ 11:22) >  VENTRICLES: Analysis of regional contractile function demonstrated severe  anterolateral hypokinesis, apical akinesis, and diaphragmatic akinesis.  Global left ventricular function was severely depressed. EF calculated by  contrast ventriculography was 25 % and EF estimated was 30 %.  VALVES: AORTIC VALVE: There was no aortic stenosis.  CORONARY VESSELS: The coronary circulation is right dominant.  LM:   --  LM: Normal.  LAD:   --  Mid LAD: There was a diffuse 80 % stenosis at the site of a  prior stent.  CX:   --  Circumflex: Angiography showed minor luminal irregularities with  no flow limiting lesions.  --  Mid circumflex: There was a tubular 0 % stenosis at the site of a prior  stent.  RCA:   --  Proximal RCA: There was a tubular 95 % stenosis at the site of a  prior stent.  --  Distal RCA: There was a 50 % stenosis.  --  RPDA: There was a tubular 90 % stenosis. The lesion was associated with  a small filling defect consistent with thrombus. There was CASSANDRA grade 3  flow through the vessel (brisk flow).  --  RPLS: There was a discrete 90 % stenosis at the site of a prior stent.  COMPLICATIONS: No complications occurred during the cath lab visit. No  complications occurred during the cath lab visit. No complications  occurred during the cath lab visit.    < end of copied text >    ASSESSMENT AND PLAN:  In summary, TATIANA RIBERA is a 87y Male with past medical history significant for h/o CAD s/p MI & multiple stents, ICM, PPM, paroxysmal atrial fibrillation (on Eliquis), HTN, HL, RA a/w substernal chest pain.  Patient was admitted to [facility] from 12/28-12/30/21 with atypical chest pain and was discharged home.  On day of presentation to [facility] earlier this month patient developed recurrent chest pain and was noted to have an inferior STEMI.  He had an emergent cardiac catheterization s/p atherectomy and stent to the RCA with known LAD ISR and subsequent staged laser and PTCA of the LAD on last admit.  Now admitted with increasing dyspnea on exertion and also at rest. The patient reported mild dyspnea after discharge from the hospital which continued to increase. He reported swelling in the legs and shortness of breath when lying down which had made it difficult to sleep. He denied any chest pain or palpitations. He reports being compliant with his medications and is unaware of any relieving factors. He denied any similar symptoms in the past. There wad no associated fevers, chills, or recent sick contacts. The patient was noted to have dyspnea and hypoxia when laying down in the emergency department.     - Acute on chronic systolic CH. Due to azotemia and JOE, Pleural effusions. Will change IV lasix to po today due to worsening renal function. To dose IV PRN as needed. hydralazine and imdur added due to low EF. Unable to maintain ACEI/ARB at the current time due to worsening renal function. Follow electrolytes, renal function. Repeat labs in the AM.  - JOE. s/p diuresis. losartan held. will add hydralazine and nitrates due to ICM and JOE  - Mild elevation in troponin s/p recent STEMI and in light of JOE is non specific. There are no sx to suggest ACS. Continued medical therapy will be pursued  -recent STEMI. Peak troponin was 6.95.  s/p PCI/BEVERLY pRCA. and staged laser and PTCA of the LAD. ASA and plavix in place. Stable. no CP syncrome  - s/p laser atherectomy and scoring balloon angioplasty of the mid and distal LAD ISR with significant improvement in luminal area on 1/6/21.  - Echocardiogram 1/2/22 demonstrated severely decreased LV fxn (EF 20-25%). The entire inferior wall, mid and apical anterior septum, mid and apical inferior septum, basal and mid inferolateral wall, and apex are akinetic. The entire anterior wall, basal and mid anterolateral wall, basal anteroseptal segment, basal inferoseptal segment, and apical lateral segment are hypokinetic. RV systolic function is mildly reduced, severe LAE, trivial pericardial effusion is present. The pericardial effusion is localized near the right atrium. There is no evidence of cardiac tamponade. Mild AR, moderate-severe MR, trace TX, moderate TR.  - Rhythm/hemodynamics stable.  Continue current dose of coreg for now and titrate PRN.  - Needs ASA 81mg daily due to recent BEVERLY  - Allergy to Plavix is listed.  He reports body aches.  This is not clearly an allergy.  He will be on Triple Therapy for 1 month.  Plavix is less potent than Brilinta and should result in lower bleeding risk.  Pt agreeable to try Plavix.  If he develops any symptoms, will change to Brilinta.  - ASA, Plavix with Eliquis.  PPI while on triple therapy.  - HL.  Simvastatin 40 mg PO daily added (lipids 1/3/22: , LDL 62, HDL 30, Trig 106)  - Patient has a h/o chronic AF with an increased [id number] score maintained on oral AC with Eliquis 5 bid. Pt is in AF currently with controlled rate.  - ICM. will need re assessment of EF in the outpt setting following revascularization for consideration of ICD or biV ICD.  Reassess resumption of losartan based on renal function. off of BB and now on coreg due to ICM. hydralazine and imdur added.  - Anxiety management as per medical  - Telemetry monitoring personally reviewed by me. PAF in the 70's  - ECG personally reviewed by me  - radiologic imaging reviewed  - Laboratory data reviewed.  - I have personally reviewed all obtainable prior records and data    We will follow with you    Key Fischer MD                                                               [facility]                                                    [address], [address], [place] [address]                                                         PHONE: [phone]    FAX: [phone] [address], [address], [place] [address]                                                 PHONE: [phone]    FAX: [phone]  *******************************************************************************  cc: SOB    HPI: TATIANA RIBERA is an 87y Male with h/o CAD s/p MI & multiple stents, ICM, PPM, paroxysmal atrial fibrillation (on Eliquis), HTN, HL, RA a/w substernal chest pain.  Patient was admitted to [facility] from 12/28-12/30/21 with atypical chest pain and was discharged home.  On day of presentation to [facility] earlier this month patient developed recurrent chest pain and was noted to have an inferior STEMI.  He had an emergent cardiac catheterization s/p atherectomy and stent to the RCA with known LAD ISR and subsequent staged laser and PTCA of the LAD on last admit.  Now admitted with increasing dyspnea on exertion and also at rest. The patient reported mild dyspnea after discharge from the hospital which continued to increase. He reported swelling in the legs and shortness of breath when lying down which had made it difficult to sleep. He denied any chest pain or palpitations. He reports being compliant with his medications and is unaware of any relieving factors. He denied any similar symptoms in the past. There wad no associated fevers, chills, or recent sick contacts. The patient was noted to have dyspnea and hypoxia when laying down in the emergency department.       Overnight events/Subjective Assessment: laying flat. sleeping on my arrival. No JVD or LE edema.  No tachypnea. Denies chest pain. On awakening patient, pt reports continued SOB    INTERPRETATION OF TELEMETRY (personally reviewed): PAF 70's    PAST MEDICAL & SURGICAL HISTORY:  HTN (hypertension)    Hyperlipidemia    CAD (coronary artery disease)    Stented coronary artery    MI (myocardial infarction)  1988    Other persistent atrial fibrillation    History of hemorrhoidectomy    History of inguinal hernia repair    Absent tonsil    Pacemaker        Lipitor (Muscle Pain)  Plavix (Other)      MEDICATIONS  (STANDING):  apixaban 2.5 milliGRAM(s) Oral every 12 hours  carvedilol 12.5 milliGRAM(s) Oral every 12 hours  clopidogrel Tablet 75 milliGRAM(s) Oral daily  furosemide   Injectable 40 milliGRAM(s) IV Push daily  influenza  Vaccine (HIGH DOSE) 0.7 milliLiter(s) IntraMuscular once  pantoprazole    Tablet 40 milliGRAM(s) Oral before breakfast    MEDICATIONS  (PRN):      Vital Signs Last 24 Hrs  T(C): 36.7 (18 Jan 2022 04:00), Max: 37.2 (17 Jan 2022 16:03)  T(F): 98 (18 Jan 2022 04:00), Max: 99 (17 Jan 2022 19:31)  HR: 77 (18 Jan 2022 04:00) (70 - 88)  BP: 125/77 (18 Jan 2022 04:00) (107/66 - 128/76)  BP(mean): --  RR: 18 (18 Jan 2022 04:00) (18 - 20)  SpO2: 97% (18 Jan 2022 04:00) (93% - 100%)    I&O's Detail    16 Jan 2022 07:01  -  17 Jan 2022 07:00  --------------------------------------------------------  IN:  Total IN: 0 mL    OUT:    Voided (mL): 695 mL  Total OUT: 695 mL    Total NET: -695 mL      17 Jan 2022 07:01  -  18 Jan 2022 06:33  --------------------------------------------------------  IN:  Total IN: 0 mL    OUT:    Voided (mL): 300 mL  Total OUT: 300 mL    Total NET: -300 mL        I&O's Summary    16 Jan 2022 07:01  -  17 Jan 2022 07:00  --------------------------------------------------------  IN: 0 mL / OUT: 695 mL / NET: -695 mL    17 Jan 2022 07:01  -  18 Jan 2022 06:33  --------------------------------------------------------  IN: 0 mL / OUT: 300 mL / NET: -300 mL            PHYSICAL EXAM:  General: Appears well developed, well nourished, no acute distress. not in acute pain  HEAD: normal cephalic. Atraumatic  PUPILS: equal and reactive to light  EARS: normal hearing  NECK: supple. no JVD or HJR. no carotid bruits. no visible lymphadenopathy  NOSE: no gross abnormalities  CHEST: symmetric chest wall expansion  CARDIOVASCULAR: Normal rate. irregular irregular rhythm. Normal S1 and S2, no S3/S4,  no murmur, rub, or gallop  LUNGS: Normal effort. Normal respiratory rate. Breath sounds are clear to auscultation bilaterally. No respiratory distress. No stridor.  no rales, rhonchi or wheeze. no decreased Breath sounds  ABDOMEN: Soft, nontender, non-distended, positive bowel sounds, no mass or bruit. no abdominal tenderness. No rebound. no ascites  EXTREMITIES: No clubbing, cyanosis or edema. normal range of motion  PULSES:  distal pulses WNL  SKIN: Warm and dry with normal turgor. no visible rash or cyanosis   NEURO: Alert & oriented x 3, grossly intact with no focal weakness  PSYCH: normal mood and affect. Grossly normal insight and judgement exhibited    FAMILY HISTORY:  No pertinent family history of ischemic heart disease in mother, father or in first degree relatives        SOCIAL HISTORY:  no active smoking. No ETOH/No IVDA    REVIEW OF SYSTEMS:  Constitutional: no fever, chills or malaise. No weight loss  Head: no trauma  Eyes: no visual deficit. No double vision  Ears: no hearing deficit or ringing in the ears  Nose: no nose bleeds or smell changes or congestion  Throat: no difficult swallowing or painful swallowing  Neck: supple. No lymphadenopathy or swelling  Respiratory: + SOB, no wheeze, asthma, COPD. No cough. No blood in the sputum  Cardiovascular: no CP, palpitations, irregular heart beats. No edema. No PND. No orthopnea. No skin/temperature or color changes  Gastrointestinal: no abdominal pain. No constipation. No diarrhea. No melena. No nausea. No vomiting. No bloating  Genitourinary: no frequency or urgency. No hematuria  Lymphatics: no grossly swollen lymph nodes  Musculoskeletal: no limitation of range of motion. Normal strength. No pain  Integumentary: no visible rash. No itching  Neurologic: no HA. No TIA or stroke symptoms. No seizure. No hx of epilepsy. No tingling or numbness. No weakness. No dizziness  Psychiatric: denied. Reports appropriate mood.        LABS:                        12.2   15.81 )-----------( 256      ( 17 Jan 2022 03:03 )             35.5     01-17    134<L>  |  98  |  53.2<H>  ----------------------------<  115<H>  4.4   |  20.0<L>  |  2.04<H>    Ca    8.0<L>      17 Jan 2022 20:29    TPro  6.2<L>  /  Alb  3.5  /  TBili  0.8  /  DBili  x   /  AST  16  /  ALT  19  /  AlkPhos  96  01-16    CARDIAC MARKERS ( 16 Jan 2022 17:24 )  x     / 0.76 ng/mL / x     / x     / x      CARDIAC MARKERS ( 16 Jan 2022 14:47 )  x     / 0.82 ng/mL / x     / x     / x          PT/INR - ( 16 Jan 2022 15:32 )   PT: 21.3 sec;   INR: 1.89 ratio         PTT - ( 16 Jan 2022 15:32 )  PTT:30.2 sec  Serum Pro-Brain Natriuretic Peptide: 07841 pg/mL (01-16 @ 14:47)  serum  Lipids:     Thyroid Stimulating Hormone, Serum: 0.36 uIU/mL (01-17 @ 03:03)  Thyroid Stimulating Hormone, Serum: 0.15 uIU/mL (01-02 @ 19:50)      RADIOLOGY & ADDITIONAL STUDIES:    ECG: Atrial paced 76. old IWMI, old AWMI. NSSTT    < from: Xray Chest 2 Views PA/Lat (01.16.22 @ 15:04) >  IMPRESSION:    Cardiomegaly. Pulmonary vascular congestion and small pleural effusions.   Underlying infiltrates not excluded.    < end of copied text >    < from: Cardiac Catheterization (01.06.22 @ 12:16) >  CORONARY VESSELS: The coronary circulation is right dominant.  LM:   --  LM: Angiography showed minor luminal irregularities with no flow  limiting lesions.  LAD:   --  Mid LAD: There was a tubular 90 % stenosis at the site of a  prior stent, in-stent. There was CASSANDRA grade 3 flow through the vessel  (brisk flow). This is a likely culprit for the patient's clinical  presentation. An intervention was performed.  --  Distal LAD: There was a discrete 98 %stenosis at the site of a prior  stent, in-stent. There was CASSANDRA grade 3 flow through the vessel (brisk  flow). This is a likely culprit for the patient's clinical presentation.  An intervention was performed.  CX:   --  Proximal circumflex: There was a 20 % stenosis at the site of a  prior stent.  RCA:   --  RCA: This vessel was not injected.  COMPLICATIONS: No complications occurred during the cath lab visit.  DIAGNOSTIC IMPRESSIONS: - Severe ISR of the mid and distal LAD stents (2  layers of stent already in place for majority of the segment)  - Successful laser atherectomy, and scoring balloon angioplasty of the mid  and distal LAD with significant improvement in luminal area as confirmed   by IVUS imaging  - Severe LV systolic dysfunction by echo s/p IWSTEMI  - LVEDP elevated at 26mmHg  DIAGNOSTIC RECOMMENDATIONS: - ASA 81mg daily. Plavix loaded on the table.  Plavix is listed as an allergy. He reports body aches. Will continue  Plavix 75mg daily for now.  - Eliquis 5mg BID to resumein AM if vascular access site is stable. Triple  therapy for 1 month with PPI. After 1 month, Plavix and Eliquis alone.   After 1 year, ASA and Eliquis alone.  - If he does not tolerate Plavix, will change to Brilinta  - Continue BB and ARB, statin  - Monitor overnight, discharge in AM if stable  - Outpatient follow up at the [facility] in 1 week  - Repeat echo in outpatient setting to evaluate candidacy for upgrade to   ICD  INTERVENTIONAL IMPRESSIONS: - Severe ISR of the mid and distalLAD stents  (2 layers of stent already in place for majority of the segment)  - Successful laser atherectomy, and scoring balloon angioplasty of the mid  and distal LAD with significant improvement in luminal area as confirmed   by IVUS imaging  - Severe LV systolic dysfunction by echo s/p IWSTEMI  - LVEDP elevated at 26mmHg  INTERVENTIONAL RECOMMENDATIONS: - ASA 81mg daily. Plavix loaded on the  table. Plavix is listed as an allergy. He reports body aches. Will  continue Plavix 75mg daily for now.  - Eliquis 5mg BID to resume in AM if vascular access site is stable. Triple  therapy for 1 month with PPI. After 1 month, Plavix and Eliquis alone.   After 1 year, ASA and Eliquis alone.  - If he does not tolerate Plavix, will change to Brilinta  - Continue BB and ARB, statin  - Monitor overnight, discharge in AM if stable  - Outpatient follow up at the [facility] in 1 week  - Repeat echo in outpatient setting to evaluate candidacy for upgrade to   ICD  Prepared and signed by  Clarissa HEADLEY    < end of copied text >      CARDIAC CATHETERIZATION: < from: Cardiac Catheterization (01.02.22 @ 11:22) >  VENTRICLES: Analysis of regional contractile function demonstrated severe  anterolateral hypokinesis, apical akinesis, and diaphragmatic akinesis.  Global left ventricular function was severely depressed. EF calculated by  contrast ventriculography was 25 % and EF estimated was 30 %.  VALVES: AORTIC VALVE: There was no aortic stenosis.  CORONARY VESSELS: The coronary circulation is right dominant.  LM:   --  LM: Normal.  LAD:   --  Mid LAD: There was a diffuse 80 % stenosis at the site of a  prior stent.  CX:   --  Circumflex: Angiography showed minor luminal irregularities with  no flow limiting lesions.  --  Mid circumflex: There was a tubular 0 % stenosis at the site of a prior  stent.  RCA:   --  Proximal RCA: There was a tubular 95 % stenosis at the site of a  prior stent.  --  Distal RCA: There was a 50 % stenosis.  --  RPDA: There was a tubular 90 % stenosis. The lesion was associated with  a small filling defect consistent with thrombus. There was CASSANDRA grade 3  flow through the vessel (brisk flow).  --  RPLS: There was a discrete 90 % stenosis at the site of a prior stent.  COMPLICATIONS: No complications occurred during the cath lab visit. No  complications occurred during the cath lab visit. No complications  occurred during the cath lab visit.    < end of copied text >    ASSESSMENT AND PLAN:  In summary, TATIANA RIBERA is a 87y Male with past medical history significant for h/o CAD s/p MI & multiple stents, ICM, PPM, paroxysmal atrial fibrillation (on Eliquis), HTN, HL, RA a/w substernal chest pain.  Patient was admitted to [facility] from 12/28-12/30/21 with atypical chest pain and was discharged home.  On day of presentation to [facility] earlier this month patient developed recurrent chest pain and was noted to have an inferior STEMI.  He had an emergent cardiac catheterization s/p atherectomy and stent to the RCA with known LAD ISR and subsequent staged laser and PTCA of the LAD on last admit.  Now admitted with increasing dyspnea on exertion and also at rest. The patient reported mild dyspnea after discharge from the hospital which continued to increase. He reported swelling in the legs and shortness of breath when lying down which had made it difficult to sleep. He denied any chest pain or palpitations. He reports being compliant with his medications and is unaware of any relieving factors. He denied any similar symptoms in the past. There wad no associated fevers, chills, or recent sick contacts. The patient was noted to have dyspnea and hypoxia when laying down in the emergency department.     - Acute on chronic systolic CHF. Due to azotemia and JOE, Pleural effusions. Will change IV lasix to po today due to worsening renal function. To dose IV PRN as needed. hydralazine and imdur added due to low EF. Unable to maintain ACEI/ARB at the current time due to worsening renal function. Follow electrolytes, renal function. Repeat labs in the AM.  - JOE. s/p diuresis. losartan held. will add hydralazine and nitrates due to ICM and OJE  - Mild elevation in troponin s/p recent STEMI and in light of JOE is non specific. There are no sx to suggest ACS. Continued medical therapy will be pursued  -recent STEMI. Peak troponin was 6.95.  s/p PCI/BEVERLY pRCA. and staged laser and PTCA of the LAD. ASA and plavix in place. Stable. no CP syncrome  - s/p laser atherectomy and scoring balloon angioplasty of the mid and distal LAD ISR with significant improvement in luminal area on 1/6/21.  - Echocardiogram 1/2/22 demonstrated severely decreased LV fxn (EF 20-25%). The entire inferior wall, mid and apical anterior septum, mid and apical inferior septum, basal and mid inferolateral wall, and apex are akinetic. The entire anterior wall, basal and mid anterolateral wall, basal anteroseptal segment, basal inferoseptal segment, and apical lateral segment are hypokinetic. RV systolic function is mildly reduced, severe LAE, trivial pericardial effusion is present. The pericardial effusion is localized near the right atrium. There is no evidence of cardiac tamponade. Mild AR, moderate-severe MR, trace CO, moderate TR.  - Rhythm/hemodynamics stable.  Continue current dose of coreg for now and titrate PRN.  - Needs ASA 81mg daily due to recent BEVERLY  - Allergy to Plavix is listed.  He reports body aches.  This is not clearly an allergy.  He will be on Triple Therapy for 1 month.  Plavix is less potent than Brilinta and should result in lower bleeding risk.  Pt agreeable to try Plavix.  If he develops any symptoms, will change to Brilinta.  - ASA, Plavix with Eliquis.  PPI while on triple therapy.  - HL.  Simvastatin 40 mg PO daily added (lipids 1/3/22: , LDL 62, HDL 30, Trig 106)  - Patient has a h/o chronic AF with an increased [id number] score maintained on oral AC with Eliquis 5 bid. Pt is in AF currently with controlled rate.  - ICM. will need re assessment of EF in the outpt setting following revascularization for consideration of ICD or biV ICD.  Reassess resumption of losartan based on renal function. off of BB and now on coreg due to ICM. hydralazine and imdur added.  - Anxiety management as per medical  - Telemetry monitoring personally reviewed by me. PAF in the 70's  - ECG personally reviewed by me  - radiologic imaging reviewed  - Laboratory data reviewed.  - I have personally reviewed all obtainable prior records and data    We will follow with you    Key Fischer MD

## 2022-01-18 NOTE — PROGRESS NOTE ADULT - SUBJECTIVE AND OBJECTIVE BOX
TATIANA HERRERAFARIBA  ----------------------------------------  The patient was seen at bedside. Patient with heart failure. Reports feeling better. Able to lay flat without dyspnea.    Vital Signs Last 24 Hrs  T(C): 36.7 (18 Jan 2022 12:30), Max: 37.2 (17 Jan 2022 16:03)  T(F): 98 (18 Jan 2022 12:30), Max: 99 (17 Jan 2022 19:31)  HR: 74 (18 Jan 2022 12:30) (74 - 80)  BP: 119/74 (18 Jan 2022 12:30) (107/66 - 128/76)  BP(mean): --  RR: 18 (18 Jan 2022 12:30) (18 - 18)  SpO2: 97% (18 Jan 2022 12:30) (97% - 100%)    PHYSICAL EXAMINATION:  ----------------------------------------  General appearance: No acute distress, Awake, Alert  HEENT: Normocephalic, Atraumatic, Conjunctiva clear, EOMI  Neck: Supple, No JVD, No tenderness  Lungs: Breath sound equal bilaterally, No wheezes, Mild rales  Cardiovascular: S1S2, Regular rhythm  Abdomen: Soft, Nontender, Nondistended, No guarding/rebound, Positive bowel sounds  Extremities: No clubbing, No cyanosis, No calf tenderness, Arthritic changes to the fingers, Pedal edema improved  Neuro: Strength equal bilaterally, No tremors  Psychiatric: Appropriate mood, Normal affect    LABORATORY STUDIES:  ----------------------------------------             12.1   11.28 )-----------( 237      ( 18 Jan 2022 07:51 )             36.3     01-18    132<L>  |  95<L>  |  55.9<H>  ----------------------------<  131<H>  4.6   |  22.0  |  1.93<H>    Ca    8.6      18 Jan 2022 07:51  Mg     2.3     01-18    TPro  6.2<L>  /  Alb  3.5  /  TBili  0.8  /  DBili  x   /  AST  16  /  ALT  19  /  AlkPhos  96  01-16    LIVER FUNCTIONS - ( 16 Jan 2022 14:47 )  Alb: 3.5 g/dL / Pro: 6.2 g/dL / ALK PHOS: 96 U/L / ALT: 19 U/L / AST: 16 U/L / GGT: x           PT/INR - ( 16 Jan 2022 15:32 )   PT: 21.3 sec;   INR: 1.89 ratio    PTT - ( 16 Jan 2022 15:32 )  PTT:30.2 sec    CARDIAC MARKERS ( 16 Jan 2022 17:24 )  x     / 0.76 ng/mL / x     / x     / x      CARDIAC MARKERS ( 16 Jan 2022 14:47 )  x     / 0.82 ng/mL / x     / x     / x        MEDICATIONS  (STANDING):  apixaban 2.5 milliGRAM(s) Oral every 12 hours  aspirin  chewable 81 milliGRAM(s) Oral daily  carvedilol 12.5 milliGRAM(s) Oral every 12 hours  clopidogrel Tablet 75 milliGRAM(s) Oral daily  furosemide    Tablet 40 milliGRAM(s) Oral daily  hydrALAZINE 25 milliGRAM(s) Oral three times a day  influenza  Vaccine (HIGH DOSE) 0.7 milliLiter(s) IntraMuscular once  isosorbide   mononitrate ER Tablet (IMDUR) 30 milliGRAM(s) Oral daily  pantoprazole    Tablet 40 milliGRAM(s) Oral before breakfast  simvastatin 40 milliGRAM(s) Oral at bedtime      ASSESSMENT / PLAN:  ----------------------------------------  87M with a history of atrial fibrillation, rheumatoid arthritis, and coronary artery disease with recent hospitalization requiring percutaneous coronary intervention who presented with dyspnea on exertion and orthopnea with lower extremity edema. He was found to have elevated BNP and chest Xray with pulmonary vascular congestion consistent with heart failure.    Acute on chronic systolic heart failure - To monitor urine output and weights. Prior echocardiogram on 1/2/22 noted an ejection fraction of 20-25%. Cardiology follow up noted. On carvedilol, hydralazine, and isosorbide. On furosemide.    Coronary artery disease - Noted to have recent percutaneous coronary intervention. On aspirin and clopidogrel. No chest pain. Troponin elevated in the setting of acute kidney injury.    Atrial fibrillation - On metoprolol with apixaban for anticoagulation.    Acute kidney injury - To continue monitoring. Furosemide to be transitioned to oral formulation. Losartan was held. Monitoring urine output.    Hyponatremia - Improved from admission. To continue monitor with diuresis. Hypokalemia improved with potassium supplementation.    Rheumatoid arthritis - The patient reported that he was previously taking prednisone but not consistently.    Leukocytosis - Afebrile. Improved.

## 2022-01-18 NOTE — DISCHARGE NOTE NURSING/CASE MANAGEMENT/SOCIAL WORK - [id number]_GEN_ALL_CORE
For information on Fall & Injury Prevention, visit: [email or web address] OR  [email or web address] OR  https://www.cdc.gov/steadi/patient.html

## 2022-01-19 LAB
ANION GAP SERPL CALC-SCNC: 16 MMOL/L — SIGNIFICANT CHANGE UP (ref 5–17)
BUN SERPL-MCNC: 54.5 MG/DL — HIGH (ref 8–20)
CALCIUM SERPL-MCNC: 8.1 MG/DL — LOW (ref 8.6–10.2)
CHLORIDE SERPL-SCNC: 97 MMOL/L — LOW (ref 98–107)
CO2 SERPL-SCNC: 21 MMOL/L — LOW (ref 22–29)
CREAT SERPL-MCNC: 1.95 MG/DL — HIGH (ref 0.5–1.3)
GLUCOSE SERPL-MCNC: 88 MG/DL — SIGNIFICANT CHANGE UP (ref 70–99)
HCT VFR BLD CALC: 34.1 % — LOW (ref 39–50)
HGB BLD-MCNC: 11.5 G/DL — LOW (ref 13–17)
MCHC RBC-ENTMCNC: 30.5 PG — SIGNIFICANT CHANGE UP (ref 27–34)
MCHC RBC-ENTMCNC: 33.7 GM/DL — SIGNIFICANT CHANGE UP (ref 32–36)
MCV RBC AUTO: 90.5 FL — SIGNIFICANT CHANGE UP (ref 80–100)
PLATELET # BLD AUTO: 239 K/UL — SIGNIFICANT CHANGE UP (ref 150–400)
POTASSIUM SERPL-MCNC: 3.4 MMOL/L — LOW (ref 3.5–5.3)
POTASSIUM SERPL-SCNC: 3.4 MMOL/L — LOW (ref 3.5–5.3)
RBC # BLD: 3.77 M/UL — LOW (ref 4.2–5.8)
RBC # FLD: 15 % — HIGH (ref 10.3–14.5)
SODIUM SERPL-SCNC: 134 MMOL/L — LOW (ref 135–145)
WBC # BLD: 12.83 K/UL — HIGH (ref 3.8–10.5)
WBC # FLD AUTO: 12.83 K/UL — HIGH (ref 3.8–10.5)

## 2022-01-19 PROCEDURE — 99233 SBSQ HOSP IP/OBS HIGH 50: CPT

## 2022-01-19 RX ORDER — LANOLIN ALCOHOL/MO/W.PET/CERES
3 CREAM (GRAM) TOPICAL AT BEDTIME
Refills: 0 | Status: DISCONTINUED | OUTPATIENT
Start: 2022-01-19 | End: 2022-01-28

## 2022-01-19 RX ORDER — POTASSIUM CHLORIDE 20 MEQ
20 PACKET (EA) ORAL
Refills: 0 | Status: COMPLETED | OUTPATIENT
Start: 2022-01-19 | End: 2022-01-19

## 2022-01-19 RX ORDER — CARVEDILOL PHOSPHATE 80 MG/1
6.25 CAPSULE, EXTENDED RELEASE ORAL EVERY 12 HOURS
Refills: 0 | Status: DISCONTINUED | OUTPATIENT
Start: 2022-01-20 | End: 2022-01-22

## 2022-01-19 RX ORDER — DIAZEPAM 5 MG
2 TABLET ORAL
Refills: 0 | Status: DISCONTINUED | OUTPATIENT
Start: 2022-01-19 | End: 2022-01-26

## 2022-01-19 RX ADMIN — Medication 40 MILLIGRAM(S): at 06:02

## 2022-01-19 RX ADMIN — Medication 20 MILLIEQUIVALENT(S): at 09:32

## 2022-01-19 RX ADMIN — PANTOPRAZOLE SODIUM 40 MILLIGRAM(S): 20 TABLET, DELAYED RELEASE ORAL at 06:02

## 2022-01-19 RX ADMIN — Medication 81 MILLIGRAM(S): at 09:31

## 2022-01-19 RX ADMIN — CLOPIDOGREL BISULFATE 75 MILLIGRAM(S): 75 TABLET, FILM COATED ORAL at 09:32

## 2022-01-19 RX ADMIN — Medication 25 MILLIGRAM(S): at 06:02

## 2022-01-19 RX ADMIN — SIMVASTATIN 40 MILLIGRAM(S): 20 TABLET, FILM COATED ORAL at 21:32

## 2022-01-19 RX ADMIN — APIXABAN 2.5 MILLIGRAM(S): 2.5 TABLET, FILM COATED ORAL at 18:28

## 2022-01-19 RX ADMIN — Medication 2 MILLIGRAM(S): at 16:47

## 2022-01-19 RX ADMIN — APIXABAN 2.5 MILLIGRAM(S): 2.5 TABLET, FILM COATED ORAL at 06:02

## 2022-01-19 RX ADMIN — Medication 25 MILLIGRAM(S): at 21:31

## 2022-01-19 RX ADMIN — Medication 20 MILLIEQUIVALENT(S): at 06:02

## 2022-01-19 RX ADMIN — Medication 20 MILLIEQUIVALENT(S): at 16:44

## 2022-01-19 RX ADMIN — CARVEDILOL PHOSPHATE 12.5 MILLIGRAM(S): 80 CAPSULE, EXTENDED RELEASE ORAL at 06:02

## 2022-01-19 NOTE — PROGRESS NOTE ADULT - SUBJECTIVE AND OBJECTIVE BOX
[facility], [facility]                                          [address], [address], [place] [address]                                               PHONE: [phone]    FAX: [phone] [address], [address], [place] [address]                                       PHONE: [phone]    FAX: [phone]  *******************************************************************************    Overnight events/Subjective Assessment: No acute CV events overnight.  No chest pain, palpitations, shortness of breath, PND or orthopnea.       Lipitor (Muscle Pain)  Plavix (Other)    MEDICATIONS  (STANDING):  apixaban 2.5 milliGRAM(s) Oral every 12 hours  aspirin  chewable 81 milliGRAM(s) Oral daily  clopidogrel Tablet 75 milliGRAM(s) Oral daily  furosemide    Tablet 40 milliGRAM(s) Oral daily  hydrALAZINE 25 milliGRAM(s) Oral three times a day  influenza  Vaccine (HIGH DOSE) 0.7 milliLiter(s) IntraMuscular once  isosorbide   mononitrate ER Tablet (IMDUR) 30 milliGRAM(s) Oral daily  pantoprazole    Tablet 40 milliGRAM(s) Oral before breakfast  simvastatin 40 milliGRAM(s) Oral at bedtime    MEDICATIONS  (PRN):  diazepam    Tablet 2 milliGRAM(s) Oral two times a day PRN anxiety  melatonin 3 milliGRAM(s) Oral at bedtime PRN Sleep      Vital Signs Last 24 Hrs  T(C): 36.9 (19 Jan 2022 20:34), Max: 36.9 (19 Jan 2022 20:34)  T(F): 98.4 (19 Jan 2022 20:34), Max: 98.4 (19 Jan 2022 20:34)  HR: 70 (19 Jan 2022 20:34) (70 - 77)  BP: 118/73 (19 Jan 2022 20:34) (99/76 - 118/73)  RR: 18 (19 Jan 2022 20:34) (18 - 18)  SpO2: 94% (19 Jan 2022 20:34) (94% - 99%)    I&O's Detail    18 Jan 2022 07:01  -  19 Jan 2022 07:00  --------------------------------------------------------  IN:  Total IN: 0 mL    OUT:    Voided (mL): 400 mL  Total OUT: 400 mL    Total NET: -400 mL        I&O's Summary    18 Jan 2022 07:01  -  19 Jan 2022 07:00  --------------------------------------------------------  IN: 0 mL / OUT: 400 mL / NET: -400 mL        PHYSICAL EXAM:  General: Appears well developed, well nourished, no acute distress  HEENT: Head: normocephalic, atraumatic  Eyes: Pupils equal and reactive  Neck: Supple, no carotid bruit, no JVD, no HJR  CARDIOVASCULAR: irregularly irregular Normal S1 and S2, no murmur, rub, or gallop  LUNGS: Clear to auscultation bilaterally, no rales, rhonchi or wheeze  ABDOMEN: Soft, nontender, non-distended, positive bowel sounds, no mass or bruit  EXTREMITIES: No edema, distal pulses WNL  SKIN: Warm and dry with normal turgor  NEURO: Alert & oriented x 3, grossly intact  PSYCH: normal mood and affect        LABS:                        11.5   12.83 )-----------( 239      ( 19 Jan 2022 04:24 )             34.1     01-19    134<L>  |  97<L>  |  54.5<H>  ----------------------------<  88  3.4<L>   |  21.0<L>  |  1.95<H>    Ca    8.1<L>      19 Jan 2022 04:24  Mg     2.3     01-18      Serum Pro-Brain Natriuretic Peptide: 30573 pg/mL (01-16 @ 14:47)  serum  Lipids:     Thyroid Stimulating Hormone, Serum: 0.36 uIU/mL (01-17 @ 03:03)  Thyroid Stimulating Hormone, Serum: 0.15 uIU/mL (01-02 @ 19:50)      RADIOLOGY & ADDITIONAL STUDIES REVIEWED      ASSESSMENT AND PLAN:  In summary, TATIANA RIBERA is a 87y man with HTN, HLD, CAD s/p multiple prior stents most recnetly s/p recent inferior STEMI s/p PCI/BEVERLY pRCA with staged PCI with laser atherectomy/scoring balloon angioplasty to 80% m-dLAD ISR (01/2022), 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     - The patient has been chest pain free since admission.  Mildly elevated, essentially flat-trending troponin is nonspecific and most likely represents residual downtrending troponin following recent STEMI.  Nothing to suggest acute MI.  No need to continue trending troponin.   - Continue medical management of known severe CAD post recent MI/coronary intervention asa noted above including dual antiplatelet therapy.  Patient is maintained on "triple therapy" with aspirin, plavix and eilquis, which will be continued at this time with plan to complete one month with plan to discontinue aspirin, continuing plavix and eliquis with goal to complete one year of plavix, with plan to then discontinue plavix and resume aspirin 81 mg daily.   - Acute on chronic combined systolic/diastolic HFrEF: the patient is now with marked clinical and symptomatic improvement following IV diuresis.   - JOE, stable, improving:  Agree with changing to PO and and continuing to closely monitor symptoms and renal function.  Continue off ARB given JOE, continuing hydralazine and Imdur.   - Echocardiogram 1/2/22 demonstrated severely decreased LV fxn (EF 20-25%). The entire inferior wall, mid and apical anterior septum, mid and apical inferior septum, basal and mid inferolateral wall, and apex are akinetic. The entire anterior wall, basal and mid anterolateral wall, basal anteroseptal segment, basal inferoseptal segment, and apical lateral segment are hypokinetic. RV systolic function is mildly reduced, severe LAE, trivial pericardial effusion is present. The pericardial effusion is localized near the right atrium. There is no evidence of cardiac tamponade. Mild AR, moderate-severe MR, trace NV, moderate TR.  - Plan to repeat echocardiogram in the outpatient setting.   - HTN: BP well controlled on current regimen.  - HLD: continue statin.      Chago Hernandez M.D.

## 2022-01-19 NOTE — PROGRESS NOTE ADULT - SUBJECTIVE AND OBJECTIVE BOX
TATIANA HERRERAFARIBA  ----------------------------------------  The patient was seen at bedside. Patient with heart failure. Reports insomnia and some anxiety. No orthopnea or chest pain.    Vital Signs Last 24 Hrs  T(C): 36.4 (19 Jan 2022 11:31), Max: 36.7 (19 Jan 2022 05:00)  T(F): 97.5 (19 Jan 2022 11:31), Max: 98 (19 Jan 2022 05:00)  HR: 75 (19 Jan 2022 11:31) (75 - 77)  BP: 105/64 (19 Jan 2022 11:31) (101/66 - 105/67)  BP(mean): --  RR: 18 (19 Jan 2022 11:31) (18 - 18)  SpO2: 97% (19 Jan 2022 11:31) (96% - 99%)    PHYSICAL EXAMINATION:  ----------------------------------------  General appearance: No acute distress, Awake, Alert  HEENT: Normocephalic, Atraumatic, Conjunctiva clear, EOMI  Neck: Supple, No JVD, No tenderness  Lungs: Breath sound equal bilaterally, No wheezes, Mild rales  Cardiovascular: S1S2, Regular rhythm  Abdomen: Soft, Nontender, Nondistended, No guarding/rebound, Positive bowel sounds  Extremities: No clubbing, No cyanosis, No calf tenderness, Arthritic changes to the fingers, Pedal edema improved  Neuro: Strength equal bilaterally, No tremors  Psychiatric: Appropriate mood, Normal affect    LABORATORY STUDIES:  ----------------------------------------             11.5   12.83 )-----------( 239      ( 19 Jan 2022 04:24 )             34.1     01-19    134<L>  |  97<L>  |  54.5<H>  ----------------------------<  88  3.4<L>   |  21.0<L>  |  1.95<H>    Ca    8.1<L>      19 Jan 2022 04:24  Mg     2.3     01-18    MEDICATIONS  (STANDING):  apixaban 2.5 milliGRAM(s) Oral every 12 hours  aspirin  chewable 81 milliGRAM(s) Oral daily  carvedilol 12.5 milliGRAM(s) Oral every 12 hours  clopidogrel Tablet 75 milliGRAM(s) Oral daily  furosemide    Tablet 40 milliGRAM(s) Oral daily  hydrALAZINE 25 milliGRAM(s) Oral three times a day  influenza  Vaccine (HIGH DOSE) 0.7 milliLiter(s) IntraMuscular once  isosorbide   mononitrate ER Tablet (IMDUR) 30 milliGRAM(s) Oral daily  pantoprazole    Tablet 40 milliGRAM(s) Oral before breakfast  potassium chloride    Tablet ER 20 milliEquivalent(s) Oral every 2 hours  simvastatin 40 milliGRAM(s) Oral at bedtime    MEDICATIONS  (PRN):  diazepam    Tablet 2 milliGRAM(s) Oral two times a day PRN anxiety  melatonin 3 milliGRAM(s) Oral at bedtime PRN Sleep      ASSESSMENT / PLAN:  ----------------------------------------  87M with a history of atrial fibrillation, rheumatoid arthritis, and coronary artery disease with recent hospitalization requiring percutaneous coronary intervention who presented with dyspnea on exertion and orthopnea with lower extremity edema. He was found to have elevated BNP and chest Xray with pulmonary vascular congestion consistent with heart failure.    Acute on chronic systolic heart failure - Prior echocardiogram on 1/2/22 noted an ejection fraction of 20-25%. On carvedilol, hydralazine, and isosorbide. On furosemide for diuresis. Hypoxia improved. For further evaluation with Physical Therapy.    Coronary artery disease - Noted to have recent percutaneous coronary intervention. On aspirin and clopidogrel. No chest pain. Troponin elevated in the setting of acute kidney injury. Telemetry monitoring.    Atrial fibrillation - On metoprolol with apixaban for anticoagulation.    Acute kidney injury - On furosemide. To allow for some azotemia in the setting of heart failure. Losartan was previously held. Monitoring urine output.    Hyponatremia - Improved from admission. To continue monitor with diuresis.    Hypokalemia - For further supplementation with potassium.    Rheumatoid arthritis - The patient reported that he was previously taking prednisone but not consistently.    Leukocytosis - Afebrile. Improved from admission.    Anxiety - On diazepam.    Discussed with the patient's daughter Sejal ([phone]) in regards to the patient's condition and test results. She noted that the patient had been taking diazepam for many years and his wife had been encouraging him to stop the medication. Advised that the patient may benefit from slow taper of the medication if he had been taking if for an extended period of time rather than an abrupt discontinuation.

## 2022-01-20 LAB
ANION GAP SERPL CALC-SCNC: 17 MMOL/L — SIGNIFICANT CHANGE UP (ref 5–17)
BUN SERPL-MCNC: 54.4 MG/DL — HIGH (ref 8–20)
CALCIUM SERPL-MCNC: 8.3 MG/DL — LOW (ref 8.6–10.2)
CHLORIDE SERPL-SCNC: 99 MMOL/L — SIGNIFICANT CHANGE UP (ref 98–107)
CO2 SERPL-SCNC: 18 MMOL/L — LOW (ref 22–29)
CREAT SERPL-MCNC: 1.81 MG/DL — HIGH (ref 0.5–1.3)
GLUCOSE SERPL-MCNC: 125 MG/DL — HIGH (ref 70–99)
HCT VFR BLD CALC: 34.7 % — LOW (ref 39–50)
HGB BLD-MCNC: 11.7 G/DL — LOW (ref 13–17)
MCHC RBC-ENTMCNC: 30.5 PG — SIGNIFICANT CHANGE UP (ref 27–34)
MCHC RBC-ENTMCNC: 33.7 GM/DL — SIGNIFICANT CHANGE UP (ref 32–36)
MCV RBC AUTO: 90.6 FL — SIGNIFICANT CHANGE UP (ref 80–100)
PLATELET # BLD AUTO: 220 K/UL — SIGNIFICANT CHANGE UP (ref 150–400)
POTASSIUM SERPL-MCNC: 4.4 MMOL/L — SIGNIFICANT CHANGE UP (ref 3.5–5.3)
POTASSIUM SERPL-SCNC: 4.4 MMOL/L — SIGNIFICANT CHANGE UP (ref 3.5–5.3)
RBC # BLD: 3.83 M/UL — LOW (ref 4.2–5.8)
RBC # FLD: 15.1 % — HIGH (ref 10.3–14.5)
SODIUM SERPL-SCNC: 134 MMOL/L — LOW (ref 135–145)
WBC # BLD: 13.32 K/UL — HIGH (ref 3.8–10.5)
WBC # FLD AUTO: 13.32 K/UL — HIGH (ref 3.8–10.5)

## 2022-01-20 PROCEDURE — 71046 X-RAY EXAM CHEST 2 VIEWS: CPT | Mod: 26

## 2022-01-20 PROCEDURE — 99232 SBSQ HOSP IP/OBS MODERATE 35: CPT

## 2022-01-20 RX ORDER — FUROSEMIDE 40 MG
40 TABLET ORAL DAILY
Refills: 0 | Status: DISCONTINUED | OUTPATIENT
Start: 2022-01-20 | End: 2022-01-21

## 2022-01-20 RX ADMIN — CARVEDILOL PHOSPHATE 6.25 MILLIGRAM(S): 80 CAPSULE, EXTENDED RELEASE ORAL at 17:53

## 2022-01-20 RX ADMIN — ISOSORBIDE MONONITRATE 30 MILLIGRAM(S): 60 TABLET, EXTENDED RELEASE ORAL at 10:46

## 2022-01-20 RX ADMIN — Medication 40 MILLIGRAM(S): at 05:21

## 2022-01-20 RX ADMIN — CARVEDILOL PHOSPHATE 6.25 MILLIGRAM(S): 80 CAPSULE, EXTENDED RELEASE ORAL at 05:20

## 2022-01-20 RX ADMIN — PANTOPRAZOLE SODIUM 40 MILLIGRAM(S): 20 TABLET, DELAYED RELEASE ORAL at 05:21

## 2022-01-20 RX ADMIN — Medication 3 MILLIGRAM(S): at 21:33

## 2022-01-20 RX ADMIN — APIXABAN 2.5 MILLIGRAM(S): 2.5 TABLET, FILM COATED ORAL at 05:22

## 2022-01-20 RX ADMIN — Medication 25 MILLIGRAM(S): at 21:33

## 2022-01-20 RX ADMIN — CLOPIDOGREL BISULFATE 75 MILLIGRAM(S): 75 TABLET, FILM COATED ORAL at 10:46

## 2022-01-20 RX ADMIN — SIMVASTATIN 40 MILLIGRAM(S): 20 TABLET, FILM COATED ORAL at 21:33

## 2022-01-20 RX ADMIN — Medication 81 MILLIGRAM(S): at 10:46

## 2022-01-20 RX ADMIN — APIXABAN 2.5 MILLIGRAM(S): 2.5 TABLET, FILM COATED ORAL at 17:52

## 2022-01-20 RX ADMIN — Medication 25 MILLIGRAM(S): at 06:23

## 2022-01-20 RX ADMIN — Medication 2 MILLIGRAM(S): at 21:33

## 2022-01-20 NOTE — PHYSICAL THERAPY INITIAL EVALUATION ADULT - LIVES WITH, PROFILE
Pt lives with spouse in private home, [address] with handrail, 1 step inside to living space/spouse

## 2022-01-20 NOTE — PROGRESS NOTE ADULT - SUBJECTIVE AND OBJECTIVE BOX
TATIANA RIBERA  ----------------------------------------  The patient was seen at bedside. Patient with heart failure. Reported an episode of dyspnea when he was about to fall asleep at night. Currently without complaints.    Vital Signs Last 24 Hrs  T(C): 36.3 (20 Jan 2022 08:33), Max: 36.9 (19 Jan 2022 20:34)  T(F): 97.4 (20 Jan 2022 08:33), Max: 98.5 (20 Jan 2022 05:24)  HR: 75 (20 Jan 2022 08:33) (70 - 82)  BP: 107/57 (20 Jan 2022 08:33) (99/76 - 118/73)  BP(mean): --  RR: 18 (20 Jan 2022 08:33) (18 - 18)  SpO2: 96% (20 Jan 2022 08:33) (94% - 97%)    PHYSICAL EXAMINATION:  ----------------------------------------  General appearance: No acute distress, Awake, Alert  HEENT: Normocephalic, Atraumatic, Conjunctiva clear, EOMI  Neck: Supple, No JVD, No tenderness  Lungs: Breath sound equal bilaterally, No wheezes, Mild rales  Cardiovascular: S1S2, Regular rhythm  Abdomen: Soft, Nontender, Nondistended, No guarding/rebound, Positive bowel sounds  Extremities: No clubbing, No cyanosis, No calf tenderness, Arthritic changes to the fingers, Pedal edema improved  Neuro: Strength equal bilaterally, No tremors  Psychiatric: Appropriate mood, Normal affect    LABORATORY STUDIES:  ----------------------------------------             11.7   13.32 )-----------( 220      ( 20 Jan 2022 03:28 )             34.7     01-20    134<L>  |  99  |  54.4<H>  ----------------------------<  125<H>  4.4   |  18.0<L>  |  1.81<H>    Ca    8.3<L>      20 Jan 2022 03:28    MEDICATIONS  (STANDING):  apixaban 2.5 milliGRAM(s) Oral every 12 hours  aspirin  chewable 81 milliGRAM(s) Oral daily  carvedilol 6.25 milliGRAM(s) Oral every 12 hours  clopidogrel Tablet 75 milliGRAM(s) Oral daily  furosemide   Injectable 40 milliGRAM(s) IV Push daily  hydrALAZINE 25 milliGRAM(s) Oral three times a day  influenza  Vaccine (HIGH DOSE) 0.7 milliLiter(s) IntraMuscular once  isosorbide   mononitrate ER Tablet (IMDUR) 30 milliGRAM(s) Oral daily  pantoprazole    Tablet 40 milliGRAM(s) Oral before breakfast  simvastatin 40 milliGRAM(s) Oral at bedtime    MEDICATIONS  (PRN):  diazepam    Tablet 2 milliGRAM(s) Oral two times a day PRN anxiety  melatonin 3 milliGRAM(s) Oral at bedtime PRN Sleep      ASSESSMENT / PLAN:  ----------------------------------------  87M with a history of atrial fibrillation, rheumatoid arthritis, and coronary artery disease with recent hospitalization requiring percutaneous coronary intervention who presented with dyspnea on exertion and orthopnea with lower extremity edema. He was found to have elevated BNP and chest Xray with pulmonary vascular congestion consistent with heart failure.    Acute on chronic systolic heart failure - Prior echocardiogram on 1/2/22 noted an ejection fraction of 20-25%. On carvedilol, hydralazine, and isosorbide. Hypoxia resolved. Monitoring reviewed, no significant arrhythmia or hypoxia episodes noted. Possibly secondary to anxiety. For further evaluation with Physical Therapy. Cardiology follow up noted, to continue on furosemide.    Coronary artery disease - Noted to have recent percutaneous coronary intervention. On aspirin and clopidogrel. No chest pain. Troponin elevated in the setting of acute kidney injury. Telemetry monitoring.    Atrial fibrillation - On metoprolol with apixaban for anticoagulation.    Acute kidney injury - On furosemide. To allow for some azotemia in the setting of heart failure. Losartan was previously held. Monitoring urine output.    Hyponatremia - Improved from admission. To continue monitor with diuresis.    Hypokalemia - Improved with potassium supplementation.    Rheumatoid arthritis - The patient reported that he was previously taking prednisone but not consistently.    Leukocytosis - Afebrile. Improved from admission.    Anxiety - On diazepam.

## 2022-01-20 NOTE — PROGRESS NOTE ADULT - SUBJECTIVE AND OBJECTIVE BOX
[facility], [facility]                                          [address] , [address], [place] [address]                                               PHONE: [phone]    FAX: [phone] [address], [address], [place] [address]                                       PHONE: [phone]    FAX: [phone]  *******************************************************************************    Overnight events/Subjective Assessment:  He reports dyspnea when laying flat and PND.  This AM he was unable to speak in complete sentences when laying flat -- improved with sitting up.  No CP, palpitations.    INTERPRETATION OF TELEMETRY (personally reviewed):  SR, Vpacing 60-80s, no events    Lipitor (Muscle Pain)  Plavix (Other)    MEDICATIONS  (STANDING):  apixaban 2.5 milliGRAM(s) Oral every 12 hours  aspirin  chewable 81 milliGRAM(s) Oral daily  carvedilol 6.25 milliGRAM(s) Oral every 12 hours  clopidogrel Tablet 75 milliGRAM(s) Oral daily  furosemide    Tablet 40 milliGRAM(s) Oral daily  hydrALAZINE 25 milliGRAM(s) Oral three times a day  influenza  Vaccine (HIGH DOSE) 0.7 milliLiter(s) IntraMuscular once  isosorbide   mononitrate ER Tablet (IMDUR) 30 milliGRAM(s) Oral daily  pantoprazole    Tablet 40 milliGRAM(s) Oral before breakfast  simvastatin 40 milliGRAM(s) Oral at bedtime    MEDICATIONS  (PRN):  diazepam    Tablet 2 milliGRAM(s) Oral two times a day PRN anxiety  melatonin 3 milliGRAM(s) Oral at bedtime PRN Sleep      Vital Signs Last 24 Hrs  T(C): 36.3 (20 Jan 2022 08:33), Max: 36.9 (19 Jan 2022 20:34)  T(F): 97.4 (20 Jan 2022 08:33), Max: 98.5 (20 Jan 2022 05:24)  HR: 75 (20 Jan 2022 08:33) (70 - 82)  BP: 107/57 (20 Jan 2022 08:33) (99/76 - 118/73)  BP(mean): --  RR: 18 (20 Jan 2022 08:33) (18 - 18)  SpO2: 96% (20 Jan 2022 08:33) (94% - 97%)    I&O's Detail    I&O's Summary          PHYSICAL EXAM:  General: Appears well developed, well nourished, + respiratory distress when laying down, improved with sitting up  HEENT: Head: normocephalic, atraumatic  Eyes: Pupils equal and reactive  Neck: Supple, no carotid bruit, no JVD, no HJR  CARDIOVASCULAR: Normal S1 and S2, no murmur, rub, or gallop  LUNGS: Clear to auscultation bilaterally, no rales, rhonchi or wheeze  ABDOMEN: Soft, nontender, non-distended, positive bowel sounds, no mass or bruit  EXTREMITIES: No edema, distal pulses WNL  SKIN: Warm and dry with normal turgor  NEURO: Alert & oriented x 3, grossly intact  PSYCH: appropriate mood and affect        LABS:                        11.7   13.32 )-----------( 220      ( 20 Jan 2022 03:28 )             34.7     01-20    134<L>  |  99  |  54.4<H>  ----------------------------<  125<H>  4.4   |  18.0<L>  |  1.81<H>    Ca    8.3<L>      20 Jan 2022 03:28            Serum Pro-Brain Natriuretic Peptide: 02047 pg/mL (01-16 @ 14:47)  serum  Lipids:     Thyroid Stimulating Hormone, Serum: 0.36 uIU/mL (01-17 @ 03:03)  Thyroid Stimulating Hormone, Serum: 0.15 uIU/mL (01-02 @ 19:50)      ASSESSMENT AND PLAN:  In summary, TATIANA RIBERA is a 87y man with HTN, HLD, CAD s/p multiple prior stents most recnetly s/p recent inferior STEMI s/p PCI/BEVERLY pRCA with staged PCI with laser atherectomy/scoring balloon angioplasty to 80% m-dLAD ISR (01/2022), 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     - The patient has been chest pain free since admission.  Mildly elevated, essentially flat-trending troponin is nonspecific and most likely represents residual downtrending troponin following recent STEMI.  Nothing to suggest acute MI.  No need to continue trending troponin.   - Continue medical management of known severe CAD post recent MI/coronary intervention asa noted above including dual antiplatelet therapy.  Patient is maintained on "triple therapy" with aspirin, plavix and eilquis, which will be continued at this time with plan to complete one month with plan to discontinue aspirin, continuing plavix and eliquis with goal to complete one year of plavix, with plan to then discontinue plavix and resume aspirin 81 mg daily.   - Acute on chronic combined systolic/diastolic HFrEF: the patient is improving with IV diuresis.  Still c/o orthopnea and PND, with symptoms improving with sitting up.  Continue IV diuresis today and reevaluate tomorrow.  Monitor renal function  - Repeat PA/Lat CXR ordered to evaluate for potential alternate etiology of dyspnea as he clinically appears euvolemic on exam.  - JOE, stable, improving:  monitor symptoms and renal function.  Continue off ARB given JOE, continuing hydralazine and Imdur.   - Echocardiogram 1/2/22 demonstrated severely decreased LV fxn (EF 20-25%). The entire inferior wall, mid and apical anterior septum, mid and apical inferior septum, basal and mid inferolateral wall, and apex are akinetic. The entire anterior wall, basal and mid anterolateral wall, basal anteroseptal segment, basal inferoseptal segment, and apical lateral segment are hypokinetic. RV systolic function is mildly reduced, severe LAE, trivial pericardial effusion is present. The pericardial effusion is localized near the right atrium. There is no evidence of cardiac tamponade. Mild AR, moderate-severe MR, trace ID, moderate TR.  - Plan to repeat echocardiogram in the outpatient setting.   - HTN: BP well controlled on current regimen.  - HLD: continue statin.      Kaushal Thomas MD

## 2022-01-20 NOTE — PHYSICAL THERAPY INITIAL EVALUATION ADULT - CRITERIA FOR SKILLED THERAPEUTIC INTERVENTIONS
impairments found/functional limitations in following categories/therapy frequency/predicted duration of therapy intervention/anticipated equipment needs at discharge

## 2022-01-20 NOTE — PHYSICAL THERAPY INITIAL EVALUATION ADULT - PERTINENT HX OF CURRENT PROBLEM, REHAB EVAL
87M with a history of atrial fibrillation, rheumatoid arthritis, and coronary artery disease with recent hospitalization requiring percutaneous coronary intervention who presented with dyspnea on exertion and orthopnea with lower extremity edema.

## 2022-01-20 NOTE — PHYSICAL THERAPY INITIAL EVALUATION ADULT - ADDITIONAL COMMENTS
General
Pt reports ambulating independently indoors, uses S[facility] in community. Reports difficulty walking more than 10 feet since recent cardiac surgery. +, independent ADLs, shops with wife etc.

## 2022-01-21 PROCEDURE — 99233 SBSQ HOSP IP/OBS HIGH 50: CPT

## 2022-01-21 RX ADMIN — CLOPIDOGREL BISULFATE 75 MILLIGRAM(S): 75 TABLET, FILM COATED ORAL at 12:29

## 2022-01-21 RX ADMIN — APIXABAN 2.5 MILLIGRAM(S): 2.5 TABLET, FILM COATED ORAL at 04:57

## 2022-01-21 RX ADMIN — Medication 81 MILLIGRAM(S): at 12:28

## 2022-01-21 RX ADMIN — APIXABAN 2.5 MILLIGRAM(S): 2.5 TABLET, FILM COATED ORAL at 18:17

## 2022-01-21 RX ADMIN — CARVEDILOL PHOSPHATE 6.25 MILLIGRAM(S): 80 CAPSULE, EXTENDED RELEASE ORAL at 18:17

## 2022-01-21 RX ADMIN — SIMVASTATIN 40 MILLIGRAM(S): 20 TABLET, FILM COATED ORAL at 22:43

## 2022-01-21 RX ADMIN — Medication 25 MILLIGRAM(S): at 14:09

## 2022-01-21 RX ADMIN — Medication 25 MILLIGRAM(S): at 04:57

## 2022-01-21 RX ADMIN — ISOSORBIDE MONONITRATE 30 MILLIGRAM(S): 60 TABLET, EXTENDED RELEASE ORAL at 12:29

## 2022-01-21 RX ADMIN — CARVEDILOL PHOSPHATE 6.25 MILLIGRAM(S): 80 CAPSULE, EXTENDED RELEASE ORAL at 04:57

## 2022-01-21 RX ADMIN — PANTOPRAZOLE SODIUM 40 MILLIGRAM(S): 20 TABLET, DELAYED RELEASE ORAL at 04:57

## 2022-01-21 RX ADMIN — Medication 40 MILLIGRAM(S): at 04:56

## 2022-01-21 NOTE — PROGRESS NOTE ADULT - SUBJECTIVE AND OBJECTIVE BOX
TATIANA MACKLUCINA  ----------------------------------------  The patient was seen at bedside. Patient with heart failure. Reported episodes of dyspnea this morning.    Vital Signs Last 24 Hrs  T(C): 36.4 (21 Jan 2022 08:07), Max: 36.4 (20 Jan 2022 20:00)  T(F): 97.6 (21 Jan 2022 08:07), Max: 97.6 (21 Jan 2022 08:07)  HR: 75 (21 Jan 2022 08:07) (73 - 75)  BP: 118/66 (21 Jan 2022 08:07) (103/73 - 118/66)  BP(mean): --  RR: 18 (21 Jan 2022 08:07) (18 - 18)  SpO2: 93% (21 Jan 2022 08:07) (91% - 95%)    PHYSICAL EXAMINATION:  ----------------------------------------  General appearance: No acute distress, Awake, Alert  HEENT: Normocephalic, Atraumatic, Conjunctiva clear, EOMI  Neck: Supple, No JVD, No tenderness  Lungs: Breath sound equal bilaterally, No wheezes, Mild rales  Cardiovascular: S1S2, Regular rhythm  Abdomen: Soft, Nontender, Nondistended, No guarding/rebound, Positive bowel sounds  Extremities: No clubbing, No cyanosis, No calf tenderness, Arthritic changes to the fingers, Pedal edema improved  Neuro: Strength equal bilaterally, No tremors  Psychiatric: Appropriate mood, Normal affect    LABORATORY STUDIES:  ----------------------------------------             11.7   13.32 )-----------( 220      ( 20 Jan 2022 03:28 )             34.7     01-20    134<L>  |  99  |  54.4<H>  ----------------------------<  125<H>  4.4   |  18.0<L>  |  1.81<H>    Ca    8.3<L>      20 Jan 2022 03:28    MEDICATIONS  (STANDING):  apixaban 2.5 milliGRAM(s) Oral every 12 hours  aspirin  chewable 81 milliGRAM(s) Oral daily  carvedilol 6.25 milliGRAM(s) Oral every 12 hours  clopidogrel Tablet 75 milliGRAM(s) Oral daily  hydrALAZINE 25 milliGRAM(s) Oral three times a day  influenza  Vaccine (HIGH DOSE) 0.7 milliLiter(s) IntraMuscular once  isosorbide   mononitrate ER Tablet (IMDUR) 30 milliGRAM(s) Oral daily  pantoprazole    Tablet 40 milliGRAM(s) Oral before breakfast  simvastatin 40 milliGRAM(s) Oral at bedtime  torsemide 40 milliGRAM(s) Oral daily    MEDICATIONS  (PRN):  diazepam    Tablet 2 milliGRAM(s) Oral two times a day PRN anxiety  melatonin 3 milliGRAM(s) Oral at bedtime PRN Sleep      ASSESSMENT / PLAN:  ----------------------------------------  87M with a history of atrial fibrillation, rheumatoid arthritis, and coronary artery disease with recent hospitalization requiring percutaneous coronary intervention who presented with dyspnea on exertion and orthopnea with lower extremity edema. He was found to have elevated BNP and chest Xray with pulmonary vascular congestion consistent with heart failure and furosemide was initiated for diuresis.    Acute on chronic systolic heart failure - Prior echocardiogram on 1/2/22 noted an ejection fraction of 20-25%. On carvedilol, hydralazine, and isosorbide. Hypoxia resolved. Telemetry recordings reviewed, no significant arrhythmia or hypoxia episodes noted. Suspect component of anxiety. Cardiology follow up noted, to continue on torsemide and repeat laboratory studies in the morning.    Coronary artery disease - Noted to have recent percutaneous coronary intervention. On aspirin and clopidogrel. No chest pain. Troponin elevated in the setting of acute kidney injury. On telemetry monitoring.    Atrial fibrillation - On metoprolol with apixaban for anticoagulation.    Acute kidney injury - Monitoring renal function while on torsemide. Slightly improved. To allow for some degree of azotemia in the setting of heart failure. Losartan was previously held. Monitoring urine output.    Hyponatremia - Improved from admission. To continue monitor with diuresis.    Hypokalemia - Improved with potassium supplementation.    Rheumatoid arthritis - The patient reported that he was previously taking prednisone but not consistently.    Leukocytosis - Afebrile. Improved from admission.    Anxiety - On diazepam.

## 2022-01-21 NOTE — PROGRESS NOTE ADULT - SUBJECTIVE AND OBJECTIVE BOX
[facility]                                                    [address], [address], [place] [address]                                                         PHONE: [phone]    FAX: [phone] [address], [address], [place] [address]                                                 PHONE: [phone]    FAX: [phone]  *******************************************************************************  cc: SOB    HPI: TATIANA RIBERA is an 87y Male with h/o CAD s/p MI & multiple stents, ICM, PPM, paroxysmal atrial fibrillation (on Eliquis), HTN, HL, RA a/w substernal chest pain.  Patient was admitted to [facility] from 12/28-12/30/21 with atypical chest pain and was discharged home.  On day of presentation to [facility] earlier this month patient developed recurrent chest pain and was noted to have an inferior STEMI.  He had an emergent cardiac catheterization s/p atherectomy and stent to the RCA with known LAD ISR and subsequent staged laser and PTCA of the LAD on last admit.  Now admitted with increasing dyspnea on exertion and also at rest. The patient reported mild dyspnea after discharge from the hospital which continued to increase. He reported swelling in the legs and shortness of breath when lying down which had made it difficult to sleep. He denied any chest pain or palpitations. He reports being compliant with his medications and is unaware of any relieving factors. He denied any similar symptoms in the past. There wad no associated fevers, chills, or recent sick contacts. The patient was noted to have dyspnea and hypoxia when laying down in the emergency department.         Overnight events/Subjective Assessment: laying flat. No JVD or LE edema.  No tachypnea. Denies chest pain.     INTERPRETATION OF TELEMETRY (personally reviewed): AF cvr    PAST MEDICAL & SURGICAL HISTORY:  HTN (hypertension)    Hyperlipidemia    CAD (coronary artery disease)    Stented coronary artery    MI (myocardial infarction)  1988    Other persistent atrial fibrillation    History of hemorrhoidectomy    History of inguinal hernia repair    Absent tonsil    Pacemaker        Lipitor (Muscle Pain)  Plavix (Other)      MEDICATIONS  (STANDING):  apixaban 2.5 milliGRAM(s) Oral every 12 hours  aspirin  chewable 81 milliGRAM(s) Oral daily  carvedilol 6.25 milliGRAM(s) Oral every 12 hours  clopidogrel Tablet 75 milliGRAM(s) Oral daily  furosemide   Injectable 40 milliGRAM(s) IV Push daily  hydrALAZINE 25 milliGRAM(s) Oral three times a day  influenza  Vaccine (HIGH DOSE) 0.7 milliLiter(s) IntraMuscular once  isosorbide   mononitrate ER Tablet (IMDUR) 30 milliGRAM(s) Oral daily  pantoprazole    Tablet 40 milliGRAM(s) Oral before breakfast  simvastatin 40 milliGRAM(s) Oral at bedtime    MEDICATIONS  (PRN):  diazepam    Tablet 2 milliGRAM(s) Oral two times a day PRN anxiety  melatonin 3 milliGRAM(s) Oral at bedtime PRN Sleep      Vital Signs Last 24 Hrs  T(C): 36.4 (21 Jan 2022 04:24), Max: 36.4 (20 Jan 2022 20:00)  T(F): 97.5 (21 Jan 2022 04:24), Max: 97.5 (20 Jan 2022 20:00)  HR: 75 (21 Jan 2022 04:24) (73 - 75)  BP: 115/72 (21 Jan 2022 04:24) (103/73 - 115/72)  BP(mean): --  RR: 18 (20 Jan 2022 20:00) (18 - 18)  SpO2: 91% (21 Jan 2022 04:24) (91% - 96%)    I&O's Detail    I&O's Summary          PHYSICAL EXAM:  General: Appears well developed, well nourished, no acute distress. not in acute pain  HEAD: normal cephalic. Atraumatic  PUPILS: equal and reactive to light  EARS: normal hearing  NECK: supple. no JVD or HJR. no carotid bruits. no visible lymphadenopathy  NOSE: no gross abnormalities  CHEST: symmetric chest wall expansion  CARDIOVASCULAR: Normal rate. irregular irregular rhythm. Normal S1 and S2, no S3/S4,  no murmur, rub, or gallop  LUNGS: Normal effort. Normal respiratory rate. Breath sounds are clear to auscultation bilaterally. No respiratory distress. No stridor.  no rales, rhonchi or wheeze. no decreased Breath sounds  ABDOMEN: Soft, nontender, non-distended, positive bowel sounds, no mass or bruit. no abdominal tenderness. No rebound. no ascites  EXTREMITIES: No clubbing, cyanosis or edema. normal range of motion  PULSES:  distal pulses WNL  SKIN: Warm and dry with normal turgor. no visible rash or cyanosis   NEURO: Alert & oriented x 3, grossly intact with no focal weakness  PSYCH: normal mood and affect. Grossly normal insight and judgement exhibited    FAMILY HISTORY:  No pertinent family history of ischemic heart disease in mother, father or in first degree relatives        SOCIAL HISTORY: no  active smoking. No ETOH/No IVDA    REVIEW OF SYSTEMS:  All pertinent positive and negative ROS as above. All other ROS are negative.        LABS:                        11.7   13.32 )-----------( 220      ( 20 Jan 2022 03:28 )             34.7     01-20    134<L>  |  99  |  54.4<H>  ----------------------------<  125<H>  4.4   |  18.0<L>  |  1.81<H>    Ca    8.3<L>      20 Jan 2022 03:28            Serum Pro-Brain Natriuretic Peptide: 70384 pg/mL (01-16 @ 14:47)  serum  Lipids:     Thyroid Stimulating Hormone, Serum: 0.36 uIU/mL (01-17 @ 03:03)  Thyroid Stimulating Hormone, Serum: 0.15 uIU/mL (01-02 @ 19:50)      RADIOLOGY & ADDITIONAL STUDIES:    < from: Cardiac Catheterization (01.06.22 @ 12:16) >  CORONARY VESSELS: The coronary circulation is right dominant.  LM:   --  LM: Angiography showed minor luminal irregularities with no flow  limiting lesions.  LAD:   --  Mid LAD: There was a tubular 90 % stenosis at the site of a  prior stent, in-stent. There was CASSANDRA grade 3 flow through the vessel  (brisk flow). This is a likely culprit for the patient's clinical  presentation. An intervention was performed.  --  Distal LAD: There was a discrete 98 %stenosis at the site of a prior  stent, in-stent. There was CASSANDRA grade 3 flow through the vessel (brisk  flow). This is a likely culprit for the patient's clinical presentation.  An intervention was performed.  CX:   --  Proximal circumflex: There was a 20 % stenosis at the site of a  prior stent.  RCA:   --  RCA: This vessel was not injected.  COMPLICATIONS: No complications occurred during the cath lab visit.  DIAGNOSTIC IMPRESSIONS: - Severe ISR of the mid and distal LAD stents (2  layers of stent already in place for majority of the segment)  - Successful laser atherectomy, and scoring balloon angioplasty of the mid  and distal LAD with significant improvement in luminal area as confirmed   by IVUS imaging  - Severe LV systolic dysfunction by echo s/p IWSTEMI  - LVEDP elevated at 26mmHg  DIAGNOSTIC RECOMMENDATIONS: - ASA 81mg daily. Plavix loaded on the table.  Plavix is listed as an allergy. He reports body aches. Will continue  Plavix 75mg daily for now.  - Eliquis 5mg BID to resumein AM if vascular access site is stable. Triple  therapy for 1 month with PPI. After 1 month, Plavix and Eliquis alone.   After 1 year, ASA and Eliquis alone.  - If he does not tolerate Plavix, will change to Brilinta  - Continue BB and ARB, statin  - Monitor overnight, discharge in AM if stable  - Outpatient follow up at the [facility] in 1 week  - Repeat echo in outpatient setting to evaluate candidacy for upgrade to   ICD  INTERVENTIONAL IMPRESSIONS: - Severe ISR of the mid and distalLAD stents  (2 layers of stent already in place for majority of the segment)  - Successful laser atherectomy, and scoring balloon angioplasty of the mid  and distal LAD with significant improvement in luminal area as confirmed   by IVUS imaging  - Severe LV systolic dysfunction by echo s/p IWSTEMI  - LVEDP elevated at 26mmHg  INTERVENTIONAL RECOMMENDATIONS: - ASA 81mg daily. Plavix loaded on the  table. Plavix is listed as an allergy. He reports body aches. Will  continue Plavix 75mg daily for now.  - Eliquis 5mg BID to resume in AM if vascular access site is stable. Triple  therapy for 1 month with PPI. After 1 month, Plavix and Eliquis alone.   After 1 year, ASA and Eliquis alone.  - If he does not tolerate Plavix, will change to Brilinta  - Continue BB and ARB, statin  - Monitor overnight, discharge in AM if stable  - Outpatient follow up at the [facility] in 1 week  - Repeat echo in outpatient setting to evaluate candidacy for upgrade to   ICD  Prepared and signed by  Clarissa HEADLEY    < end of copied text >      CARDIAC CATHETERIZATION: < from: Cardiac Catheterization (01.02.22 @ 11:22) >  VENTRICLES: Analysis of regional contractile function demonstrated severe  anterolateral hypokinesis, apical akinesis, and diaphragmatic akinesis.  Global left ventricular function was severely depressed. EF calculated by  contrast ventriculography was 25 % and EF estimated was 30 %.  VALVES: AORTIC VALVE: There was no aortic stenosis.  CORONARY VESSELS: The coronary circulation is right dominant.  LM:   --  LM: Normal.  LAD:   --  Mid LAD: There was a diffuse 80 % stenosis at the site of a  prior stent.  CX:   --  Circumflex: Angiography showed minor luminal irregularities with  no flow limiting lesions.  --  Mid circumflex: There was a tubular 0 % stenosis at the site of a prior  stent.  RCA:   --  Proximal RCA: There was a tubular 95 % stenosis at the site of a  prior stent.  --  Distal RCA: There was a 50 % stenosis.  --  RPDA: There was a tubular 90 % stenosis. The lesion was associated with  a small filling defect consistent with thrombus. There was CASSANDRA grade 3  flow through the vessel (brisk flow).  --  RPLS: There was a discrete 90 % stenosis at the site of a prior stent.  COMPLICATIONS: No complications occurred during the cath lab visit. No  complications occurred during the cath lab visit. No complications  occurred during the cath lab visit.    < end of copied text >    < from: Xray Chest 2 Views PA/Lat (01.20.22 @ 11:52) >  IMPRESSION: Small right effusion. Less pulmonary congestion.    < end of copied text >      ASSESSMENT AND PLAN:  In summary, TATIANA RIBERA is a 87y Male with past medical history significant for HTN, HLD, CAD s/p multiple prior stents most recnetly s/p recent inferior STEMI s/p PCI/BEVERLY pRCA with staged PCI with laser atherectomy/scoring balloon angioplasty to 80% m-dLAD ISR (01/2022), 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     - Acute on chronic systolic CHF. Due to azotemia and JOE, and improving Pleural effusions. Will change IV lasix to po torsemide today. Need to tolerate a component of renal dysfunction to maintain out of CHF. To dose IV PRN as needed. hydralazine and imdur added due to low EF. Unable to maintain ACEI/ARB at the current time due to worsening renal function and need for diuretic. Follow electrolytes, renal function. Repeat labs in the AM.  - JOE. s/p diuresis. losartan held. will add hydralazine and nitrates due to ICM and JOE  - Mild elevation in troponin s/p recent STEMI and in light of JOE is non specific. There are no sx to suggest ACS. Continued medical therapy will be pursued  -recent STEMI. Peak troponin was 6.95.  s/p PCI/BEVERLY pRCA. and staged laser and PTCA of the LAD. ASA and plavix in place. Stable. no CP syndrome  - s/p laser atherectomy and scoring balloon angioplasty of the mid and distal LAD ISR with significant improvement in luminal area on 1/6/21.  - Continue medical management of known severe CAD post recent MI/coronary intervention asa noted above including dual antiplatelet therapy.  Patient is maintained on "triple therapy" with aspirin, plavix and eilquis, which will be continued at this time with plan to complete one month with plan to discontinue aspirin, continuing plavix and eliquis with goal to complete one year of plavix, with plan to then discontinue plavix and resume aspirin 81 mg daily.   - Echocardiogram 1/2/22 demonstrated severely decreased LV fxn (EF 20-25%). The entire inferior wall, mid and apical anterior septum, mid and apical inferior septum, basal and mid inferolateral wall, and apex are akinetic. The entire anterior wall, basal and mid anterolateral wall, basal anteroseptal segment, basal inferoseptal segment, and apical lateral segment are hypokinetic. RV systolic function is mildly reduced, severe LAE, trivial pericardial effusion is present. The pericardial effusion is localized near the right atrium. There is no evidence of cardiac tamponade. Mild AR, moderate-severe MR, trace SC, moderate TR.  - Rhythm/hemodynamics stable.  Continue current dose of coreg for now and titrate PRN.  - Allergy to Plavix is listed.  He reports body aches.  This is not clearly an allergy.  He will be on Triple Therapy for 1 month.  Plavix is less potent than Brilinta and should result in lower bleeding risk.  Pt agreeable to try Plavix.  If he develops any symptoms, will change to Brilinta.  - ASA, Plavix with Eliquis.  PPI while on triple therapy.  - HL.  Simvastatin 40 mg PO daily added (lipids 1/3/22: , LDL 62, HDL 30, Trig 106)  - Patient has a h/o chronic AF with an increased [id number] score maintained on oral AC with Eliquis 2.5 bid. Pt is in AF currently with controlled rate.  - ICM. will need re assessment of EF in the outpt setting following revascularization for consideration of ICD or biV ICD. Echo will be arranged.   Reassess resumption of losartan based on renal function. off of BB and now on coreg due to ICM. hydralazine and imdur.  - Anxiety management as per medical  - Telemetry monitoring personally reviewed by me. PAF in the 70's  - ECG personally reviewed by me  - radiologic imaging reviewed  - Laboratory data reviewed.  - I have personally reviewed all obtainable prior records and data      Key Fischer MD

## 2022-01-22 LAB
ANION GAP SERPL CALC-SCNC: 16 MMOL/L — SIGNIFICANT CHANGE UP (ref 5–17)
ANION GAP SERPL CALC-SCNC: 17 MMOL/L — SIGNIFICANT CHANGE UP (ref 5–17)
BUN SERPL-MCNC: 42.3 MG/DL — HIGH (ref 8–20)
BUN SERPL-MCNC: 44.7 MG/DL — HIGH (ref 8–20)
CALCIUM SERPL-MCNC: 8.2 MG/DL — LOW (ref 8.6–10.2)
CALCIUM SERPL-MCNC: 8.3 MG/DL — LOW (ref 8.6–10.2)
CHLORIDE SERPL-SCNC: 95 MMOL/L — LOW (ref 98–107)
CHLORIDE SERPL-SCNC: 96 MMOL/L — LOW (ref 98–107)
CO2 SERPL-SCNC: 21 MMOL/L — LOW (ref 22–29)
CO2 SERPL-SCNC: 23 MMOL/L — SIGNIFICANT CHANGE UP (ref 22–29)
CREAT SERPL-MCNC: 1.54 MG/DL — HIGH (ref 0.5–1.3)
CREAT SERPL-MCNC: 1.99 MG/DL — HIGH (ref 0.5–1.3)
GLUCOSE SERPL-MCNC: 95 MG/DL — SIGNIFICANT CHANGE UP (ref 70–99)
GLUCOSE SERPL-MCNC: 96 MG/DL — SIGNIFICANT CHANGE UP (ref 70–99)
HCT VFR BLD CALC: 36.2 % — LOW (ref 39–50)
HGB BLD-MCNC: 12.2 G/DL — LOW (ref 13–17)
MAGNESIUM SERPL-MCNC: 2 MG/DL — SIGNIFICANT CHANGE UP (ref 1.6–2.6)
MCHC RBC-ENTMCNC: 30.8 PG — SIGNIFICANT CHANGE UP (ref 27–34)
MCHC RBC-ENTMCNC: 33.7 GM/DL — SIGNIFICANT CHANGE UP (ref 32–36)
MCV RBC AUTO: 91.4 FL — SIGNIFICANT CHANGE UP (ref 80–100)
PLATELET # BLD AUTO: 169 K/UL — SIGNIFICANT CHANGE UP (ref 150–400)
POTASSIUM SERPL-MCNC: 3.4 MMOL/L — LOW (ref 3.5–5.3)
POTASSIUM SERPL-MCNC: 4.1 MMOL/L — SIGNIFICANT CHANGE UP (ref 3.5–5.3)
POTASSIUM SERPL-SCNC: 3.4 MMOL/L — LOW (ref 3.5–5.3)
POTASSIUM SERPL-SCNC: 4.1 MMOL/L — SIGNIFICANT CHANGE UP (ref 3.5–5.3)
RBC # BLD: 3.96 M/UL — LOW (ref 4.2–5.8)
RBC # FLD: 14.9 % — HIGH (ref 10.3–14.5)
SODIUM SERPL-SCNC: 134 MMOL/L — LOW (ref 135–145)
SODIUM SERPL-SCNC: 134 MMOL/L — LOW (ref 135–145)
WBC # BLD: 10.43 K/UL — SIGNIFICANT CHANGE UP (ref 3.8–10.5)
WBC # FLD AUTO: 10.43 K/UL — SIGNIFICANT CHANGE UP (ref 3.8–10.5)

## 2022-01-22 PROCEDURE — 99233 SBSQ HOSP IP/OBS HIGH 50: CPT

## 2022-01-22 RX ORDER — CARVEDILOL PHOSPHATE 80 MG/1
3.12 CAPSULE, EXTENDED RELEASE ORAL EVERY 12 HOURS
Refills: 0 | Status: DISCONTINUED | OUTPATIENT
Start: 2022-01-22 | End: 2022-01-23

## 2022-01-22 RX ORDER — POTASSIUM CHLORIDE 20 MEQ
40 PACKET (EA) ORAL ONCE
Refills: 0 | Status: COMPLETED | OUTPATIENT
Start: 2022-01-22 | End: 2022-01-22

## 2022-01-22 RX ADMIN — ISOSORBIDE MONONITRATE 30 MILLIGRAM(S): 60 TABLET, EXTENDED RELEASE ORAL at 13:41

## 2022-01-22 RX ADMIN — Medication 2 MILLIGRAM(S): at 00:58

## 2022-01-22 RX ADMIN — Medication 25 MILLIGRAM(S): at 13:40

## 2022-01-22 RX ADMIN — CLOPIDOGREL BISULFATE 75 MILLIGRAM(S): 75 TABLET, FILM COATED ORAL at 13:40

## 2022-01-22 RX ADMIN — Medication 81 MILLIGRAM(S): at 13:40

## 2022-01-22 RX ADMIN — APIXABAN 2.5 MILLIGRAM(S): 2.5 TABLET, FILM COATED ORAL at 18:26

## 2022-01-22 RX ADMIN — CARVEDILOL PHOSPHATE 6.25 MILLIGRAM(S): 80 CAPSULE, EXTENDED RELEASE ORAL at 06:25

## 2022-01-22 RX ADMIN — PANTOPRAZOLE SODIUM 40 MILLIGRAM(S): 20 TABLET, DELAYED RELEASE ORAL at 06:27

## 2022-01-22 RX ADMIN — Medication 2 MILLIGRAM(S): at 21:18

## 2022-01-22 RX ADMIN — APIXABAN 2.5 MILLIGRAM(S): 2.5 TABLET, FILM COATED ORAL at 06:26

## 2022-01-22 RX ADMIN — Medication 40 MILLIGRAM(S): at 06:26

## 2022-01-22 RX ADMIN — Medication 100 MILLIGRAM(S): at 14:22

## 2022-01-22 RX ADMIN — Medication 100 MILLIGRAM(S): at 21:21

## 2022-01-22 RX ADMIN — Medication 25 MILLIGRAM(S): at 06:23

## 2022-01-22 RX ADMIN — Medication 40 MILLIEQUIVALENT(S): at 13:40

## 2022-01-22 NOTE — HISTORY OF PRESENT ILLNESS
[Post-hospitalization from ___ Hospital] : Post-hospitalization from [unfilled] Hospital [Admitted on: ___] : The patient was admitted on [unfilled] [Discharged on ___] : discharged on [unfilled] [Discharge Summary] : discharge summary [Pertinent Labs] : pertinent labs [Radiology Findings] : radiology findings [Discharge Med List] : discharge medication list [Med Reconciliation] : medication reconciliation has been completed [Patient Contacted By: ____] : and contacted by [unfilled] [FreeTextEntry2] : Copied from [employer]\par ' 87y Male with h/o CAD s/p MI & multiple stents, ICM, PPM, chronic atrial fibrillation (on Eliquis), HTN, HL, RA a/w substernal chest pain.  Patient was admitted to [facility] from 12/28-12/30/21 with atypical chest pain and was discharged home.  On day of presentation to [facility] patient developed recurrent chest pain and was noted to have an inferior STEMI.  He had an emergent cardiac catheterization s/p atherectomy and stent to the RCA with known LAD ISR. Patient returned to cath lab on 1/6, now   s/p LHC via RFA with laser balloon to mid and distal LAD closed with 6Fr Angioseal. Procedure performed by Dr. Thomas, he received IV Heparin and was loaded with Plavix 600 mg po as well as, IV sedation intraprocedurally. Patient will need re assessment of EF in the outpt setting following revascularization for consideration of ICD. Maintain losartan/statin/BB. Eliquis resumed at 2.5 mg BID as per Dr. Thomas.  He will be on Triple Therapy for 1 month.  Plavix is less potent than Brilinta and should result in lower bleeding risk.  Pt is medically stable for discharge.'\par \par Pt seen at home for post-hospitalization follow-up under [facility]. During 72hr follow-up call pt's spouse reported that pt ia in panic mode as soon as he falls asleep and screams out for help. Pt hasn't been sleeping in bed since the discharge, very anxious and also very unsteady on his feet so he can't make it to the scheduled cardiologist appt on 1/14.\par Pt sitting in a recliner, AOx3, presents with dyspnea and tachypnea. Pt stating that he is feeling very tired from lack of sleep, yet as soon as falling asleep, he feels like he is suffocating and drowning in the water. \par Pt has become more unsteady on his feet since the discharge and there is no transportation to take him to the cardiologist appt on Friday. \par Pt had recent MI s/p PCI x 1, s/p laser atherectomy and scoring balloon angioplasty of the mild and distal LAD ISP found to have 80% restenosis on mLAD. having been started on Plavix and Eliquis.\par

## 2022-01-22 NOTE — COUNSELING
[Fall prevention counseling provided] : Fall prevention counseling provided [Adequate lighting] : Adequate lighting [No throw rugs] : No throw rugs [Use proper foot wear] : Use proper foot wear [Use recommended devices] : Use recommended devices [de-identified] : 1. CHF\par    - low sodium diet\par    - 1-1.2L fluid restriction (including soup, tea, juice and waster)\par    - Daily weight monitor; if wt gain >1-2lb in 2-3 days, > 3-5lbs in a week, please call CCC/CN \par    - Daily BP monitor, if SBP<100, do not give BP meds, wait 1 hrs to recheck BP, if SBP<100 again, call CCC/CN\par \par 2. Pulmonary health\par    - Deep breathing exercise in sitting up tall or standing position, 10 times hourly\par   \par  [None] : None [Good understanding] : Patient has a good understanding of lifestyle changes and steps needed to achieve self management goal

## 2022-01-22 NOTE — DIETITIAN INITIAL EVALUATION ADULT. - OTHER INFO
Pt with h/o atrial fibrillation, rheumatoid arthritis, and coronary artery disease with recent hospitalization requiring percutaneous coronary intervention who presented with dyspnea on exertion and orthopnea with lower extremity edema. He was found to have elevated BNP and chest Xray with pulmonary vascular congestion consistent with heart failure and furosemide was initiated for diuresis.

## 2022-01-22 NOTE — PHYSICAL EXAM
[No Acute Distress] : no acute distress [Well Nourished] : well nourished [Well Developed] : well developed [Normal Voice/Communication] : normal voice/communication [Ill-Appearing] : ill-appearing [Normal Sclera/Conjunctiva] : normal sclera/conjunctiva [PERRL] : pupils equal round and reactive to light [EOMI] : extraocular movements intact [Normal Outer Ear/Nose] : the outer ears and nose were normal in appearance [No JVD] : no jugular venous distention [Supple] : supple [Thyroid Normal, No Nodules] : the thyroid was normal and there were no nodules present [No Respiratory Distress] : no respiratory distress  [No Accessory Muscle Use] : no accessory muscle use [Normal Rate] : normal rate  [Normal S1, S2] : normal S1 and S2 [No Murmur] : no murmur heard [Pedal Pulses Present] : the pedal pulses are present [No Edema] : there was no peripheral edema [No Extremity Clubbing/Cyanosis] : no extremity clubbing/cyanosis [Soft] : abdomen soft [Non Tender] : non-tender [Non-distended] : non-distended [Normal Bowel Sounds] : normal bowel sounds [No CVA Tenderness] : no CVA  tenderness [No Spinal Tenderness] : no spinal tenderness [No Joint Swelling] : no joint swelling [Grossly Normal Strength/Tone] : grossly normal strength/tone [No Rash] : no rash [No Skin Lesions] : no skin lesions [Coordination Grossly Intact] : coordination grossly intact [No Focal Deficits] : no focal deficits [Deep Tendon Reflexes (DTR)] : deep tendon reflexes were 2+ and symmetric [Speech Grossly Normal] : speech grossly normal [Memory Grossly Normal] : memory grossly normal [Normal Affect] : the affect was normal [Alert and Oriented x3] : oriented to person, place, and time [Normal Mood] : the mood was normal [Normal Insight/Judgement] : insight and judgment were intact [de-identified] : dyspnea, shallow tachypnea, diminished lung sounds at base bilaterally, RML crackles.  [de-identified] : irregular

## 2022-01-22 NOTE — DIETITIAN INITIAL EVALUATION ADULT. - ORAL INTAKE PTA/DIET HISTORY
Pt reports decreased appetite/po intake at meals since admission.  Pt states fair po intake at home, but denies wt loss.  Pt states normally following low na meal plan prior to admission.  Heart failure nutrition guidelines reinforced and pt verbalized good understanding.  RD to remain available.

## 2022-01-22 NOTE — PROGRESS NOTE ADULT - SUBJECTIVE AND OBJECTIVE BOX
HEALTH ISSUES - PROBLEM Dx:     CHF, JOE    INTERVAL HPI/ OVERNIGHT EVENTS:    Lying in bed  is concerned about his bed/ room change  explained and calmed   plan of care explained  states he is not ready to go home as he is weak and shaky and cannot manage at home and ongoing lack of appetite  lives with spouse  also c/o dry cough started yesterday  masks provided and explained why compliance is required    REVIEW OF SYSTEMS:    as above    Vital Signs Last 24 Hrs  T(C): 36.4 (22 Jan 2022 10:17), Max: 36.7 (21 Jan 2022 21:36)  T(F): 97.6 (22 Jan 2022 10:17), Max: 98.1 (21 Jan 2022 21:36)  HR: 78 (22 Jan 2022 13:34) (72 - 88)  BP: 121/61 (22 Jan 2022 13:34) (94/60 - 124/84)  BP(mean): --  RR: 16 (22 Jan 2022 13:34) (16 - 18)  SpO2: 95% (22 Jan 2022 13:34) (95% - 98%)    PHYSICAL EXAM-  GENERAL: Comfortable, lying in bed, on room air, no distress  HEAD:  Atraumatic, Normocephalic  EYES: EOMI, conjunctiva and sclera clear  ENT: Moist mucous membranes, No lesions  NECK: Supple, No JVD, Normal thyroid  NERVOUS SYSTEM:  Alert & Oriented X 3, Motor Strength 5/5 B/L upper and lower extremities  CHEST/LUNG: CTA bilaterally; No rales, rhonchi, wheezing, or rubs  HEART: Regular rate and rhythm; No murmurs, rubs, or gallops  ABDOMEN: Soft, Nontender, Nondistended; Bowel sounds present  EXTREMITIES:  2+ Peripheral Pulses, No clubbing, cyanosis, or edema  SKIN: No rashes or lesions    MEDICATIONS  (STANDING):  apixaban 2.5 milliGRAM(s) Oral every 12 hours  aspirin  chewable 81 milliGRAM(s) Oral daily  benzonatate 100 milliGRAM(s) Oral every 8 hours  carvedilol 6.25 milliGRAM(s) Oral every 12 hours  clopidogrel Tablet 75 milliGRAM(s) Oral daily  hydrALAZINE 25 milliGRAM(s) Oral three times a day  influenza  Vaccine (HIGH DOSE) 0.7 milliLiter(s) IntraMuscular once  isosorbide   mononitrate ER Tablet (IMDUR) 30 milliGRAM(s) Oral daily  pantoprazole    Tablet 40 milliGRAM(s) Oral before breakfast  simvastatin 40 milliGRAM(s) Oral at bedtime  torsemide 40 milliGRAM(s) Oral daily    MEDICATIONS  (PRN):  diazepam    Tablet 2 milliGRAM(s) Oral two times a day PRN anxiety  guaiFENesin Oral Liquid (Sugar-Free) 200 milliGRAM(s) Oral every 6 hours PRN Cough  melatonin 3 milliGRAM(s) Oral at bedtime PRN Sleep      LABS:                        12.2   10.43 )-----------( 169      ( 22 Jan 2022 08:36 )             36.2     01-22    134<L>  |  96<L>  |  42.3<H>  ----------------------------<  95  3.4<L>   |  21.0<L>  |  1.54<H>    Ca    8.3<L>      22 Jan 2022 08:36

## 2022-01-22 NOTE — HEALTH RISK ASSESSMENT
[Former] : Former [No] : No [No falls in past year] : Patient reported no falls in the past year [Assistive Device] : Patient uses an assistive device [0] : 2) Feeling down, depressed, or hopeless: Not at all (0) [PHQ-2 Negative - No further assessment needed] : PHQ-2 Negative - No further assessment needed [AHT1Ugghp] : 0 [Low Fat Diet] : low fat [Low Salt Diet] : low salt [de-identified] : assistance required [de-identified] : assistance required

## 2022-01-22 NOTE — CHART NOTE - NSCHARTNOTEFT_GEN_A_CORE
Patient c/o dizziness since coming in and his meds being changed. He is insisting his prior meds worked for him.    His BP has been in 120 range.    Now c/o dizziness and SBP <120.     Orthostatic measurement STAT  Reduce Coreg to 3.25 with parameters  D/C Hydralazine for now. To initiate when hemodynamically stable or when diuretic dose is reduced.

## 2022-01-22 NOTE — ASSESSMENT
[FreeTextEntry1] : [] SOB\par - persistent SOB, + PND and orthopnea\par - unable to fall asleep\par \par \par [] s/p STEMI\par - s/p PCI w/ BEVERLY to pRCA\par - s/p laser atherectomy and scoring balloon angioplasty of the mid and distal LAD ISR w/ significant improvement in luminal area on 1/6/21\par - continue ASA, Toprol Eliquis, Losartan , simvastatin and Plavix\par \par \par [] HFrEF\par - BNP 52732, 1/6 chest xray with pulm edema, suggestive of CHF\par - EF 25%\par - discharged with no diuretics and suspecting CHF exacerbation.\par - STAR furosemide 20mg daily, but taking 40mg x 3 days\par - STAT CBC, CMP now\par - STAT chest xray \par \par [] anxiety vs CHF exacerbation\par - has mild anxiety at baseline, worsening anxiety 2/2 fluid overload?\par - xanax 0.25mg QHS\par - START sertraline 25mg QD x7days, then increase to 50mg \par \par \par - discussed assessment with JOHN Da Silva of Dr. Yee. no suggestion made. will r/o CHF exacerbation with labs and chest xray. 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 Worsening symptoms discussed with pt understanding. No issues or concerns at this time. Pt encouraged to call CCC/CN at [phone] w/any questions, concerns or issues. Will continue to follow up with patient status\par \par

## 2022-01-22 NOTE — PROGRESS NOTE ADULT - SUBJECTIVE AND OBJECTIVE BOX
[facility], [facility]                                                    [address] , [address], [place] [address]                                                         PHONE: [phone]    FAX: [phone] [address], [address], [place] [address]                                                 PHONE: [phone]    FAX: [phone]  *******************************************************************************    Overnight events/Subjective Assessment:    INTERPRETATION OF TELEMETRY (personally reviewed):    Lipitor (Muscle Pain)  Plavix (Other)    MEDICATIONS  (STANDING):  apixaban 2.5 milliGRAM(s) Oral every 12 hours  aspirin  chewable 81 milliGRAM(s) Oral daily  benzonatate 100 milliGRAM(s) Oral every 8 hours  carvedilol 6.25 milliGRAM(s) Oral every 12 hours  clopidogrel Tablet 75 milliGRAM(s) Oral daily  hydrALAZINE 25 milliGRAM(s) Oral three times a day  influenza  Vaccine (HIGH DOSE) 0.7 milliLiter(s) IntraMuscular once  isosorbide   mononitrate ER Tablet (IMDUR) 30 milliGRAM(s) Oral daily  pantoprazole    Tablet 40 milliGRAM(s) Oral before breakfast  potassium chloride    Tablet ER 40 milliEquivalent(s) Oral once  simvastatin 40 milliGRAM(s) Oral at bedtime  torsemide 40 milliGRAM(s) Oral daily    MEDICATIONS  (PRN):  diazepam    Tablet 2 milliGRAM(s) Oral two times a day PRN anxiety  guaiFENesin Oral Liquid (Sugar-Free) 200 milliGRAM(s) Oral every 6 hours PRN Cough  melatonin 3 milliGRAM(s) Oral at bedtime PRN Sleep      Vital Signs Last 24 Hrs  T(C): 36.4 (22 Jan 2022 10:17), Max: 36.7 (21 Jan 2022 21:36)  T(F): 97.6 (22 Jan 2022 10:17), Max: 98.1 (21 Jan 2022 21:36)  HR: 76 (22 Jan 2022 10:17) (72 - 88)  BP: 111/61 (22 Jan 2022 10:17) (94/60 - 124/84)  BP(mean): --  RR: 18 (22 Jan 2022 10:17) (18 - 18)  SpO2: 97% (22 Jan 2022 10:17) (97% - 98%)    I&O's Detail    21 Jan 2022 07:01  -  22 Jan 2022 07:00  --------------------------------------------------------  IN:  Total IN: 0 mL    OUT:    Voided (mL): 300 mL  Total OUT: 300 mL    Total NET: -300 mL        I&O's Summary    21 Jan 2022 07:01  -  22 Jan 2022 07:00  --------------------------------------------------------  IN: 0 mL / OUT: 300 mL / NET: -300 mL            PHYSICAL EXAM:  General: Appears well developed, well nourished, no acute distress  HEENT: Head: normocephalic, atraumatic  Eyes: Pupils equal and reactive  Neck: Supple, no carotid bruit, no JVD, no HJR  CARDIOVASCULAR: Normal S1 and S2, no murmur, rub, or gallop  LUNGS: Clear to auscultation bilaterally, no rales, rhonchi or wheeze  ABDOMEN: Soft, nontender, non-distended, positive bowel sounds, no mass or bruit  EXTREMITIES: No edema, distal pulses WNL  SKIN: Warm and dry with normal turgor  NEURO: Alert & oriented x 3, grossly intact  PSYCH: normal mood and affect        LABS:                        12.2   10.43 )-----------( 169      ( 22 Jan 2022 08:36 )             36.2     01-22    134<L>  |  96<L>  |  42.3<H>  ----------------------------<  95  3.4<L>   |  21.0<L>  |  1.54<H>    Ca    8.3<L>      22 Jan 2022 08:36            Serum Pro-Brain Natriuretic Peptide: 72739 pg/mL (01-16 @ 14:47)  serum  Lipids:     Thyroid Stimulating Hormone, Serum: 0.36 uIU/mL (01-17 @ 03:03)  Thyroid Stimulating Hormone, Serum: 0.15 uIU/mL (01-02 @ 19:50)      RADIOLOGY & ADDITIONAL STUDIES:    ECG:    ECHO:    STRESS TEST:    CARDIAC CATHETERIZATION:    ASSESSMENT AND PLAN:  In summary, TATIANA RIBERA is a 87y Male with past medical history significant for       Senthil Bond, DO

## 2022-01-22 NOTE — CHART NOTE - NSCHARTNOTEFT_GEN_A_CORE
Medicine PA- During evening rounds nurse mentioned pt had x8 beats vtach @ 1630, no episodes since, baseline SR now. Pt's K-3.4 was supplemented today, pt has no new complaints, VSS, repeat BMP and Mg ordered.

## 2022-01-22 NOTE — CHART NOTE - NSCHARTNOTEFT_GEN_A_CORE
PA NOTE-MEDICINE    RN States Pt c/o Generalized Weakness-unable to Stand.  87M with a history of atrial fibrillation, rheumatoid arthritis, and coronary artery disease with recent hospitalization requiring percutaneous coronary intervention who presented with dyspnea on exertion and orthopnea with lower extremity edema. He was found to have elevated BNP and chest Xray with pulmonary vascular congestion consistent with heart failure and furosemide was initiated for diuresis.  Pt states he has had Generalized Weakness since undergoing Cardiac Cath on Last admission approx 2 weeks ago   Pt was evaluated by PT But Pt states he Hasn't had very Many PT sessions and is Marshall about going Home.  Pt is due to Be DC'd tomorrow AM and is being DC'd to his Private Home he shares with his Wife   Denies: Pain SOB   Pt does Suffer from Anxiety    WDWN Male lying in Bed NAD Appropriate Fluent Sl Anxious   VSS  RN Administering Valium (already Ordered Q 12)  Will Add Pts Concerns to AM signout     Continue to Monitor Pt   Recall PA if any changes in Pt Status

## 2022-01-22 NOTE — DIETITIAN INITIAL EVALUATION ADULT. - ETIOLOGY
related to inability to consume sufficient protein energy intake with decreased appetite in setting of CAD/CHF

## 2022-01-22 NOTE — REVIEW OF SYSTEMS
[Fever] : no fever [Chills] : no chills [Fatigue] : no fatigue [Night Sweats] : no night sweats [Recent Change In Weight] : ~T no recent weight change [Chest Pain] : no chest pain [Palpitations] : no palpitations [Claudication] : no  leg claudication [Lower Ext Edema] : lower extremity edema [Orthopena] : orthopnea [Paroxysmal Nocturnal Dyspnea] : paroxysmal nocturnal dyspnea [Shortness Of Breath] : shortness of breath [Wheezing] : no wheezing [Cough] : no cough [Dyspnea on Exertion] : dyspnea on exertion [Abdominal Pain] : no abdominal pain [Nausea] : no nausea [Constipation] : no constipation [Diarrhea] : no diarrhea [Vomiting] : no vomiting [Heartburn] : no heartburn [Melena] : no melena [Dysuria] : no dysuria [Incontinence] : no incontinence [Hesitancy] : no hesitancy [Nocturia] : no nocturia [Hematuria] : no hematuria [Frequency] : no frequency [Joint Pain] : no joint pain [Joint Stiffness] : no joint stiffness [Muscle Pain] : no muscle pain [Muscle Weakness] : no muscle weakness [Back Pain] : no back pain [Joint Swelling] : no joint swelling [Headache] : no headache [Dizziness] : no dizziness [Fainting] : no fainting [Confusion] : no confusion [Unsteady Walk] : ataxia [Memory Loss] : no memory loss [Insomnia] : insomnia [Anxiety] : no anxiety [Depression] : no depression [Negative] : Heme/Lymph [FreeTextEntry7] : loss of appetite

## 2022-01-23 LAB
ANION GAP SERPL CALC-SCNC: 17 MMOL/L — SIGNIFICANT CHANGE UP (ref 5–17)
BUN SERPL-MCNC: 42.2 MG/DL — HIGH (ref 8–20)
CALCIUM SERPL-MCNC: 8.2 MG/DL — LOW (ref 8.6–10.2)
CHLORIDE SERPL-SCNC: 95 MMOL/L — LOW (ref 98–107)
CO2 SERPL-SCNC: 19 MMOL/L — LOW (ref 22–29)
CREAT SERPL-MCNC: 1.74 MG/DL — HIGH (ref 0.5–1.3)
GLUCOSE SERPL-MCNC: 101 MG/DL — HIGH (ref 70–99)
MAGNESIUM SERPL-MCNC: 2.1 MG/DL — SIGNIFICANT CHANGE UP (ref 1.8–2.6)
PHOSPHATE SERPL-MCNC: 3.6 MG/DL — SIGNIFICANT CHANGE UP (ref 2.4–4.7)
POTASSIUM SERPL-MCNC: 3.7 MMOL/L — SIGNIFICANT CHANGE UP (ref 3.5–5.3)
POTASSIUM SERPL-SCNC: 3.7 MMOL/L — SIGNIFICANT CHANGE UP (ref 3.5–5.3)
SARS-COV-2 RNA SPEC QL NAA+PROBE: DETECTED
SODIUM SERPL-SCNC: 131 MMOL/L — LOW (ref 135–145)

## 2022-01-23 PROCEDURE — 99233 SBSQ HOSP IP/OBS HIGH 50: CPT

## 2022-01-23 RX ORDER — SODIUM BICARBONATE 1 MEQ/ML
650 SYRINGE (ML) INTRAVENOUS THREE TIMES A DAY
Refills: 0 | Status: DISCONTINUED | OUTPATIENT
Start: 2022-01-23 | End: 2022-01-27

## 2022-01-23 RX ORDER — METOPROLOL TARTRATE 50 MG
5 TABLET ORAL ONCE
Refills: 0 | Status: COMPLETED | OUTPATIENT
Start: 2022-01-23 | End: 2022-01-23

## 2022-01-23 RX ORDER — CARVEDILOL PHOSPHATE 80 MG/1
6.25 CAPSULE, EXTENDED RELEASE ORAL EVERY 12 HOURS
Refills: 0 | Status: DISCONTINUED | OUTPATIENT
Start: 2022-01-23 | End: 2022-01-24

## 2022-01-23 RX ORDER — DIGOXIN 250 MCG
250 TABLET ORAL DAILY
Refills: 0 | Status: DISCONTINUED | OUTPATIENT
Start: 2022-01-23 | End: 2022-01-24

## 2022-01-23 RX ORDER — POTASSIUM CHLORIDE 20 MEQ
40 PACKET (EA) ORAL ONCE
Refills: 0 | Status: COMPLETED | OUTPATIENT
Start: 2022-01-23 | End: 2022-01-23

## 2022-01-23 RX ADMIN — CARVEDILOL PHOSPHATE 6.25 MILLIGRAM(S): 80 CAPSULE, EXTENDED RELEASE ORAL at 17:53

## 2022-01-23 RX ADMIN — Medication 81 MILLIGRAM(S): at 09:01

## 2022-01-23 RX ADMIN — CLOPIDOGREL BISULFATE 75 MILLIGRAM(S): 75 TABLET, FILM COATED ORAL at 09:01

## 2022-01-23 RX ADMIN — APIXABAN 2.5 MILLIGRAM(S): 2.5 TABLET, FILM COATED ORAL at 17:53

## 2022-01-23 RX ADMIN — Medication 250 MICROGRAM(S): at 10:53

## 2022-01-23 RX ADMIN — Medication 650 MILLIGRAM(S): at 22:07

## 2022-01-23 RX ADMIN — Medication 5 MILLIGRAM(S): at 01:28

## 2022-01-23 RX ADMIN — Medication 650 MILLIGRAM(S): at 13:04

## 2022-01-23 RX ADMIN — Medication 20 MILLIGRAM(S): at 13:04

## 2022-01-23 RX ADMIN — Medication 650 MILLIGRAM(S): at 10:53

## 2022-01-23 RX ADMIN — PANTOPRAZOLE SODIUM 40 MILLIGRAM(S): 20 TABLET, DELAYED RELEASE ORAL at 05:35

## 2022-01-23 RX ADMIN — Medication 100 MILLIGRAM(S): at 22:08

## 2022-01-23 RX ADMIN — Medication 2 MILLIGRAM(S): at 22:07

## 2022-01-23 RX ADMIN — Medication 40 MILLIEQUIVALENT(S): at 09:01

## 2022-01-23 RX ADMIN — APIXABAN 2.5 MILLIGRAM(S): 2.5 TABLET, FILM COATED ORAL at 05:35

## 2022-01-23 NOTE — PROGRESS NOTE ADULT - SUBJECTIVE AND OBJECTIVE BOX
HEALTH ISSUES - PROBLEM Dx:    CHF, JOE    INTERVAL HPI/ OVERNIGHT EVENTS:    Last evening c/o dizziness and  range  Coreg held and hydralazine d/doretha  overnight went into A fib RVR upto 150s  This AM started Dig    pt anxious personality. He is aware he is close to discharge home and the first thing he says is " I am not ready for discharge, I am too weak, I cant walk"  today no c/o cough  c/o weakness  denies dizziness  Explained JACLYN. He stated that he might have to go to [facility] to get stronger.  explained plan of care regarding his BP and HR    REVIEW OF SYSTEMS:    as above    Vital Signs Last 24 Hrs  T(C): 36.4 (23 Jan 2022 08:52), Max: 36.7 (22 Jan 2022 21:15)  T(F): 97.6 (23 Jan 2022 08:52), Max: 98 (22 Jan 2022 21:15)  HR: 80 (23 Jan 2022 13:03) (73 - 146)  BP: 116/55 (23 Jan 2022 13:03) (99/61 - 125/84)  BP(mean): 73 (22 Jan 2022 15:19) (73 - 73)  RR: 17 (23 Jan 2022 13:03) (16 - 18)  SpO2: 93% (23 Jan 2022 13:03) (93% - 98%)    PHYSICAL EXAM-  GENERAL: Comfortable, lying in bed, on room air, no distress, anxious  HEAD:  Atraumatic, Normocephalic  EYES: EOMI, conjunctiva and sclera clear  ENT: Moist mucous membranes, No lesions  NECK: Supple, No JVD, Normal thyroid  NERVOUS SYSTEM:  Alert & Oriented X 3, Motor Strength 5/5 B/L upper and lower extremities  CHEST/LUNG: CTA bilaterally; No rales, rhonchi, wheezing, or rubs  HEART: Regular rate and rhythm; No murmurs, rubs, or gallops  ABDOMEN: Soft, Nontender, Nondistended; Bowel sounds present  EXTREMITIES:  2+ Peripheral Pulses, No clubbing, cyanosis, or edema  SKIN: No rashes or lesions    MEDICATIONS  (STANDING):  apixaban 2.5 milliGRAM(s) Oral every 12 hours  aspirin  chewable 81 milliGRAM(s) Oral daily  benzonatate 100 milliGRAM(s) Oral every 8 hours  carvedilol 6.25 milliGRAM(s) Oral every 12 hours  clopidogrel Tablet 75 milliGRAM(s) Oral daily  digoxin  Injectable 250 MICROGram(s) IV Push daily  influenza  Vaccine (HIGH DOSE) 0.7 milliLiter(s) IntraMuscular once  pantoprazole    Tablet 40 milliGRAM(s) Oral before breakfast  simvastatin 40 milliGRAM(s) Oral at bedtime  sodium bicarbonate 650 milliGRAM(s) Oral three times a day  torsemide 20 milliGRAM(s) Oral daily    MEDICATIONS  (PRN):  diazepam    Tablet 2 milliGRAM(s) Oral two times a day PRN anxiety  guaiFENesin Oral Liquid (Sugar-Free) 200 milliGRAM(s) Oral every 6 hours PRN Cough  melatonin 3 milliGRAM(s) Oral at bedtime PRN Sleep      LABS:                        12.2   10.43 )-----------( 169      ( 22 Jan 2022 08:36 )             36.2     01-23    131<L>  |  95<L>  |  42.2<H>  ----------------------------<  101<H>  3.7   |  19.0<L>  |  1.74<H>    Ca    8.2<L>      23 Jan 2022 05:18  Phos  3.6     01-23  Mg     2.1     01-23

## 2022-01-24 LAB
ANION GAP SERPL CALC-SCNC: 14 MMOL/L — SIGNIFICANT CHANGE UP (ref 5–17)
BUN SERPL-MCNC: 46.1 MG/DL — HIGH (ref 8–20)
CALCIUM SERPL-MCNC: 8.4 MG/DL — LOW (ref 8.6–10.2)
CHLORIDE SERPL-SCNC: 99 MMOL/L — SIGNIFICANT CHANGE UP (ref 98–107)
CO2 SERPL-SCNC: 24 MMOL/L — SIGNIFICANT CHANGE UP (ref 22–29)
CREAT SERPL-MCNC: 1.62 MG/DL — HIGH (ref 0.5–1.3)
CRP SERPL-MCNC: 13 MG/L — HIGH
D DIMER BLD IA.RAPID-MCNC: 308 NG/ML DDU — HIGH
DIGOXIN SERPL-MCNC: <0.4 NG/ML — LOW (ref 0.8–2)
FERRITIN SERPL-MCNC: 347 NG/ML — SIGNIFICANT CHANGE UP (ref 30–400)
GLUCOSE SERPL-MCNC: 86 MG/DL — SIGNIFICANT CHANGE UP (ref 70–99)
LDH SERPL L TO P-CCNC: 213 U/L — HIGH (ref 98–192)
POTASSIUM SERPL-MCNC: 3.6 MMOL/L — SIGNIFICANT CHANGE UP (ref 3.5–5.3)
POTASSIUM SERPL-SCNC: 3.6 MMOL/L — SIGNIFICANT CHANGE UP (ref 3.5–5.3)
SODIUM SERPL-SCNC: 137 MMOL/L — SIGNIFICANT CHANGE UP (ref 135–145)

## 2022-01-24 PROCEDURE — 99233 SBSQ HOSP IP/OBS HIGH 50: CPT

## 2022-01-24 PROCEDURE — 99223 1ST HOSP IP/OBS HIGH 75: CPT

## 2022-01-24 RX ORDER — SOTROVIMAB 62.5 MG/ML
500 INJECTION, SOLUTION, CONCENTRATE INTRAVENOUS ONCE
Refills: 0 | Status: COMPLETED | OUTPATIENT
Start: 2022-01-24 | End: 2022-01-24

## 2022-01-24 RX ORDER — DIGOXIN 250 MCG
125 TABLET ORAL DAILY
Refills: 0 | Status: DISCONTINUED | OUTPATIENT
Start: 2022-01-25 | End: 2022-01-28

## 2022-01-24 RX ORDER — SODIUM CHLORIDE 9 MG/ML
250 INJECTION INTRAMUSCULAR; INTRAVENOUS; SUBCUTANEOUS
Refills: 0 | Status: COMPLETED | OUTPATIENT
Start: 2022-01-24 | End: 2022-01-24

## 2022-01-24 RX ORDER — CARVEDILOL PHOSPHATE 80 MG/1
3.12 CAPSULE, EXTENDED RELEASE ORAL EVERY 12 HOURS
Refills: 0 | Status: DISCONTINUED | OUTPATIENT
Start: 2022-01-24 | End: 2022-01-28

## 2022-01-24 RX ORDER — POLYETHYLENE GLYCOL 3350 17 G/17G
17 POWDER, FOR SOLUTION ORAL DAILY
Refills: 0 | Status: DISCONTINUED | OUTPATIENT
Start: 2022-01-24 | End: 2022-01-28

## 2022-01-24 RX ORDER — SENNA PLUS 8.6 MG/1
2 TABLET ORAL AT BEDTIME
Refills: 0 | Status: DISCONTINUED | OUTPATIENT
Start: 2022-01-24 | End: 2022-01-28

## 2022-01-24 RX ADMIN — Medication 650 MILLIGRAM(S): at 06:03

## 2022-01-24 RX ADMIN — Medication 100 MILLIGRAM(S): at 22:30

## 2022-01-24 RX ADMIN — PANTOPRAZOLE SODIUM 40 MILLIGRAM(S): 20 TABLET, DELAYED RELEASE ORAL at 06:02

## 2022-01-24 RX ADMIN — SOTROVIMAB 116 MILLIGRAM(S): 62.5 INJECTION, SOLUTION, CONCENTRATE INTRAVENOUS at 23:19

## 2022-01-24 RX ADMIN — Medication 2 MILLIGRAM(S): at 19:45

## 2022-01-24 RX ADMIN — Medication 81 MILLIGRAM(S): at 13:40

## 2022-01-24 RX ADMIN — APIXABAN 2.5 MILLIGRAM(S): 2.5 TABLET, FILM COATED ORAL at 06:02

## 2022-01-24 RX ADMIN — Medication 100 MILLIGRAM(S): at 06:03

## 2022-01-24 RX ADMIN — APIXABAN 2.5 MILLIGRAM(S): 2.5 TABLET, FILM COATED ORAL at 17:45

## 2022-01-24 RX ADMIN — CLOPIDOGREL BISULFATE 75 MILLIGRAM(S): 75 TABLET, FILM COATED ORAL at 13:40

## 2022-01-24 RX ADMIN — Medication 650 MILLIGRAM(S): at 22:30

## 2022-01-24 RX ADMIN — Medication 650 MILLIGRAM(S): at 13:40

## 2022-01-24 RX ADMIN — Medication 20 MILLIGRAM(S): at 06:02

## 2022-01-24 RX ADMIN — SODIUM CHLORIDE 100 MILLILITER(S): 9 INJECTION INTRAMUSCULAR; INTRAVENOUS; SUBCUTANEOUS at 23:20

## 2022-01-24 RX ADMIN — CARVEDILOL PHOSPHATE 3.12 MILLIGRAM(S): 80 CAPSULE, EXTENDED RELEASE ORAL at 17:44

## 2022-01-24 RX ADMIN — Medication 100 MILLIGRAM(S): at 13:40

## 2022-01-24 NOTE — CONSULT NOTE ADULT - ASSESSMENT
87y  Male with recent hospitalization for STEMI requiring percutaneous coronary intervention with stent placement to the RCA and LAD presented with increasing dyspnea on exertion and also at rest. The patient reported mild dyspnea after discharge from the hospital which continued to increase. He reported swelling in the legs and shortness of breath when lying down which had made it difficult to sleep. He denied any chest pain or palpitations. Patient was managed for Acute on chronic systolic heart failure and JOE. Patient initially tested negative for COVID 19 on admission. While in the hospital developed cough 1/22 and tested positive for COVID 19 1/23/22. Reports being vaccinated last year with J&J vaccine x1 with no second shot. ID input requested.         Acute on chronic systolic heart failure   COVID-19 infection  cough  JOE      - COVID 19 PCR positive on 1/23/22  - CXR with no PNA   - Continue supportive care measures  - continue to trend inflammatory markers.   - trend CBC with diff, CMP,  CRP, Ferritin, D Dimer  procalcitonin q 48hours  - Avoid antibiotics unless there is a concern for a bacterial infection  - May consider vitamin C, thiamine and zinc (note lack of evidence to support benefit with COVID 19)  - Discussed Sotrovimab with the patient. Discussed EUA, AE, risks vs benefits. Patient agreeable to trial Sotrovimab, consent obtained. Approval from Dr Saunders (medical Director) Obtained.   - Start Sotrovimab 500mg IV x1 dose  - Trend Fever  - Trend WBC      Thank you for allowing me to participate in the care of your patient.   Will Follow      d/w Dr Merchant

## 2022-01-24 NOTE — CONSULT NOTE ADULT - SUBJECTIVE AND OBJECTIVE BOX
[facility]  INFECTIOUS DISEASES AND INTERNAL MEDICINE at [place] and [place]  =======================================================                               Leonel William MD#  Kaushal Zarate MD*                                     Allan Peters MD*    Mary Piper MD*            Diplomates American Board of Internal Medicine & Infectious Diseases                # [place] Office - Appt - Tel  [phone] Fax [phone]                * [place] Office - Appt - Tel [phone] Fax [phone]                                  Hospital Consult line:  [phone]  =======================================================      [id number]  TATIANA RIBERA    CC: Patient is a 87y old  Male who presents with a chief complaint of CHF (22 Jan 2022 12:12)      87y  Male with recent hospitalization for STEMI requiring percutaneous coronary intervention with stent placement to the RCA and LAD presented with increasing dyspnea on exertion and also at rest. The patient reported mild dyspnea after discharge from the hospital which continued to increase. He reported swelling in the legs and shortness of breath when lying down which had made it difficult to sleep. He denied any chest pain or palpitations. Patient was managed for Acute on chronic systolic heart failure and JOE. Patient initially tested negative for COVID 19 on admission. While in the hospital developed cough 1/22 and tested positive for COVID 19 1/23/22. Reports being vaccinated last year with J&J vaccine x1 with no second shot. ID input requested.       Past Medical & Surgical Hx:  HTN (hypertension)  Hyperlipidemia  CAD (coronary artery disease)  Stented coronary artery  MI (myocardial infarction)1988  Other persistent atrial fibrillation  History of hemorrhoidectomy  History of inguinal hernia repair  Absent tonsil  Pacemaker      Social Hx:  Denies smoking       FAMILY HISTORY:  Patient does not recall family history  of medical problems on mother or father       Allergies  Plavix (Other)  Intolerances  Lipitor (Muscle Pain)      REVIEW OF SYSTEMS:  CONSTITUTIONAL:  No Fever or chills  HEENT:  No diplopia or blurred vision.  No earache, sore throat or runny nose.  CARDIOVASCULAR:  No pressure, squeezing, strangling, tightness, heaviness or aching about the chest, neck, axilla or epigastrium.  RESPIRATORY:  + cough. No shortness of breath  GASTROINTESTINAL:  No nausea, vomiting or diarrhea.  GENITOURINARY:  No dysuria, frequency or urgency. No Blood in urine  MUSCULOSKELETAL:  no joint aches, no muscle pain  SKIN:  No change in skin, hair or nails.  NEUROLOGIC:  No Headaches, seizures or weakness.  PSYCHIATRIC:  No disorder of thought or mood.  ENDOCRINE:  No heat or cold intolerance  HEMATOLOGICAL:  No easy bruising or bleeding.       Physical Exam:  GEN: NAD, pleasant  HEENT: normocephalic and atraumatic. EOMI. PERRL.  Anicteric  NECK: Supple.   LUNGS: Clear to auscultation.  HEART: Regular rate and rhythm   ABDOMEN: Soft, nontender, and nondistended.  Positive bowel sounds.    : No CVA tenderness  EXTREMITIES: Without any edema.  MSK: No joint swelling  NEUROLOGIC:  No Focal Deficits  PSYCHIATRIC: Appropriate affect .  SKIN: No Rash      Vitals:  T(F): 97.3 (24 Jan 2022 11:56), Max: 97.6 (24 Jan 2022 05:40)  HR: 75 (24 Jan 2022 11:56)  BP: 120/65 (24 Jan 2022 11:56)  RR: 18 (24 Jan 2022 11:56)  SpO2: 93% (24 Jan 2022 11:56) (93% - 96%)  temp max in last 48H T(F): , Max: 98 (01-22-22 @ 21:15)      Current Antibiotics:    Other medications:  apixaban 2.5 milliGRAM(s) Oral every 12 hours  aspirin  chewable 81 milliGRAM(s) Oral daily  benzonatate 100 milliGRAM(s) Oral every 8 hours  carvedilol 3.125 milliGRAM(s) Oral every 12 hours  clopidogrel Tablet 75 milliGRAM(s) Oral daily  influenza  Vaccine (HIGH DOSE) 0.7 milliLiter(s) IntraMuscular once  pantoprazole    Tablet 40 milliGRAM(s) Oral before breakfast  simvastatin 40 milliGRAM(s) Oral at bedtime  sodium bicarbonate 650 milliGRAM(s) Oral three times a day  torsemide 20 milliGRAM(s) Oral daily        01-24    137  |  99  |  46.1<H>  ----------------------------<  86  3.6   |  24.0  |  1.62<H>    Ca    8.4<L>      24 Jan 2022 05:48  Phos  3.6     01-23  Mg     2.1     01-23      WBC Count: 10.43 K/uL (01-22-22 @ 08:36)  WBC Count: 13.32 K/uL (01-20-22 @ 03:28)    Creatinine, Serum: 1.62 mg/dL (01-24-22 @ 05:48)  Creatinine, Serum: 1.74 mg/dL (01-23-22 @ 05:18)  Creatinine, Serum: 1.99 mg/dL (01-22-22 @ 22:34)  Creatinine, Serum: 1.54 mg/dL (01-22-22 @ 08:36)  Creatinine, Serum: 1.81 mg/dL (01-20-22 @ 03:28)    C-Reactive Protein, Serum: 13 mg/L (01-24-22 @ 05:48)    COVID-19 PCR: Detected (01-23-22 @ 07:58)  SARS-CoV-2 Result: NotDetec (01-16-22 @ 15:32)  COVID-19 PCR: NotDetec (01-06-22 @ 08:30)  SARS-CoV-2 Result: NotDetec (01-02-22 @ 10:59)        < from: Xray Chest 2 Views PA/Lat (01.20.22 @ 11:52) >  ACC: [id number] EXAM:  XR CHEST PA LAT 2V                          PROCEDURE DATE:  01/20/2022      INTERPRETATION:  Chest 2 views    HISTORY: Dyspnea    COMPARISON: 1/16/2022    Frontal and lateral views of the chest were obtained with suboptimal   inspiration. With allowance for this, the heart is similarly enlarged in   size and the lungs show less pulmonary congestion. Left cardiac pacemaker   and coronary artery stents are again noted. Small right pleural effusion   is present.    There is no evidence of pneumothorax nor left pleural effusion.    IMPRESSION: Small right effusion. Less pulmonary congestion.    Thank you for this referral.    --- End of Report ---    < end of copied text >

## 2022-01-24 NOTE — PROGRESS NOTE ADULT - SUBJECTIVE AND OBJECTIVE BOX
HEALTH ISSUES - PROBLEM Dx:    CHF, JOE, COVID +    INTERVAL HPI/ OVERNIGHT EVENTS:    comfortable. always lying flat in bed  keeps staling chronic complaints of weakness/ occ intermittent episodic SOB, that has been ongoing for months  no new complaints  on room air  states he got his COVID vaccine in Spring of 2021  explained that hsi family gave the info about vaccine being in Dec 2021 and the chronicity of his complaints  encouraged getting OOB to Chair    REVIEW OF SYSTEMS:    as above    Vital Signs Last 24 Hrs  T(C): 36.3 (24 Jan 2022 11:56), Max: 36.4 (23 Jan 2022 19:00)  T(F): 97.3 (24 Jan 2022 11:56), Max: 97.6 (24 Jan 2022 05:40)  HR: 75 (24 Jan 2022 11:56) (75 - 75)  BP: 120/65 (24 Jan 2022 11:56) (109/65 - 133/73)  BP(mean): --  RR: 18 (24 Jan 2022 11:56) (17 - 18)  SpO2: 93% (24 Jan 2022 11:56) (93% - 96%)    PHYSICAL EXAM-  GENERAL: Comfortable, lying in bed, on room air, no distress  HEAD:  Atraumatic, Normocephalic  EYES: EOMI, conjunctiva and sclera clear  ENT: Moist mucous membranes, No lesions  NECK: Supple, No JVD, Normal thyroid  NERVOUS SYSTEM:  Alert & Oriented X 3, Motor Strength 5/5 B/L upper and lower extremities  CHEST/LUNG: CTA bilaterally; No rales, rhonchi, wheezing, or rubs  HEART: Regular rate and rhythm; No murmurs, rubs, or gallops  ABDOMEN: Soft, Nontender, Nondistended; Bowel sounds present  EXTREMITIES:  2+ Peripheral Pulses, No clubbing, cyanosis, or edema  SKIN: No rashes or lesions    MEDICATIONS  (STANDING):  apixaban 2.5 milliGRAM(s) Oral every 12 hours  aspirin  chewable 81 milliGRAM(s) Oral daily  benzonatate 100 milliGRAM(s) Oral every 8 hours  carvedilol 3.125 milliGRAM(s) Oral every 12 hours  clopidogrel Tablet 75 milliGRAM(s) Oral daily  influenza  Vaccine (HIGH DOSE) 0.7 milliLiter(s) IntraMuscular once  pantoprazole    Tablet 40 milliGRAM(s) Oral before breakfast  simvastatin 40 milliGRAM(s) Oral at bedtime  sodium bicarbonate 650 milliGRAM(s) Oral three times a day  torsemide 20 milliGRAM(s) Oral daily    MEDICATIONS  (PRN):  diazepam    Tablet 2 milliGRAM(s) Oral two times a day PRN anxiety  guaiFENesin Oral Liquid (Sugar-Free) 200 milliGRAM(s) Oral every 6 hours PRN Cough  melatonin 3 milliGRAM(s) Oral at bedtime PRN Sleep      LABS:    01-24    137  |  99  |  46.1<H>  ----------------------------<  86  3.6   |  24.0  |  1.62<H>    Ca    8.4<L>      24 Jan 2022 05:48  Phos  3.6     01-23  Mg     2.1     01-23

## 2022-01-25 LAB
ANION GAP SERPL CALC-SCNC: 14 MMOL/L — SIGNIFICANT CHANGE UP (ref 5–17)
BUN SERPL-MCNC: 43.2 MG/DL — HIGH (ref 8–20)
CALCIUM SERPL-MCNC: 8.2 MG/DL — LOW (ref 8.6–10.2)
CHLORIDE SERPL-SCNC: 94 MMOL/L — LOW (ref 98–107)
CO2 SERPL-SCNC: 23 MMOL/L — SIGNIFICANT CHANGE UP (ref 22–29)
CREAT SERPL-MCNC: 1.58 MG/DL — HIGH (ref 0.5–1.3)
GLUCOSE SERPL-MCNC: 110 MG/DL — HIGH (ref 70–99)
POTASSIUM SERPL-MCNC: 3.5 MMOL/L — SIGNIFICANT CHANGE UP (ref 3.5–5.3)
POTASSIUM SERPL-SCNC: 3.5 MMOL/L — SIGNIFICANT CHANGE UP (ref 3.5–5.3)
SODIUM SERPL-SCNC: 131 MMOL/L — LOW (ref 135–145)

## 2022-01-25 PROCEDURE — 99232 SBSQ HOSP IP/OBS MODERATE 35: CPT

## 2022-01-25 RX ADMIN — APIXABAN 2.5 MILLIGRAM(S): 2.5 TABLET, FILM COATED ORAL at 18:08

## 2022-01-25 RX ADMIN — Medication 650 MILLIGRAM(S): at 21:13

## 2022-01-25 RX ADMIN — Medication 200 MILLIGRAM(S): at 12:30

## 2022-01-25 RX ADMIN — Medication 81 MILLIGRAM(S): at 12:31

## 2022-01-25 RX ADMIN — Medication 650 MILLIGRAM(S): at 05:59

## 2022-01-25 RX ADMIN — CLOPIDOGREL BISULFATE 75 MILLIGRAM(S): 75 TABLET, FILM COATED ORAL at 12:30

## 2022-01-25 RX ADMIN — Medication 20 MILLIGRAM(S): at 05:58

## 2022-01-25 RX ADMIN — Medication 100 MILLIGRAM(S): at 05:58

## 2022-01-25 RX ADMIN — PANTOPRAZOLE SODIUM 40 MILLIGRAM(S): 20 TABLET, DELAYED RELEASE ORAL at 05:59

## 2022-01-25 RX ADMIN — CARVEDILOL PHOSPHATE 3.12 MILLIGRAM(S): 80 CAPSULE, EXTENDED RELEASE ORAL at 05:58

## 2022-01-25 RX ADMIN — CARVEDILOL PHOSPHATE 3.12 MILLIGRAM(S): 80 CAPSULE, EXTENDED RELEASE ORAL at 18:08

## 2022-01-25 RX ADMIN — APIXABAN 2.5 MILLIGRAM(S): 2.5 TABLET, FILM COATED ORAL at 05:58

## 2022-01-25 RX ADMIN — Medication 125 MICROGRAM(S): at 05:58

## 2022-01-25 NOTE — PROGRESS NOTE ADULT - SUBJECTIVE AND OBJECTIVE BOX
[facility]  INFECTIOUS DISEASES AND INTERNAL MEDICINE at [place] and [place]  =======================================================                               Leonel William MD#  Kaushal Zarate MD*                                     Allan Peters MD*    Mary Piper MD*            Diplomates American Board of Internal Medicine & Infectious Diseases                # [place] Office - Appt - Tel  [phone] Fax [phone]                * [place] Office - Appt - Tel [phone] Fax [phone]                                  Hospital Consult line:  [phone]  =======================================================    TATIANA RIBERA [id number]    Follow up: COVID-19 infection  S/p Sotrovimab 500mg IV x1 dose 1/24/22    No complaints      Allergies:  Lipitor (Muscle Pain)  Plavix (Other)       REVIEW OF SYSTEMS:  CONSTITUTIONAL:  No Fever or chills  HEENT:  No diplopia or blurred vision.  No earache, sore throat or runny nose.  CARDIOVASCULAR:  No pressure, squeezing, strangling, tightness, heaviness or aching about the chest, neck, axilla or epigastrium.  RESPIRATORY:  + cough. No shortness of breath  GASTROINTESTINAL:  No nausea, vomiting or diarrhea.  GENITOURINARY:  No dysuria, frequency or urgency.   MUSCULOSKELETAL:  no joint aches, no muscle pain  SKIN:  No change in skin, hair or nails.  NEUROLOGIC:  No Headaches, seizures or weakness.  PSYCHIATRIC:  No disorder of thought or mood.  ENDOCRINE:  No heat or cold intolerance  HEMATOLOGICAL:  No easy bruising or bleeding.       Physical Exam:  GEN: NAD, pleasant  HEENT: normocephalic and atraumatic. EOMI. PERRL.  Anicteric  NECK: Supple.   LUNGS: Clear to auscultation.  HEART: Regular rate and rhythm   ABDOMEN: Soft, nontender, and nondistended.  Positive bowel sounds.    : No CVA tenderness  EXTREMITIES: Without any edema.  MSK: No joint swelling  NEUROLOGIC:  No Focal Deficits  PSYCHIATRIC: Appropriate affect .  SKIN: No Rash    Vitals:  T(F): 98 (25 Jan 2022 11:27), Max: 98.7 (25 Jan 2022 05:01)  HR: 75 (25 Jan 2022 11:27)  BP: 114/66 (25 Jan 2022 11:27)  RR: 18 (25 Jan 2022 11:27)  SpO2: 94% (25 Jan 2022 11:27) (94% - 98%)  temp max in last 48H T(F): , Max: 98.7 (01-25-22 @ 05:01)      Current Antibiotics:    Other medications:  apixaban 2.5 milliGRAM(s) Oral every 12 hours  aspirin  chewable 81 milliGRAM(s) Oral daily  carvedilol 3.125 milliGRAM(s) Oral every 12 hours  clopidogrel Tablet 75 milliGRAM(s) Oral daily  digoxin     Tablet 125 MICROGram(s) Oral daily  influenza  Vaccine (HIGH DOSE) 0.7 milliLiter(s) IntraMuscular once  pantoprazole    Tablet 40 milliGRAM(s) Oral before breakfast  simvastatin 40 milliGRAM(s) Oral at bedtime  sodium bicarbonate 650 milliGRAM(s) Oral three times a day  torsemide 20 milliGRAM(s) Oral daily      01-25    131<L>  |  94<L>  |  43.2<H>  ----------------------------<  110<H>  3.5   |  23.0  |  1.58<H>    Ca    8.2<L>      25 Jan 2022 06:16      WBC Count: 10.43 K/uL (01-22-22 @ 08:36)    Creatinine, Serum: 1.58 mg/dL (01-25-22 @ 06:16)  Creatinine, Serum: 1.62 mg/dL (01-24-22 @ 05:48)  Creatinine, Serum: 1.74 mg/dL (01-23-22 @ 05:18)  Creatinine, Serum: 1.99 mg/dL (01-22-22 @ 22:34)  Creatinine, Serum: 1.54 mg/dL (01-22-22 @ 08:36)    C-Reactive Protein, Serum: 13 mg/L (01-24-22 @ 05:48)    Ferritin, Serum: 347 ng/mL (01-24-22 @ 05:48)    COVID-19 PCR: Detected (01-23-22 @ 07:58)  SARS-CoV-2 Result: NotDetec (01-16-22 @ 15:32)  COVID-19 PCR: NotDetec (01-06-22 @ 08:30)  SARS-CoV-2 Result: NotDetec (01-02-22 @ 10:59)

## 2022-01-25 NOTE — CHART NOTE - NSCHARTNOTEFT_GEN_A_CORE
Source: Patient [ ]  Family [ ]   other [ x]    Current Diet:   Diet, DASH/TLC:   Sodium & Cholesterol Restricted  Supplement Feeding Modality:  Oral  Ensure Clear Cans or Servings Per Day:  1       Frequency:  Three Times a day (01-22-22 @ 13:56)    Patient reports [ ] nausea  [ ] vomiting [ ] diarrhea [ ] constipation  [ ]chewing problems [ ] swallowing issues  [ ] other:     PO intake:  < 50% [ ]   50-75%  [ x]   %  [ ]  other :    Source for PO intake [ ] Patient [ ] family [x] chart [ ] staff [ ] other    Current Weight:   (1/16)  157 lbs    % Weight Change: No recent weight documented     Pertinent Medications: MEDICATIONS  (STANDING):  apixaban 2.5 milliGRAM(s) Oral every 12 hours  aspirin  chewable 81 milliGRAM(s) Oral daily  carvedilol 3.125 milliGRAM(s) Oral every 12 hours  clopidogrel Tablet 75 milliGRAM(s) Oral daily  digoxin     Tablet 125 MICROGram(s) Oral daily  influenza  Vaccine (HIGH DOSE) 0.7 milliLiter(s) IntraMuscular once  pantoprazole    Tablet 40 milliGRAM(s) Oral before breakfast  simvastatin 40 milliGRAM(s) Oral at bedtime  sodium bicarbonate 650 milliGRAM(s) Oral three times a day  torsemide 20 milliGRAM(s) Oral daily    MEDICATIONS  (PRN):  diazepam    Tablet 2 milliGRAM(s) Oral two times a day PRN anxiety  guaiFENesin Oral Liquid (Sugar-Free) 200 milliGRAM(s) Oral every 6 hours PRN Cough  melatonin 3 milliGRAM(s) Oral at bedtime PRN Sleep  polyethylene glycol 3350 17 Gram(s) Oral daily PRN Constipation  senna 2 Tablet(s) Oral at bedtime PRN Constipation    Pertinent Labs:   01-25 Na131 mmol/L<L> Glu 110 mg/dL<H> K+ 3.5 mmol/L Cr  1.58 mg/dL<H> BUN 43.2 mg/dL<H> Phos n/a   Alb n/a   PAB n/a       Skin: 1+ R/L leg edema    Nutrition focused physical exam conducted - found signs of malnutrition [ ]absent [ x]present    Subcutaneous fat loss: [x ] Orbital fat pads region, [ ]Buccal fat region, [x ]Triceps region,  [ ]Ribs region    Muscle wasting: [x ]Temples region, [x ]Clavicle region, [ x]Shoulder region, [ ]Scapula region, [ ]Interosseous region,  [ ]thigh region, [ ]Calf region    Estimated Needs:   [x ] no change since previous assessment  [ ] recalculated:     Current Nutrition Diagnosis: Pt remains at high nutrition risk secondary to malnutrition (severe acute) related to inability to consume sufficient protein energy intake with decreased appetite in setting of CAD/CHF as evidenced by pt meeting <50% est energy intake >5 days and moderate muscle wasting/fat loss. PT continues with suboptimal PO intake completing 50-75% of meals, intermittently taking Ensure Clear. SNa decreased this morning, aware B/L LE edema noted. Last documented BM 1/22.     Recommendations:   1) Continue Ensure Clear as tolerated   2) Rx MVI daily   3) Encourage HBV protein sources   4) Monitor weights daily for trend/accuracy   5) Provide encouragement/assistance as needed during mealtimes to inc PO     Monitoring and Evaluation:   [x ] PO intake [ ] Tolerance to diet prescription [X] Weights  [X] Follow up per protocol [X] Labs:

## 2022-01-25 NOTE — PROGRESS NOTE ADULT - SUBJECTIVE AND OBJECTIVE BOX
HEALTH ISSUES - PROBLEM Dx:    CHF, JOE, COVID +    INTERVAL HPI/ OVERNIGHT EVENTS:    comfortable. always lying flat in bed  regularly talks about his chronic complaints of weakness/ occ intermittent episodic SOB, that has been ongoing for months  no new complaints  on room air  s/p MABs yesterday    REVIEW OF SYSTEMS:    as above    Vital Signs Last 24 Hrs  T(C): 37.1 (25 Jan 2022 05:01), Max: 37.1 (25 Jan 2022 05:01)  T(F): 98.7 (25 Jan 2022 05:01), Max: 98.7 (25 Jan 2022 05:01)  HR: 91 (25 Jan 2022 05:01) (64 - 91)  BP: 135/73 (25 Jan 2022 05:01) (120/65 - 153/79)  BP(mean): --  RR: 18 (25 Jan 2022 05:01) (18 - 18)  SpO2: 94% (25 Jan 2022 05:01) (93% - 98%)    PHYSICAL EXAM-  GENERAL: Comfortable, lying in bed, on room air, no distress  HEAD:  Atraumatic, Normocephalic  EYES: EOMI, conjunctiva and sclera clear  ENT: Moist mucous membranes, No lesions  NECK: Supple, No JVD, Normal thyroid  NERVOUS SYSTEM:  Alert & Oriented X 3, Motor Strength 5/5 B/L upper and lower extremities  CHEST/LUNG: CTA bilaterally; No rales, rhonchi, wheezing, or rubs  HEART: Regular rate and rhythm; No murmurs, rubs, or gallops  ABDOMEN: Soft, Nontender, Nondistended; Bowel sounds present  EXTREMITIES:  2+ Peripheral Pulses, No clubbing, cyanosis, or edema  SKIN: No rashes or lesions    MEDICATIONS  (STANDING):  apixaban 2.5 milliGRAM(s) Oral every 12 hours  aspirin  chewable 81 milliGRAM(s) Oral daily  carvedilol 3.125 milliGRAM(s) Oral every 12 hours  clopidogrel Tablet 75 milliGRAM(s) Oral daily  digoxin     Tablet 125 MICROGram(s) Oral daily  influenza  Vaccine (HIGH DOSE) 0.7 milliLiter(s) IntraMuscular once  pantoprazole    Tablet 40 milliGRAM(s) Oral before breakfast  simvastatin 40 milliGRAM(s) Oral at bedtime  sodium bicarbonate 650 milliGRAM(s) Oral three times a day  torsemide 20 milliGRAM(s) Oral daily    MEDICATIONS  (PRN):  diazepam    Tablet 2 milliGRAM(s) Oral two times a day PRN anxiety  guaiFENesin Oral Liquid (Sugar-Free) 200 milliGRAM(s) Oral every 6 hours PRN Cough  melatonin 3 milliGRAM(s) Oral at bedtime PRN Sleep  polyethylene glycol 3350 17 Gram(s) Oral daily PRN Constipation  senna 2 Tablet(s) Oral at bedtime PRN Constipation      LABS:    01-25    131<L>  |  94<L>  |  43.2<H>  ----------------------------<  110<H>  3.5   |  23.0  |  1.58<H>    Ca    8.2<L>      25 Jan 2022 06:16

## 2022-01-26 LAB
ALBUMIN SERPL ELPH-MCNC: 2.8 G/DL — LOW (ref 3.3–5.2)
ALP SERPL-CCNC: 68 U/L — SIGNIFICANT CHANGE UP (ref 40–120)
ALT FLD-CCNC: 16 U/L — SIGNIFICANT CHANGE UP
ANION GAP SERPL CALC-SCNC: 14 MMOL/L — SIGNIFICANT CHANGE UP (ref 5–17)
ANISOCYTOSIS BLD QL: SLIGHT — SIGNIFICANT CHANGE UP
AST SERPL-CCNC: 29 U/L — SIGNIFICANT CHANGE UP
BASOPHILS # BLD AUTO: 0 K/UL — SIGNIFICANT CHANGE UP (ref 0–0.2)
BASOPHILS NFR BLD AUTO: 0 % — SIGNIFICANT CHANGE UP (ref 0–2)
BILIRUB SERPL-MCNC: 1 MG/DL — SIGNIFICANT CHANGE UP (ref 0.4–2)
BUN SERPL-MCNC: 45.1 MG/DL — HIGH (ref 8–20)
BURR CELLS BLD QL SMEAR: PRESENT — SIGNIFICANT CHANGE UP
CALCIUM SERPL-MCNC: 8.1 MG/DL — LOW (ref 8.6–10.2)
CHLORIDE SERPL-SCNC: 94 MMOL/L — LOW (ref 98–107)
CO2 SERPL-SCNC: 24 MMOL/L — SIGNIFICANT CHANGE UP (ref 22–29)
CREAT SERPL-MCNC: 1.41 MG/DL — HIGH (ref 0.5–1.3)
CRP SERPL-MCNC: 56 MG/L — HIGH
EOSINOPHIL # BLD AUTO: 0 K/UL — SIGNIFICANT CHANGE UP (ref 0–0.5)
EOSINOPHIL NFR BLD AUTO: 0 % — SIGNIFICANT CHANGE UP (ref 0–6)
FERRITIN SERPL-MCNC: 564 NG/ML — HIGH (ref 30–400)
GIANT PLATELETS BLD QL SMEAR: PRESENT — SIGNIFICANT CHANGE UP
GLUCOSE SERPL-MCNC: 83 MG/DL — SIGNIFICANT CHANGE UP (ref 70–99)
HCT VFR BLD CALC: 35.8 % — LOW (ref 39–50)
HGB BLD-MCNC: 12.1 G/DL — LOW (ref 13–17)
LYMPHOCYTES # BLD AUTO: 0.38 K/UL — LOW (ref 1–3.3)
LYMPHOCYTES # BLD AUTO: 9.9 % — LOW (ref 13–44)
MANUAL SMEAR VERIFICATION: SIGNIFICANT CHANGE UP
MCHC RBC-ENTMCNC: 30.4 PG — SIGNIFICANT CHANGE UP (ref 27–34)
MCHC RBC-ENTMCNC: 33.8 GM/DL — SIGNIFICANT CHANGE UP (ref 32–36)
MCV RBC AUTO: 89.9 FL — SIGNIFICANT CHANGE UP (ref 80–100)
MONOCYTES # BLD AUTO: 0.21 K/UL — SIGNIFICANT CHANGE UP (ref 0–0.9)
MONOCYTES NFR BLD AUTO: 5.4 % — SIGNIFICANT CHANGE UP (ref 2–14)
MYELOCYTES NFR BLD: 0.9 % — HIGH (ref 0–0)
NEUTROPHILS # BLD AUTO: 3.23 K/UL — SIGNIFICANT CHANGE UP (ref 1.8–7.4)
NEUTROPHILS NFR BLD AUTO: 83.8 % — HIGH (ref 43–77)
PLAT MORPH BLD: NORMAL — SIGNIFICANT CHANGE UP
PLATELET # BLD AUTO: 96 K/UL — LOW (ref 150–400)
POIKILOCYTOSIS BLD QL AUTO: SLIGHT — SIGNIFICANT CHANGE UP
POTASSIUM SERPL-MCNC: 3 MMOL/L — LOW (ref 3.5–5.3)
POTASSIUM SERPL-SCNC: 3 MMOL/L — LOW (ref 3.5–5.3)
PROCALCITONIN SERPL-MCNC: 0.3 NG/ML — HIGH (ref 0.02–0.1)
PROT SERPL-MCNC: 5.5 G/DL — LOW (ref 6.6–8.7)
RBC # BLD: 3.98 M/UL — LOW (ref 4.2–5.8)
RBC # FLD: 14.7 % — HIGH (ref 10.3–14.5)
RBC BLD AUTO: SIGNIFICANT CHANGE UP
SMUDGE CELLS # BLD: PRESENT — SIGNIFICANT CHANGE UP
SODIUM SERPL-SCNC: 132 MMOL/L — LOW (ref 135–145)
WBC # BLD: 3.86 K/UL — SIGNIFICANT CHANGE UP (ref 3.8–10.5)
WBC # FLD AUTO: 3.86 K/UL — SIGNIFICANT CHANGE UP (ref 3.8–10.5)

## 2022-01-26 PROCEDURE — 99233 SBSQ HOSP IP/OBS HIGH 50: CPT

## 2022-01-26 RX ORDER — POTASSIUM CHLORIDE 20 MEQ
40 PACKET (EA) ORAL EVERY 4 HOURS
Refills: 0 | Status: DISCONTINUED | OUTPATIENT
Start: 2022-01-26 | End: 2022-01-26

## 2022-01-26 RX ORDER — POTASSIUM CHLORIDE 20 MEQ
40 PACKET (EA) ORAL ONCE
Refills: 0 | Status: COMPLETED | OUTPATIENT
Start: 2022-01-26 | End: 2022-01-26

## 2022-01-26 RX ADMIN — Medication 125 MICROGRAM(S): at 05:43

## 2022-01-26 RX ADMIN — Medication 2 MILLIGRAM(S): at 18:17

## 2022-01-26 RX ADMIN — SIMVASTATIN 40 MILLIGRAM(S): 20 TABLET, FILM COATED ORAL at 22:14

## 2022-01-26 RX ADMIN — CARVEDILOL PHOSPHATE 3.12 MILLIGRAM(S): 80 CAPSULE, EXTENDED RELEASE ORAL at 18:17

## 2022-01-26 RX ADMIN — Medication 650 MILLIGRAM(S): at 15:32

## 2022-01-26 RX ADMIN — APIXABAN 2.5 MILLIGRAM(S): 2.5 TABLET, FILM COATED ORAL at 05:43

## 2022-01-26 RX ADMIN — APIXABAN 2.5 MILLIGRAM(S): 2.5 TABLET, FILM COATED ORAL at 18:17

## 2022-01-26 RX ADMIN — Medication 650 MILLIGRAM(S): at 22:14

## 2022-01-26 RX ADMIN — Medication 81 MILLIGRAM(S): at 15:32

## 2022-01-26 RX ADMIN — CLOPIDOGREL BISULFATE 75 MILLIGRAM(S): 75 TABLET, FILM COATED ORAL at 15:39

## 2022-01-26 RX ADMIN — Medication 650 MILLIGRAM(S): at 05:43

## 2022-01-26 RX ADMIN — Medication 40 MILLIEQUIVALENT(S): at 15:36

## 2022-01-26 RX ADMIN — PANTOPRAZOLE SODIUM 40 MILLIGRAM(S): 20 TABLET, DELAYED RELEASE ORAL at 05:43

## 2022-01-26 NOTE — PROGRESS NOTE ADULT - SUBJECTIVE AND OBJECTIVE BOX
HEALTH ISSUES - PROBLEM Dx:    CHF, JOE, COVID +    INTERVAL HPI/ OVERNIGHT EVENTS:    comfortable. sitting up in bed and eating his BF  states yesterday he got out of bed to chair and wants to do the same today  he is hoping if he is able to get OOB to C he might go home instead of to [facility]    REVIEW OF SYSTEMS:    as above    Vital Signs Last 24 Hrs  T(C): 36.6 (26 Jan 2022 10:20), Max: 37.1 (25 Jan 2022 18:33)  T(F): 97.9 (26 Jan 2022 10:20), Max: 98.7 (25 Jan 2022 18:33)  HR: 76 (26 Jan 2022 10:20) (75 - 76)  BP: 104/62 (26 Jan 2022 10:20) (98/61 - 121/65)  BP(mean): --  RR: 18 (26 Jan 2022 10:20) (18 - 19)  SpO2: 94% (26 Jan 2022 10:20) (94% - 97%)    PHYSICAL EXAM-  GENERAL: Comfortable, lying in bed, on room air, no distress  HEAD:  Atraumatic, Normocephalic  EYES: EOMI, conjunctiva and sclera clear  ENT: Moist mucous membranes, No lesions  NECK: Supple, No JVD, Normal thyroid  NERVOUS SYSTEM:  Alert & Oriented X 3, Motor Strength 5/5 B/L upper and lower extremities  CHEST/LUNG: CTA bilaterally; No rales, rhonchi, wheezing, or rubs  HEART: Regular rate and rhythm; No murmurs, rubs, or gallops  ABDOMEN: Soft, Nontender, Nondistended; Bowel sounds present  EXTREMITIES:  2+ Peripheral Pulses, No clubbing, cyanosis, or edema  SKIN: No rashes or lesions    MEDICATIONS  (STANDING):  apixaban 2.5 milliGRAM(s) Oral every 12 hours  aspirin  chewable 81 milliGRAM(s) Oral daily  carvedilol 3.125 milliGRAM(s) Oral every 12 hours  clopidogrel Tablet 75 milliGRAM(s) Oral daily  digoxin     Tablet 125 MICROGram(s) Oral daily  influenza  Vaccine (HIGH DOSE) 0.7 milliLiter(s) IntraMuscular once  pantoprazole    Tablet 40 milliGRAM(s) Oral before breakfast  potassium chloride   Powder 40 milliEquivalent(s) Oral once  simvastatin 40 milliGRAM(s) Oral at bedtime  sodium bicarbonate 650 milliGRAM(s) Oral three times a day  torsemide 20 milliGRAM(s) Oral daily    MEDICATIONS  (PRN):  diazepam    Tablet 2 milliGRAM(s) Oral two times a day PRN anxiety  guaiFENesin Oral Liquid (Sugar-Free) 200 milliGRAM(s) Oral every 6 hours PRN Cough  melatonin 3 milliGRAM(s) Oral at bedtime PRN Sleep  polyethylene glycol 3350 17 Gram(s) Oral daily PRN Constipation  senna 2 Tablet(s) Oral at bedtime PRN Constipation      LABS:                        12.1   3.86  )-----------( 96       ( 26 Jan 2022 07:44 )             35.8     01-26    132<L>  |  94<L>  |  45.1<H>  ----------------------------<  83  3.0<L>   |  24.0  |  1.41<H>    Ca    8.1<L>      26 Jan 2022 07:44    TPro  5.5<L>  /  Alb  2.8<L>  /  TBili  1.0  /  DBili  x   /  AST  29  /  ALT  16  /  AlkPhos  68  01-26            Assessment and Plan    Encephalopathy  Malnutrition  Morbid Obesity  Functional quadriplegia    DVT Prophylaxis    Discussed with: Patient, family, RN, CM, Consultants  Plan of care/ Discharge planning discussed.    Visit Time:  HEALTH ISSUES - PROBLEM Dx:    CHF, JOE, COVID +    INTERVAL HPI/ OVERNIGHT EVENTS:    comfortable. sitting up in bed and eating his BF  states yesterday he got out of bed to chair and wants to do the same today  he is hoping if he is able to get OOB to C he might go home instead of to [facility]    REVIEW OF SYSTEMS:    as above    Vital Signs Last 24 Hrs  T(C): 36.6 (26 Jan 2022 10:20), Max: 37.1 (25 Jan 2022 18:33)  T(F): 97.9 (26 Jan 2022 10:20), Max: 98.7 (25 Jan 2022 18:33)  HR: 76 (26 Jan 2022 10:20) (75 - 76)  BP: 104/62 (26 Jan 2022 10:20) (98/61 - 121/65)  BP(mean): --  RR: 18 (26 Jan 2022 10:20) (18 - 19)  SpO2: 94% (26 Jan 2022 10:20) (94% - 97%)    PHYSICAL EXAM-  GENERAL: Comfortable, sitting up in bed, on room air, no distress  HEAD:  Atraumatic, Normocephalic  EYES: EOMI, conjunctiva and sclera clear  ENT: Moist mucous membranes, No lesions  NECK: Supple, No JVD, Normal thyroid  NERVOUS SYSTEM:  Alert & Oriented X 3, Motor Strength 5/5 B/L upper and lower extremities  CHEST/LUNG: CTA bilaterally; No rales, rhonchi, wheezing, or rubs  HEART: Regular rate and rhythm; No murmurs, rubs, or gallops  ABDOMEN: Soft, Nontender, Nondistended; Bowel sounds present  EXTREMITIES:  2+ Peripheral Pulses, No clubbing, cyanosis, or edema  SKIN: No rashes or lesions    MEDICATIONS  (STANDING):  apixaban 2.5 milliGRAM(s) Oral every 12 hours  aspirin  chewable 81 milliGRAM(s) Oral daily  carvedilol 3.125 milliGRAM(s) Oral every 12 hours  clopidogrel Tablet 75 milliGRAM(s) Oral daily  digoxin     Tablet 125 MICROGram(s) Oral daily  influenza  Vaccine (HIGH DOSE) 0.7 milliLiter(s) IntraMuscular once  pantoprazole    Tablet 40 milliGRAM(s) Oral before breakfast  potassium chloride   Powder 40 milliEquivalent(s) Oral once  simvastatin 40 milliGRAM(s) Oral at bedtime  sodium bicarbonate 650 milliGRAM(s) Oral three times a day  torsemide 20 milliGRAM(s) Oral daily    MEDICATIONS  (PRN):  diazepam    Tablet 2 milliGRAM(s) Oral two times a day PRN anxiety  guaiFENesin Oral Liquid (Sugar-Free) 200 milliGRAM(s) Oral every 6 hours PRN Cough  melatonin 3 milliGRAM(s) Oral at bedtime PRN Sleep  polyethylene glycol 3350 17 Gram(s) Oral daily PRN Constipation  senna 2 Tablet(s) Oral at bedtime PRN Constipation      LABS:                        12.1   3.86  )-----------( 96       ( 26 Jan 2022 07:44 )             35.8     01-26    132<L>  |  94<L>  |  45.1<H>  ----------------------------<  83  3.0<L>   |  24.0  |  1.41<H>    Ca    8.1<L>      26 Jan 2022 07:44    TPro  5.5<L>  /  Alb  2.8<L>  /  TBili  1.0  /  DBili  x   /  AST  29  /  ALT  16  /  AlkPhos  68  01-26

## 2022-01-27 LAB
ANION GAP SERPL CALC-SCNC: 14 MMOL/L — SIGNIFICANT CHANGE UP (ref 5–17)
BUN SERPL-MCNC: 45.3 MG/DL — HIGH (ref 8–20)
CALCIUM SERPL-MCNC: 8 MG/DL — LOW (ref 8.6–10.2)
CHLORIDE SERPL-SCNC: 94 MMOL/L — LOW (ref 98–107)
CO2 SERPL-SCNC: 25 MMOL/L — SIGNIFICANT CHANGE UP (ref 22–29)
CREAT SERPL-MCNC: 1.34 MG/DL — HIGH (ref 0.5–1.3)
DIGOXIN SERPL-MCNC: 0.9 NG/ML — SIGNIFICANT CHANGE UP (ref 0.8–2)
GLUCOSE SERPL-MCNC: 112 MG/DL — HIGH (ref 70–99)
HCT VFR BLD CALC: 37.8 % — LOW (ref 39–50)
HGB BLD-MCNC: 12.8 G/DL — LOW (ref 13–17)
MCHC RBC-ENTMCNC: 30.5 PG — SIGNIFICANT CHANGE UP (ref 27–34)
MCHC RBC-ENTMCNC: 33.9 GM/DL — SIGNIFICANT CHANGE UP (ref 32–36)
MCV RBC AUTO: 90.2 FL — SIGNIFICANT CHANGE UP (ref 80–100)
PLATELET # BLD AUTO: 101 K/UL — LOW (ref 150–400)
POTASSIUM SERPL-MCNC: 3 MMOL/L — LOW (ref 3.5–5.3)
POTASSIUM SERPL-SCNC: 3 MMOL/L — LOW (ref 3.5–5.3)
RBC # BLD: 4.19 M/UL — LOW (ref 4.2–5.8)
RBC # FLD: 14.6 % — HIGH (ref 10.3–14.5)
SODIUM SERPL-SCNC: 133 MMOL/L — LOW (ref 135–145)
WBC # BLD: 4.22 K/UL — SIGNIFICANT CHANGE UP (ref 3.8–10.5)
WBC # FLD AUTO: 4.22 K/UL — SIGNIFICANT CHANGE UP (ref 3.8–10.5)

## 2022-01-27 PROCEDURE — 99232 SBSQ HOSP IP/OBS MODERATE 35: CPT

## 2022-01-27 RX ORDER — POTASSIUM CHLORIDE 20 MEQ
40 PACKET (EA) ORAL EVERY 4 HOURS
Refills: 0 | Status: COMPLETED | OUTPATIENT
Start: 2022-01-27 | End: 2022-01-27

## 2022-01-27 RX ORDER — SODIUM BICARBONATE 1 MEQ/ML
650 SYRINGE (ML) INTRAVENOUS DAILY
Refills: 0 | Status: DISCONTINUED | OUTPATIENT
Start: 2022-01-27 | End: 2022-01-27

## 2022-01-27 RX ORDER — POTASSIUM CHLORIDE 20 MEQ
40 PACKET (EA) ORAL EVERY 4 HOURS
Refills: 0 | Status: DISCONTINUED | OUTPATIENT
Start: 2022-01-27 | End: 2022-01-27

## 2022-01-27 RX ADMIN — CARVEDILOL PHOSPHATE 3.12 MILLIGRAM(S): 80 CAPSULE, EXTENDED RELEASE ORAL at 17:57

## 2022-01-27 RX ADMIN — Medication 40 MILLIEQUIVALENT(S): at 16:10

## 2022-01-27 RX ADMIN — Medication 125 MICROGRAM(S): at 06:04

## 2022-01-27 RX ADMIN — CARVEDILOL PHOSPHATE 3.12 MILLIGRAM(S): 80 CAPSULE, EXTENDED RELEASE ORAL at 06:04

## 2022-01-27 RX ADMIN — APIXABAN 2.5 MILLIGRAM(S): 2.5 TABLET, FILM COATED ORAL at 17:56

## 2022-01-27 RX ADMIN — Medication 81 MILLIGRAM(S): at 12:40

## 2022-01-27 RX ADMIN — Medication 650 MILLIGRAM(S): at 12:40

## 2022-01-27 RX ADMIN — PANTOPRAZOLE SODIUM 40 MILLIGRAM(S): 20 TABLET, DELAYED RELEASE ORAL at 06:04

## 2022-01-27 RX ADMIN — Medication 20 MILLIGRAM(S): at 06:04

## 2022-01-27 RX ADMIN — Medication 40 MILLIEQUIVALENT(S): at 12:39

## 2022-01-27 RX ADMIN — Medication 650 MILLIGRAM(S): at 06:04

## 2022-01-27 RX ADMIN — SIMVASTATIN 40 MILLIGRAM(S): 20 TABLET, FILM COATED ORAL at 21:13

## 2022-01-27 RX ADMIN — Medication 40 MILLIEQUIVALENT(S): at 20:29

## 2022-01-27 RX ADMIN — APIXABAN 2.5 MILLIGRAM(S): 2.5 TABLET, FILM COATED ORAL at 06:04

## 2022-01-27 RX ADMIN — CLOPIDOGREL BISULFATE 75 MILLIGRAM(S): 75 TABLET, FILM COATED ORAL at 12:40

## 2022-01-27 NOTE — PROGRESS NOTE ADULT - NUTRITIONAL ASSESSMENT
This patient has been assessed with a concern for Malnutrition and has been determined to have a diagnosis/diagnoses of Severe protein-calorie malnutrition.    This patient is being managed with:   Diet DASH/ TLC-  Sodium & Cholesterol Restricted  Supplement Feeding Modality:  Oral  Ensure Clear Cans or Servings Per Day:  1       Frequency:  Three Times a day  Entered: Jan 22 2022  1:57PM
This patient has been assessed with a concern for Malnutrition and has been determined to have a diagnosis/diagnoses of Severe protein-calorie malnutrition.    This patient is being managed with:   Diet DASH/TLC-  Sodium & Cholesterol Restricted  Supplement Feeding Modality:  Oral  Ensure Clear Cans or Servings Per Day:  1       Frequency:  Three Times a day  Entered: Jan 22 2022  1:57PM    
This patient has been assessed with a concern for Malnutrition and has been determined to have a diagnosis/diagnoses of Severe protein-calorie malnutrition.    This patient is being managed with:   Diet DASH/TLC-  Sodium & Cholesterol Restricted  Supplement Feeding Modality:  Oral  Ensure Clear Cans or Servings Per Day:  1       Frequency:  Three Times a day  Entered: Jan 22 2022  1:57PM    
This patient has been assessed with a concern for Malnutrition and has been determined to have a diagnosis/diagnoses of Severe protein-calorie malnutrition.    This patient is being managed with:   Diet DASH/ TLC-  Sodium & Cholesterol Restricted  Supplement Feeding Modality:  Oral  Ensure Clear Cans or Servings Per Day:  1       Frequency:  Three Times a day  Entered: Jan 22 2022  1:57PM
This patient has been assessed with a concern for Malnutrition and has been determined to have a diagnosis/diagnoses of Severe protein-calorie malnutrition.    This patient is being managed with:   Diet DASH/ TLC-  Sodium & Cholesterol Restricted  Supplement Feeding Modality:  Oral  Ensure Clear Cans or Servings Per Day:  1  Frequency:  Three Times a day  Entered: Jan 22 2022  1:57PM
This patient has been assessed with a concern for Malnutrition and has been determined to have a diagnosis/diagnoses of Severe protein-calorie malnutrition.    This patient is being managed with:   Diet DASH/TLC-  Sodium & Cholesterol Restricted  1200mL Fluid Restriction ([id number])  Supplement Feeding Modality:  Oral  Ensure Clear Cans or Servings Per Day:  1       Frequency:  Three Times a day  Entered: Jan 26 2022  2:58PM    
This patient has been assessed with a concern for Malnutrition and has been determined to have a diagnosis/diagnoses of Severe protein-calorie malnutrition.    This patient is being managed with:   Diet DASH/TLC-  Sodium & Cholesterol Restricted  Entered: Jan 17 2022  9:08AM

## 2022-01-27 NOTE — PROGRESS NOTE ADULT - ASSESSMENT
87M with a history of atrial fibrillation, rheumatoid arthritis, and coronary artery disease with recent hospitalization requiring percutaneous coronary intervention who presented with dyspnea on exertion and orthopnea with lower extremity edema. He was found to have elevated BNP and chest X ray with pulmonary vascular congestion consistent with heart failure and furosemide was initiated for diuresis.    # Thrombocytopenia- improving well   likely sec to MAB given  Monitor and trend  No active bleed noted    # COVID +  Cough suppressants  Monitor for Hypoxia  Currently on room air  Vaccinated [employer] in Dec 2021.   Markers noted  ID consult- s/p Sotrimuvab on 1/24    # C/O Dizziness- now resolved  Orthostatics negative  s/p SBP in 100s to 110s range- now improved well  To allow for SBP of 120 range d/c Hydralazine and Imdur  Cont Coreg at reduced dose  Torsemide reduced to 20mg daily. CXR pulm vasc congestion resolved    Can add Imdur + Hydralazine o/p as tolerated    # Atrial fibrillation - Chronic. s/p RVR   On apixaban for anticoagulation.  Cont Coreg at lower dose  Dig initiated. Renal fxn improved. Levels in AM    # Acute on chronic systolic heart failure - resolved  Prior echocardiogram on 1/2/22 noted an ejection fraction of 20-25%.   On carvedilol, Currently hydralazine, and isosorbide on hold as stated above. Can be initiated o/p gradually as tolerated  Hypoxia resolved.   Suspect component of anxiety.   Torsemide reduced to 20mg   Cardiology following    # Coronary artery disease -   Noted to have recent percutaneous coronary intervention.   On aspirin and clopidogrel.   Troponin elevated in the setting of acute kidney injury.    # Acute kidney injury - improving  Monitoring renal function while on torsemide.   To allow for some degree of azotemia in the setting of heart failure.   Losartan was previously held.   Monitoring urine output.  Cont NaHco3 PO reduce to daily    # Hyponatremia - improving  To continue monitor with diuresis.  Fluid restriction to 1200ml    # Hypokalemia - Replete  reduce bicarb to daily    # Rheumatoid arthritis - The patient reported that he was previously taking prednisone but not consistently.    # Leukocytosis - Afebrile. Improved from admission.    # Anxiety - On diazepam.    on renal dose Eliquis    Dispo- LOS- 5 days since COVID + (possible d/c on 1/28), if remains stable  Spouse called and updated.   
87M with a history of atrial fibrillation, rheumatoid arthritis, and coronary artery disease with recent hospitalization requiring percutaneous coronary intervention who presented with dyspnea on exertion and orthopnea with lower extremity edema. He was found to have elevated BNP and chest Xray with pulmonary vascular congestion consistent with heart failure and furosemide was initiated for diuresis.    # COVID +  Cough suppressants  Monitor for Hypoxia  Currently on room air  Vaccinated [employer] in Dec 2021.   Markers noted  ID consult- s/p Sotrimuvab on 1/24  rpt markers in AM    # C/O Dizziness- nwo resolved  Orthostatics negative  s/p SBP in 100s to 110s range- now improved well  To allow for SBP of 120 range d/c Hydralazine and Imdur  Cont Coreg at reduced dose  Torsemide reduced to 20mg daily. CXR pulm vasc congestion resolved    Now BP improved. Will trial low dose Imdur in addition to his current CHF meds    # Atrial fibrillation - Chronic. s/p RVR   On apixaban for anticoagulation.  Cont Coreg at lower dose  Dig initiated. Renal fxn improved. Levels low    # Acute on chronic systolic heart failure - resolved  Prior echocardiogram on 1/2/22 noted an ejection fraction of 20-25%.   On carvedilol, Currently hydralazine, and isosorbide on hold as stated above. Can be initiated o/p gradually as tolerated  Hypoxia resolved.   Suspect component of anxiety.   Torsemide reduced to 20mg   Cardiology following    Now BP improved. Will trial low dose Imdur in addition to his current CHF meds    # Coronary artery disease -   Noted to have recent percutaneous coronary intervention.   On aspirin and clopidogrel.   Troponin elevated in the setting of acute kidney injury.    # Acute kidney injury - improving  Monitoring renal function while on torsemide.   To allow for some degree of azotemia in the setting of heart failure.   Losartan was previously held.   Monitoring urine output.    # Hyponatremia - improved  To continue monitor with diuresis.    # Hypokalemia - Improved with potassium supplementation.    # Rheumatoid arthritis - The patient reported that he was previously taking prednisone but not consistently.    # Leukocytosis - Afebrile. Improved from admission.    # Anxiety - On diazepam.    on renal dose Eliquis    Dispo- Pt now open to considering [facility] for PT and getting stronger. CM made aware.   LOS- 5 days since COVID +, if remains stable  Spouse called and updated.   
87M with a history of atrial fibrillation, rheumatoid arthritis, and coronary artery disease with recent hospitalization requiring percutaneous coronary intervention who presented with dyspnea on exertion and orthopnea with lower extremity edema. He was found to have elevated BNP and chest Xray with pulmonary vascular congestion consistent with heart failure and furosemide was initiated for diuresis.    # COVID +  Cough suppressants  Monitor for Hypoxia  Currently on room air  Vaccinated [employer] in Dec 2021.   Markers in AM  ID consulted to evaluate for Sotrimuvab    # DIzziness  Orthostatics negative  SBP in 100s to 110s range  To allow for SBP of 120 range d/c Hydralazine and Imdur  Cont Coreg   Torsemide reduced to 20mg daily. CXR pulm vasc congestion resolved    # Atrial fibrillation - Chronic. with RVR overnight into this AM  On apixaban for anticoagulation.  Was on Coreg 6.25 which was held last evening for above  Dig initiated today.  Dig level in AM    # Acute on chronic systolic heart failure - resolving well  Prior echocardiogram on 1/2/22 noted an ejection fraction of 20-25%.   On carvedilol, Currently hydralazine, and isosorbide on hold as stated above. Can be initiated o/p gradually as tolerated  Hypoxia resolved.   Suspect component of anxiety.   Torsemide reduced to 20mg today  Cardiology following    # Coronary artery disease -   Noted to have recent percutaneous coronary intervention.   On aspirin and clopidogrel.   Troponin elevated in the setting of acute kidney injury.    # Acute kidney injury - improving  Monitoring renal function while on torsemide.   To allow for some degree of azotemia in the setting of heart failure.   Losartan was previously held.   Monitoring urine output.    # Hyponatremia - improved  To continue monitor with diuresis.    # Hypokalemia - Improved with potassium supplementation.    # Rheumatoid arthritis - The patient reported that he was previously taking prednisone but not consistently.    # Leukocytosis - Afebrile. Improved from admission.    # Anxiety - On diazepam.    on renal dose Eliquis    Dispo- Pt now open to considering [facility] for PT and getting stronger  LOS- 2-3 days, if remains stable  Spouse called and updated  
87y  Male with recent hospitalization for STEMI requiring percutaneous coronary intervention with stent placement to the RCA and LAD presented with increasing dyspnea on exertion and also at rest. The patient reported mild dyspnea after discharge from the hospital which continued to increase. He reported swelling in the legs and shortness of breath when lying down which had made it difficult to sleep. He denied any chest pain or palpitations. Patient was managed for Acute on chronic systolic heart failure and JOE. Patient initially tested negative for COVID 19 on admission. While in the hospital developed cough 1/22 and tested positive for COVID 19 1/23/22. Reports being vaccinated last year with J&J vaccine x1 with no second shot. ID input requested.         Acute on chronic systolic heart failure   COVID-19 infection  cough  JOE      - COVID 19 PCR positive on 1/23/22  - CXR with no PNA   - Continue supportive care measures  - continue to trend inflammatory markers.   - trend CBC with diff, CMP,  CRP, Ferritin, D Dimer  procalcitonin q 48hours  - Avoid antibiotics unless there is a concern for a bacterial infection  - May consider vitamin C, thiamine and zinc (note lack of evidence to support benefit with COVID 19)  - Discussed Sotrovimab with the patient. Discussed EUA, AE, risks vs benefits. Patient agreeable to trial Sotrovimab, consent obtained. Approval from Dr Saunders (medical Director) Obtained.   - S/p Sotrovimab 500mg IV x1 dose 1/24/22  - Trend Fever  - Trend WBC      Thank you for allowing me to participate in the care of your patient.   Will sign off. Please call PRN.   
87M with a history of atrial fibrillation, rheumatoid arthritis, and coronary artery disease with recent hospitalization requiring percutaneous coronary intervention who presented with dyspnea on exertion and orthopnea with lower extremity edema. He was found to have elevated BNP and chest Xray with pulmonary vascular congestion consistent with heart failure and furosemide was initiated for diuresis.    # COVID +  Cough suppressants  Monitor for Hypoxia  Currently on room air  Vaccinated [facility] in Dec 2021.   Markers noted  ID consulted to evaluate for Sotrimuvab    # C/O DIzziness  Orthostatics negative  SBP in 100s to 110s range  To allow for SBP of 120 range d/c Hydralazine and Imdur  Cont Coreg  at reduced dose  Torsemide reduced to 20mg daily. CXR pulm vasc congestion resolved    # Atrial fibrillation - Chronic. s/p RVR   On apixaban for anticoagulation.  Cont Coreg at lower dose  Dig initiated. Renal fxn improved. Levels low    # Acute on chronic systolic heart failure - resolved  Prior echocardiogram on 1/2/22 noted an ejection fraction of 20-25%.   On carvedilol, Currently hydralazine, and isosorbide on hold as stated above. Can be initiated o/p gradually as tolerated  Hypoxia resolved.   Suspect component of anxiety.   Torsemide reduced to 20mg   Cardiology following    # Coronary artery disease -   Noted to have recent percutaneous coronary intervention.   On aspirin and clopidogrel.   Troponin elevated in the setting of acute kidney injury.    # Acute kidney injury - improving  Monitoring renal function while on torsemide.   To allow for some degree of azotemia in the setting of heart failure.   Losartan was previously held.   Monitoring urine output.    # Hyponatremia - improved  To continue monitor with diuresis.    # Hypokalemia - Improved with potassium supplementation.    # Rheumatoid arthritis - The patient reported that he was previously taking prednisone but not consistently.    # Leukocytosis - Afebrile. Improved from admission.    # Anxiety - On diazepam.    on renal dose Eliquis    Dispo- Pt now open to considering [facility] for PT and getting stronger  LOS- 2-3 days, if remains stable  Spouse called and updated. Family wants to talk to CM regarding [facility] choices. Same message was conveyed to CCC today.  
87M with a history of atrial fibrillation, rheumatoid arthritis, and coronary artery disease with recent hospitalization requiring percutaneous coronary intervention who presented with dyspnea on exertion and orthopnea with lower extremity edema. He was found to have elevated BNP and chest Xray with pulmonary vascular congestion consistent with heart failure and furosemide was initiated for diuresis.    # Dry cough  Cough suppressants  IF symptoms worsen or warrant will check COVID/ Viral panel    # Acute on chronic systolic heart failure - resolving well  Prior echocardiogram on 1/2/22 noted an ejection fraction of 20-25%.   On carvedilol, hydralazine, and isosorbide.   Hypoxia resolved.   Suspect component of anxiety.   to continue on torsemide   Cardiology following    # Coronary artery disease -   Noted to have recent percutaneous coronary intervention.   On aspirin and clopidogrel.   Troponin elevated in the setting of acute kidney injury.    # Atrial fibrillation - Chronic, rate controlled  On metoprolol with apixaban for anticoagulation.    # Acute kidney injury - imrpoving  Monitoring renal function while on torsemide.   To allow for some degree of azotemia in the setting of heart failure.   Losartan was previously held.   Monitoring urine output.    # Hyponatremia - imrpoved  To continue monitor with diuresis.    # Hypokalemia - Improved with potassium supplementation.    # Rheumatoid arthritis - The patient reported that he was previously taking prednisone but not consistently.    # Leukocytosis - Afebrile. Improved from admission.    # Anxiety - On diazepam.    on renal dose Eliquis    Pt medically clear for discharge home with Home PT. However he states weakness and unable to self care at home. Requested Daily PT sessions in house.  Also upset and unhappy about being on Plavix. states it gives him ankle edema  
88 yo male mult med problems (see above) now being tx'd for CHF.    - pt is laying flat - no sob or increase LE edema.  No rales on exam. appears euvolemic  - pt only complaint is mild cough  - please cont current meds  - use po diuretics  - medicine to check and supplement electrolytes  - f/u with [facility] upon discharge.  - d/w pt in detail  - labs reviewed
87M with a history of atrial fibrillation, rheumatoid arthritis, and coronary artery disease with recent hospitalization requiring percutaneous coronary intervention who presented with dyspnea on exertion and orthopnea with lower extremity edema. He was found to have elevated BNP and chest X ray with pulmonary vascular congestion consistent with heart failure and furosemide was initiated for diuresis.    # Thrombocytopenia  likely sec to MAB given  Monitor and trend  IF < 50K need to hold Eliquis and Antiplatelets until platelets recovered  No active bleed noted    # COVID +  Cough suppressants  Monitor for Hypoxia  Currently on room air  Vaccinated [employer] in Dec 2021.   Markers noted  ID consult- s/p Sotrimuvab on 1/24    # C/O Dizziness- now resolved  Orthostatics negative  s/p SBP in 100s to 110s range- now improved well  To allow for SBP of 120 range d/c Hydralazine and Imdur  Cont Coreg at reduced dose  Torsemide reduced to 20mg daily. CXR pulm vasc congestion resolved    Can add Imdur + Hydralazine o/p as tolerated    # Atrial fibrillation - Chronic. s/p RVR   On apixaban for anticoagulation.  Cont Coreg at lower dose  Dig initiated. Renal fxn improved. Levels in AM    # Acute on chronic systolic heart failure - resolved  Prior echocardiogram on 1/2/22 noted an ejection fraction of 20-25%.   On carvedilol, Currently hydralazine, and isosorbide on hold as stated above. Can be initiated o/p gradually as tolerated  Hypoxia resolved.   Suspect component of anxiety.   Torsemide reduced to 20mg   Cardiology following    # Coronary artery disease -   Noted to have recent percutaneous coronary intervention.   On aspirin and clopidogrel.   Troponin elevated in the setting of acute kidney injury.    # Acute kidney injury - improving  Monitoring renal function while on torsemide.   To allow for some degree of azotemia in the setting of heart failure.   Losartan was previously held.   Monitoring urine output.  Cont NaHco3 PO    # Hyponatremia - improving  To continue monitor with diuresis.  Fluid restriction to 1200ml    # Hypokalemia - Replete    # Rheumatoid arthritis - The patient reported that he was previously taking prednisone but not consistently.    # Leukocytosis - Afebrile. Improved from admission.    # Anxiety - On diazepam.    on renal dose Eliquis    Dispo- LOS- 5 days since COVID + (possible d/c on 1/28), if remains stable  Spouse called and updated.

## 2022-01-27 NOTE — PROGRESS NOTE ADULT - SUBJECTIVE AND OBJECTIVE BOX
HEALTH ISSUES - PROBLEM Dx:    CHF, JOE, COVID +    INTERVAL HPI/ OVERNIGHT EVENTS:    comfortable.  doing better everyday  feels better    REVIEW OF SYSTEMS:    as above    Vital Signs Last 24 Hrs  T(C): 37 (27 Jan 2022 16:18), Max: 37 (27 Jan 2022 16:18)  T(F): 98.6 (27 Jan 2022 16:18), Max: 98.6 (27 Jan 2022 16:18)  HR: 75 (27 Jan 2022 16:18) (75 - 103)  BP: 111/65 (27 Jan 2022 16:18) (107/63 - 136/83)  BP(mean): --  RR: 18 (27 Jan 2022 16:18) (17 - 18)  SpO2: 98% (27 Jan 2022 16:18) (94% - 99%)    PHYSICAL EXAM-  GENERAL: Comfortable, sitting up in bed, on room air, no distress  HEAD:  Atraumatic, Normocephalic  EYES: EOMI, conjunctiva and sclera clear  ENT: Moist mucous membranes, No lesions  NECK: Supple, No JVD, Normal thyroid  NERVOUS SYSTEM:  Alert & Oriented X 3, Motor Strength 5/5 B/L upper and lower extremities  CHEST/LUNG: CTA bilaterally; No rales, rhonchi, wheezing, or rubs  HEART: Regular rate and rhythm; No murmurs, rubs, or gallops  ABDOMEN: Soft, Nontender, Nondistended; Bowel sounds present  EXTREMITIES:  2+ Peripheral Pulses, No clubbing, cyanosis, or edema  SKIN: No rashes or lesions    MEDICATIONS  (STANDING):  apixaban 2.5 milliGRAM(s) Oral every 12 hours  aspirin  chewable 81 milliGRAM(s) Oral daily  carvedilol 3.125 milliGRAM(s) Oral every 12 hours  clopidogrel Tablet 75 milliGRAM(s) Oral daily  digoxin     Tablet 125 MICROGram(s) Oral daily  influenza  Vaccine (HIGH DOSE) 0.7 milliLiter(s) IntraMuscular once  pantoprazole    Tablet 40 milliGRAM(s) Oral before breakfast  potassium chloride   Powder 40 milliEquivalent(s) Oral every 4 hours  simvastatin 40 milliGRAM(s) Oral at bedtime  sodium bicarbonate 650 milliGRAM(s) Oral daily  torsemide 20 milliGRAM(s) Oral daily    MEDICATIONS  (PRN):  guaiFENesin Oral Liquid (Sugar-Free) 200 milliGRAM(s) Oral every 6 hours PRN Cough  melatonin 3 milliGRAM(s) Oral at bedtime PRN Sleep  polyethylene glycol 3350 17 Gram(s) Oral daily PRN Constipation  senna 2 Tablet(s) Oral at bedtime PRN Constipation      LABS:                        12.8   4.22  )-----------( 101      ( 27 Jan 2022 08:30 )             37.8     01-27    133<L>  |  94<L>  |  45.3<H>  ----------------------------<  112<H>  3.0<L>   |  25.0  |  1.34<H>    Ca    8.0<L>      27 Jan 2022 08:30    TPro  5.5<L>  /  Alb  2.8<L>  /  TBili  1.0  /  DBili  x   /  AST  29  /  ALT  16  /  AlkPhos  68  01-26

## 2022-01-27 NOTE — PROGRESS NOTE ADULT - PROVIDER SPECIALTY LIST ADULT
Cardiology
Hospitalist
Cardiology
Cardiology
Hospitalist
Internal Medicine
Infectious Disease
Internal Medicine

## 2022-01-28 ENCOUNTER — TRANSCRIPTION ENCOUNTER (OUTPATIENT)
Age: 87
End: 2022-01-28

## 2022-01-28 VITALS
TEMPERATURE: 98 F | OXYGEN SATURATION: 98 % | RESPIRATION RATE: 18 BRPM | SYSTOLIC BLOOD PRESSURE: 125 MMHG | DIASTOLIC BLOOD PRESSURE: 72 MMHG | HEART RATE: 75 BPM

## 2022-01-28 LAB
ANION GAP SERPL CALC-SCNC: 11 MMOL/L — SIGNIFICANT CHANGE UP (ref 5–17)
BUN SERPL-MCNC: 35.5 MG/DL — HIGH (ref 8–20)
CALCIUM SERPL-MCNC: 8 MG/DL — LOW (ref 8.6–10.2)
CHLORIDE SERPL-SCNC: 99 MMOL/L — SIGNIFICANT CHANGE UP (ref 98–107)
CO2 SERPL-SCNC: 24 MMOL/L — SIGNIFICANT CHANGE UP (ref 22–29)
CREAT SERPL-MCNC: 1.24 MG/DL — SIGNIFICANT CHANGE UP (ref 0.5–1.3)
GLUCOSE SERPL-MCNC: 113 MG/DL — HIGH (ref 70–99)
HCT VFR BLD CALC: 38.1 % — LOW (ref 39–50)
HGB BLD-MCNC: 12.7 G/DL — LOW (ref 13–17)
MAGNESIUM SERPL-MCNC: 2.1 MG/DL — SIGNIFICANT CHANGE UP (ref 1.6–2.6)
MCHC RBC-ENTMCNC: 30.5 PG — SIGNIFICANT CHANGE UP (ref 27–34)
MCHC RBC-ENTMCNC: 33.3 GM/DL — SIGNIFICANT CHANGE UP (ref 32–36)
MCV RBC AUTO: 91.4 FL — SIGNIFICANT CHANGE UP (ref 80–100)
PHOSPHATE SERPL-MCNC: 2 MG/DL — LOW (ref 2.4–4.7)
PLATELET # BLD AUTO: 112 K/UL — LOW (ref 150–400)
POTASSIUM SERPL-MCNC: 3.8 MMOL/L — SIGNIFICANT CHANGE UP (ref 3.5–5.3)
POTASSIUM SERPL-SCNC: 3.8 MMOL/L — SIGNIFICANT CHANGE UP (ref 3.5–5.3)
RBC # BLD: 4.17 M/UL — LOW (ref 4.2–5.8)
RBC # FLD: 14.6 % — HIGH (ref 10.3–14.5)
SARS-COV-2 RNA SPEC QL NAA+PROBE: DETECTED
SODIUM SERPL-SCNC: 134 MMOL/L — LOW (ref 135–145)
WBC # BLD: 5.21 K/UL — SIGNIFICANT CHANGE UP (ref 3.8–10.5)
WBC # FLD AUTO: 5.21 K/UL — SIGNIFICANT CHANGE UP (ref 3.8–10.5)

## 2022-01-28 PROCEDURE — 85730 THROMBOPLASTIN TIME PARTIAL: CPT

## 2022-01-28 PROCEDURE — 87637 SARSCOV2&INF A&B&RSV AMP PRB: CPT

## 2022-01-28 PROCEDURE — 36415 COLL VENOUS BLD VENIPUNCTURE: CPT

## 2022-01-28 PROCEDURE — 84100 ASSAY OF PHOSPHORUS: CPT

## 2022-01-28 PROCEDURE — 99239 HOSP IP/OBS DSCHRG MGMT >30: CPT

## 2022-01-28 PROCEDURE — 85027 COMPLETE CBC AUTOMATED: CPT

## 2022-01-28 PROCEDURE — 86140 C-REACTIVE PROTEIN: CPT

## 2022-01-28 PROCEDURE — 84443 ASSAY THYROID STIM HORMONE: CPT

## 2022-01-28 PROCEDURE — 96374 THER/PROPH/DIAG INJ IV PUSH: CPT

## 2022-01-28 PROCEDURE — 99285 EMERGENCY DEPT VISIT HI MDM: CPT | Mod: 25

## 2022-01-28 PROCEDURE — 85025 COMPLETE CBC W/AUTO DIFF WBC: CPT

## 2022-01-28 PROCEDURE — 84145 PROCALCITONIN (PCT): CPT

## 2022-01-28 PROCEDURE — 80053 COMPREHEN METABOLIC PANEL: CPT

## 2022-01-28 PROCEDURE — U0005: CPT

## 2022-01-28 PROCEDURE — 83880 ASSAY OF NATRIURETIC PEPTIDE: CPT

## 2022-01-28 PROCEDURE — U0003: CPT

## 2022-01-28 PROCEDURE — 83615 LACTATE (LD) (LDH) ENZYME: CPT

## 2022-01-28 PROCEDURE — 82728 ASSAY OF FERRITIN: CPT

## 2022-01-28 PROCEDURE — 93005 ELECTROCARDIOGRAM TRACING: CPT

## 2022-01-28 PROCEDURE — 80048 BASIC METABOLIC PNL TOTAL CA: CPT

## 2022-01-28 PROCEDURE — 83735 ASSAY OF MAGNESIUM: CPT

## 2022-01-28 PROCEDURE — 71046 X-RAY EXAM CHEST 2 VIEWS: CPT

## 2022-01-28 PROCEDURE — 80162 ASSAY OF DIGOXIN TOTAL: CPT

## 2022-01-28 PROCEDURE — 84484 ASSAY OF TROPONIN QUANT: CPT

## 2022-01-28 PROCEDURE — 97530 THERAPEUTIC ACTIVITIES: CPT

## 2022-01-28 PROCEDURE — 85379 FIBRIN DEGRADATION QUANT: CPT

## 2022-01-28 PROCEDURE — 85610 PROTHROMBIN TIME: CPT

## 2022-01-28 PROCEDURE — 97116 GAIT TRAINING THERAPY: CPT

## 2022-01-28 RX ORDER — APIXABAN 2.5 MG/1
1 TABLET, FILM COATED ORAL
Qty: 0 | Refills: 0 | DISCHARGE
Start: 2022-01-28

## 2022-01-28 RX ORDER — SIMVASTATIN 20 MG/1
1 TABLET, FILM COATED ORAL
Qty: 0 | Refills: 0 | DISCHARGE
Start: 2022-01-28

## 2022-01-28 RX ORDER — DIAZEPAM 5 MG
1 TABLET ORAL
Qty: 0 | Refills: 0 | DISCHARGE
Start: 2022-01-28

## 2022-01-28 RX ORDER — DIGOXIN 250 MCG
1 TABLET ORAL
Qty: 0 | Refills: 0 | DISCHARGE
Start: 2022-01-28

## 2022-01-28 RX ORDER — LANOLIN ALCOHOL/MO/W.PET/CERES
1 CREAM (GRAM) TOPICAL
Qty: 0 | Refills: 0 | DISCHARGE
Start: 2022-01-28

## 2022-01-28 RX ORDER — DIAZEPAM 5 MG
1 TABLET ORAL
Qty: 0 | Refills: 0 | DISCHARGE

## 2022-01-28 RX ORDER — CLOPIDOGREL BISULFATE 75 MG/1
1 TABLET, FILM COATED ORAL
Qty: 0 | Refills: 0 | DISCHARGE
Start: 2022-01-28

## 2022-01-28 RX ORDER — CARVEDILOL PHOSPHATE 80 MG/1
1 CAPSULE, EXTENDED RELEASE ORAL
Qty: 0 | Refills: 0 | DISCHARGE
Start: 2022-01-28

## 2022-01-28 RX ORDER — POLYETHYLENE GLYCOL 3350 17 G/17G
17 POWDER, FOR SOLUTION ORAL
Qty: 0 | Refills: 0 | DISCHARGE
Start: 2022-01-28

## 2022-01-28 RX ADMIN — CLOPIDOGREL BISULFATE 75 MILLIGRAM(S): 75 TABLET, FILM COATED ORAL at 11:59

## 2022-01-28 RX ADMIN — Medication 125 MICROGRAM(S): at 05:07

## 2022-01-28 RX ADMIN — Medication 81 MILLIGRAM(S): at 11:59

## 2022-01-28 RX ADMIN — PANTOPRAZOLE SODIUM 40 MILLIGRAM(S): 20 TABLET, DELAYED RELEASE ORAL at 05:06

## 2022-01-28 RX ADMIN — APIXABAN 2.5 MILLIGRAM(S): 2.5 TABLET, FILM COATED ORAL at 05:06

## 2022-01-28 RX ADMIN — Medication 85 MILLIMOLE(S): at 12:06

## 2022-01-28 RX ADMIN — Medication 20 MILLIGRAM(S): at 05:06

## 2022-01-28 NOTE — DISCHARGE NOTE PROVIDER - NSDCMRMEDTOKEN_GEN_ALL_CORE_FT
apixaban 2.5 mg oral tablet: 1 tab(s) orally every 12 hours  carvedilol 3.125 mg oral tablet: 1 tab(s) orally every 12 hours  clopidogrel 75 mg oral tablet: 1 tab(s) orally once a day  digoxin 125 mcg (0.125 mg) oral tablet: 1 tab(s) orally once a day  Ecotrin Adult Low Strength 81 mg oral delayed release tablet: 1 tab(s) orally once a day  guaiFENesin 100 mg/5 mL oral liquid: 10 milliliter(s) orally every 6 hours, As needed, Cough  melatonin 3 mg oral tablet: 1 tab(s) orally once a day (at bedtime), As needed, Sleep  polyethylene glycol 3350 oral powder for reconstitution: 17 gram(s) orally once a day, As needed, Constipation  senna oral tablet: 2 tab(s) orally once (at bedtime)  simvastatin 40 mg oral tablet: 1 tab(s) orally once a day (at bedtime)  sodium phosphate dibasic compounding powder: 1 dose(s) compounding once a day  torsemide 20 mg oral tablet: 1 tab(s) orally once a day   apixaban 2.5 mg oral tablet: 1 tab(s) orally every 12 hours  carvedilol 3.125 mg oral tablet: 1 tab(s) orally every 12 hours  clopidogrel 75 mg oral tablet: 1 tab(s) orally once a day  diazePAM 2 mg oral tablet: 1 tab(s) orally once a day (at bedtime)  digoxin 125 mcg (0.125 mg) oral tablet: 1 tab(s) orally once a day  Ecotrin Adult Low Strength 81 mg oral delayed release tablet: 1 tab(s) orally once a day  guaiFENesin 100 mg/5 mL oral liquid: 10 milliliter(s) orally every 6 hours, As needed, Cough  polyethylene glycol 3350 oral powder for reconstitution: 17 gram(s) orally once a day, As needed, Constipation  senna oral tablet: 2 tab(s) orally once (at bedtime)  simvastatin 40 mg oral tablet: 1 tab(s) orally once a day (at bedtime)  sodium phosphate dibasic compounding powder: 1 dose(s) compounding once a day  torsemide 20 mg oral tablet: 1 tab(s) orally once a day

## 2022-01-28 NOTE — DISCHARGE NOTE PROVIDER - CARE PROVIDER_API CALL
Key Fischer)  Cardiovascular Disease; Internal Medicine  [address], [address]  [place] [address]  Phone: [phone]  Fax: [phone]  Follow Up Time:     Aries Dorsey)  Internal Medicine; Nephrology  [address], [address]  [place], [place] [id number]  Phone: [phone]  Fax: [phone]  Follow Up Time:     RHeumatology,   Phone: (   )    -  Fax: (   )    -  Follow Up Time:     PMD,   Phone: (   )    -  Fax: (   )    -  Follow Up Time:

## 2022-01-28 NOTE — DISCHARGE NOTE PROVIDER - NSDCFUADDAPPT_GEN_ALL_CORE_FT
YOU HAVE A FOLLOW UP CARDIOLOGIST APPOINTMENT WITH DR. JENKINS ON 2/4 AT 11AM [phone]  SNF FACILITY WILL MANAGE MEDICATIONS.

## 2022-01-28 NOTE — DISCHARGE NOTE PROVIDER - HOSPITAL COURSE
87M with a history of atrial fibrillation, rheumatoid arthritis, and coronary artery disease with recent hospitalization requiring percutaneous coronary intervention who presented with dyspnea on exertion and orthopnea with lower extremity edema. He was found to have elevated BNP and chest X ray with pulmonary vascular congestion consistent with heart failure and furosemide was initiated for diuresis. GDMT was started for CHF however he did not tolerate SBP being in 110 range. So now remains on coreg alone. along with diuretic. his severe JOE is resolving well with treatment. He turned COVID + during hospitalization and was given MAB. he is asymptomatic. Now ready for [facility] after 5 days of quarantine here.    TIME 52 mins

## 2022-01-28 NOTE — DISCHARGE NOTE PROVIDER - DETAILS OF MALNUTRITION DIAGNOSIS/DIAGNOSES
This patient has been assessed with a concern for Malnutrition and was treated during this hospitalization for the following Nutrition diagnosis/diagnoses:     -  01/22/2022: Severe protein-calorie malnutrition

## 2022-01-28 NOTE — DISCHARGE NOTE PROVIDER - PROVIDER TOKENS
PROVIDER:[TOKEN:[[id number]]],PROVIDER:[TOKEN:[[id number]]],FREE:[LAST:[RHeumatology],PHONE:[(   )    -],FAX:[(   )    -]],FREE:[LAST:[PMD],PHONE:[(   )    -],FAX:[(   )    -]]

## 2022-01-28 NOTE — DISCHARGE NOTE PROVIDER - NSDCFUADDINST_GEN_ALL_CORE_FT
1- His prednisone is on hold. He did not have adrenal insufficiency in the hospital. to f/u rheumatology for his RA and then assess Prednisone  2- When he is more active and comfortable with BP, can add low dose hydralazine and Imdur gradually with cardiology f/u  3- MOnitor for COVID related long term symptoms

## 2022-01-28 NOTE — DISCHARGE NOTE PROVIDER - ATTENDING DISCHARGE PHYSICAL EXAMINATION:
ICU Vital Signs Last 24 Hrs  T(C): 36.5 (28 Jan 2022 08:50), Max: 37 (27 Jan 2022 16:18)  T(F): 97.7 (28 Jan 2022 08:50), Max: 98.6 (27 Jan 2022 16:18)  HR: 75 (28 Jan 2022 08:50) (75 - 78)  BP: 112/62 (28 Jan 2022 08:50) (107/60 - 135/70)  BP(mean): --  ABP: --  ABP(mean): --  RR: 18 (28 Jan 2022 08:50) (18 - 18)  SpO2: 97% (28 Jan 2022 08:50) (97% - 98%)  GENERAL: Comfortable, sitting up in bed, on room air, no distress  HEAD:  Atraumatic, Normocephalic  EYES: EOMI, conjunctiva and sclera clear  ENT: Moist mucous membranes, No lesions  NECK: Supple, No JVD, Normal thyroid  NERVOUS SYSTEM:  Alert & Oriented X 3, Motor Strength 5/5 B/L upper and lower extremities  CHEST/LUNG: CTA bilaterally; No rales, rhonchi, wheezing, or rubs  HEART: Regular rate and rhythm; No murmurs, rubs, or gallops  ABDOMEN: Soft, Nontender, Nondistended; Bowel sounds present  EXTREMITIES:  2+ Peripheral Pulses, No clubbing, cyanosis, or edema  SKIN: No rashes or lesions

## 2022-01-28 NOTE — DISCHARGE NOTE PROVIDER - NSDCCPCAREPLAN_GEN_ALL_CORE_FT
PRINCIPAL DISCHARGE DIAGNOSIS  Diagnosis: Acute systolic congestive heart failure  Assessment and Plan of Treatment:       SECONDARY DISCHARGE DIAGNOSES  Diagnosis: CAD (coronary artery disease)  Assessment and Plan of Treatment:     Diagnosis: JOE (acute kidney injury)  Assessment and Plan of Treatment:     Diagnosis: Hyponatremia  Assessment and Plan of Treatment:     Diagnosis: Hypokalemia  Assessment and Plan of Treatment:     Diagnosis: Hypophosphatemia  Assessment and Plan of Treatment:     Diagnosis: Chronic atrial fibrillation  Assessment and Plan of Treatment:     Diagnosis: Rheumatoid arthritis  Assessment and Plan of Treatment:

## 2022-03-13 ENCOUNTER — INPATIENT (INPATIENT)
Facility: HOSPITAL | Age: 87
LOS: 0 days | Discharge: HOME | DRG: 291 | End: 2022-03-14
Attending: INTERNAL MEDICINE | Admitting: INTERNAL MEDICINE
Payer: COMMERCIAL

## 2022-03-13 VITALS
OXYGEN SATURATION: 98 % | SYSTOLIC BLOOD PRESSURE: 123 MMHG | HEART RATE: 142 BPM | RESPIRATION RATE: 22 BRPM | DIASTOLIC BLOOD PRESSURE: 90 MMHG | HEIGHT: 65 IN

## 2022-03-13 DIAGNOSIS — Z90.89 ACQUIRED ABSENCE OF OTHER ORGANS: Chronic | ICD-10-CM

## 2022-03-13 DIAGNOSIS — I48.91 UNSPECIFIED ATRIAL FIBRILLATION: ICD-10-CM

## 2022-03-13 DIAGNOSIS — Z95.0 PRESENCE OF CARDIAC PACEMAKER: Chronic | ICD-10-CM

## 2022-03-13 LAB
ALBUMIN SERPL ELPH-MCNC: 3.6 G/DL — SIGNIFICANT CHANGE UP (ref 3.3–5.2)
ALP SERPL-CCNC: 103 U/L — SIGNIFICANT CHANGE UP (ref 40–120)
ALT FLD-CCNC: 18 U/L — SIGNIFICANT CHANGE UP
ANION GAP SERPL CALC-SCNC: 18 MMOL/L — HIGH (ref 5–17)
AST SERPL-CCNC: 29 U/L — SIGNIFICANT CHANGE UP
BASE EXCESS BLDV CALC-SCNC: -3.7 MMOL/L — LOW (ref -2–3)
BASOPHILS # BLD AUTO: 0.07 K/UL — SIGNIFICANT CHANGE UP (ref 0–0.2)
BASOPHILS NFR BLD AUTO: 0.7 % — SIGNIFICANT CHANGE UP (ref 0–2)
BILIRUB SERPL-MCNC: 1 MG/DL — SIGNIFICANT CHANGE UP (ref 0.4–2)
BUN SERPL-MCNC: 40.9 MG/DL — HIGH (ref 8–20)
CA-I SERPL-SCNC: 1.05 MMOL/L — LOW (ref 1.15–1.33)
CALCIUM SERPL-MCNC: 8.9 MG/DL — SIGNIFICANT CHANGE UP (ref 8.6–10.2)
CHLORIDE BLDV-SCNC: 100 MMOL/L — SIGNIFICANT CHANGE UP (ref 98–107)
CHLORIDE SERPL-SCNC: 96 MMOL/L — LOW (ref 98–107)
CO2 SERPL-SCNC: 18 MMOL/L — LOW (ref 22–29)
CREAT SERPL-MCNC: 1.63 MG/DL — HIGH (ref 0.5–1.3)
EGFR: 40 ML/MIN/1.73M2 — LOW
EOSINOPHIL # BLD AUTO: 0.03 K/UL — SIGNIFICANT CHANGE UP (ref 0–0.5)
EOSINOPHIL NFR BLD AUTO: 0.3 % — SIGNIFICANT CHANGE UP (ref 0–6)
GAS PNL BLDV: 129 MMOL/L — LOW (ref 136–145)
GAS PNL BLDV: SIGNIFICANT CHANGE UP
GLUCOSE BLDV-MCNC: 141 MG/DL — HIGH (ref 70–99)
GLUCOSE SERPL-MCNC: 139 MG/DL — HIGH (ref 70–99)
HCO3 BLDV-SCNC: 21 MMOL/L — LOW (ref 22–29)
HCT VFR BLD CALC: 34.4 % — LOW (ref 39–50)
HCT VFR BLDA CALC: 34 % — SIGNIFICANT CHANGE UP
HGB BLD CALC-MCNC: 11.4 G/DL — LOW (ref 12.6–17.4)
HGB BLD-MCNC: 11.2 G/DL — LOW (ref 13–17)
IMM GRANULOCYTES NFR BLD AUTO: 0.7 % — SIGNIFICANT CHANGE UP (ref 0–1.5)
LACTATE BLDV-MCNC: 3.6 MMOL/L — HIGH (ref 0.5–2)
LYMPHOCYTES # BLD AUTO: 1.14 K/UL — SIGNIFICANT CHANGE UP (ref 1–3.3)
LYMPHOCYTES # BLD AUTO: 12.1 % — LOW (ref 13–44)
MAGNESIUM SERPL-MCNC: 2.2 MG/DL — SIGNIFICANT CHANGE UP (ref 1.6–2.6)
MCHC RBC-ENTMCNC: 31.5 PG — SIGNIFICANT CHANGE UP (ref 27–34)
MCHC RBC-ENTMCNC: 32.6 GM/DL — SIGNIFICANT CHANGE UP (ref 32–36)
MCV RBC AUTO: 96.9 FL — SIGNIFICANT CHANGE UP (ref 80–100)
MONOCYTES # BLD AUTO: 0.99 K/UL — HIGH (ref 0–0.9)
MONOCYTES NFR BLD AUTO: 10.5 % — SIGNIFICANT CHANGE UP (ref 2–14)
NEUTROPHILS # BLD AUTO: 7.13 K/UL — SIGNIFICANT CHANGE UP (ref 1.8–7.4)
NEUTROPHILS NFR BLD AUTO: 75.7 % — SIGNIFICANT CHANGE UP (ref 43–77)
NT-PROBNP SERPL-SCNC: HIGH PG/ML (ref 0–300)
PCO2 BLDV: 34 MMHG — LOW (ref 42–55)
PH BLDV: 7.4 — SIGNIFICANT CHANGE UP (ref 7.32–7.43)
PLATELET # BLD AUTO: 323 K/UL — SIGNIFICANT CHANGE UP (ref 150–400)
PO2 BLDV: 57 MMHG — HIGH (ref 25–45)
POTASSIUM BLDV-SCNC: 5.7 MMOL/L — HIGH (ref 3.5–5.1)
POTASSIUM SERPL-MCNC: 4.9 MMOL/L — SIGNIFICANT CHANGE UP (ref 3.5–5.3)
POTASSIUM SERPL-SCNC: 4.9 MMOL/L — SIGNIFICANT CHANGE UP (ref 3.5–5.3)
PROT SERPL-MCNC: 6.9 G/DL — SIGNIFICANT CHANGE UP (ref 6.6–8.7)
RBC # BLD: 3.55 M/UL — LOW (ref 4.2–5.8)
RBC # FLD: 16.5 % — HIGH (ref 10.3–14.5)
SAO2 % BLDV: 87.4 % — SIGNIFICANT CHANGE UP
SARS-COV-2 RNA SPEC QL NAA+PROBE: SIGNIFICANT CHANGE UP
SODIUM SERPL-SCNC: 132 MMOL/L — LOW (ref 135–145)
T4 AB SER-ACNC: 8.7 UG/DL — SIGNIFICANT CHANGE UP (ref 4.5–12)
TROPONIN T SERPL-MCNC: 0.02 NG/ML — SIGNIFICANT CHANGE UP (ref 0–0.06)
TSH SERPL-MCNC: 0.52 UIU/ML — SIGNIFICANT CHANGE UP (ref 0.27–4.2)
WBC # BLD: 9.43 K/UL — SIGNIFICANT CHANGE UP (ref 3.8–10.5)
WBC # FLD AUTO: 9.43 K/UL — SIGNIFICANT CHANGE UP (ref 3.8–10.5)

## 2022-03-13 PROCEDURE — 93010 ELECTROCARDIOGRAM REPORT: CPT

## 2022-03-13 PROCEDURE — 99223 1ST HOSP IP/OBS HIGH 75: CPT

## 2022-03-13 PROCEDURE — 36000 PLACE NEEDLE IN VEIN: CPT | Mod: GC

## 2022-03-13 PROCEDURE — 71045 X-RAY EXAM CHEST 1 VIEW: CPT | Mod: 26

## 2022-03-13 PROCEDURE — 99285 EMERGENCY DEPT VISIT HI MDM: CPT | Mod: 25,GC

## 2022-03-13 PROCEDURE — 76937 US GUIDE VASCULAR ACCESS: CPT | Mod: 26

## 2022-03-13 RX ORDER — METOPROLOL TARTRATE 50 MG
50 TABLET ORAL DAILY
Refills: 0 | Status: DISCONTINUED | OUTPATIENT
Start: 2022-03-13 | End: 2022-03-14

## 2022-03-13 RX ORDER — METOPROLOL TARTRATE 50 MG
50 TABLET ORAL ONCE
Refills: 0 | Status: COMPLETED | OUTPATIENT
Start: 2022-03-13 | End: 2022-03-13

## 2022-03-13 RX ORDER — PITAVASTATIN CALCIUM 1.04 MG/1
1 TABLET, FILM COATED ORAL
Qty: 0 | Refills: 0 | DISCHARGE

## 2022-03-13 RX ORDER — LANOLIN ALCOHOL/MO/W.PET/CERES
3 CREAM (GRAM) TOPICAL AT BEDTIME
Refills: 0 | Status: DISCONTINUED | OUTPATIENT
Start: 2022-03-13 | End: 2022-03-14

## 2022-03-13 RX ORDER — FUROSEMIDE 40 MG
40 TABLET ORAL ONCE
Refills: 0 | Status: COMPLETED | OUTPATIENT
Start: 2022-03-13 | End: 2022-03-13

## 2022-03-13 RX ORDER — ASPIRIN/CALCIUM CARB/MAGNESIUM 324 MG
81 TABLET ORAL DAILY
Refills: 0 | Status: DISCONTINUED | OUTPATIENT
Start: 2022-03-13 | End: 2022-03-14

## 2022-03-13 RX ORDER — ATORVASTATIN CALCIUM 80 MG/1
10 TABLET, FILM COATED ORAL AT BEDTIME
Refills: 0 | Status: DISCONTINUED | OUTPATIENT
Start: 2022-03-13 | End: 2022-03-14

## 2022-03-13 RX ORDER — DIAZEPAM 5 MG
2 TABLET ORAL AT BEDTIME
Refills: 0 | Status: DISCONTINUED | OUTPATIENT
Start: 2022-03-13 | End: 2022-03-14

## 2022-03-13 RX ORDER — ISOSORBIDE MONONITRATE 60 MG/1
30 TABLET, EXTENDED RELEASE ORAL DAILY
Refills: 0 | Status: DISCONTINUED | OUTPATIENT
Start: 2022-03-13 | End: 2022-03-14

## 2022-03-13 RX ORDER — APIXABAN 2.5 MG/1
2.5 TABLET, FILM COATED ORAL EVERY 12 HOURS
Refills: 0 | Status: DISCONTINUED | OUTPATIENT
Start: 2022-03-13 | End: 2022-03-14

## 2022-03-13 RX ORDER — ACETAMINOPHEN 500 MG
650 TABLET ORAL EVERY 6 HOURS
Refills: 0 | Status: DISCONTINUED | OUTPATIENT
Start: 2022-03-13 | End: 2022-03-14

## 2022-03-13 RX ORDER — ONDANSETRON 8 MG/1
4 TABLET, FILM COATED ORAL EVERY 8 HOURS
Refills: 0 | Status: DISCONTINUED | OUTPATIENT
Start: 2022-03-13 | End: 2022-03-14

## 2022-03-13 RX ADMIN — Medication 10 MILLIGRAM(S): at 11:29

## 2022-03-13 RX ADMIN — Medication 81 MILLIGRAM(S): at 11:29

## 2022-03-13 RX ADMIN — Medication 50 MILLIGRAM(S): at 07:16

## 2022-03-13 RX ADMIN — Medication 40 MILLIGRAM(S): at 07:56

## 2022-03-13 RX ADMIN — Medication 2 MILLIGRAM(S): at 22:52

## 2022-03-13 RX ADMIN — APIXABAN 2.5 MILLIGRAM(S): 2.5 TABLET, FILM COATED ORAL at 17:22

## 2022-03-13 NOTE — ED PROVIDER NOTE - CARE PLAN
Principal Discharge DX:	Atrial fibrillation with RVR   1 Principal Discharge DX:	Atrial fibrillation with RVR  Secondary Diagnosis:	Heart failure   Principal Discharge DX:	Atrial fibrillation with RVR  Secondary Diagnosis:	Heart failure  Secondary Diagnosis:	JOE (acute kidney injury)

## 2022-03-13 NOTE — ED PROVIDER NOTE - OBJECTIVE STATEMENT
Patient is a 88 yo male with pmhx CAD s/p MI and multiple stents, ICM, PPM, paroxysmal a fib on eliquis and metoprolol, HTN, RA complaining of SOB for several days presenting in a fib with RVR. Patient states he has had SOB at rest and on exertion for 2 days with general weakness; he denies syncope/chest pain/palpitations/dizziness/lightheadedness/falls. Patient initially presented with HR in 130s-140s, aaox3, no CP, HR came down to 90s without any intervention. Lasix and home PO metoprolol given. Patient is followed by [facility]. Denies fevers, chills, dizziness, lightheadedness, dysphagia, dysarthria, diplopia, photophobia, syncope, cough, congestion, CP, abdominal pain, neck pain, back pain, diarrhea, dysuria, hematuria, hematochezia, hematemesis, n/v, recent travel, sick contacts, leg swelling. Patient was diagnosed with COVID in January.

## 2022-03-13 NOTE — ED ADULT NURSE NOTE - OBJECTIVE STATEMENT
Pt aox3. Pt States he came to Emergency Department due to SOB. Denies pain. Reports PMHx cardiac stents and MI (1988, 2022), and osteoarthritis. Pt states "the SOB started years ago after the cardiac stent". Skin warm to touch. No edema noted. Respirations even and unlabored. Pt placed on cardiac monitor and pulse ox. pt educated on plan of care, pt able to successfully teach back plan of care to RN, RN will continue to reeducate pt during hospital stay.

## 2022-03-13 NOTE — ED PROVIDER NOTE - PROGRESS NOTE DETAILS
JK - patient stable, VSS, HR in the 90s, satting 99% on RA, aaox3, no CP or SOB currently. CXR showing worsening bilateral pulmonary edema c/f HF. [facility] consulted. Patient ready and agreeable to admission to medicine with telemetry for diuresis and further monitoring. Currently stable.

## 2022-03-13 NOTE — ED ADULT NURSE REASSESSMENT NOTE - NS ED NURSE REASSESS COMMENT FT1
Pt repeatedly moaning. Pt denying pain and states "I don't know why I am making this noise it just happens".

## 2022-03-13 NOTE — H&P ADULT - ASSESSMENT
The patient is an 88 year old male with a past medical history of Afib on Eliquis, CAD status post PCI, ischemic cardiomyopathy with an EF of 25%, hypertension, and hyperlipidemia who presented to the ER with complaints of difficulty breathing. Admitted for Afib with RVR and acute on chronic systolic CHF    Assessment/Plan:    1. Afib with rvr  - Rate improved with a dose of Lasix 40mg IV x 1 and po metoprolol 50mg   - Continue metoprolol  - Eliquis renally dosed  - Telemetry monitoring    2. Acute on chronic systolic CHF with ischemic cardiomyopathy   - Recent TTE 01/2022 with EF of 25%  - GIven a dose of IV lasix 40mg x1 today  - Daily weight  - Strict I&Os  - REassess clinically for further dosing of lasix  -  Cardiology called by ED attending    3. CKD stage 3  - Baseline creatinine of 1.4-1.6  - Monitor with diuresis    4. Hyperlipidemia  -On Statin therapy    5. CAD status post PCI  - Aspirin, BB, Statin, ACEi    6. Anxiety   - Continue Valium QHS      VTE On ELiquis

## 2022-03-13 NOTE — ED PROVIDER NOTE - ATTENDING CONTRIBUTION TO CARE
I personally saw the patient with the resident, and completed the key components of the history and physical exam. I then discussed the management plan with the resident.    88yo male with pmh of CAD s/p stents, CHF, PPM ([employer]), paroxysmal a fib on eliquis and metoprolol, HTN, RA presents with sob and generalized weakness for the past few days. Pt states that he's had difficulty sleeping due to sob. Pt staes decreased urine output lately.  Pt initially with afib with rvr rate in the 140s, rate controlled spontaneously therefore given home dose of metoprolol, also lasix for CHF exacerbation, pt with JOE, will admit for further management

## 2022-03-13 NOTE — ED ADULT TRIAGE NOTE - CHIEF COMPLAINT QUOTE
Pt. BIBA for SOB x 4 days. As per EMS, pt. recently came home from rehab. Pt. yokasta on arrival. pt. has hx of CHF, STEMI with stent placement in January. Pt. found to be tachycardic in triage,  EKG performed, hx of A-fib.

## 2022-03-13 NOTE — ED PROVIDER NOTE - PHYSICAL EXAMINATION
Constitutional: Well appearing, awake, alert, oriented to person, place, and time/situation and in no apparent distress  ENMT: Airway patent nasal mucosa clear. Mouth with normal mucosa. Throat has no vesicles no oropharyngeal exudates and uvula is midline. No blood in the oropharynx  EYES: clear bilaterally, pupils equal, round and reactive to light  Cardiac: Tachycardic, regular rhythm. Heart sounds S1, S2. No murmurs, rubs or gallops. Good capillary refill, 2+ pulses, 1+ bilateral pitting edema  Respiratory: Lungs CTAB, no use of accessory muscles, no crackles, satting 99% on RA in no distress  Gastrointestinal: Abdomen nondistended, non-tender, no rebound guarding or peritoneal signs  Genitourinary: No CVA tenderness, pelvis nontender, bladder nondistended  Musculoskeletal: Spine appears normal, range of motion is not limited, no muscle or joint tenderness  Neurological: Alert and oriented, no focal deficits, no motor or sensory deficits. CN 2-12 intact, PERRLA, EOMI, No FND, moving all extremities equally, sensation intact, No dysmetria, no ataxia, negative heel-shin, 5/5 strength   Skin: Skin normal color for race, warm, dry and intact, No evidence of rash

## 2022-03-13 NOTE — CONSULT NOTE ADULT - SUBJECTIVE AND OBJECTIVE BOX
Chhaya Espino M.D., F.A.C.C.                                                                                            [facility]                                                                                                   [address] [place] [address]                                                                                                     [phone]    COVERING FOR LICMA       The patient is a 88y Male whose cardiac risk factors include hypertension and  hyperlipidemia.      Past medical history of Afib on Eliquis, CAD status post PCI, ischemic cardiomyopathy with an EF of 25%  Hx of MI in 1988 and STEMI in 1/2022  status post arthrectomy and PCI to the RCA and incidentally was exposed to COVID status post MAB infusion.    ECHO (see below) with EF 20- 25% with moderate to severe MR.   Went to rehab and since then he has had  complaints of shortness of breath worse with exertion.   States he saw his cardiologist last week who started PO Lasix 20mg OD with minimal improvement.   Symptoms of dyspnea have worsened over the past few days associated with orthopnea.   In the ER, noted to be in rapid afib.   Given a dose of IV Lasix 40mg x 1 and PO metoprolol 50mg X1 with improvement in HR.   In ER intermittently moans and c/o SOB    No Known Allergies    Intolerances      MEDICATIONS  (STANDING):  apixaban 2.5 milliGRAM(s) Oral every 12 hours  aspirin enteric coated 81 milliGRAM(s) Oral daily  atorvastatin 10 milliGRAM(s) Oral at bedtime  diazepam    Tablet 2 milliGRAM(s) Oral at bedtime  enalapril 10 milliGRAM(s) Oral daily  metoprolol succinate ER 50 milliGRAM(s) Oral daily    MEDICATIONS  (PRN):  acetaminophen     Tablet .. 650 milliGRAM(s) Oral every 6 hours PRN Temp greater or equal to 38C (100.4F), Mild Pain (1 - 3)  aluminum hydroxide/magnesium hydroxide/simethicone Suspension 30 milliLiter(s) Oral every 4 hours PRN Dyspepsia  melatonin 3 milliGRAM(s) Oral at bedtime PRN Insomnia  ondansetron Injectable 4 milliGRAM(s) IV Push every 8 hours PRN Nausea and/or Vomiting      Acute Infectious Diseases:  No history of rheumatic fever or TB.    PAST MEDICAL & SURGICAL HISTORY:  History of hemorrhoidectomy    History of inguinal hernia repair    Absent tonsil    Pacemaker      PAST MEDICAL & SURGICAL HISTORY:  HTN (hypertension)    Hyperlipidemia    CAD (coronary artery disease)    Stented coronary artery    MI (myocardial infarction)  1988    Other persistent atrial fibrillation        Habits:  [   ] Cigarettes:  [   ] Alcohol:  [   ] Drugs:    Social History:  [   ]    [   ]  Single  [   ]  /Separate  [   ]  Children  [   ]   Job    Family History of Heart Disease:    Vital Signs Last 24 Hrs  T(C): 36.5 (13 Mar 2022 07:43), Max: 36.5 (13 Mar 2022 07:43)  T(F): 97.7 (13 Mar 2022 07:43), Max: 97.7 (13 Mar 2022 07:43)  HR: 75 (13 Mar 2022 08:01) (75 - 142)  BP: 127/74 (13 Mar 2022 08:01) (123/90 - 135/66)  BP(mean): --  RR: 20 (13 Mar 2022 08:01) (20 - 22)  SpO2: 99% (13 Mar 2022 08:01) (98% - 99%)    I&O's Detail      Constitutional:    NC/AT: Normal     HEENT: Normal     Neck:  No JVD, bruits or thyromegaly    Respiratory:   Decreased BS at bases    Cardiovascular: IRR with  2/6 systolic murmur .    Gastrointestinal: Soft without hepatosplenomegaly.    Extremities: without cyanosis, clubbing or edema.    Neurological:  Oriented   x 3  . Very nervous. No gross sensory or motor defects.    Skin: Clear    Psychiatric: Normal affect. Nervous                               11.2   9.43  )-----------( 323      ( 13 Mar 2022 07:29 )             34.4     03-13    132<L>  |  96<L>  |  40.9<H>  ----------------------------<  139<H>  4.9   |  18.0<L>  |  1.63<H>    Ca    8.9      13 Mar 2022 07:29  Mg     2.2     03-13    TPro  6.9  /  Alb  3.6  /  TBili  1.0  /  DBili  x   /  AST  29  /  ALT  18  /  AlkPhos  103  03-13      CARDIAC MARKERS ( 13 Mar 2022 07:29 )  x     / 0.02 ng/mL / x     / x     / x            ECHO 1/2022   PHYSICIAN INTERPRETATION:  Left Ventricle: Endocardial visualization was enhanced with intravenous   echo contrast. The left ventricular internal cavity size is normal.  Global LV systolic function was severely decreased. Left ventricular   ejection fraction, by visual estimation, is 20 to 25%.      LV Wall Scoring:  The entire inferior wall, mid and apical anterior septum, mid and apical  inferior septum, basal and mid inferolateral wall, and apex are akinetic.   The  entire anterior wall, basal and mid anterolateral wall, basal anteroseptal  segment, basal inferoseptal segment, and apical lateral segment are  hypokinetic.    Right Ventricle: RV systolic function is mildly reduced. TV S' 0.1 m/s.  Left Atrium: Severely enlarged left atrium.  Right Atrium: The right atrium is normal in size.  Pericardium: Trivial pericardial effusion is present. The pericardial   effusion is localized near the right atrium. There is no evidence of   cardiac tamponade.  Mitral Valve: Thickening and calcification of the anterior and posterior   mitral valve leaflets. Mobility is moderately decreased for both   leaflets. Moderate to severe mitral valve regurgitation is seen.  Tricuspid Valve: Moderate tricuspid regurgitation is visualized.  Aortic Valve: Peak transaortic gradient equals 3.8 mmHg, mean transaortic   gradient equals 1.8 mmHg, the calculated aortic valve area equals 2.95   cm² by the continuity equation consistent with normally opening aortic   valve. Mild aortic valve regurgitation is seen.  Pulmonic Valve: The pulmonic valve was not well visualized. Trace   pulmonic valve regurgitation.  Aorta: The aortic root is normal in size and structure.  Pulmonary Artery: The pulmonary artery is not well seen.  Venous: The inferior vena cava was normal sized, with respiratory size   variation greater than 50%.      Summary:   1. Left ventricular ejection fraction, by visual estimation, is 20 to   25%. No LV thrombus.   2. Severely decreased global left ventricular systolic function.   3. Multiple left ventricular regional wall motion abnormalities exist.   See wall motion findings.   4. Severely enlarged left atrium.   5. There is mild septal left ventricular hypertrophy.   6. Mildly reduced RV systolic function.   7. Moderate to severe mitral valve regurgitation.   8. Thickening and calcification of the anterior and posterior mitral   valve leaflets.   9. Moderate tricuspid regurgitation.  10. Mild aortic regurgitation.  11. Endocardial visualization was enhanced with intravenous echo contrast.  12. Moderately decreased mitral leaflet mobility.    MD Tresa Electronically signed on 1/2/2022 at 4:39:04 PM        EKG: AF with rapid vr. PRWP. NSST changes.         IMPRESSION:    The patient is an 89 yo man with AF and severe LV dysfunction/moderate to severe MR and Covid in 1/2022  presents with SOB and rapid AF. AF now controlled.   Rx for acute on chronic systolic AF  Pt extremely nervous- moaning etc.   [facility]O will follow with you.

## 2022-03-13 NOTE — ED PROVIDER NOTE - CLINICAL SUMMARY MEDICAL DECISION MAKING FREE TEXT BOX
Patient is a 88 yo male with pmhx CAD s/p MI and multiple stents, ICM, PPM, paroxysmal a fib on eliquis and metoprolol, HTN, RA complaining of SOB for several days presenting in a fib with RVR. Patient states he has had SOB at rest and on exertion for 2 days with general weakness; he denies syncope/chest pain/palpitations/dizziness/lightheadedness/falls. Patient initially presented with HR in 130s-140s, aaox3, no CP, HR came down to 90s without any intervention. Lasix and home PO metoprolol given. cbc, cmp, vbg to r/o electrolyte abnormalities. CXR, BNP to r/o HF vs. PNA. troponin to r/o ACS. [facility] consulted. VSS, currently stable. Will continue to monitor.

## 2022-03-13 NOTE — H&P ADULT - HISTORY OF PRESENT ILLNESS
The patient is an 88 year old male with a past medical history of Afib on Eliquis, CAD status post PCI, ischemic cardiomyopathy with an EF of 25%, hypertension, and hyperlipidemia who presented to the ER with complaints of difficulty breathing. According to the patient, he was admitted to [facility] in January for STEMI status post arthrectomy and PCI to the RCA and incidentally was exposed to COVID status post MAB infusion.  Since then he has had complaints of shortness of breath worse with exertion. States he saw his cardiologist last week who started PO Lasix 20mg OD with minimal improvement. Symptoms of dyspnea have worsened over the past few days associated with orthopnea. In the ER, noted to be in rapid afib. Given a dose of IV Lasix 40mg x 1 and PO metoprolol 50mg X1 with improvement in HR. At the time of my examination patient is laying comfortably in bed speaking in full sentences. Then became very upset, moaning stating he feels very short of breath- vitals remained stable; after patient calmed down his breathing improved.

## 2022-03-14 ENCOUNTER — TRANSCRIPTION ENCOUNTER (OUTPATIENT)
Age: 87
End: 2022-03-14

## 2022-03-14 VITALS
TEMPERATURE: 98 F | HEART RATE: 75 BPM | DIASTOLIC BLOOD PRESSURE: 69 MMHG | SYSTOLIC BLOOD PRESSURE: 126 MMHG | OXYGEN SATURATION: 99 % | RESPIRATION RATE: 18 BRPM

## 2022-03-14 LAB
ALBUMIN SERPL ELPH-MCNC: 3.1 G/DL — LOW (ref 3.3–5.2)
ALBUMIN SERPL ELPH-MCNC: 3.1 G/DL — LOW (ref 3.3–5.2)
ALP SERPL-CCNC: 106 U/L — SIGNIFICANT CHANGE UP (ref 40–120)
ALP SERPL-CCNC: 106 U/L — SIGNIFICANT CHANGE UP (ref 40–120)
ALT FLD-CCNC: 412 U/L — HIGH
ALT FLD-CCNC: 412 U/L — HIGH
ANION GAP SERPL CALC-SCNC: 18 MMOL/L — HIGH (ref 5–17)
AST SERPL-CCNC: 387 U/L — HIGH
AST SERPL-CCNC: 387 U/L — HIGH
BILIRUB DIRECT SERPL-MCNC: 0.2 MG/DL — SIGNIFICANT CHANGE UP (ref 0–0.3)
BILIRUB DIRECT SERPL-MCNC: 0.2 MG/DL — SIGNIFICANT CHANGE UP (ref 0–0.3)
BILIRUB INDIRECT FLD-MCNC: 0.5 MG/DL — SIGNIFICANT CHANGE UP (ref 0.2–1)
BILIRUB INDIRECT FLD-MCNC: 0.5 MG/DL — SIGNIFICANT CHANGE UP (ref 0.2–1)
BILIRUB SERPL-MCNC: 0.7 MG/DL — SIGNIFICANT CHANGE UP (ref 0.4–2)
BUN SERPL-MCNC: 44.6 MG/DL — HIGH (ref 8–20)
CALCIUM SERPL-MCNC: 8.1 MG/DL — LOW (ref 8.6–10.2)
CHLORIDE SERPL-SCNC: 102 MMOL/L — SIGNIFICANT CHANGE UP (ref 98–107)
CO2 SERPL-SCNC: 19 MMOL/L — LOW (ref 22–29)
CREAT SERPL-MCNC: 1.68 MG/DL — HIGH (ref 0.5–1.3)
EGFR: 39 ML/MIN/1.73M2 — LOW
GLUCOSE SERPL-MCNC: 68 MG/DL — LOW (ref 70–99)
HCT VFR BLD CALC: 34.2 % — LOW (ref 39–50)
HGB BLD-MCNC: 10.8 G/DL — LOW (ref 13–17)
MAGNESIUM SERPL-MCNC: 2.2 MG/DL — SIGNIFICANT CHANGE UP (ref 1.6–2.6)
MCHC RBC-ENTMCNC: 30.7 PG — SIGNIFICANT CHANGE UP (ref 27–34)
MCHC RBC-ENTMCNC: 31.6 GM/DL — LOW (ref 32–36)
MCV RBC AUTO: 97.2 FL — SIGNIFICANT CHANGE UP (ref 80–100)
NT-PROBNP SERPL-SCNC: HIGH PG/ML (ref 0–300)
PLATELET # BLD AUTO: 198 K/UL — SIGNIFICANT CHANGE UP (ref 150–400)
POTASSIUM SERPL-MCNC: 3.7 MMOL/L — SIGNIFICANT CHANGE UP (ref 3.5–5.3)
POTASSIUM SERPL-SCNC: 3.7 MMOL/L — SIGNIFICANT CHANGE UP (ref 3.5–5.3)
PROT SERPL-MCNC: 5.6 G/DL — LOW (ref 6.6–8.7)
PROT SERPL-MCNC: 5.6 G/DL — LOW (ref 6.6–8.7)
RBC # BLD: 3.52 M/UL — LOW (ref 4.2–5.8)
RBC # FLD: 16.6 % — HIGH (ref 10.3–14.5)
SODIUM SERPL-SCNC: 138 MMOL/L — SIGNIFICANT CHANGE UP (ref 135–145)
WBC # BLD: 6.99 K/UL — SIGNIFICANT CHANGE UP (ref 3.8–10.5)
WBC # FLD AUTO: 6.99 K/UL — SIGNIFICANT CHANGE UP (ref 3.8–10.5)

## 2022-03-14 PROCEDURE — U0003: CPT

## 2022-03-14 PROCEDURE — 84484 ASSAY OF TROPONIN QUANT: CPT

## 2022-03-14 PROCEDURE — U0005: CPT

## 2022-03-14 PROCEDURE — 85018 HEMOGLOBIN: CPT

## 2022-03-14 PROCEDURE — 84443 ASSAY THYROID STIM HORMONE: CPT

## 2022-03-14 PROCEDURE — 85027 COMPLETE CBC AUTOMATED: CPT

## 2022-03-14 PROCEDURE — 85025 COMPLETE CBC W/AUTO DIFF WBC: CPT

## 2022-03-14 PROCEDURE — 99285 EMERGENCY DEPT VISIT HI MDM: CPT

## 2022-03-14 PROCEDURE — 82330 ASSAY OF CALCIUM: CPT

## 2022-03-14 PROCEDURE — 82247 BILIRUBIN TOTAL: CPT

## 2022-03-14 PROCEDURE — 36415 COLL VENOUS BLD VENIPUNCTURE: CPT

## 2022-03-14 PROCEDURE — 84436 ASSAY OF TOTAL THYROXINE: CPT

## 2022-03-14 PROCEDURE — 93005 ELECTROCARDIOGRAM TRACING: CPT

## 2022-03-14 PROCEDURE — 82248 BILIRUBIN DIRECT: CPT

## 2022-03-14 PROCEDURE — 83735 ASSAY OF MAGNESIUM: CPT

## 2022-03-14 PROCEDURE — 82435 ASSAY OF BLOOD CHLORIDE: CPT

## 2022-03-14 PROCEDURE — 84295 ASSAY OF SERUM SODIUM: CPT

## 2022-03-14 PROCEDURE — 83605 ASSAY OF LACTIC ACID: CPT

## 2022-03-14 PROCEDURE — 71045 X-RAY EXAM CHEST 1 VIEW: CPT

## 2022-03-14 PROCEDURE — 83880 ASSAY OF NATRIURETIC PEPTIDE: CPT

## 2022-03-14 PROCEDURE — 99232 SBSQ HOSP IP/OBS MODERATE 35: CPT

## 2022-03-14 PROCEDURE — 84132 ASSAY OF SERUM POTASSIUM: CPT

## 2022-03-14 PROCEDURE — 80076 HEPATIC FUNCTION PANEL: CPT

## 2022-03-14 PROCEDURE — 82947 ASSAY GLUCOSE BLOOD QUANT: CPT

## 2022-03-14 PROCEDURE — 96374 THER/PROPH/DIAG INJ IV PUSH: CPT

## 2022-03-14 PROCEDURE — 80053 COMPREHEN METABOLIC PANEL: CPT

## 2022-03-14 PROCEDURE — 82803 BLOOD GASES ANY COMBINATION: CPT

## 2022-03-14 PROCEDURE — 85014 HEMATOCRIT: CPT

## 2022-03-14 PROCEDURE — 99239 HOSP IP/OBS DSCHRG MGMT >30: CPT

## 2022-03-14 RX ORDER — ASPIRIN/CALCIUM CARB/MAGNESIUM 324 MG
1 TABLET ORAL
Qty: 0 | Refills: 0 | DISCHARGE
Start: 2022-03-14

## 2022-03-14 RX ORDER — FUROSEMIDE 40 MG
1 TABLET ORAL
Qty: 0 | Refills: 0 | DISCHARGE

## 2022-03-14 RX ORDER — APIXABAN 2.5 MG/1
1 TABLET, FILM COATED ORAL
Qty: 60 | Refills: 0
Start: 2022-03-14 | End: 2022-04-12

## 2022-03-14 RX ORDER — FUROSEMIDE 40 MG
40 TABLET ORAL DAILY
Refills: 0 | Status: DISCONTINUED | OUTPATIENT
Start: 2022-03-14 | End: 2022-03-14

## 2022-03-14 RX ORDER — METOPROLOL TARTRATE 50 MG
1 TABLET ORAL
Qty: 30 | Refills: 0
Start: 2022-03-14 | End: 2022-04-12

## 2022-03-14 RX ORDER — METOPROLOL TARTRATE 50 MG
1 TABLET ORAL
Qty: 0 | Refills: 0 | DISCHARGE

## 2022-03-14 RX ORDER — APIXABAN 2.5 MG/1
1 TABLET, FILM COATED ORAL
Qty: 0 | Refills: 0 | DISCHARGE

## 2022-03-14 RX ORDER — ACETAMINOPHEN 500 MG
2 TABLET ORAL
Qty: 0 | Refills: 0 | DISCHARGE
Start: 2022-03-14

## 2022-03-14 RX ORDER — FUROSEMIDE 40 MG
1 TABLET ORAL
Qty: 30 | Refills: 0
Start: 2022-03-14 | End: 2022-04-12

## 2022-03-14 RX ORDER — DIAZEPAM 5 MG
1 TABLET ORAL
Qty: 0 | Refills: 0 | DISCHARGE
Start: 2022-03-14

## 2022-03-14 RX ORDER — ASPIRIN/CALCIUM CARB/MAGNESIUM 324 MG
1 TABLET ORAL
Qty: 0 | Refills: 0 | DISCHARGE

## 2022-03-14 RX ADMIN — Medication 10 MILLIGRAM(S): at 06:32

## 2022-03-14 RX ADMIN — Medication 81 MILLIGRAM(S): at 08:32

## 2022-03-14 RX ADMIN — Medication 50 MILLIGRAM(S): at 06:32

## 2022-03-14 RX ADMIN — APIXABAN 2.5 MILLIGRAM(S): 2.5 TABLET, FILM COATED ORAL at 06:32

## 2022-03-14 RX ADMIN — Medication 40 MILLIGRAM(S): at 08:34

## 2022-03-14 NOTE — DISCHARGE NOTE PROVIDER - HOSPITAL COURSE
88 year old male with a past medical history of Afib on Eliquis, CAD status post PCI, ischemic cardiomyopathy systolic HFr EF of 25%, hypertension, and hyperlipidemia who presented to the ER with complaints of difficulty breathing. Pt was noted with A fib RVR. Pt was admitted with A fib RVR, given metoprolol with eventual rate control. Pt maintained on metoprolol 50mg er qd. Pt also noted with JOE on CKD3 as well as congestion on imaging and exam with elevated probnp and acute systolic HF exacerbation which responded to 2 doses of IV lasix. With improved sob. Pt able to be weaned off of supplemental o2 2l to RA without any complications. Evaluated by cardiology and recommended to continue with increased lasix at home to 40mg po qd, c/w metoprolol and vasotec but d/c imdur for now. No need for repeat TTE per cardio. Pt to f/u with pmd in 3-5 days, cardio in 1-2 weeks. Pt also advised to receive referral for nephrology outpt.

## 2022-03-14 NOTE — PROGRESS NOTE ADULT - SUBJECTIVE AND OBJECTIVE BOX
TATIANA RIBERA  [id number]      Chief Complaint:  CHF/AF with RVR    Interval History:  Patient feeling much better, states back to baseline.  No further SOB.  Denies CP, palps.    Tele:  SR with intermittent A and V pacing.  No AF.      acetaminophen     Tablet .. 650 milliGRAM(s) Oral every 6 hours PRN  aluminum hydroxide/magnesium hydroxide/simethicone Suspension 30 milliLiter(s) Oral every 4 hours PRN  apixaban 2.5 milliGRAM(s) Oral every 12 hours  aspirin enteric coated 81 milliGRAM(s) Oral daily  atorvastatin 10 milliGRAM(s) Oral at bedtime  diazepam    Tablet 2 milliGRAM(s) Oral at bedtime  enalapril 10 milliGRAM(s) Oral daily  furosemide   Injectable 40 milliGRAM(s) IV Push daily  melatonin 3 milliGRAM(s) Oral at bedtime PRN  metoprolol succinate ER 50 milliGRAM(s) Oral daily  ondansetron Injectable 4 milliGRAM(s) IV Push every 8 hours PRN          Physical Exam:  T(C): 36.6 (03-14-22 @ 08:08), Max: 36.6 (03-14-22 @ 04:39)  HR: 75 (03-14-22 @ 08:08) (74 - 89)  BP: 126/69 (03-14-22 @ 08:08) (100/59 - 126/69)  RR: 18 (03-14-22 @ 08:08) (18 - 18)  SpO2: 99% (03-14-22 @ 08:08) (99% - 100%)  Wt(kg): --  General: Comfortable in NAD  Neck: No JVD  CVS: nl s1s2, no s3  Pulm: CTA b/l  Abd: soft, non-tender  Ext: No c/c/e  Neuro A&O x3  Psych: Normal affect      Labs:   14 Mar 2022 03:03    138    |  102    |  44.6   ----------------------------<  68     3.7     |  19.0   |  1.68     Ca    8.1        14 Mar 2022 03:03  Mg     2.2       14 Mar 2022 03:03    TPro  5.6    /  Alb  3.1    /  TBili  0.7    /  DBili  x      /  AST  387    /  ALT  412    /  AlkPhos  106    14 Mar 2022 03:03                          10.8   6.99  )-----------( 198      ( 14 Mar 2022 03:03 )             34.2       CARDIAC MARKERS ( 13 Mar 2022 07:29 )  x     / 0.02 ng/mL / x     / x     / x          Echo (1/2022):   1. Left ventricular ejection fraction, by visual estimation, is 20 to 25%. No LV thrombus.   2. Severely decreased global left ventricular systolic function.   3. Multiple left ventricular regional wall motion abnormalities exist.    4. Severely enlarged left atrium.   5. There is mild septal left ventricular hypertrophy.   6. Mildly reduced RV systolic function.   7. Moderate to severe mitral valve regurgitation.   8. Thickening and calcification of the anterior and posterior mitral valve leaflets.   9. Moderate tricuspid regurgitation.  10. Mild aortic regurgitation.  11. Endocardial visualization was enhanced with intravenous echo contrast.  12. Moderately decreased mitral leaflet mobility.      Assessment:  88yMale PMHX  Afib on Eliquis, CAD status post PCI, ischemic cardiomyopathy with an EF of 25% Hx of MI in 1988 and STEMI in 1/2022  status post arthrectomy and PCI to the RCA p/w worsening SOB.  In ER found to be in AF with RVR and CHF.  Now s/p diuresis and converted back to SR.  Patient feeling back to baseline.    Plan:  1. Lasix IV today and then can change to po.  2. Do not need to repeat echo, recently done.  3. Continue other current CV meds at current doses.  Good regimen for CAD/ICM.  4. Eliquis @ 2.5mg BID for CVA PPX.  5. OOB/ambulate.  6. D/c planning, possibly later today vs. tomorrow with OP f/u with [facility].

## 2022-03-14 NOTE — DISCHARGE NOTE PROVIDER - NSDCCPCAREPLAN_GEN_ALL_CORE_FT
PRINCIPAL DISCHARGE DIAGNOSIS  Diagnosis: Atrial fibrillation with RVR  Assessment and Plan of Treatment: Continue with taking the metoprolol as prescribed. Please continue with the lower dosage of eliquis as prescribed. You will continue with eliquis 2.5mg orally twice daily.  This was decreased secondary to your chronic kidney dysfunction.  Your medications were sent to [facility] pharmacy.      SECONDARY DISCHARGE DIAGNOSES  Diagnosis: JOE (acute kidney injury)  Assessment and Plan of Treatment: Acute kidney failure on chronic kidney disease. Your current creatine is 1.6. Please monitor your kidney function closely. Please follow up with your primary care physician and referral for nephrologist. Avoid any medications that may harm the kidney.    Diagnosis: Acute systolic congestive heart failure  Assessment and Plan of Treatment: Continue with medications as prescribed, your lasix was increased from 20mg orally daily to 40mg orally daily.   Continue with metoprolol and vasotec as prescribed. Fluid restriction 1.5 liters a day and reduced salt. Follow up with [facility] in 1-2 weeks for further management. Your medications were sent to [facility] pharmacy.    Diagnosis: Transaminitis  Assessment and Plan of Treatment: Your liver enzymes were noted to be elevated, please monitor these levels. This may be because you received atorvastatin in the hospital and take livalo at home. Please have these re checked in 3-5 days with your primary care physician. If these levels remain elevated you have to discontinue this medication. You may also need further imaging performed on your liver based on the repeat numbers.    Diagnosis: CAD (coronary artery disease)  Assessment and Plan of Treatment: Continue with medications as prescribed. Please discontinue your imdur medication at this time. When you follow up with [facility] they will readdress if this medication should be restarted.

## 2022-03-14 NOTE — DISCHARGE NOTE PROVIDER - ATTENDING DISCHARGE PHYSICAL EXAMINATION:
CONSTITUTIONAL: Well groomed, awake, alert and in no apparent distress  CARDIAC: Irregularly irregular Heart sounds S1, S2.  No murmurs, rubs or gallops   RESPIRATORY: Breath sounds clear and equal bilaterally. No wheezes, rhales or rhonchi  GASTROENTEROLOGY: soft nt nd bs +normoactive   EXTREMITIES: No edema, cyanosis or deformity   NEUROLOGICAL: Alert and oriented x 3   SKIN: No rash, skin turgor wnl

## 2022-03-14 NOTE — DISCHARGE NOTE NURSING/CASE MANAGEMENT/SOCIAL WORK - NSDCPEFALRISK_GEN_ALL_CORE
For information on Fall & Injury Prevention, visit: [email or web address] OR  [email or web address] OR  https://www.cdc.gov/steadi/patient.html

## 2022-03-14 NOTE — DISCHARGE NOTE PROVIDER - NSDCMRMEDTOKEN_GEN_ALL_CORE_FT
acetaminophen 325 mg oral tablet: 2 tab(s) orally every 6 hours, As needed, Temp greater or equal to 38C (100.4F), Mild Pain (1 - 3)  apixaban 2.5 mg oral tablet: 1 tab(s) orally every 12 hours  aspirin 81 mg oral delayed release tablet: 1 tab(s) orally once a day  diazePAM 2 mg oral tablet: 1 tab(s) orally once a day (at bedtime)  enalapril 10 mg oral tablet: 1 tab(s) orally once a day  Lasix 40 mg oral tablet: 1 tab(s) orally once a day   Livalo 2 mg oral tablet: 1 tab(s) orally once a day  metoprolol succinate 50 mg oral tablet, extended release: 1 tab(s) orally once a day

## 2022-03-14 NOTE — DISCHARGE NOTE PROVIDER - CARE PROVIDER_API CALL
Key Fischer)  Cardiovascular Disease; Internal Medicine  [address], [address]  [place] [address]  Phone: [phone]  Fax: [phone]  Follow Up Time:

## 2022-03-14 NOTE — DISCHARGE NOTE NURSING/CASE MANAGEMENT/SOCIAL WORK - PATIENT PORTAL LINK FT
You can access the FollowMyHealth Patient Portal offered by [facility] by registering at the following website: [email or web address]. By joining [employer]’s FollowMyHealth portal, you will also be able to view your health information using other applications (apps) compatible with our system.

## 2022-03-14 NOTE — DISCHARGE NOTE PROVIDER - NSDCFUADDAPPT_GEN_ALL_CORE_FT
Please follow up with your primary care physician in 3-5 days. Please follow up with the cardiologist - [facility] in 1-2 weeks

## 2022-03-14 NOTE — DISCHARGE NOTE NURSING/CASE MANAGEMENT/SOCIAL WORK - NSDCFUADDAPPT_GEN_ALL_CORE_FT
Please follow up with your primary care physician in 3-5 days. Please follow up with the cardiologist - [facility] in 1-2 weeks

## 2022-07-20 NOTE — PROGRESS NOTE ADULT - ASSESSMENT
Subjective:       Patient ID: Kailee Malone is a 78 y.o. female.    Chief Complaint:  No chief complaint on file.      History of Present Illness  This pleasant 78 year old right handed  female presents to the clinic with  for follow up of tremors. Patient states the tremors are a little better since increasing the propranolol to 60 mg daily.  Patient states tremors started 20 years ago. She states she can drink from a cup, eat with a fork but sometimes does better with a spoon, she can sometimes use a screw , she can put on her make up and reports decreased hand . She states her hand witting is horrible. She denies diabetes but has anxiety and depression that is treated with prozac. She states she was tested for ROMINA two years ago and test results were negative. She desires not to have the sleep study at this time but will consider it in the future. She states tremors are worse at rest and better with activity. There was no resting tremor, voice tremor or head tremor noted at today's visit. There was a slight hand tremor with outstretched arms. She denies social embarrassment or functional disability from the tremors. PMH includes tremors, cervix cancer, HTN, RLS, GERDs, and Kirkpatrick's Esophagus. Family history includes father with tremors, DM, CHF and cancer. She denies smoking or drinking alcohol. She states she had a total hysterectomy 25 years ago. She is currently on Propranolol 60 mg daily and is tolerating this medication without side effects. She states she monitors her heart rate and blood pressure prior to taking this medication and states her heart rate runs mid 60's to low 70's. Discussed with the patient and  increasing the propranolol to 80 mg daily of the propranolol LA. They are in agreement with this. Discussed the plan in detail with Neurologist Dr. MOY Domínguez, the patient and  and they are in agreement with the plan. All their questions were answered at  today's visit.      MRI of the brain with and without contrast done on April 12, 2022 showed no acute abnormality.       Review of Systems  Review of Systems   Constitutional: Negative for activity change, diaphoresis, fever and unexpected weight change.   HENT: Negative for congestion, ear pain, facial swelling, hearing loss, tinnitus, trouble swallowing and voice change.    Eyes: Negative for photophobia, pain and visual disturbance.   Respiratory: Negative for chest tightness, shortness of breath and wheezing.    Cardiovascular: Negative for chest pain, palpitations and leg swelling.   Gastrointestinal: Negative for constipation, diarrhea, nausea and vomiting.   Genitourinary: Negative for difficulty urinating.   Musculoskeletal: Negative for back pain, gait problem, neck pain and neck stiffness.   Skin: Negative for color change, pallor, rash and wound.   Neurological: Positive for tremors. Negative for dizziness, seizures, syncope, facial asymmetry, speech difficulty, weakness, light-headedness, numbness and headaches.   Psychiatric/Behavioral: Negative for agitation, behavioral problems, confusion, decreased concentration and hallucinations. The patient is not nervous/anxious and is not hyperactive.        Objective:      Neurologic Exam     Mental Status   Oriented to person, place, and time.   Oriented to person.   Oriented to place.   Oriented to time.   Attention: normal. Concentration: normal.   Speech: speech is normal   Level of consciousness: alert  Knowledge: good.     Cranial Nerves     CN II   Visual fields full to confrontation.     CN III, IV, VI   Pupils are equal, round, and reactive to light.  Extraocular motions are normal.   Right pupil: Size: 3 mm. Shape: regular. Reactivity: brisk.   Left pupil: Size: 3 mm. Shape: regular. Reactivity: brisk.   CN III: no CN III palsy  CN VI: no CN VI palsy    CN V   Facial sensation intact.     CN VII   Facial expression full, symmetric.     CN VIII   CN  VIII normal.   Hearing: intact    CN IX, X   CN IX normal.   CN X normal.     CN XI   CN XI normal.     CN XII   CN XII normal.     Motor Exam   Muscle bulk: normal  Overall muscle tone: normal    Strength   Right deltoid: 5/5  Left deltoid: 5/5  Right biceps: 5/5  Left biceps: 5/5  Right triceps: 5/5  Left triceps: 5/5  Right quadriceps: 5/5  Left quadriceps: 5/5  Right hamstrin/5  Left hamstrin/5    Sensory Exam   Light touch normal.     Gait, Coordination, and Reflexes     Gait  Gait: normal    Coordination   Romberg: positive  Finger to nose coordination: normal    Tremor   Resting tremor: absent  Intention tremor: absent  Action tremor: absent    Reflexes   Right brachioradialis: 2+  Left brachioradialis: 2+  Right biceps: 2+  Left biceps: 2+  Right triceps: 2+  Left triceps: 2+  Right patellar: 2+  Left patellar: 2+  Right achilles: 2+  Left achilles: 2+  Right : 4+  Left : 4+      Physical Exam  Constitutional:       General: She is not in acute distress.  HENT:      Head: Normocephalic.      Nose: Nose normal.      Mouth/Throat:      Mouth: Mucous membranes are moist.   Eyes:      Extraocular Movements: Extraocular movements intact and EOM normal.      Pupils: Pupils are equal, round, and reactive to light.   Cardiovascular:      Rate and Rhythm: Normal rate and regular rhythm.      Heart sounds: Normal heart sounds. No murmur heard.  Pulmonary:      Effort: Pulmonary effort is normal. No respiratory distress.      Breath sounds: Normal breath sounds. No wheezing, rhonchi or rales.   Musculoskeletal:         General: No swelling, tenderness, deformity or signs of injury. Normal range of motion.      Cervical back: Normal range of motion. No rigidity or tenderness.      Right lower leg: No edema.      Left lower leg: No edema.   Skin:     General: Skin is warm and dry.      Capillary Refill: Capillary refill takes less than 2 seconds.      Coloration: Skin is not jaundiced or pale.       Findings: No bruising, erythema, lesion or rash.   Neurological:      Mental Status: She is alert and oriented to person, place, and time.      Coordination: Romberg Test abnormal. Finger-Nose-Finger Test normal.      Gait: Gait is intact.      Deep Tendon Reflexes:      Reflex Scores:       Tricep reflexes are 2+ on the right side and 2+ on the left side.       Bicep reflexes are 2+ on the right side and 2+ on the left side.       Brachioradialis reflexes are 2+ on the right side and 2+ on the left side.       Patellar reflexes are 2+ on the right side and 2+ on the left side.       Achilles reflexes are 2+ on the right side and 2+ on the left side.  Psychiatric:         Mood and Affect: Mood normal.         Speech: Speech normal.         Behavior: Behavior normal.           Assessment:     Problem List Items Addressed This Visit        Neuro    Tremors of nervous system - Primary    Relevant Medications    propranoloL (INDERAL LA) 80 MG 24 hr capsule       Palliative Care    On long term drug therapy       Other    Sleep apnea            Plan:       1. Consider sleep study in the future when patient is ready  2. Regular follow up with PCP for medical management and lab work   3. Renew and increase propranolol 80 LA mg one tablet daily, monitor heart rate and blood pressure before taking   4. Consider ROLAND scan in the future if needed  5. Follow up with Neurology in 3 months or sooner if needed   Pt is an 84yo Male with HTN, HLD, CAD/MI/stents, PAF (non compliant with eliquis and will not take AC), admitted to [facility] s/p 2 syncopal syncope x 2, found to be bradycardic requiring PPM. awaiting ID clearance for PPM placement

## 2023-09-10 NOTE — DISCHARGE NOTE ADULT - NS MD DC PLAN IMMU FLU PROVIDE INFO
Patient urine culture resulted with  S. saprophyticus . Susceptible to Sulfonamides. Patient was given Rx for sulfamethoxazole/ trimethoprim. Therapy is appropriate coverage. No further follow-up required.    Microbiology Results (last 10 days)       Procedure Component Value - Date/Time    Urine Culture - Urine, Urine, Clean Catch [[id number]]  (Abnormal)  (Susceptibility) Collected: 09/08/23 0129    Lab Status: Final result Specimen: Urine, Clean Catch Updated: 09/10/23 0912     Urine Culture >100,000 CFU/mL Staphylococcus saprophyticus    Narrative:      Colonization of the urinary tract without infection is common. Treatment is discouraged unless the patient is symptomatic, pregnant, or undergoing an invasive urologic procedure.    Susceptibility        Staphylococcus saprophyticus      GINGER      Clindamycin Susceptible      Inducible Clindamycin Resistance Negative      Oxacillin Susceptible      Tetracycline Susceptible      Trimethoprim + Sulfamethoxazole Susceptible      Vancomycin Susceptible                       Susceptibility Comments       Staphylococcus saprophyticus    This isolate does not demonstrate inducible clindamycin resistance in vitro.                         Katty Whalen RPH  9/10/2023 10:16 EDT     Risks/benefits discussed with patient or patient surrogate

## 2023-10-02 NOTE — PROGRESS NOTE ADULT - PROBLEM SELECTOR PROBLEM 8
Coronary artery disease involving native coronary artery of native heart without angina pectoris right posterior thigh/done Hyperlipidemia, unspecified hyperlipidemia type

## 2023-10-13 NOTE — PROGRESS NOTE ADULT - PROBLEM SELECTOR PLAN 6
diet restriction- states he is allergic to cholesterol pills Skyrizi Counseling: I discussed with the patient the risks of risankizumab-rzaa including but not limited to immunosuppression, and serious infections.  The patient understands that monitoring is required including a PPD at baseline and must alert us or the primary physician if symptoms of infection or other concerning signs are noted.

## 2024-11-02 NOTE — PROGRESS NOTE ADULT - SUBJECTIVE AND OBJECTIVE BOX
Patient: TATIANA RIBERA [id number] 83y Male                 Internal Medicine Hospitalist Progress Note - Dr. Chago Gaspar    Chief Complaint: Patient is a 83y old  Male who presents with a chief complaint of sob, fainted (08 Jan 2018 23:19)    HPI:  Pt is an 82yo Male with HTN, HLD, CAD/MI/stents, PAF (non compliant with eliquis and will not take AC), admitted to [facility] s/p 2 syncopal  syncope x 2 today which was preceded by SOB and dizziness. Denies prior syncopal episodes in the past. When further questioned about syncope/SOB pt is quick to relate all symptoms to his recent influenza/URI symptoms x 2 weeks.  In ED, noted to have episodes of bradycardia.  Seen by EP and found to have indication for PPM.  Seen by ID for evaluation prior to PPM placement.  Blood cultures obtained.    denies cough /fever / chills/CP/SOB/palpitations. continue to have b/l foot and ankle pain. long-standing history. wants bengay. helps. started on lidociane and tylenol. helping but anxious about taking meds. does not want to take new meds and change dose.    BC*2 neg not available.   awaiting PPM placement. need ID clearance.     Tele: longest pause 2.5 sec. PVC    ROS:  CONSTITUTIONAL:  No distress.   HEENT:  Eyes:  No diplopia or blurred vision.   CARDIOVASCULAR:  No pressure, squeezing, tightness, heaviness or aching about the chest; no palpitations. no leg swelling, no orthopnea or PND  RESPIRATORY:  no SOB. no wheezing. no cough or sputum.  No hemoptysis  GI: no nausea, no vomiting, no diarrhea, no constipation. No hematochezia or melena  ext: as per HPI  SKIN: no rash  CNS: No headaches. No weakness. no numbness. No depression or anxiety. No SI      ____________________PHYSICAL EXAM:  Vital Signs Last 24 Hrs  T(C): 36.6 (17 Jan 2018 09:37), Max: 36.9 (16 Jan 2018 16:07)  T(F): 97.9 (17 Jan 2018 09:37), Max: 98.5 (16 Jan 2018 16:07)  HR: 82 (17 Jan 2018 09:37) (72 - 82)  BP: 115/63 (17 Jan 2018 09:37) (115/63 - 148/72)  BP(mean): --  RR: 20 (17 Jan 2018 09:37) (18 - 20)  SpO2: 96% (17 Jan 2018 05:30) (96% - 99%)    LABS:    no labs today    MEDICATIONS  (STANDING):  aspirin enteric coated 325 milliGRAM(s) Oral daily  diazepam    Tablet 2 milliGRAM(s) Oral two times a day  enalapril 10 milliGRAM(s) Oral daily  enoxaparin Injectable 68 milliGRAM(s) SubCutaneous every 12 hours  prasugrel 10 milliGRAM(s) Oral daily  predniSONE   Tablet 5 milliGRAM(s) Oral daily  sodium chloride 0.9% lock flush 3 milliLiter(s) IV Push every 8 hours    MEDICATIONS  (PRN):  acetaminophen   Tablet. 325 milliGRAM(s) Oral every 4 hours PRN pain  guaiFENesin    Syrup 100 milliGRAM(s) Oral every 8 hours PRN Cough  lidocaine 2% Gel 1 Application(s) Topical two times a day PRN pain  ondansetron Injectable 4 milliGRAM(s) IV Push every 8 hours PRN Nausea Labor at Term

## 2025-03-07 NOTE — DIETITIAN INITIAL EVALUATION ADULT. - PHYSICAL ASSESSMENT ORBITAL
For information on Fall & Injury Prevention, visit: [email or web address] OR  [email or web address] OR  https://www.cdc.gov/steadi/patient.html moderate